# Patient Record
Sex: FEMALE | Race: WHITE | NOT HISPANIC OR LATINO | Employment: OTHER | URBAN - METROPOLITAN AREA
[De-identification: names, ages, dates, MRNs, and addresses within clinical notes are randomized per-mention and may not be internally consistent; named-entity substitution may affect disease eponyms.]

---

## 2017-02-01 ENCOUNTER — OFFICE VISIT (OUTPATIENT)
Dept: INTERNAL MEDICINE | Facility: CLINIC | Age: 67
End: 2017-02-01

## 2017-02-01 VITALS
HEIGHT: 58 IN | BODY MASS INDEX: 25.19 KG/M2 | SYSTOLIC BLOOD PRESSURE: 118 MMHG | DIASTOLIC BLOOD PRESSURE: 76 MMHG | WEIGHT: 120 LBS | TEMPERATURE: 98.3 F | HEART RATE: 72 BPM | RESPIRATION RATE: 16 BRPM

## 2017-02-01 DIAGNOSIS — I10 ESSENTIAL HYPERTENSION: Primary | ICD-10-CM

## 2017-02-01 DIAGNOSIS — K76.89 LIVER CYST: ICD-10-CM

## 2017-02-01 DIAGNOSIS — E06.3 HASHIMOTO'S THYROIDITIS: ICD-10-CM

## 2017-02-01 DIAGNOSIS — N28.1 KIDNEY CYSTS: ICD-10-CM

## 2017-02-01 DIAGNOSIS — Z00.00 PHYSICAL EXAM: ICD-10-CM

## 2017-02-01 LAB
ALBUMIN SERPL-MCNC: 4.3 G/DL (ref 3.2–4.8)
ALBUMIN/GLOB SERPL: 1.6 G/DL (ref 1.5–2.5)
ALP SERPL-CCNC: 73 U/L (ref 25–100)
ALT SERPL W P-5'-P-CCNC: 19 U/L (ref 7–40)
ANION GAP SERPL CALCULATED.3IONS-SCNC: 7 MMOL/L (ref 3–11)
ARTICHOKE IGE QN: 132 MG/DL (ref 0–130)
AST SERPL-CCNC: 23 U/L (ref 0–33)
BILIRUB SERPL-MCNC: 1 MG/DL (ref 0.3–1.2)
BUN BLD-MCNC: 29 MG/DL (ref 9–23)
BUN/CREAT SERPL: 26.4 (ref 7–25)
CALCIUM SPEC-SCNC: 10.3 MG/DL (ref 8.7–10.4)
CHLORIDE SERPL-SCNC: 105 MMOL/L (ref 99–109)
CHOLEST SERPL-MCNC: 233 MG/DL (ref 0–200)
CO2 SERPL-SCNC: 29 MMOL/L (ref 20–31)
CREAT BLD-MCNC: 1.1 MG/DL (ref 0.6–1.3)
GFR SERPL CREATININE-BSD FRML MDRD: 49 ML/MIN/1.73
GLOBULIN UR ELPH-MCNC: 2.7 GM/DL
GLUCOSE BLD-MCNC: 93 MG/DL (ref 70–100)
HCV AB SER DONR QL: NORMAL
HDLC SERPL-MCNC: 63 MG/DL (ref 40–60)
POTASSIUM BLD-SCNC: 3.9 MMOL/L (ref 3.5–5.5)
PROT SERPL-MCNC: 7 G/DL (ref 5.7–8.2)
SODIUM BLD-SCNC: 141 MMOL/L (ref 132–146)
TRIGL SERPL-MCNC: 96 MG/DL (ref 0–150)

## 2017-02-01 PROCEDURE — 99214 OFFICE O/P EST MOD 30 MIN: CPT | Performed by: INTERNAL MEDICINE

## 2017-02-01 PROCEDURE — 80053 COMPREHEN METABOLIC PANEL: CPT | Performed by: INTERNAL MEDICINE

## 2017-02-01 PROCEDURE — 80061 LIPID PANEL: CPT | Performed by: INTERNAL MEDICINE

## 2017-02-01 PROCEDURE — 86803 HEPATITIS C AB TEST: CPT | Performed by: INTERNAL MEDICINE

## 2017-02-01 PROCEDURE — 36415 COLL VENOUS BLD VENIPUNCTURE: CPT | Performed by: INTERNAL MEDICINE

## 2017-02-01 RX ORDER — LEVOTHYROXINE SODIUM 75 MCG
75 TABLET ORAL DAILY
Refills: 0 | COMMUNITY
Start: 2017-01-30 | End: 2021-05-20 | Stop reason: SDUPTHER

## 2017-02-01 NOTE — PROGRESS NOTES
Chief Complaint   Patient presents with   • EXTENDED FOLLOW UP       History of Present Illness      The patient presents for an established patient follow-up visit and a health maintenance visit. The patient has had a colonoscopy. The last colonoscopy was in 2016. Most recent colonoscopy findings: polyps. She has had a prior mammogram. Her last mammogram was in 2016 and it revealed no abnormalities. The patient states that she has gotten the pneumococcal vaccinations from [facility] and from another office. The patient states she has not had the zoster vaccination.    The patient presents for a follow-up related to hypertension. The patient reports that she has had palpitationsbut she denies having headaches, chest pain, dyspnea, edema, syncope or blurred vision. She states that she is taking her medication as prescribed. She reports that she is experiencing lightheadedness due to the medication. She checks her blood pressures at home. The home readings are low. She reports 3 episodes of hypotension, as low as 70/40.    As relates to hypothyroidism, the patient reports that she is fatigued. She reports no hair loss, heat intolerance, cold intolerance, diarrhea, constipation or sweats. She is taking her medication as prescribed her medication as prescribed. The patient has a thyroid nodule that is being monitored by Dr. Harris.    The patient states that she was diagnosed with liver and kidney cysts during an outside hospitalization. She reports this was nearly one year ago. The patient does have a family history of polycystic kidney disease.    Medications      Current Outpatient Prescriptions:   •  acetaminophen (TYLENOL) 500 MG tablet, Take 500 mg by mouth As Needed for mild pain (1-3)., Disp: , Rfl:   •  albuterol (PROVENTIL HFA;VENTOLIN HFA) 108 (90 BASE) MCG/ACT inhaler, Inhale 2 puffs every 4 (four) hours as needed for wheezing., Disp: 1 inhaler, Rfl: 5  •  aspirin 81 MG EC tablet, Take 81 mg by mouth daily.,  "Disp: , Rfl:   •  cycloSPORINE (RESTASIS) 0.05 % ophthalmic emulsion, Administer 2 drops to both eyes 2 (two) times a day., Disp: , Rfl:   •  diclofenac (VOLTAREN) 1 % gel gel, Apply 4 g topically 4 (four) times a day as needed., Disp: , Rfl:   •  fexofenadine (ALLEGRA) 180 MG tablet, Take 180 mg by mouth daily as needed., Disp: , Rfl:   •  fluticasone-salmeterol (ADVAIR) 100-50 MCG/DOSE DISKUS, Inhale 1 puff 2 (two) times a day., Disp: 60 each, Rfl: 5  •  losartan-hydrochlorothiazide (HYZAAR) 100-25 MG per tablet, Take 1 tablet by mouth Daily., Disp: 90 tablet, Rfl: 3  •  metoprolol succinate XL (TOPROL XL) 200 MG 24 hr tablet, Take 1 tablet by mouth Daily. Patient wants brand name, Disp: 90 tablet, Rfl: 3  •  SYNTHROID 75 MCG tablet, Take 1 tablet by mouth Daily., Disp: , Rfl: 0  •  Triamcinolone Acetonide (NASACORT) 55 MCG/ACT nasal inhaler, 2 sprays into each nostril daily., Disp: , Rfl:      Allergies    Allergies   Allergen Reactions   • Ace Inhibitors Cough   • Plaquenil [Hydroxychloroquine Sulfate] Other (See Comments)     EYE PROBLEMS   • Sudafed [Pseudoephedrine Hcl] Other (See Comments)     INCREASED PRESSURE IN EYES    • Tenormin [Atenolol] Cough       Problem List    Patient Active Problem List   Diagnosis   • Asthma   • Sinus tachycardia   • Hypertension   • Peripheral vascular disease   • GERD (gastroesophageal reflux disease)   • Diverticulosis   • Colon polyps   • Constipation   • Hashimoto's thyroiditis   • Mixed connective tissue disease   • Lupus   • Uterine fibroid   • Diverticulitis of small intestine   • Palpitations   • SVT (supraventricular tachycardia)       Physical Examination    Visit Vitals   • /76 (BP Location: Right arm, Patient Position: Sitting, Cuff Size: Adult)   • Pulse 72   • Temp 98.3 °F (36.8 °C) (Temporal Artery )   • Resp 16   • Ht 58.25\" (148 cm)   • Wt 120 lb (54.4 kg)   • LMP  (LMP Unknown)  Comment: 2 YEARS AGO BY DR PLATT   • Breastfeeding No   • BMI 24.87 kg/m2 "       HEENT: Head- Normocephalic Atraumatic. Facies- Within normal limits. Pinnas- Normal texture and shape bilaterally. Canals- Normal bilaterally. TMs- Normal bilaterally. Nares- Patent bilaterally. Nasal Septum- is normal. Lids- Normal bilaterally. Conjunctiva- Clear bilaterally. Sclera- Anicteric bilaterally. Oropharynx- Moist with no lesions. Tonsils- No enlargement, erythema or exudate.    Neck: Thyroid- non enlarged, symmetric and has no nodules. No bruits are detected. ROM- Normal Range of Motion with no rigidity.    Lymph Nodes: Cervical- no enlarged lymph nodes noted. Clavicular- Deferred. Axillary- Deferred. Inguinal- Deferred.    Lungs: Auscultation- Clear to auscultation bilaterally. There are no retractions, clubbing or cyanosis. The Expiratory to Inspiratory ratio is equal.    Cardiovascular: There are no carotid bruits. Heart- Normal Rate with Regular rhythm and no murmurs. There are no gallops. There are no rubs. In the lower extremities there is no edema. The upper extremities do not have edema.    Abdomen: Noted to have scarringbut is not noted to have rigidity, a mass, a hernia, an enlarged liver, an enlarged spleen, an enlarged right kidney, an enlarged left kidney, tenderness or pulsatile mass. The scarring is located in the right lower quadrant and pelvic midline.    Breast: Normal contours bilaterally with no masses, discharge, skin changes or lumps. There are no scars noted.    The patient has no worrisome or suspicious skin lesions noted.    Impression and Assessment    Normal Physical Examination.    Renal cyst     Liver cyst     Hypertension.     Hypothyroidism.     Plan    Liver cyst Plan: Further plans will be made after the tests are reviewed.    Hypertension Plan: The medications were changed as noted.    Hypothyroidism Plan: Dr. Harris obtained TSH a few days ago and adjusted her medications accordingly.    Renal cyst Plan: Further plans will be made after test results are  available.    Counseling was provided regarding: Adequate Exercise and Healthy Diet.    The following was ordered for screening and health maintenance: Hepatitis C Antibody and Lipid Profile.       Immunizations Ordered and Administered: None.    Kavitha was seen today for extended follow up.    Diagnoses and all orders for this visit:    Essential hypertension  -     Comprehensive Metabolic Panel    Hashimoto's thyroiditis    Physical exam  -     Hepatitis C Antibody  -     Lipid Panel    Liver cyst  -     CT Abdomen With Contrast; Future    Kidney cysts  -     CT Abdomen With Contrast; Future        Return to Office    The patient was instructed to return for follow-up in 6 months. Next Visit: Follow-up.    The patient was instructed to return sooner if the condition changes, worsens, or doesn't resolve.

## 2017-02-03 ENCOUNTER — TELEPHONE (OUTPATIENT)
Dept: INTERNAL MEDICINE | Facility: CLINIC | Age: 67
End: 2017-02-03

## 2017-02-03 RX ORDER — LOSARTAN POTASSIUM AND HYDROCHLOROTHIAZIDE 12.5; 1 MG/1; MG/1
1 TABLET ORAL DAILY
Qty: 30 TABLET | Refills: 5 | Status: SHIPPED | OUTPATIENT
Start: 2017-02-03 | End: 2017-07-31 | Stop reason: SDUPTHER

## 2017-02-03 NOTE — TELEPHONE ENCOUNTER
Ok she stated that it was suppose to be decreased from 100/25 to the 100/12.5 she thought that he was going to send this in yesterday

## 2017-02-03 NOTE — TELEPHONE ENCOUNTER
----- Message from Ragini Burt sent at 2/3/2017  2:53 PM EST -----  Contact: self  Was under the assumption that you were going to call in losartan-hydrochlorothiazide (HYZAAR) 100-12.5 MG per tablet?    Call back number [phone]

## 2017-02-06 ENCOUNTER — HOSPITAL ENCOUNTER (OUTPATIENT)
Dept: CT IMAGING | Facility: HOSPITAL | Age: 67
Discharge: HOME | End: 2017-02-06
Attending: INTERNAL MEDICINE | Admitting: INTERNAL MEDICINE

## 2017-02-06 DIAGNOSIS — N28.1 KIDNEY CYSTS: ICD-10-CM

## 2017-02-06 DIAGNOSIS — K76.89 LIVER CYST: ICD-10-CM

## 2017-02-06 PROCEDURE — 74160 CT ABDOMEN W/CONTRAST: CPT

## 2017-02-06 PROCEDURE — 0 IOPAMIDOL 61 % SOLUTION: Performed by: INTERNAL MEDICINE

## 2017-02-06 RX ADMIN — IOPAMIDOL 75 ML: 612 INJECTION, SOLUTION INTRAVENOUS at 14:55

## 2017-03-03 ENCOUNTER — OFFICE VISIT (OUTPATIENT)
Dept: CARDIOLOGY | Facility: CLINIC | Age: 67
End: 2017-03-03

## 2017-03-03 VITALS
SYSTOLIC BLOOD PRESSURE: 130 MMHG | HEIGHT: 59 IN | BODY MASS INDEX: 25 KG/M2 | WEIGHT: 124 LBS | DIASTOLIC BLOOD PRESSURE: 88 MMHG | HEART RATE: 78 BPM

## 2017-03-03 DIAGNOSIS — I10 ESSENTIAL HYPERTENSION: ICD-10-CM

## 2017-03-03 DIAGNOSIS — R00.2 PALPITATIONS: Primary | ICD-10-CM

## 2017-03-03 PROCEDURE — 99213 OFFICE O/P EST LOW 20 MIN: CPT | Performed by: INTERNAL MEDICINE

## 2017-03-03 RX ORDER — CELECOXIB 100 MG/1
100 CAPSULE ORAL 2 TIMES DAILY PRN
COMMUNITY
End: 2017-11-07 | Stop reason: SDUPTHER

## 2017-03-03 NOTE — PROGRESS NOTES
Encounter Date:2017      Patient ID: Kavitha Barnhart is a 66 y.o. female.    Chief Complaint: Follow-up of palpitations and hypertension    Problem List:  1. Palpitations/possible atrial tachycardia:  a. Patient was admitted for diverticulitis complicated by bowel perforation on 2016, and while in the hospital began having palpitations and shortness of breath that showed possible atrial tachycardia versus atrial fibrillation.   b. Patient reports a long-standing history of palpitations and tachycardia.   c. Echocardiogram at [facility], 01/10/2016, showed LVH with ejection fraction of 60% to 65%. No significant valve disease.   d. A 24-hour Holter, 2016, showed sinus rhythm with average heart rate of 85. Rare PVCs and PACs with occasional short runs of SVT/PAD. No symptoms were reported.   2. Hypertension.   3. Diverticulitis complicated by perforated sigmoid colon:  a. Currently awaiting surgery at the beginning of 2016. He was hospitalized at [facility], 2016 for approximately 2 weeks and treated with antibiotics.   4. Lupus and mixed connective tissue disease.   5. Raynaud’s disease.  6. Sjögren's disease.  7. Hashimoto disease.  8. Surgical history:  a. .  b. Knee surgery.    History of Present Illness     Mrs. Price is a 66 yr old white female with the above noted medical history who presents today for 3 month follow-up of her palpitations and hypertension.  She continues to have occasional episodes of palpitations that are not bothersome.  Her blood pressure has been fairly well- controlled at home.  Dr. Reddnig decreased the HCTZ dose in her Hyzaar due to episodes of dizziness. She denies any symptoms of chest pain, shortness of breath, syncope, PND or orthopnea.  She has mild edema. She recently had an abnormal TSH and her medications were adjusted.    Allergies   Allergen Reactions   • Ace Inhibitors Cough   • Plaquenil  [Hydroxychloroquine Sulfate] Other (See Comments)     EYE PROBLEMS   • Sudafed [Pseudoephedrine Hcl] Other (See Comments)     INCREASED PRESSURE IN EYES    • Tenormin [Atenolol] Cough         Current Outpatient Prescriptions:   •  acetaminophen (TYLENOL) 500 MG tablet, Take 500 mg by mouth As Needed for mild pain (1-3)., Disp: , Rfl:   •  albuterol (PROVENTIL HFA;VENTOLIN HFA) 108 (90 BASE) MCG/ACT inhaler, Inhale 2 puffs every 4 (four) hours as needed for wheezing., Disp: 1 inhaler, Rfl: 5  •  aspirin 81 MG EC tablet, Take 81 mg by mouth daily., Disp: , Rfl:   •  celecoxib (CeleBREX) 100 MG capsule, Take 100 mg by mouth Daily As Needed for mild pain (1-3)., Disp: , Rfl:   •  cycloSPORINE (RESTASIS) 0.05 % ophthalmic emulsion, Administer 1 drop to both eyes 2 (Two) Times a Day., Disp: , Rfl:   •  diclofenac (VOLTAREN) 1 % gel gel, Apply 4 g topically 4 (four) times a day as needed., Disp: , Rfl:   •  fexofenadine (ALLEGRA) 180 MG tablet, Take 180 mg by mouth daily as needed., Disp: , Rfl:   •  fluticasone-salmeterol (ADVAIR) 100-50 MCG/DOSE DISKUS, Inhale 1 puff 2 (two) times a day., Disp: 60 each, Rfl: 5  •  losartan-hydrochlorothiazide (HYZAAR) 100-12.5 MG per tablet, Take 1 tablet by mouth Daily., Disp: 30 tablet, Rfl: 5  •  metoprolol succinate XL (TOPROL XL) 200 MG 24 hr tablet, Take 1 tablet by mouth Daily. Patient wants brand name, Disp: 90 tablet, Rfl: 3  •  SYNTHROID 75 MCG tablet, Take 75 mcg by mouth Daily., Disp: , Rfl: 0  •  Triamcinolone Acetonide (NASACORT) 55 MCG/ACT nasal inhaler, 2 sprays into each nostril daily., Disp: , Rfl:     The following portions of the patient's history were reviewed and updated as appropriate: allergies, current medications, past family history, past medical history, past social history, past surgical history and problem list.    Review of Systems   Constitution: Negative.   HENT: Negative.    Eyes: Negative.    Cardiovascular: Positive for leg swelling and palpitations.  "Negative for chest pain, dyspnea on exertion, irregular heartbeat, near-syncope, orthopnea, paroxysmal nocturnal dyspnea and syncope.   Respiratory: Negative.    Endocrine: Negative.    Hematologic/Lymphatic: Negative.    Skin: Negative.    Musculoskeletal: Positive for arthritis, joint pain and joint swelling.   Gastrointestinal: Negative.    Genitourinary: Negative.    Neurological: Positive for dizziness.   Psychiatric/Behavioral: Negative.        Objective:     Blood pressure 130/88, pulse 78, height 59\" (149.9 cm), weight 124 lb (56.2 kg),       Physical Exam   Constitutional: She is oriented to person, place, and time. She appears well-developed and well-nourished.   HENT:   Head: Normocephalic and atraumatic.   Neck: Normal range of motion. No thyromegaly present.   Cardiovascular: Normal rate, regular rhythm, normal heart sounds and intact distal pulses.  Exam reveals no gallop and no friction rub.    No murmur heard.  Pulmonary/Chest: Effort normal and breath sounds normal. No respiratory distress. She has no wheezes. She has no rales.   Abdominal: Soft. Bowel sounds are normal.   Musculoskeletal: Normal range of motion. She exhibits no edema.   Neurological: She is alert and oriented to person, place, and time.   Skin: Skin is warm and dry.   Psychiatric: She has a normal mood and affect. Her behavior is normal. Judgment and thought content normal.   Nursing note and vitals reviewed.      Lab Review:     Procedures        Assessment:        Diagnosis Plan   1. Palpitations     2. Essential hypertension         Plan:      Continue current medications.   FU in 12 MO, sooner as needed.  Thank you for allowing us to participate in the care of your patient.     Scribed for Naveen Sarmiento MD by Cielo Ghotra RN. 3/3/2017  3:44 PM     I, Naveen Sarmiento MD, personally performed the services described in this documentation as scribed by the above named individual in my presence, and it is both accurate and complete.  " 3/3/2017  3:57 PM

## 2017-04-07 ENCOUNTER — TRANSCRIBE ORDERS (OUTPATIENT)
Dept: MAMMOGRAPHY | Facility: HOSPITAL | Age: 67
End: 2017-04-07

## 2017-04-07 DIAGNOSIS — Z12.31 VISIT FOR SCREENING MAMMOGRAM: Primary | ICD-10-CM

## 2017-04-21 ENCOUNTER — HOSPITAL ENCOUNTER (OUTPATIENT)
Dept: MAMMOGRAPHY | Facility: HOSPITAL | Age: 67
Discharge: HOME | End: 2017-04-21
Attending: OBSTETRICS & GYNECOLOGY | Admitting: OBSTETRICS & GYNECOLOGY

## 2017-04-21 DIAGNOSIS — Z12.31 VISIT FOR SCREENING MAMMOGRAM: ICD-10-CM

## 2017-04-21 PROCEDURE — 77063 BREAST TOMOSYNTHESIS BI: CPT | Performed by: RADIOLOGY

## 2017-04-21 PROCEDURE — G0202 SCR MAMMO BI INCL CAD: HCPCS | Performed by: RADIOLOGY

## 2017-04-21 PROCEDURE — G0202 SCR MAMMO BI INCL CAD: HCPCS

## 2017-04-21 PROCEDURE — 77063 BREAST TOMOSYNTHESIS BI: CPT

## 2017-05-03 ENCOUNTER — OFFICE VISIT (OUTPATIENT)
Dept: INTERNAL MEDICINE | Facility: CLINIC | Age: 67
End: 2017-05-03

## 2017-05-03 VITALS
TEMPERATURE: 99.5 F | HEART RATE: 72 BPM | DIASTOLIC BLOOD PRESSURE: 82 MMHG | WEIGHT: 126 LBS | SYSTOLIC BLOOD PRESSURE: 144 MMHG | BODY MASS INDEX: 25.45 KG/M2 | RESPIRATION RATE: 14 BRPM

## 2017-05-03 DIAGNOSIS — J45.20 MILD INTERMITTENT ASTHMA WITHOUT COMPLICATION: ICD-10-CM

## 2017-05-03 DIAGNOSIS — I10 ESSENTIAL HYPERTENSION: Primary | ICD-10-CM

## 2017-05-03 DIAGNOSIS — K21.9 GASTROESOPHAGEAL REFLUX DISEASE WITHOUT ESOPHAGITIS: ICD-10-CM

## 2017-05-03 LAB
ALBUMIN SERPL-MCNC: 4.2 G/DL (ref 3.2–4.8)
ALBUMIN/GLOB SERPL: 1.6 G/DL (ref 1.5–2.5)
ALP SERPL-CCNC: 66 U/L (ref 25–100)
ALT SERPL W P-5'-P-CCNC: 28 U/L (ref 7–40)
ANION GAP SERPL CALCULATED.3IONS-SCNC: 5 MMOL/L (ref 3–11)
AST SERPL-CCNC: 27 U/L (ref 0–33)
BILIRUB SERPL-MCNC: 0.7 MG/DL (ref 0.3–1.2)
BUN BLD-MCNC: 29 MG/DL (ref 9–23)
BUN/CREAT SERPL: 29 (ref 7–25)
CALCIUM SPEC-SCNC: 10 MG/DL (ref 8.7–10.4)
CHLORIDE SERPL-SCNC: 107 MMOL/L (ref 99–109)
CO2 SERPL-SCNC: 30 MMOL/L (ref 20–31)
CREAT BLD-MCNC: 1 MG/DL (ref 0.6–1.3)
GFR SERPL CREATININE-BSD FRML MDRD: 55 ML/MIN/1.73
GLOBULIN UR ELPH-MCNC: 2.6 GM/DL
GLUCOSE BLD-MCNC: 95 MG/DL (ref 70–100)
POTASSIUM BLD-SCNC: 4.4 MMOL/L (ref 3.5–5.5)
PROT SERPL-MCNC: 6.8 G/DL (ref 5.7–8.2)
SODIUM BLD-SCNC: 142 MMOL/L (ref 132–146)

## 2017-05-03 PROCEDURE — 36415 COLL VENOUS BLD VENIPUNCTURE: CPT | Performed by: INTERNAL MEDICINE

## 2017-05-03 PROCEDURE — 99214 OFFICE O/P EST MOD 30 MIN: CPT | Performed by: INTERNAL MEDICINE

## 2017-05-03 PROCEDURE — 80053 COMPREHEN METABOLIC PANEL: CPT | Performed by: INTERNAL MEDICINE

## 2017-05-03 RX ORDER — LOTEPREDNOL ETABONATE 5 MG/G
1 GEL OPHTHALMIC DAILY PRN
COMMUNITY
Start: 2017-04-18 | End: 2017-11-07

## 2017-07-31 RX ORDER — LOSARTAN POTASSIUM AND HYDROCHLOROTHIAZIDE 12.5; 1 MG/1; MG/1
TABLET ORAL
Qty: 30 TABLET | Refills: 5 | Status: SHIPPED | OUTPATIENT
Start: 2017-07-31 | End: 2018-02-13 | Stop reason: SDUPTHER

## 2017-11-07 ENCOUNTER — OFFICE VISIT (OUTPATIENT)
Dept: INTERNAL MEDICINE | Facility: CLINIC | Age: 67
End: 2017-11-07

## 2017-11-07 VITALS
TEMPERATURE: 97.1 F | HEART RATE: 64 BPM | WEIGHT: 131 LBS | BODY MASS INDEX: 26.46 KG/M2 | DIASTOLIC BLOOD PRESSURE: 82 MMHG | RESPIRATION RATE: 16 BRPM | SYSTOLIC BLOOD PRESSURE: 146 MMHG

## 2017-11-07 DIAGNOSIS — I10 ESSENTIAL HYPERTENSION: Primary | ICD-10-CM

## 2017-11-07 DIAGNOSIS — K21.9 GASTROESOPHAGEAL REFLUX DISEASE WITHOUT ESOPHAGITIS: ICD-10-CM

## 2017-11-07 DIAGNOSIS — J45.20 MILD INTERMITTENT ASTHMA WITHOUT COMPLICATION: ICD-10-CM

## 2017-11-07 LAB
ALBUMIN SERPL-MCNC: 4.1 G/DL (ref 3.2–4.8)
ALBUMIN/GLOB SERPL: 1.8 G/DL (ref 1.5–2.5)
ALP SERPL-CCNC: 74 U/L (ref 25–100)
ALT SERPL W P-5'-P-CCNC: 26 U/L (ref 7–40)
ANION GAP SERPL CALCULATED.3IONS-SCNC: 7 MMOL/L (ref 3–11)
AST SERPL-CCNC: 24 U/L (ref 0–33)
BILIRUB SERPL-MCNC: 0.7 MG/DL (ref 0.3–1.2)
BUN BLD-MCNC: 24 MG/DL (ref 9–23)
BUN/CREAT SERPL: 20 (ref 7–25)
CALCIUM SPEC-SCNC: 9.6 MG/DL (ref 8.7–10.4)
CHLORIDE SERPL-SCNC: 107 MMOL/L (ref 99–109)
CO2 SERPL-SCNC: 28 MMOL/L (ref 20–31)
CREAT BLD-MCNC: 1.2 MG/DL (ref 0.6–1.3)
GFR SERPL CREATININE-BSD FRML MDRD: 45 ML/MIN/1.73
GLOBULIN UR ELPH-MCNC: 2.3 GM/DL
GLUCOSE BLD-MCNC: 103 MG/DL (ref 70–100)
POTASSIUM BLD-SCNC: 4 MMOL/L (ref 3.5–5.5)
PROT SERPL-MCNC: 6.4 G/DL (ref 5.7–8.2)
SODIUM BLD-SCNC: 142 MMOL/L (ref 132–146)

## 2017-11-07 PROCEDURE — 36415 COLL VENOUS BLD VENIPUNCTURE: CPT | Performed by: INTERNAL MEDICINE

## 2017-11-07 PROCEDURE — 99214 OFFICE O/P EST MOD 30 MIN: CPT | Performed by: INTERNAL MEDICINE

## 2017-11-07 PROCEDURE — 80053 COMPREHEN METABOLIC PANEL: CPT | Performed by: INTERNAL MEDICINE

## 2017-11-07 RX ORDER — CELECOXIB 100 MG/1
100 CAPSULE ORAL 2 TIMES DAILY PRN
Qty: 60 CAPSULE | Refills: 5 | Status: SHIPPED | OUTPATIENT
Start: 2017-11-07 | End: 2019-02-26 | Stop reason: SDUPTHER

## 2017-11-07 RX ORDER — ALBUTEROL SULFATE 90 UG/1
2 AEROSOL, METERED RESPIRATORY (INHALATION) EVERY 4 HOURS PRN
Qty: 1 INHALER | Refills: 5 | Status: SHIPPED | OUTPATIENT
Start: 2017-11-07 | End: 2019-02-26 | Stop reason: SDUPTHER

## 2017-11-07 NOTE — PROGRESS NOTES
Chief Complaint   Patient presents with   • Follow-up     6 month follow up chronic medical problems       History of Present Illness    The patient presents for a follow-up related to hypertension. The patient reports that she has had no headaches, chest pain, dyspnea, edema, syncope, blurred vision or palpitations. She states that she is taking her medication as prescribed. She is not having medication side effects.    The patient is not on medication for her gastroesophageal reflux. She reports no abdominal pain, belching, diarrhea, dysphagia, early satiety, heartburn, hoarseness, nausea, odynophagia, rectal bleeding, vomiting or weight loss. The GERD has no known aggravating factors.    As relates to the patient's asthma, rescue inhaler usage is reported to be daily. The patient reports that she has no known triggers. Since the last office visit she has not sought emergency care. She currently reports no cough or sputum production. The patient is using an inhaled steroid. She does rinse her mouth after using her steroid inhaler. She doesn't report thrush symptoms or a sore mouth.    Review of Systems    CARDIOVASCULAR- Denies Claudication.    PULMONARY- Denies Hemoptysis or Pleuritic Chest Pain.    Medications      Current Outpatient Prescriptions:   •  acetaminophen (TYLENOL) 500 MG tablet, Take 500 mg by mouth As Needed for mild pain (1-3)., Disp: , Rfl:   •  albuterol (PROVENTIL HFA;VENTOLIN HFA) 108 (90 Base) MCG/ACT inhaler, Inhale 2 puffs Every 4 (Four) Hours As Needed for Wheezing., Disp: 1 inhaler, Rfl: 5  •  aspirin 81 MG EC tablet, Take 81 mg by mouth daily., Disp: , Rfl:   •  celecoxib (CeleBREX) 100 MG capsule, Take 1 capsule by mouth 2 (Two) Times a Day As Needed for Mild Pain ., Disp: 60 capsule, Rfl: 5  •  cycloSPORINE (RESTASIS) 0.05 % ophthalmic emulsion, Administer 1 drop to both eyes 2 (Two) Times a Day., Disp: , Rfl:   •  diclofenac (VOLTAREN) 1 % gel gel, Apply 4 g topically 4 (four) times a  day as needed., Disp: , Rfl:   •  fexofenadine (ALLEGRA) 180 MG tablet, Take 180 mg by mouth daily as needed., Disp: , Rfl:   •  fluticasone-salmeterol (ADVAIR) 100-50 MCG/DOSE DISKUS, Inhale 1 puff 2 (Two) Times a Day., Disp: 60 each, Rfl: 5  •  losartan-hydrochlorothiazide (HYZAAR) 100-12.5 MG per tablet, take 1 tablet by mouth daily, Disp: 30 tablet, Rfl: 5  •  metoprolol succinate XL (TOPROL XL) 200 MG 24 hr tablet, Take 1 tablet by mouth Daily. Patient wants brand name, Disp: 90 tablet, Rfl: 3  •  SYNTHROID 75 MCG tablet, Take 75 mcg by mouth Daily., Disp: , Rfl: 0  •  Triamcinolone Acetonide (NASACORT) 55 MCG/ACT nasal inhaler, 2 sprays into each nostril daily., Disp: , Rfl:      Allergies    Allergies   Allergen Reactions   • Ace Inhibitors Cough   • Plaquenil [Hydroxychloroquine Sulfate] Other (See Comments)     EYE PROBLEMS   • Sudafed [Pseudoephedrine Hcl] Other (See Comments)     INCREASED PRESSURE IN EYES    • Tenormin [Atenolol] Cough       Problem List    Patient Active Problem List   Diagnosis   • Asthma   • Hypertension   • Peripheral vascular disease   • GERD (gastroesophageal reflux disease)   • Diverticulosis   • Colon polyps   • Constipation   • Hashimoto's thyroiditis   • Mixed connective tissue disease   • Lupus   • Uterine fibroid       Medications, Allergies, Problems List and Past History were reviewed and updated.    Physical Examination    /82 (BP Location: Left arm, Patient Position: Sitting, Cuff Size: Adult)  Pulse 64  Temp 97.1 °F (36.2 °C) (Temporal Artery )   Resp 16  Wt 131 lb (59.4 kg)  LMP  (LMP Unknown) Comment: 2 YEARS AGO BY DR PLATT  Breastfeeding? No  BMI 26.46 kg/m2    HEENT: Head- Normocephalic Atraumatic. Facies- Within normal limits. Pinnas- Normal texture and shape bilaterally. Canals- Normal bilaterally. TMs- Normal bilaterally. Nares- Patent bilaterally. Nasal Septum- is normal. There is no tenderness to palpation over the frontal or maxillary sinuses.  Lids- Normal bilaterally. Conjunctiva- Clear bilaterally. Sclera- Anicteric bilaterally. Oropharynx- Moist with no lesions. Tonsils- No enlargement, erythema or exudate.    Neck: Thyroid- non enlarged, symmetric and has no nodules. No bruits are detected. ROM- Normal Range of Motion with no rigidity.    Lungs: Auscultation- Clear to auscultation bilaterally. There are no retractions, clubbing or cyanosis. The Expiratory to Inspiratory ratio is equal.    Cardiovascular: There are no carotid bruits. Heart- Normal Rate with Regular rhythm and no murmurs. There are no gallops. There are no rubs. In the lower extremities there is no edema. The upper extremities do not have edema.    Abdomen: Soft, benign, non-tender with no masses, hernias, organomegaly or scars.    Impression and Assessment    Essential Hypertension.    Gastroesophageal Reflux Disease.    Asthma.    Plan    Gastroesophageal Reflux Disease Plan: The condition is stable. No change is needed in the current plan.    Essential Hypertension Plan: The current plan was continued.    Asthma Plan: The current plan was continued.    Kavitha was seen today for follow-up.    Diagnoses and all orders for this visit:    Essential hypertension  -     Comprehensive Metabolic Panel    Mild intermittent asthma without complication  -     fluticasone-salmeterol (ADVAIR) 100-50 MCG/DOSE DISKUS; Inhale 1 puff 2 (Two) Times a Day.  -     Comprehensive Metabolic Panel    Gastroesophageal reflux disease without esophagitis  -     Comprehensive Metabolic Panel    Other orders  -     albuterol (PROVENTIL HFA;VENTOLIN HFA) 108 (90 Base) MCG/ACT inhaler; Inhale 2 puffs Every 4 (Four) Hours As Needed for Wheezing.  -     celecoxib (CeleBREX) 100 MG capsule; Take 1 capsule by mouth 2 (Two) Times a Day As Needed for Mild Pain .        Return to Office    The patient was instructed to return for follow-up in 4 months. Next Visit: Physical.    The patient was instructed to return sooner  if the condition changes, worsens, or doesn't resolve.

## 2017-12-08 RX ORDER — METOPROLOL SUCCINATE 200 MG
TABLET, EXTENDED RELEASE 24 HR ORAL
Qty: 90 TABLET | Refills: 3 | Status: SHIPPED | OUTPATIENT
Start: 2017-12-08 | End: 2018-12-31 | Stop reason: SDUPTHER

## 2018-02-13 RX ORDER — LOSARTAN POTASSIUM AND HYDROCHLOROTHIAZIDE 12.5; 1 MG/1; MG/1
TABLET ORAL
Qty: 30 TABLET | Refills: 5 | Status: SHIPPED | OUTPATIENT
Start: 2018-02-13 | End: 2018-08-23 | Stop reason: SDUPTHER

## 2018-02-19 ENCOUNTER — OFFICE VISIT (OUTPATIENT)
Dept: INTERNAL MEDICINE | Facility: CLINIC | Age: 68
End: 2018-02-19

## 2018-02-19 VITALS
DIASTOLIC BLOOD PRESSURE: 76 MMHG | RESPIRATION RATE: 16 BRPM | WEIGHT: 132 LBS | SYSTOLIC BLOOD PRESSURE: 124 MMHG | BODY MASS INDEX: 26.66 KG/M2 | TEMPERATURE: 98.6 F | HEART RATE: 68 BPM

## 2018-02-19 DIAGNOSIS — S61.211A LACERATION OF LEFT INDEX FINGER WITHOUT FOREIGN BODY, NAIL DAMAGE STATUS UNSPECIFIED, INITIAL ENCOUNTER: Primary | ICD-10-CM

## 2018-02-19 PROCEDURE — 99212 OFFICE O/P EST SF 10 MIN: CPT | Performed by: INTERNAL MEDICINE

## 2018-02-19 PROCEDURE — 90715 TDAP VACCINE 7 YRS/> IM: CPT | Performed by: INTERNAL MEDICINE

## 2018-02-19 PROCEDURE — 90471 IMMUNIZATION ADMIN: CPT | Performed by: INTERNAL MEDICINE

## 2018-02-19 RX ORDER — ESTRADIOL 10 UG/1
1 INSERT VAGINAL 2 TIMES WEEKLY
Refills: 12 | COMMUNITY
Start: 2018-02-06 | End: 2020-07-07

## 2018-02-19 NOTE — PROGRESS NOTES
Chief Complaint   Patient presents with   • CUT TIP INDEX FINGER LEFT HAND     DONE 2/16/18        History of Present Illness    The patient presents after sustaining a laceration.    She presents for an initial evaluation with a laceration on the left second finger. This has been present for four days. The condition is non-painful. The patient has not had a tetanus shot in the past five years. No prior treatment has been administered. There is no evidence of infection.    Medications      Current Outpatient Prescriptions:   •  acetaminophen (TYLENOL) 500 MG tablet, Take 500 mg by mouth As Needed for mild pain (1-3)., Disp: , Rfl:   •  albuterol (PROVENTIL HFA;VENTOLIN HFA) 108 (90 Base) MCG/ACT inhaler, Inhale 2 puffs Every 4 (Four) Hours As Needed for Wheezing., Disp: 1 inhaler, Rfl: 5  •  aspirin 81 MG EC tablet, Take 81 mg by mouth daily., Disp: , Rfl:   •  celecoxib (CeleBREX) 100 MG capsule, Take 1 capsule by mouth 2 (Two) Times a Day As Needed for Mild Pain ., Disp: 60 capsule, Rfl: 5  •  cycloSPORINE (RESTASIS) 0.05 % ophthalmic emulsion, Administer 1 drop to both eyes 2 (Two) Times a Day., Disp: , Rfl:   •  diclofenac (VOLTAREN) 1 % gel gel, Apply 4 g topically 4 (four) times a day as needed., Disp: , Rfl:   •  estradiol (VAGIFEM) 10 MCG tablet vaginal tablet, Insert 1 tablet into the vagina 2 (Two) Times a Week., Disp: , Rfl: 12  •  fexofenadine (ALLEGRA) 180 MG tablet, Take 180 mg by mouth daily as needed., Disp: , Rfl:   •  fluticasone-salmeterol (ADVAIR) 100-50 MCG/DOSE DISKUS, Inhale 1 puff 2 (Two) Times a Day., Disp: 60 each, Rfl: 5  •  losartan-hydrochlorothiazide (HYZAAR) 100-12.5 MG per tablet, take 1 tablet by mouth once daily, Disp: 30 tablet, Rfl: 5  •  SYNTHROID 75 MCG tablet, Take 75 mcg by mouth Daily., Disp: , Rfl: 0  •  TOPROL  MG 24 hr tablet, take 1 tablet by mouth once daily, Disp: 90 tablet, Rfl: 3  •  Triamcinolone Acetonide (NASACORT) 55 MCG/ACT nasal inhaler, 2 sprays into each  nostril daily., Disp: , Rfl:      Allergies    Allergies   Allergen Reactions   • Ace Inhibitors Cough   • Plaquenil [Hydroxychloroquine Sulfate] Other (See Comments)     EYE PROBLEMS   • Sudafed [Pseudoephedrine Hcl] Other (See Comments)     INCREASED PRESSURE IN EYES    • Tenormin [Atenolol] Cough       Problem List    Patient Active Problem List   Diagnosis   • Asthma   • Hypertension   • Peripheral vascular disease   • GERD (gastroesophageal reflux disease)   • Diverticulosis   • Colon polyps   • Constipation   • Hashimoto's thyroiditis   • Mixed connective tissue disease   • Lupus   • Uterine fibroid       Medications, Allergies, Problems List and Past History were reviewed and updated.    Physical Examination    /76 (BP Location: Right arm, Patient Position: Sitting, Cuff Size: Adult)  Pulse 68  Temp 98.6 °F (37 °C) (Temporal Artery )   Resp 16  Wt 59.9 kg (132 lb)  LMP  (LMP Unknown) Comment: 2 YEARS AGO BY DR PALTT  Breastfeeding? No  BMI 26.66 kg/m2    The patient has a laceration on the left second finger. The laceration is pale, black and pink. The affected skin is lacerated in a curved fashion    Impression and Assessment    Laceration.    Encounter for Immunization Administration.    Plan    Laceration Plan: Skin care was discussed with the patient.    Counseled regarding immunizations and applicable VIS given.    Immunizations Ordered and Administered: Tdap.    Kavitha was seen today for cut tip index finger left hand.    Diagnoses and all orders for this visit:    Laceration of left index finger without foreign body, nail damage status unspecified, initial encounter  -     Tdap Vaccine Greater Than or Equal To 8yo IM        Return to Office    The patient was instructed to return for follow-up at the next scheduled visit.    The patient was instructed to return sooner if the condition changes, worsens, or doesn't resolve.

## 2018-02-28 ENCOUNTER — OFFICE VISIT (OUTPATIENT)
Dept: INTERNAL MEDICINE | Facility: CLINIC | Age: 68
End: 2018-02-28

## 2018-02-28 VITALS
HEART RATE: 72 BPM | TEMPERATURE: 96.9 F | WEIGHT: 133 LBS | HEIGHT: 55 IN | DIASTOLIC BLOOD PRESSURE: 78 MMHG | BODY MASS INDEX: 30.78 KG/M2 | SYSTOLIC BLOOD PRESSURE: 120 MMHG

## 2018-02-28 DIAGNOSIS — N39.0 URINARY TRACT INFECTION WITH HEMATURIA, SITE UNSPECIFIED: ICD-10-CM

## 2018-02-28 DIAGNOSIS — R31.9 URINARY TRACT INFECTION WITH HEMATURIA, SITE UNSPECIFIED: ICD-10-CM

## 2018-02-28 DIAGNOSIS — R31.9 HEMATURIA, UNSPECIFIED TYPE: Primary | ICD-10-CM

## 2018-02-28 DIAGNOSIS — Z23 NEED FOR PNEUMOCOCCAL VACCINE: ICD-10-CM

## 2018-02-28 DIAGNOSIS — K21.9 GASTROESOPHAGEAL REFLUX DISEASE WITHOUT ESOPHAGITIS: ICD-10-CM

## 2018-02-28 DIAGNOSIS — E03.9 HYPOTHYROIDISM, UNSPECIFIED TYPE: ICD-10-CM

## 2018-02-28 DIAGNOSIS — I10 ESSENTIAL HYPERTENSION: ICD-10-CM

## 2018-02-28 LAB
ALBUMIN SERPL-MCNC: 4.2 G/DL (ref 3.2–4.8)
ALBUMIN/GLOB SERPL: 1.9 G/DL (ref 1.5–2.5)
ALP SERPL-CCNC: 73 U/L (ref 25–100)
ALT SERPL W P-5'-P-CCNC: 21 U/L (ref 7–40)
ANION GAP SERPL CALCULATED.3IONS-SCNC: 8 MMOL/L (ref 3–11)
AST SERPL-CCNC: 19 U/L (ref 0–33)
BACTERIA UR QL AUTO: ABNORMAL /HPF
BILIRUB BLD-MCNC: NEGATIVE MG/DL
BILIRUB SERPL-MCNC: 0.6 MG/DL (ref 0.3–1.2)
BUN BLD-MCNC: 28 MG/DL (ref 9–23)
BUN/CREAT SERPL: 23.3 (ref 7–25)
CALCIUM SPEC-SCNC: 9.1 MG/DL (ref 8.7–10.4)
CHLORIDE SERPL-SCNC: 106 MMOL/L (ref 99–109)
CLARITY, POC: ABNORMAL
CO2 SERPL-SCNC: 26 MMOL/L (ref 20–31)
COLOR UR: YELLOW
CREAT BLD-MCNC: 1.2 MG/DL (ref 0.6–1.3)
EXPIRATION DATE: ABNORMAL
GFR SERPL CREATININE-BSD FRML MDRD: 45 ML/MIN/1.73
GLOBULIN UR ELPH-MCNC: 2.2 GM/DL
GLUCOSE BLD-MCNC: 86 MG/DL (ref 70–100)
GLUCOSE UR STRIP-MCNC: NEGATIVE MG/DL
HYALINE CASTS UR QL AUTO: ABNORMAL /LPF
KETONES UR QL: NEGATIVE
LEUKOCYTE EST, POC: ABNORMAL
Lab: ABNORMAL
NITRITE UR-MCNC: NEGATIVE MG/ML
PH UR: 5 [PH] (ref 5–8)
POTASSIUM BLD-SCNC: 3.8 MMOL/L (ref 3.5–5.5)
PROT SERPL-MCNC: 6.4 G/DL (ref 5.7–8.2)
PROT UR STRIP-MCNC: ABNORMAL MG/DL
RBC # UR STRIP: ABNORMAL /UL
RBC # UR: ABNORMAL /HPF
REF LAB TEST METHOD: ABNORMAL
SODIUM BLD-SCNC: 140 MMOL/L (ref 132–146)
SP GR UR: 1.01 (ref 1–1.03)
SQUAMOUS #/AREA URNS HPF: ABNORMAL /HPF
TSH SERPL DL<=0.05 MIU/L-ACNC: 0.77 MIU/ML (ref 0.35–5.35)
UROBILINOGEN UR QL: NORMAL
WBC UR QL AUTO: ABNORMAL /HPF

## 2018-02-28 PROCEDURE — 81003 URINALYSIS AUTO W/O SCOPE: CPT | Performed by: INTERNAL MEDICINE

## 2018-02-28 PROCEDURE — 80053 COMPREHEN METABOLIC PANEL: CPT | Performed by: INTERNAL MEDICINE

## 2018-02-28 PROCEDURE — 84443 ASSAY THYROID STIM HORMONE: CPT | Performed by: INTERNAL MEDICINE

## 2018-02-28 PROCEDURE — 36415 COLL VENOUS BLD VENIPUNCTURE: CPT | Performed by: INTERNAL MEDICINE

## 2018-02-28 PROCEDURE — 87077 CULTURE AEROBIC IDENTIFY: CPT | Performed by: INTERNAL MEDICINE

## 2018-02-28 PROCEDURE — 87186 SC STD MICRODIL/AGAR DIL: CPT | Performed by: INTERNAL MEDICINE

## 2018-02-28 PROCEDURE — 99214 OFFICE O/P EST MOD 30 MIN: CPT | Performed by: INTERNAL MEDICINE

## 2018-02-28 PROCEDURE — 90670 PCV13 VACCINE IM: CPT | Performed by: INTERNAL MEDICINE

## 2018-02-28 PROCEDURE — G0009 ADMIN PNEUMOCOCCAL VACCINE: HCPCS | Performed by: INTERNAL MEDICINE

## 2018-02-28 PROCEDURE — 87086 URINE CULTURE/COLONY COUNT: CPT | Performed by: INTERNAL MEDICINE

## 2018-02-28 PROCEDURE — 81015 MICROSCOPIC EXAM OF URINE: CPT | Performed by: INTERNAL MEDICINE

## 2018-02-28 RX ORDER — SULFAMETHOXAZOLE AND TRIMETHOPRIM 800; 160 MG/1; MG/1
1 TABLET ORAL 2 TIMES DAILY
Qty: 20 TABLET | Refills: 0 | Status: SHIPPED | OUTPATIENT
Start: 2018-02-28 | End: 2018-08-30

## 2018-03-02 LAB
BACTERIA SPEC AEROBE CULT: ABNORMAL
BACTERIA SPEC AEROBE CULT: ABNORMAL

## 2018-03-09 ENCOUNTER — OFFICE VISIT (OUTPATIENT)
Dept: CARDIOLOGY | Facility: CLINIC | Age: 68
End: 2018-03-09

## 2018-03-09 VITALS
DIASTOLIC BLOOD PRESSURE: 70 MMHG | OXYGEN SATURATION: 96 % | HEART RATE: 61 BPM | SYSTOLIC BLOOD PRESSURE: 110 MMHG | WEIGHT: 131.4 LBS | HEIGHT: 59 IN | BODY MASS INDEX: 26.49 KG/M2

## 2018-03-09 DIAGNOSIS — I10 ESSENTIAL HYPERTENSION: ICD-10-CM

## 2018-03-09 DIAGNOSIS — R00.2 PALPITATIONS: Primary | ICD-10-CM

## 2018-03-09 PROCEDURE — 99213 OFFICE O/P EST LOW 20 MIN: CPT | Performed by: INTERNAL MEDICINE

## 2018-03-09 RX ORDER — RIFAXIMIN 550 MG/1
TABLET ORAL DAILY
Refills: 0 | COMMUNITY
Start: 2018-03-05 | End: 2018-08-30

## 2018-03-09 RX ORDER — LIFITEGRAST 50 MG/ML
SOLUTION/ DROPS OPHTHALMIC
Refills: 6 | COMMUNITY
Start: 2018-03-07 | End: 2019-02-26

## 2018-03-09 NOTE — PROGRESS NOTES
Encounter Date:2018      Patient ID: Kavitha Barnhart is a 67 y.o. female.    Chief Complaint: Peripheral Vascular Disease and Hypertension      PROBLEM LIST:  1. Palpitations/possible atrial tachycardia:  a. Patient was admitted for diverticulitis complicated by bowel perforation on 2016, and while in the hospital began having palpitations and shortness of breath that showed possible atrial tachycardia versus atrial fibrillation.   b. Patient reports a long-standing history of palpitations and tachycardia.   c. Echocardiogram at [facility], 01/10/2016, showed LVH with ejection fraction of 60% to 65%. No significant valve disease.   d. A 24-hour Holter, 2016, showed sinus rhythm with average heart rate of 85. Rare PVCs and PACs with occasional short runs of SVT/PAD. No symptoms were reported.   2. Hypertension.   3. Diverticulitis complicated by perforated sigmoid colon:  a. Currently awaiting surgery at the beginning of 2016. He was hospitalized at [facility], 2016 for approximately 2 weeks and treated with antibiotics.   4. Lupus and mixed connective tissue disease.   5. Raynaud’s disease.  6. Sjögren's disease.  7. Hashimoto disease.  8. Surgical history:  a. .  b. Knee surgery.    History of Present Illness  Patient presents today for follow-up with a history of palpitations and cardiac risk factors. Since last visit has been doing well form a cardiac standpoint but does experience occasional palpitations with excessive exercise or alcohol intake. Does not monitor her caffeine intake, but states she has cut back a bit. Denies chest pain, shortness of breath, leg swelling, and syncope. Remains busy and active with an exercise class but is limited by palpitations if she pushes herself too hard.    Allergies   Allergen Reactions   • Ace Inhibitors Cough   • Plaquenil [Hydroxychloroquine Sulfate] Other (See Comments)     EYE PROBLEMS   •  Sudafed [Pseudoephedrine Hcl] Other (See Comments)     INCREASED PRESSURE IN EYES    • Tenormin [Atenolol] Cough         Current Outpatient Prescriptions:   •  albuterol (PROVENTIL HFA;VENTOLIN HFA) 108 (90 Base) MCG/ACT inhaler, Inhale 2 puffs Every 4 (Four) Hours As Needed for Wheezing., Disp: 1 inhaler, Rfl: 5  •  aspirin 81 MG EC tablet, Take 81 mg by mouth daily., Disp: , Rfl:   •  celecoxib (CeleBREX) 100 MG capsule, Take 1 capsule by mouth 2 (Two) Times a Day As Needed for Mild Pain ., Disp: 60 capsule, Rfl: 5  •  diclofenac (VOLTAREN) 1 % gel gel, Apply 4 g topically 4 (four) times a day as needed., Disp: , Rfl:   •  estradiol (VAGIFEM) 10 MCG tablet vaginal tablet, Insert 1 tablet into the vagina 2 (Two) Times a Week., Disp: , Rfl: 12  •  fexofenadine (ALLEGRA) 180 MG tablet, Take 180 mg by mouth daily as needed., Disp: , Rfl:   •  fluticasone-salmeterol (ADVAIR) 100-50 MCG/DOSE DISKUS, Inhale 1 puff 2 (Two) Times a Day., Disp: 60 each, Rfl: 5  •  losartan-hydrochlorothiazide (HYZAAR) 100-12.5 MG per tablet, take 1 tablet by mouth once daily, Disp: 30 tablet, Rfl: 5  •  sulfamethoxazole-trimethoprim (BACTRIM DS) 800-160 MG per tablet, Take 1 tablet by mouth 2 (Two) Times a Day., Disp: 20 tablet, Rfl: 0  •  SYNTHROID 75 MCG tablet, Take 75 mcg by mouth Daily., Disp: , Rfl: 0  •  TOPROL  MG 24 hr tablet, take 1 tablet by mouth once daily, Disp: 90 tablet, Rfl: 3  •  Triamcinolone Acetonide (NASACORT) 55 MCG/ACT nasal inhaler, 2 sprays into each nostril daily., Disp: , Rfl:   •  XIFAXAN 550 MG tablet, Daily., Disp: , Rfl: 0  •  XIIDRA 5 % solution, INT 1 GTT IN OU BID, Disp: , Rfl: 6    The following portions of the patient's history were reviewed and updated as appropriate: allergies, current medications, past family history, past medical history, past social history, past surgical history and problem list.    ROS  Review of Systems   Constitution: Negative for chills, fever, weight gain and weight  "loss.   Cardiovascular: Negative for chest pain, claudication, dyspnea on exertion, leg swelling, orthopnea, palpitations, paroxysmal nocturnal dyspnea and syncope.        No dizziness   Gastrointestinal: Negative for abdominal pain, constipation, diarrhea, nausea and vomiting.   Genitourinary:        No urinary symptoms   Neurological:        No symptoms of stroke.   All other systems reviewed and are negative.    Objective:     Blood pressure 110/70, pulse 61, height 149.9 cm (59\"), weight 59.6 kg (131 lb 6.4 oz), SpO2 96 %, not currently breastfeeding.      Physical Exam  Constitutional: She appears well-developed and well-nourished.   HENT:   HEENT exam unremarkable.   Neck: Neck supple. No JVD present.   No carotid bruits.   Cardiovascular: Normal rate, regular rhythm and normal heart sounds.    No murmur heard.  2 plus symmetric pulses.   Pulmonary/Chest: Breath sounds normal. Does not exhibit tenderness.   Abdominal:   Abdomen benign.   Musculoskeletal: Does not exhibit edema.   Neurological:   Neurological exam unremarkable.   Vitals reviewed.    Lab Review:   Procedures       Assessment:      Diagnosis Plan   1. Palpitations  Mostly well controlled with occasional exacerbation related to physical and emotional stress most probably related to awareness of physiologic tachycardia or PVCs.     2. Essential hypertension  Well controlled.  Continue current medication regimen.       Plan:   Maintain good hydration.  Decrease alcohol and caffeine intake.  Overall cardiac status is stable.  Continue current medications.   Follow-up with PCP for further care, follow-up with us as needed.  Thank you for allowing us to participate in the care of your patient.     Scribed for Naveen Sarmiento MD by Gio Whitaker. 3/9/2018  3:10 PM    INaveen MD, personally performed the services described in this documentation as scribed by the above named individual in my presence, and it is both accurate and complete.  3/9/2018  " 3:39 PM        Please note that portions of this note may have been completed with a voice recognition program. Efforts were made to edit the dictations, but occasionally words are mistranscribed.

## 2018-04-12 ENCOUNTER — TRANSCRIBE ORDERS (OUTPATIENT)
Dept: ADMINISTRATIVE | Facility: HOSPITAL | Age: 68
End: 2018-04-12

## 2018-04-12 DIAGNOSIS — Z12.31 VISIT FOR SCREENING MAMMOGRAM: Primary | ICD-10-CM

## 2018-04-25 ENCOUNTER — HOSPITAL ENCOUNTER (OUTPATIENT)
Dept: MAMMOGRAPHY | Facility: HOSPITAL | Age: 68
Discharge: HOME | End: 2018-04-25
Attending: INTERNAL MEDICINE | Admitting: INTERNAL MEDICINE

## 2018-04-25 DIAGNOSIS — Z12.31 VISIT FOR SCREENING MAMMOGRAM: ICD-10-CM

## 2018-04-25 PROCEDURE — 77067 SCR MAMMO BI INCL CAD: CPT

## 2018-04-25 PROCEDURE — 77063 BREAST TOMOSYNTHESIS BI: CPT | Performed by: RADIOLOGY

## 2018-04-25 PROCEDURE — 77063 BREAST TOMOSYNTHESIS BI: CPT

## 2018-04-25 PROCEDURE — 77067 SCR MAMMO BI INCL CAD: CPT | Performed by: RADIOLOGY

## 2018-07-06 ENCOUNTER — APPOINTMENT (OUTPATIENT)
Dept: MAMMOGRAPHY | Facility: HOSPITAL | Age: 68
End: 2018-07-06
Attending: INTERNAL MEDICINE

## 2018-07-12 ENCOUNTER — TELEPHONE (OUTPATIENT)
Dept: INTERNAL MEDICINE | Facility: CLINIC | Age: 68
End: 2018-07-12

## 2018-07-12 DIAGNOSIS — M79.671 FOOT PAIN, BILATERAL: Primary | ICD-10-CM

## 2018-07-12 DIAGNOSIS — M79.672 FOOT PAIN, BILATERAL: Primary | ICD-10-CM

## 2018-07-12 NOTE — TELEPHONE ENCOUNTER
----- Message from Malgorzata Rodriguez sent at 7/12/2018 11:29 AM EDT -----  Per voicemail from Miguel Wood, a physical therapist at [facility], he is requesting an order for physical therapy for patients bilateral foot pain.  Please advise.    Phone # for office [phone]  Fax # [phone]

## 2018-08-23 RX ORDER — LOSARTAN POTASSIUM AND HYDROCHLOROTHIAZIDE 12.5; 1 MG/1; MG/1
TABLET ORAL
Qty: 30 TABLET | Refills: 5 | Status: SHIPPED | OUTPATIENT
Start: 2018-08-23 | End: 2019-02-28 | Stop reason: SDUPTHER

## 2018-08-30 ENCOUNTER — OFFICE VISIT (OUTPATIENT)
Dept: INTERNAL MEDICINE | Facility: CLINIC | Age: 68
End: 2018-08-30

## 2018-08-30 VITALS
WEIGHT: 132 LBS | BODY MASS INDEX: 26.66 KG/M2 | HEART RATE: 64 BPM | DIASTOLIC BLOOD PRESSURE: 60 MMHG | TEMPERATURE: 98.6 F | RESPIRATION RATE: 16 BRPM | SYSTOLIC BLOOD PRESSURE: 112 MMHG

## 2018-08-30 DIAGNOSIS — I10 ESSENTIAL HYPERTENSION: Primary | ICD-10-CM

## 2018-08-30 DIAGNOSIS — K21.9 GASTROESOPHAGEAL REFLUX DISEASE WITHOUT ESOPHAGITIS: ICD-10-CM

## 2018-08-30 PROCEDURE — 36415 COLL VENOUS BLD VENIPUNCTURE: CPT | Performed by: INTERNAL MEDICINE

## 2018-08-30 PROCEDURE — 99213 OFFICE O/P EST LOW 20 MIN: CPT | Performed by: INTERNAL MEDICINE

## 2018-08-30 PROCEDURE — 80048 BASIC METABOLIC PNL TOTAL CA: CPT | Performed by: INTERNAL MEDICINE

## 2018-10-25 DIAGNOSIS — J45.20 MILD INTERMITTENT ASTHMA WITHOUT COMPLICATION: ICD-10-CM

## 2018-11-08 ENCOUNTER — OFFICE VISIT (OUTPATIENT)
Dept: INTERNAL MEDICINE | Facility: CLINIC | Age: 68
End: 2018-11-08

## 2018-11-08 VITALS
TEMPERATURE: 99.7 F | SYSTOLIC BLOOD PRESSURE: 148 MMHG | RESPIRATION RATE: 18 BRPM | BODY MASS INDEX: 27.27 KG/M2 | HEART RATE: 76 BPM | DIASTOLIC BLOOD PRESSURE: 86 MMHG | WEIGHT: 135 LBS

## 2018-11-08 DIAGNOSIS — J01.90 ACUTE NON-RECURRENT SINUSITIS, UNSPECIFIED LOCATION: Primary | ICD-10-CM

## 2018-11-08 PROCEDURE — 99214 OFFICE O/P EST MOD 30 MIN: CPT | Performed by: INTERNAL MEDICINE

## 2018-11-08 RX ORDER — METHYLPREDNISOLONE 4 MG/1
TABLET ORAL
Qty: 21 TABLET | Refills: 0 | Status: SHIPPED | OUTPATIENT
Start: 2018-11-08 | End: 2019-01-11

## 2018-11-08 RX ORDER — AZITHROMYCIN 250 MG/1
TABLET, FILM COATED ORAL
Qty: 6 TABLET | Refills: 0 | Status: SHIPPED | OUTPATIENT
Start: 2018-11-08 | End: 2018-12-08

## 2018-11-08 NOTE — PROGRESS NOTES
Chief Complaint   Patient presents with   • Sinusitis       History of Present Illness    The patient reports a 1 week history of upper respiratory symptoms. The patient has symptoms of a wet cough, facial pain, a headache, nasal congestion, rhinorrhea and sore throat but the patient does not have a dry cough, wheezing, fever, tooth pain, eye drainage, ear pain, ear drainage, nausea, vomiting, diarrhea, rash, decreased appetite or abdominal pain. The cough is non productive. The nasal discharge is yellow and thick. The patient has not tried anything for treatment of this illness.     Review of Systems    GENERAL/CONSTITUTIONAL- Denies Unexplained Weight Loss, Chills, Sweats, Fatigue, Weakness or Malaise.    HEENT- Denies Eye Pain, Decreased Vision, Sinus Pain, Decreased Hearing or Tinnitus.    GASTROINTESTINAL- Denies Blood per Rectum, Constipation or Heartburn.    PULMONARY- Reports: Cough. Denies: Dyspnea, Sputum Production or Hemoptysis.    Medications      Current Outpatient Prescriptions:   •  ADVAIR DISKUS 100-50 MCG/DOSE DISKUS, inhale 1 dose by mouth twice a day, Disp: 60 each, Rfl: 5  •  albuterol (PROVENTIL HFA;VENTOLIN HFA) 108 (90 Base) MCG/ACT inhaler, Inhale 2 puffs Every 4 (Four) Hours As Needed for Wheezing., Disp: 1 inhaler, Rfl: 5  •  aspirin 81 MG EC tablet, Take 81 mg by mouth daily., Disp: , Rfl:   •  celecoxib (CeleBREX) 100 MG capsule, Take 1 capsule by mouth 2 (Two) Times a Day As Needed for Mild Pain ., Disp: 60 capsule, Rfl: 5  •  diclofenac (VOLTAREN) 1 % gel gel, Apply 4 g topically 4 (four) times a day as needed., Disp: , Rfl:   •  estradiol (VAGIFEM) 10 MCG tablet vaginal tablet, Insert 1 tablet into the vagina 2 (Two) Times a Week., Disp: , Rfl: 12  •  fexofenadine (ALLEGRA) 180 MG tablet, Take 180 mg by mouth daily as needed., Disp: , Rfl:   •  losartan-hydrochlorothiazide (HYZAAR) 100-12.5 MG per tablet, take 1 tablet by mouth once daily, Disp: 30 tablet, Rfl: 5  •  SYNTHROID 75 MCG  tablet, Take 75 mcg by mouth Daily., Disp: , Rfl: 0  •  TOPROL  MG 24 hr tablet, take 1 tablet by mouth once daily, Disp: 90 tablet, Rfl: 3  •  Triamcinolone Acetonide (NASACORT) 55 MCG/ACT nasal inhaler, 2 sprays into each nostril daily., Disp: , Rfl:   •  XIIDRA 5 % solution, INT 1 GTT IN OU BID, Disp: , Rfl: 6  •  azithromycin (ZITHROMAX Z-CRISTINO) 250 MG tablet, Take 2 tablets the first day, then 1 tablet daily for 4 days., Disp: 6 tablet, Rfl: 0  •  MethylPREDNISolone (MEDROL, CRISTINO,) 4 MG tablet, Take as directed on package instructions., Disp: 21 tablet, Rfl: 0     Allergies    Allergies   Allergen Reactions   • Ace Inhibitors Cough   • Plaquenil [Hydroxychloroquine Sulfate] Other (See Comments)     EYE PROBLEMS   • Sudafed [Pseudoephedrine Hcl] Other (See Comments)     INCREASED PRESSURE IN EYES    • Tenormin [Atenolol] Cough and Angioedema       Problem List    Patient Active Problem List   Diagnosis   • Asthma   • Hypertension   • Peripheral vascular disease (CMS/HCC)   • GERD (gastroesophageal reflux disease)   • Diverticulosis   • Colon polyps   • Constipation   • Hashimoto's thyroiditis   • Mixed connective tissue disease (CMS/HCC)   • Lupus   • Uterine fibroid       Medications, Allergies, Problems List and Past History were reviewed and updated.    Physical Examination    /86 (BP Location: Left arm, Patient Position: Sitting, Cuff Size: Adult)   Pulse 76   Temp 99.7 °F (37.6 °C) (Temporal Artery )   Resp 18   Wt 61.2 kg (135 lb)   LMP  (LMP Unknown) Comment: 2 YEARS AGO BY DR PLATT  Breastfeeding? No   BMI 27.27 kg/m²     HEENT: Head- Normocephalic Atraumatic. Facies- Within normal limits. Pinnas- Normal texture and shape bilaterally. Canals- Normal bilaterally. TMs- Normal bilaterally. Nares- Patent bilaterally. Nasal Septum- is normal. There is no tenderness to palpation over the frontal or maxillary sinuses. Lids- Normal bilaterally. Conjunctiva- Clear bilaterally. Sclera- Anicteric  bilaterally. Oropharynx- Moist with no lesions. Tonsils- No enlargement, erythema or exudate.    Neck: Thyroid- non enlarged, symmetric and has no nodules. No bruits are detected. ROM- Normal Range of Motion with no rigidity.    Lymph Nodes: Cervical- no enlarged lymph nodes noted. Clavicular- Deferred. Axillary- Deferred. Inguinal- Deferred.    Lungs: Auscultation- Clear to auscultation bilaterally. There are no retractions, clubbing or cyanosis. The Expiratory to Inspiratory ratio is equal.    Cardiovascular: There are no carotid bruits. Heart- Normal Rate with Regular rhythm and no murmurs. There are no gallops. There are no rubs. In the lower extremities there is no edema. The upper extremities do not have edema.    Impression and Assessment    Acute Sinusitis.    Plan    Acute Sinusitis Plan: A cool mist humidifier was encouraged. She was instructed to use an over the counter cough suppressant. She was encouraged to drink plenty of fluids. Adequate rest was advised. Medication will be added as noted below.    Kavitha was seen today for sinusitis.    Diagnoses and all orders for this visit:    Acute non-recurrent sinusitis, unspecified location  -     azithromycin (ZITHROMAX Z-CRISTINO) 250 MG tablet; Take 2 tablets the first day, then 1 tablet daily for 4 days.  -     MethylPREDNISolone (MEDROL, CRISTINO,) 4 MG tablet; Take as directed on package instructions.        Return to Office    The patient was instructed to return for follow-up as needed.    The patient was instructed to return sooner if the condition changes, worsens, or doesn't resolve.

## 2018-12-31 ENCOUNTER — OFFICE VISIT (OUTPATIENT)
Dept: INTERNAL MEDICINE | Facility: CLINIC | Age: 68
End: 2018-12-31

## 2018-12-31 VITALS
HEART RATE: 64 BPM | RESPIRATION RATE: 18 BRPM | BODY MASS INDEX: 27.27 KG/M2 | WEIGHT: 135 LBS | DIASTOLIC BLOOD PRESSURE: 82 MMHG | TEMPERATURE: 97.4 F | SYSTOLIC BLOOD PRESSURE: 122 MMHG

## 2018-12-31 DIAGNOSIS — R31.9 HEMATURIA, UNSPECIFIED TYPE: Primary | ICD-10-CM

## 2018-12-31 DIAGNOSIS — R82.998 LEUKOCYTES IN URINE: ICD-10-CM

## 2018-12-31 DIAGNOSIS — N30.01 ACUTE CYSTITIS WITH HEMATURIA: ICD-10-CM

## 2018-12-31 LAB
BILIRUB BLD-MCNC: NEGATIVE MG/DL
CLARITY, POC: ABNORMAL
COLOR UR: YELLOW
EXPIRATION DATE: ABNORMAL
GLUCOSE UR STRIP-MCNC: NEGATIVE MG/DL
KETONES UR QL: NEGATIVE
LEUKOCYTE EST, POC: ABNORMAL
Lab: ABNORMAL
NITRITE UR-MCNC: NEGATIVE MG/ML
PH UR: 6 [PH] (ref 5–8)
PROT UR STRIP-MCNC: NEGATIVE MG/DL
RBC # UR STRIP: ABNORMAL /UL
SP GR UR: 1 (ref 1–1.03)
UROBILINOGEN UR QL: NORMAL

## 2018-12-31 PROCEDURE — 87086 URINE CULTURE/COLONY COUNT: CPT | Performed by: INTERNAL MEDICINE

## 2018-12-31 PROCEDURE — 81003 URINALYSIS AUTO W/O SCOPE: CPT | Performed by: INTERNAL MEDICINE

## 2018-12-31 PROCEDURE — 87077 CULTURE AEROBIC IDENTIFY: CPT | Performed by: INTERNAL MEDICINE

## 2018-12-31 PROCEDURE — 99213 OFFICE O/P EST LOW 20 MIN: CPT | Performed by: INTERNAL MEDICINE

## 2018-12-31 PROCEDURE — 87186 SC STD MICRODIL/AGAR DIL: CPT | Performed by: INTERNAL MEDICINE

## 2018-12-31 RX ORDER — METOPROLOL SUCCINATE 200 MG/1
TABLET, EXTENDED RELEASE ORAL
Qty: 90 TABLET | Refills: 1 | Status: SHIPPED | OUTPATIENT
Start: 2018-12-31 | End: 2019-07-03 | Stop reason: SDUPTHER

## 2018-12-31 RX ORDER — SULFAMETHOXAZOLE AND TRIMETHOPRIM 800; 160 MG/1; MG/1
1 TABLET ORAL 2 TIMES DAILY
Qty: 10 TABLET | Refills: 0 | Status: SHIPPED | OUTPATIENT
Start: 2018-12-31 | End: 2019-01-02

## 2018-12-31 NOTE — PROGRESS NOTES
"Subjective   Kavitha Barnhart is a 68 y.o. female.     History of Present Illness     Urinary symptoms  Duration  1 days  Sx : Patient says that he has been having significant urgency, dysuria, frequency, mild chills, no nausea, vomiting, no diarrhea, no other systemic symptoms.    2 mild bulge along abdomen-patient has noticed that there is a small soft tissue \"bulge\" in the lower superpubic region.  It is at this region that she had a colostomy bag placed along with revision secondary to bowel surgery.    Review of Systems   All other systems reviewed and are negative.      Objective   Physical Exam   Constitutional: She is oriented to person, place, and time. She appears well-developed and well-nourished.   HENT:   Head: Normocephalic.   Right Ear: External ear normal.   Left Ear: External ear normal.   Nose: Nose normal.   Mouth/Throat: Oropharynx is clear and moist.   Eyes: Conjunctivae and EOM are normal. Pupils are equal, round, and reactive to light.   Neck: Normal range of motion. Neck supple.   Cardiovascular: Normal rate, regular rhythm and normal heart sounds.   Pulmonary/Chest: Effort normal and breath sounds normal.   Abdominal: Soft. Bowel sounds are normal.   Musculoskeletal: Normal range of motion.   Neurological: She is alert and oriented to person, place, and time.   Skin: Skin is warm.   Nursing note and vitals reviewed.        Assessment/Plan   Kavitha was seen today for blood in urine.    Diagnoses and all orders for this visit:    Hematuria, unspecified type  -     POC Urinalysis Dipstick, Automated  -     Urine Culture - Urine, Urine, Clean Catch  -     sulfamethoxazole-trimethoprim (BACTRIM DS) 800-160 MG per tablet; Take 1 tablet by mouth 2 (Two) Times a Day.    Leukocytes in urine  -     Urine Culture - Urine, Urine, Clean Catch    Acute cystitis with hematuria               "

## 2019-01-02 ENCOUNTER — TELEPHONE (OUTPATIENT)
Dept: INTERNAL MEDICINE | Facility: CLINIC | Age: 69
End: 2019-01-02

## 2019-01-02 DIAGNOSIS — N39.0 URINARY TRACT INFECTION WITHOUT HEMATURIA, SITE UNSPECIFIED: Primary | ICD-10-CM

## 2019-01-02 LAB — BACTERIA SPEC AEROBE CULT: ABNORMAL

## 2019-01-02 RX ORDER — NITROFURANTOIN 25; 75 MG/1; MG/1
100 CAPSULE ORAL 2 TIMES DAILY
Qty: 14 CAPSULE | Refills: 0 | Status: SHIPPED | OUTPATIENT
Start: 2019-01-02 | End: 2019-02-26

## 2019-01-02 NOTE — TELEPHONE ENCOUNTER
I have spoke with patient and notified her of this, she verbalized understanding and states that she will stop taking Bactrim and will pick Macrobid up. She would like us to do a repeat urinalysis after the antibiotics to make sure she is better. Please approve the pended future order.

## 2019-01-02 NOTE — TELEPHONE ENCOUNTER
Please tell patient that her urine culture grew out Escherichia coli (which is a common bacteria that causes urinary tract infections) but her Escherichia coli is resistant to the Bactrim that was previously prescribed.    Please have her discontinue current usage of the Bactrim.    I have already called in the Macrobid which will replace the Bactrim and patient is to take this antibiotic until completion.  Follow-up in clinic if symptoms do not improve.

## 2019-01-03 NOTE — TELEPHONE ENCOUNTER
Please tell patient that Dr. Redding is back in clinic and I will have her direct that towards him for further management

## 2019-01-03 NOTE — TELEPHONE ENCOUNTER
I have entered the order.  She can stop in and leave a sample at her convenience after she completes the antibiotics.  Farzad Redding MD  1:44 PM  01/03/19

## 2019-01-03 NOTE — TELEPHONE ENCOUNTER
S/W PT, INFORMED THAT DR HERNANDEZ HAS PLACED AN ORDER FOR REPEAT U/A AFTER SHE COMPLETES HER ABX.  EXPL SHE NEEDS TO COME IN 2 DAYS AFTER COMPLETING ABX TO LEAVE SPECIMEN.  VERB UNDERSTANDING AND AGREEMENT.

## 2019-01-11 ENCOUNTER — TELEPHONE (OUTPATIENT)
Dept: INTERNAL MEDICINE | Facility: CLINIC | Age: 69
End: 2019-01-11

## 2019-01-11 ENCOUNTER — OFFICE VISIT (OUTPATIENT)
Dept: INTERNAL MEDICINE | Facility: CLINIC | Age: 69
End: 2019-01-11

## 2019-01-11 ENCOUNTER — LAB (OUTPATIENT)
Dept: INTERNAL MEDICINE | Facility: CLINIC | Age: 69
End: 2019-01-11

## 2019-01-11 VITALS
OXYGEN SATURATION: 99 % | HEART RATE: 96 BPM | WEIGHT: 135 LBS | TEMPERATURE: 98.3 F | RESPIRATION RATE: 18 BRPM | DIASTOLIC BLOOD PRESSURE: 80 MMHG | SYSTOLIC BLOOD PRESSURE: 112 MMHG | BODY MASS INDEX: 27.21 KG/M2 | HEIGHT: 59 IN

## 2019-01-11 DIAGNOSIS — T78.40XA ALLERGIC REACTION TO DRUG, INITIAL ENCOUNTER: Primary | ICD-10-CM

## 2019-01-11 DIAGNOSIS — N39.0 URINARY TRACT INFECTION WITHOUT HEMATURIA, SITE UNSPECIFIED: ICD-10-CM

## 2019-01-11 DIAGNOSIS — R53.83 OTHER FATIGUE: ICD-10-CM

## 2019-01-11 LAB
BILIRUB BLD-MCNC: NEGATIVE MG/DL
CLARITY, POC: CLEAR
COLOR UR: YELLOW
EXPIRATION DATE: NORMAL
GLUCOSE UR STRIP-MCNC: NEGATIVE MG/DL
KETONES UR QL: NEGATIVE
LEUKOCYTE EST, POC: NEGATIVE
Lab: NORMAL
NITRITE UR-MCNC: NEGATIVE MG/ML
PH UR: 5 [PH] (ref 5–8)
PROT UR STRIP-MCNC: NEGATIVE MG/DL
RBC # UR STRIP: NEGATIVE /UL
SP GR UR: 1.02 (ref 1–1.03)
UROBILINOGEN UR QL: NORMAL

## 2019-01-11 PROCEDURE — 99213 OFFICE O/P EST LOW 20 MIN: CPT | Performed by: PHYSICIAN ASSISTANT

## 2019-01-11 PROCEDURE — 81003 URINALYSIS AUTO W/O SCOPE: CPT | Performed by: INTERNAL MEDICINE

## 2019-01-11 PROCEDURE — 87077 CULTURE AEROBIC IDENTIFY: CPT | Performed by: INTERNAL MEDICINE

## 2019-01-11 PROCEDURE — 87086 URINE CULTURE/COLONY COUNT: CPT | Performed by: INTERNAL MEDICINE

## 2019-01-11 PROCEDURE — 87186 SC STD MICRODIL/AGAR DIL: CPT | Performed by: INTERNAL MEDICINE

## 2019-01-11 NOTE — TELEPHONE ENCOUNTER
Please call pt and let her know she needs to be seen today either in office, or at ER. If she is having chest tightness and throat swelling, she needs to be seen ASAP. Please encourage not to delay being seen. If she is only feeling fatigued at this time and no chest tightness/ throat swelling, then she still needs to be seen but at her convenience today or at [facility] this weekend.

## 2019-01-11 NOTE — TELEPHONE ENCOUNTER
Spoke to pt, advised of clinical message, good verbal understanding. Pt has been scheduled for a F/U today @ 3:45pm.

## 2019-01-11 NOTE — PROGRESS NOTES
Chief Complaint   Patient presents with   • Allergic Reaction     abx x2 days, feeling a little better       Subjective       History of Present Illness     Kavitha Barnhart is a 68 y.o. female. She presents for evaluation of possible reaction to recent Abx. Pt was tx with Macrobid after dx UTI with ESBL E Coli. Pt began Abx on 1/2/2019. She has never had Macrobid in the past. Pt states she began to feel very fatigued after first dose, and this persisted for several hours. She notes that this occurred with every dose of Macrobid, about an hour after taking medication. After 4 days of Abx use, she states she developed allergy symptoms with dry cough, congestion, chest tightness, some swelling around eyes. She also had some itching at arms but no associated rash. Pt does have cough variant asthma, and she is using rescue inhaler more often than normal, although this does give relief of cough and chest tightness.   Pt finished last does of Macrobid on Tuesday and does report significant improvement of fatigue, swelling at eyes, and itching. Still continues to have some cough, congestion, chest tightness but improved.   All UTI symptoms resolved.     Of note, pt does have a new puppy in the house x weeks, and some concern for allergies/ worsening of asthma.       The following portions of the patient's history were reviewed and updated as appropriate: allergies, current medications, past family history, past medical history, past social history, past surgical history and problem list.    Allergies   Allergen Reactions   • Macrobid [Nitrofurantoin Monohyd Macro] Swelling and Other (See Comments)     Swelling around eyes, fatigue   • Ace Inhibitors Cough   • Plaquenil [Hydroxychloroquine Sulfate] Other (See Comments)     EYE PROBLEMS   • Sudafed [Pseudoephedrine Hcl] Other (See Comments)     INCREASED PRESSURE IN EYES    • Tenormin [Atenolol] Cough and Angioedema     Social History     Tobacco Use   • Smoking status: Never  Smoker   • Smokeless tobacco: Never Used   Substance Use Topics   • Alcohol use: Yes     Alcohol/week: 1.8 oz     Types: 3 Glasses of wine per week     Comment: weekly         Current Outpatient Medications:   •  ADVAIR DISKUS 100-50 MCG/DOSE DISKUS, inhale 1 dose by mouth twice a day, Disp: 60 each, Rfl: 5  •  albuterol (PROVENTIL HFA;VENTOLIN HFA) 108 (90 Base) MCG/ACT inhaler, Inhale 2 puffs Every 4 (Four) Hours As Needed for Wheezing., Disp: 1 inhaler, Rfl: 5  •  aspirin 81 MG EC tablet, Take 81 mg by mouth daily., Disp: , Rfl:   •  celecoxib (CeleBREX) 100 MG capsule, Take 1 capsule by mouth 2 (Two) Times a Day As Needed for Mild Pain ., Disp: 60 capsule, Rfl: 5  •  diclofenac (VOLTAREN) 1 % gel gel, Apply 4 g topically 4 (four) times a day as needed., Disp: , Rfl:   •  estradiol (VAGIFEM) 10 MCG tablet vaginal tablet, Insert 1 tablet into the vagina 2 (Two) Times a Week., Disp: , Rfl: 12  •  fexofenadine (ALLEGRA) 180 MG tablet, Take 180 mg by mouth daily as needed., Disp: , Rfl:   •  losartan-hydrochlorothiazide (HYZAAR) 100-12.5 MG per tablet, take 1 tablet by mouth once daily, Disp: 30 tablet, Rfl: 5  •  metoprolol succinate XL (TOPROL-XL) 200 MG 24 hr tablet, take 1 tablet by mouth once daily, Disp: 90 tablet, Rfl: 1  •  SYNTHROID 75 MCG tablet, Take 75 mcg by mouth Daily., Disp: , Rfl: 0  •  Triamcinolone Acetonide (NASACORT) 55 MCG/ACT nasal inhaler, 2 sprays into each nostril daily., Disp: , Rfl:   •  XIIDRA 5 % solution, INT 1 GTT IN OU BID, Disp: , Rfl: 6  •  nitrofurantoin, macrocrystal-monohydrate, (MACROBID) 100 MG capsule, Take 1 capsule by mouth 2 (Two) Times a Day., Disp: 14 capsule, Rfl: 0    Review of Systems   Constitutional: Positive for fatigue. Negative for chills and fever.   HENT: Positive for congestion and facial swelling (around eyes). Negative for ear pain, sore throat and trouble swallowing.    Eyes: Negative for pain.   Respiratory: Positive for cough and chest tightness. Negative  for shortness of breath and wheezing.    Cardiovascular: Negative for chest pain and palpitations.   Gastrointestinal: Negative for abdominal pain, diarrhea, nausea and vomiting.   Genitourinary: Negative for dysuria.   Skin: Negative for dry skin and rash.   Neurological: Negative for dizziness and headache.       Objective   Vitals:    01/11/19 1550   BP: 112/80   Pulse: 96   Resp: 18   Temp: 98.3 °F (36.8 °C)   SpO2: 99%     Physical Exam   Constitutional: She appears well-developed and well-nourished.   HENT:   Head: Normocephalic and atraumatic.   Right Ear: Tympanic membrane, external ear and ear canal normal.   Left Ear: Tympanic membrane, external ear and ear canal normal.   Nose: Rhinorrhea present. No sinus tenderness or congestion. Right sinus exhibits no maxillary sinus tenderness and no frontal sinus tenderness. Left sinus exhibits no maxillary sinus tenderness and no frontal sinus tenderness.   Mouth/Throat: Oropharynx is clear and moist and mucous membranes are normal.   Eyes: Conjunctivae and EOM are normal. Pupils are equal, round, and reactive to light.   +mild puffiness/ poss swelling below eyes bilaterally.    Neck: Normal range of motion. Neck supple.   Cardiovascular: Normal rate and regular rhythm.   Pulmonary/Chest: Effort normal and breath sounds normal. No respiratory distress. She has no wheezes. She has no rales.   Abdominal: Soft. There is no hepatosplenomegaly. There is no tenderness.   Lymphadenopathy:     She has no cervical adenopathy.   Skin: No rash noted.   Psychiatric: She has a normal mood and affect. Her behavior is normal.             Assessment/Plan   Kavitha was seen today for allergic reaction.    Diagnoses and all orders for this visit:    Allergic reaction to drug, initial encounter    Other fatigue      Discussed reaction to Macrobid vs external allergen (new puppy, seasonal allergies, etc.) with mild asthma flare. Unlikely to be Macrobid; however, I advised pt to avoid  Macrobid use when other alternatives available.  Pt has finished all Macrobid-- no further meds required at this time.            Return if symptoms worsen or fail to improve.

## 2019-01-11 NOTE — TELEPHONE ENCOUNTER
----- Message from Shayy Alvarado sent at 1/11/2019 11:52 AM EST -----  PATIENT STATES WHEN SHE TOOK THE nitrofurantoin, macrocrystal-monohydrate, (MACROBID) 100 MG capsule SHE EXPERIENCED CHEST TIGHTNESS, TIREDNESS, FATIGUE AND SWELLING OF HER THROAT. PATIENT STATES THE SYMPTOMS BECAME WORSE THE MORE DOSES THAT SHE TOOK. PATIENT STATES SHE NEVER WENT TO THE ER. PATIENT STATES SHE STARTED THE MEDICATION 1/2/19 AND HAS FINISHED THE FULL COURSE OF THE MEDICATION. PATIENT STATES SHE STILL DOESN'T FEEL QUIET RIGHT.  SHE CAN BE REACHED [phone].

## 2019-01-14 ENCOUNTER — TELEPHONE (OUTPATIENT)
Dept: INTERNAL MEDICINE | Facility: CLINIC | Age: 69
End: 2019-01-14

## 2019-01-14 LAB — BACTERIA SPEC AEROBE CULT: ABNORMAL

## 2019-01-14 RX ORDER — AMOXICILLIN 875 MG/1
875 TABLET, COATED ORAL 2 TIMES DAILY
Qty: 20 TABLET | Refills: 0 | Status: SHIPPED | OUTPATIENT
Start: 2019-01-14 | End: 2019-01-24

## 2019-01-14 NOTE — TELEPHONE ENCOUNTER
----- Message from Lourdes Duarte sent at 1/14/2019  5:27 PM EST -----  Contact: SELF  ASTER CESAR CALLING FOR HER UA RESULTS. SHE CAN BE REACHED [phone].

## 2019-01-14 NOTE — TELEPHONE ENCOUNTER
S/W PT, REVIEWED LAB RESULTS.  SEE RESULT NOTE.  INFORMED OF NEED FOR ABX WITH DIRECTIONS GIVEN.  VERB GOOD UNDERSTANDING AND APPREC.     RX SENT VIA [facility].

## 2019-02-26 ENCOUNTER — OFFICE VISIT (OUTPATIENT)
Dept: INTERNAL MEDICINE | Facility: CLINIC | Age: 69
End: 2019-02-26

## 2019-02-26 ENCOUNTER — HOSPITAL ENCOUNTER (OUTPATIENT)
Dept: GENERAL RADIOLOGY | Facility: HOSPITAL | Age: 69
Discharge: HOME | End: 2019-02-26
Admitting: PHYSICIAN ASSISTANT

## 2019-02-26 VITALS
SYSTOLIC BLOOD PRESSURE: 120 MMHG | DIASTOLIC BLOOD PRESSURE: 74 MMHG | RESPIRATION RATE: 17 BRPM | WEIGHT: 132 LBS | HEIGHT: 59 IN | HEART RATE: 75 BPM | TEMPERATURE: 97.1 F | OXYGEN SATURATION: 98 % | BODY MASS INDEX: 26.61 KG/M2

## 2019-02-26 DIAGNOSIS — M35.1 MIXED CONNECTIVE TISSUE DISEASE (HCC): ICD-10-CM

## 2019-02-26 DIAGNOSIS — M25.551 BILATERAL HIP PAIN: ICD-10-CM

## 2019-02-26 DIAGNOSIS — R21 RASH AND NONSPECIFIC SKIN ERUPTION: ICD-10-CM

## 2019-02-26 DIAGNOSIS — M25.552 BILATERAL HIP PAIN: ICD-10-CM

## 2019-02-26 DIAGNOSIS — N39.3 STRESS INCONTINENCE OF URINE: Primary | ICD-10-CM

## 2019-02-26 DIAGNOSIS — Z79.51 LONG TERM CURRENT USE OF INHALED STEROID: ICD-10-CM

## 2019-02-26 PROCEDURE — 99214 OFFICE O/P EST MOD 30 MIN: CPT | Performed by: PHYSICIAN ASSISTANT

## 2019-02-26 PROCEDURE — 73521 X-RAY EXAM HIPS BI 2 VIEWS: CPT

## 2019-02-26 RX ORDER — OXYBUTYNIN CHLORIDE 5 MG/1
5 TABLET, EXTENDED RELEASE ORAL DAILY
Qty: 30 TABLET | Refills: 3 | Status: SHIPPED | OUTPATIENT
Start: 2019-02-26 | End: 2019-05-24

## 2019-02-26 RX ORDER — ALBUTEROL SULFATE 90 UG/1
2 AEROSOL, METERED RESPIRATORY (INHALATION) EVERY 4 HOURS PRN
Qty: 1 INHALER | Refills: 5 | Status: SHIPPED | OUTPATIENT
Start: 2019-02-26 | End: 2020-08-27 | Stop reason: SDUPTHER

## 2019-02-26 RX ORDER — CELECOXIB 100 MG/1
100 CAPSULE ORAL 2 TIMES DAILY PRN
Qty: 60 CAPSULE | Refills: 5 | Status: SHIPPED | OUTPATIENT
Start: 2019-02-26 | End: 2020-08-27 | Stop reason: SDUPTHER

## 2019-02-26 NOTE — PROGRESS NOTES
Chief Complaint   Patient presents with   • Hip Pain     bursitits bilateral, worsening within the past x3 weeks, refill celebrex, diclofenac gel, ventolin    • Rash     bilateral hips, not painful or itchy, x1 month, spreading       Subjective       History of Present Illness     Kavitha Barnhart is a 68 y.o. female. She presents with 3 week history worsening acute on chronic hip pain, new rash, and new incontinence issue. Regarding hips, pt states she has chronic hip pain with bursitis + osteoarthritis. However, this issue has worsened in the last three weeks. She has been taking Celebrex, but only PRN about 1/ day. Recently has taken BID a few days, which does give some relief. She also uses diclofenac gel as well which gives some relief. She has taken a week off of normal exercise routine and still no improvement of pain. Her pain is equal bilaterally in hips. She does have known herniated disk in lumbar spine, per pt. She has not had hip XR in 15+ years. She does not have radiating pain, but localized to hips. No numbness or tingling in toes or feet.     Pt also has rash at thighs which has been presents for about 3-4 weeks. It was initially itchy x1 week, but no longer itchy and not painful. It has slightly grown in size. She tried cortisone cream the first week which improved itching but did not resolve rash. She has not tried other tx. She has had a new puppy since January, about 6 weeks, but no other changes. Patient denies any changes in soaps/ shampoo/ lotions/ personal care products/ detergent, new sheets or new unwashed clothing, new medications or OTC meds, or significant changes in diet.     Pt reports stress incontinence x years, worsened in the last 6 months. She has incontinence with sneezing and coughing. This issue has been occurring almost daily, and does cause difficulty in patient's social life and personal life. She is sometimes worried to leave the house d/t incontinence. She denies urinary  frequency, urgency, dark urine, blood in urine, leaking of urine, pelvis pressure or other concerns. Stress incontinence only. She has not tried any meds for this issue in the past.     The following portions of the patient's history were reviewed and updated as appropriate: allergies, current medications, past family history, past medical history, past social history, past surgical history and problem list.    Allergies   Allergen Reactions   • Macrobid [Nitrofurantoin Monohyd Macro] Swelling and Other (See Comments)     Swelling around eyes, fatigue   • Ace Inhibitors Cough   • Plaquenil [Hydroxychloroquine Sulfate] Other (See Comments)     EYE PROBLEMS   • Sudafed [Pseudoephedrine Hcl] Other (See Comments)     INCREASED PRESSURE IN EYES    • Tenormin [Atenolol] Cough and Angioedema     Social History     Tobacco Use   • Smoking status: Never Smoker   • Smokeless tobacco: Never Used   Substance Use Topics   • Alcohol use: Yes     Alcohol/week: 1.8 oz     Types: 3 Glasses of wine per week     Comment: weekly             Current Outpatient Medications:   •  ADVAIR DISKUS 100-50 MCG/DOSE DISKUS, inhale 1 dose by mouth twice a day, Disp: 60 each, Rfl: 5  •  albuterol sulfate  (90 Base) MCG/ACT inhaler, Inhale 2 puffs Every 4 (Four) Hours As Needed for Wheezing., Disp: 1 inhaler, Rfl: 5  •  aspirin 81 MG EC tablet, Take 81 mg by mouth daily., Disp: , Rfl:   •  celecoxib (CeleBREX) 100 MG capsule, Take 1 capsule by mouth 2 (Two) Times a Day As Needed for Mild Pain ., Disp: 60 capsule, Rfl: 5  •  diclofenac (VOLTAREN) 1 % gel gel, Apply 4 g topically to the appropriate area as directed 4 (Four) Times a Day., Disp: 100 g, Rfl: 3  •  estradiol (VAGIFEM) 10 MCG tablet vaginal tablet, Insert 1 tablet into the vagina 2 (Two) Times a Week., Disp: , Rfl: 12  •  losartan-hydrochlorothiazide (HYZAAR) 100-12.5 MG per tablet, take 1 tablet by mouth once daily, Disp: 30 tablet, Rfl: 5  •  metoprolol succinate XL (TOPROL-XL)  200 MG 24 hr tablet, take 1 tablet by mouth once daily, Disp: 90 tablet, Rfl: 1  •  SYNTHROID 75 MCG tablet, Take 75 mcg by mouth Daily., Disp: , Rfl: 0  •  Triamcinolone Acetonide (NASACORT) 55 MCG/ACT nasal inhaler, 2 sprays into each nostril daily., Disp: , Rfl:   •  fexofenadine (ALLEGRA) 180 MG tablet, Take 180 mg by mouth daily as needed., Disp: , Rfl:   •  oxybutynin XL (DITROPAN XL) 5 MG 24 hr tablet, Take 1 tablet by mouth Daily., Disp: 30 tablet, Rfl: 3    Review of Systems   Constitutional: Negative for chills, fatigue and fever.   HENT: Negative for sore throat.    Respiratory: Negative for cough and shortness of breath.    Cardiovascular: Negative for chest pain.   Gastrointestinal: Negative for abdominal pain, diarrhea, nausea and vomiting.   Genitourinary: Positive for urinary incontinence. Negative for dysuria, frequency, hematuria and pelvic pain.   Musculoskeletal: Positive for arthralgias and back pain. Negative for gait problem.   Skin: Positive for rash.   Neurological: Negative for dizziness, weakness and headache.       Objective   Vitals:    02/26/19 1013   BP: 120/74   Pulse: 75   Resp: 17   Temp: 97.1 °F (36.2 °C)   SpO2: 98%     Physical Exam   Constitutional: She appears well-developed and well-nourished.   HENT:   Head: Normocephalic and atraumatic.   Right Ear: Tympanic membrane, external ear and ear canal normal.   Left Ear: Tympanic membrane, external ear and ear canal normal.   Mouth/Throat: Oropharynx is clear and moist and mucous membranes are normal.   Eyes: Conjunctivae are normal.   Neck: Normal range of motion.   Cardiovascular: Normal rate, regular rhythm and intact distal pulses.   No murmur heard.  Pulmonary/Chest: Effort normal and breath sounds normal. She has no wheezes. She has no rales.   Abdominal: Soft. There is no hepatosplenomegaly. There is no tenderness. There is no CVA tenderness.   Musculoskeletal:        Right hip: She exhibits tenderness. She exhibits normal  range of motion, normal strength and no deformity.        Left hip: She exhibits tenderness. She exhibits normal range of motion, normal strength and no deformity.        Lumbar back: She exhibits no tenderness, no bony tenderness and no deformity.   Right hip: mild TTP at sacroiliac joint, and at lateral aspect of hip. Normal passive flexion, extension, internal rotation, external rotation with no pain.   Left hip: mild TTP at sacroiliac joint, and at lateral aspect of hip. Normal passive flexion, extension, internal rotation, external rotation with no pain.    Lymphadenopathy:     She has no cervical adenopathy.   Skin: Rash noted. Rash is macular.   +pink macular rash at anterior thighs bilaterally, minimally at R posterior thigh/ buttock. No TTP. No drainage or bleeding.    Psychiatric: She has a normal mood and affect. Her behavior is normal.         Assessment/Plan   Kavitha was seen today for hip pain and rash.    Diagnoses and all orders for this visit:    Stress incontinence of urine  -     oxybutynin XL (DITROPAN XL) 5 MG 24 hr tablet; Take 1 tablet by mouth Daily.    Bilateral hip pain  -     diclofenac (VOLTAREN) 1 % gel gel; Apply 4 g topically to the appropriate area as directed 4 (Four) Times a Day.  -     celecoxib (CeleBREX) 100 MG capsule; Take 1 capsule by mouth 2 (Two) Times a Day As Needed for Mild Pain .  -     XR Hips Bilateral With or Without Pelvis 2 View; Future  -     DEXA Bone Density Axial; Future    Mixed connective tissue disease (CMS/HCC)  -     XR Hips Bilateral With or Without Pelvis 2 View; Future  -     DEXA Bone Density Axial; Future    Rash and nonspecific skin eruption    Long term current use of inhaled steroid  -     DEXA Bone Density Axial; Future    Other orders  -     albuterol sulfate  (90 Base) MCG/ACT inhaler; Inhale 2 puffs Every 4 (Four) Hours As Needed for Wheezing.      Advised pt she can safely take celebrex BID as prescribed as this may further relieve pain.  Also advised tylenol arthritis between doses if pain persists.   Advised benadryl cream for rash.   Will trial ditropan. Discussed kegel exercises.           Return for Next scheduled follow up.

## 2019-02-28 RX ORDER — LOSARTAN POTASSIUM AND HYDROCHLOROTHIAZIDE 12.5; 1 MG/1; MG/1
TABLET ORAL
Qty: 30 TABLET | Refills: 5 | Status: SHIPPED | OUTPATIENT
Start: 2019-02-28 | End: 2019-09-17

## 2019-03-12 ENCOUNTER — OFFICE VISIT (OUTPATIENT)
Dept: INTERNAL MEDICINE | Facility: CLINIC | Age: 69
End: 2019-03-12

## 2019-03-12 ENCOUNTER — TELEPHONE (OUTPATIENT)
Dept: INTERNAL MEDICINE | Facility: CLINIC | Age: 69
End: 2019-03-12

## 2019-03-12 VITALS
HEART RATE: 68 BPM | BODY MASS INDEX: 26.86 KG/M2 | WEIGHT: 133 LBS | RESPIRATION RATE: 18 BRPM | SYSTOLIC BLOOD PRESSURE: 124 MMHG | TEMPERATURE: 97.9 F | DIASTOLIC BLOOD PRESSURE: 70 MMHG

## 2019-03-12 DIAGNOSIS — K21.9 GASTROESOPHAGEAL REFLUX DISEASE WITHOUT ESOPHAGITIS: ICD-10-CM

## 2019-03-12 DIAGNOSIS — Z23 NEED FOR PNEUMOCOCCAL VACCINE: ICD-10-CM

## 2019-03-12 DIAGNOSIS — E03.9 HYPOTHYROIDISM, UNSPECIFIED TYPE: ICD-10-CM

## 2019-03-12 DIAGNOSIS — I10 ESSENTIAL HYPERTENSION: Primary | ICD-10-CM

## 2019-03-12 LAB
ALBUMIN SERPL-MCNC: 4.28 G/DL (ref 3.2–4.8)
ALBUMIN/GLOB SERPL: 2 G/DL (ref 1.5–2.5)
ALP SERPL-CCNC: 73 U/L (ref 25–100)
ALT SERPL W P-5'-P-CCNC: 28 U/L (ref 7–40)
ANION GAP SERPL CALCULATED.3IONS-SCNC: 12 MMOL/L (ref 3–11)
ARTICHOKE IGE QN: 136 MG/DL (ref 0–130)
AST SERPL-CCNC: 24 U/L (ref 0–33)
BILIRUB BLD-MCNC: NEGATIVE MG/DL
BILIRUB SERPL-MCNC: 0.9 MG/DL (ref 0.3–1.2)
BUN BLD-MCNC: 26 MG/DL (ref 9–23)
BUN/CREAT SERPL: 22 (ref 7–25)
CALCIUM SPEC-SCNC: 9.5 MG/DL (ref 8.7–10.4)
CHLORIDE SERPL-SCNC: 108 MMOL/L (ref 99–109)
CHOLEST SERPL-MCNC: 205 MG/DL (ref 0–200)
CLARITY, POC: CLEAR
CO2 SERPL-SCNC: 24 MMOL/L (ref 20–31)
COLOR UR: YELLOW
CREAT BLD-MCNC: 1.18 MG/DL (ref 0.6–1.3)
EXPIRATION DATE: ABNORMAL
GFR SERPL CREATININE-BSD FRML MDRD: 45 ML/MIN/1.73
GLOBULIN UR ELPH-MCNC: 2.1 GM/DL
GLUCOSE BLD-MCNC: 91 MG/DL (ref 70–100)
GLUCOSE UR STRIP-MCNC: NEGATIVE MG/DL
HDLC SERPL-MCNC: 59 MG/DL (ref 40–60)
KETONES UR QL: NEGATIVE
LEUKOCYTE EST, POC: NEGATIVE
Lab: ABNORMAL
NITRITE UR-MCNC: NEGATIVE MG/ML
PH UR: 6.5 [PH] (ref 5–8)
POTASSIUM BLD-SCNC: 4.2 MMOL/L (ref 3.5–5.5)
PROT SERPL-MCNC: 6.4 G/DL (ref 5.7–8.2)
PROT UR STRIP-MCNC: NEGATIVE MG/DL
RBC # UR STRIP: ABNORMAL /UL
SODIUM BLD-SCNC: 144 MMOL/L (ref 132–146)
SP GR UR: 1.02 (ref 1–1.03)
T4 FREE SERPL-MCNC: 1.45 NG/DL (ref 0.89–1.76)
TRIGL SERPL-MCNC: 110 MG/DL (ref 0–150)
TSH SERPL DL<=0.05 MIU/L-ACNC: 1.11 MIU/ML (ref 0.35–5.35)
UROBILINOGEN UR QL: NORMAL

## 2019-03-12 PROCEDURE — 81003 URINALYSIS AUTO W/O SCOPE: CPT | Performed by: INTERNAL MEDICINE

## 2019-03-12 PROCEDURE — 84439 ASSAY OF FREE THYROXINE: CPT | Performed by: INTERNAL MEDICINE

## 2019-03-12 PROCEDURE — 80061 LIPID PANEL: CPT | Performed by: INTERNAL MEDICINE

## 2019-03-12 PROCEDURE — 99214 OFFICE O/P EST MOD 30 MIN: CPT | Performed by: INTERNAL MEDICINE

## 2019-03-12 PROCEDURE — 90732 PPSV23 VACC 2 YRS+ SUBQ/IM: CPT | Performed by: INTERNAL MEDICINE

## 2019-03-12 PROCEDURE — 80053 COMPREHEN METABOLIC PANEL: CPT | Performed by: INTERNAL MEDICINE

## 2019-03-12 PROCEDURE — 84443 ASSAY THYROID STIM HORMONE: CPT | Performed by: INTERNAL MEDICINE

## 2019-03-12 PROCEDURE — G0009 ADMIN PNEUMOCOCCAL VACCINE: HCPCS | Performed by: INTERNAL MEDICINE

## 2019-03-12 PROCEDURE — 36415 COLL VENOUS BLD VENIPUNCTURE: CPT | Performed by: INTERNAL MEDICINE

## 2019-03-12 NOTE — TELEPHONE ENCOUNTER
----- Message from Georgie Saenz sent at 3/12/2019 12:26 PM EDT -----  PT DECINED MEDICARE WELLNESS

## 2019-03-12 NOTE — PROGRESS NOTES
Chief Complaint   Patient presents with   • EXTENDED FOLLOW UP       History of Present Illness    The patient presents for a follow-up related to hypertension. The patient reports that she has had no headaches, chest pain, dyspnea, edema, syncope, blurred vision or palpitations. She states that she is taking her medication as prescribed. She is not having medication side effects.    The patient presents for a follow-up related to GERD. The patient is not on medication for her gastroesophageal reflux. She reports no abdominal pain, belching, diarrhea, dysphagia, early satiety, heartburn, hoarseness, nausea, odynophagia, rectal bleeding, vomiting or weight loss. The GERD has no known aggravating factors.    The patient presents for a follow-up related to hypothyroidism. She reports a good energy level. She reports no hair loss, heat intolerance, cold intolerance, constipation or sweats. She is taking her medication as prescribed.    Review of Systems    CONSTITUTIONAL- Denies Fever, Chills, Sweats, Weakness or Malaise.    NECK- Denies Decreased Range of Motion, Stiffness, Thyroid Nodules, Enlarged Lymph Nodes or Goiter.    EYES- Denies Eye Pain, Eye Drainage, Eye Redness, Eye Discharge, Decreased Vision, Visual Disturbance, Diplopia, Visual Loss or Swollen Eyelid.    HENT- Denies Nasal Discharge, Sore Throat, Ear Pain, Ear Drainage, Sinus Pain, Nasal Congestion, Decreased Hearing or Tinnitus.    PULMONARY- Denies Wheezing, Sputum Production, Cough, Hemoptysis or Pleuritic Chest Pain.    CARDIOVASCULAR- Denies Claudication or Irregular Heart Beat.    GENITOURINARY- Denies Dysuria, Hematuria, Urinary Frequency, Urinary Leakage, Pelvic Pain, Vaginal Prolapse, Vaginal Discharge, Dyspareunia or Abnormal Menses.    ENDOCRINE- Denies Depression, Memory Loss, Polydypsia, Polyphagia, Polyuria, Sleep Disturbance or Weight Gain.    NEUROLOGIC- Denies Seizures, Confusion or Excessive Daytime Sleepiness.    MUSCULOSKELETAL-  Denies Joint Pain, Joint Stiffness, Decreased Range of Motion, Joint Swelling or Erythema of Joints.    INTEGUMENTARY- Denies Rash, Lumps, Sores, Itching, Dryness, Color Change, Changes in Hair, Brittle Nails, Discolored Nails, Thickened Nails, Growths or Alopecia.    Medications      Current Outpatient Medications:   •  ADVAIR DISKUS 100-50 MCG/DOSE DISKUS, inhale 1 dose by mouth twice a day, Disp: 60 each, Rfl: 5  •  albuterol sulfate  (90 Base) MCG/ACT inhaler, Inhale 2 puffs Every 4 (Four) Hours As Needed for Wheezing., Disp: 1 inhaler, Rfl: 5  •  aspirin 81 MG EC tablet, Take 81 mg by mouth daily., Disp: , Rfl:   •  celecoxib (CeleBREX) 100 MG capsule, Take 1 capsule by mouth 2 (Two) Times a Day As Needed for Mild Pain ., Disp: 60 capsule, Rfl: 5  •  diclofenac (VOLTAREN) 1 % gel gel, Apply 4 g topically to the appropriate area as directed 4 (Four) Times a Day. (Patient taking differently: Apply 4 g topically to the appropriate area as directed 4 (Four) Times a Day As Needed.), Disp: 100 g, Rfl: 3  •  estradiol (VAGIFEM) 10 MCG tablet vaginal tablet, Insert 1 tablet into the vagina 2 (Two) Times a Week., Disp: , Rfl: 12  •  fexofenadine (ALLEGRA) 180 MG tablet, Take 180 mg by mouth daily as needed., Disp: , Rfl:   •  losartan-hydrochlorothiazide (HYZAAR) 100-12.5 MG per tablet, take 1 tablet by mouth once daily, Disp: 30 tablet, Rfl: 5  •  metoprolol succinate XL (TOPROL-XL) 200 MG 24 hr tablet, take 1 tablet by mouth once daily, Disp: 90 tablet, Rfl: 1  •  oxybutynin XL (DITROPAN XL) 5 MG 24 hr tablet, Take 1 tablet by mouth Daily., Disp: 30 tablet, Rfl: 3  •  SYNTHROID 75 MCG tablet, Take 75 mcg by mouth Daily., Disp: , Rfl: 0  •  Triamcinolone Acetonide (NASACORT) 55 MCG/ACT nasal inhaler, 2 sprays into each nostril daily., Disp: , Rfl:      Allergies    Allergies   Allergen Reactions   • Macrobid [Nitrofurantoin Monohyd Macro] Swelling and Other (See Comments)     Swelling around eyes, fatigue   •  Ace Inhibitors Cough   • Plaquenil [Hydroxychloroquine Sulfate] Other (See Comments)     EYE PROBLEMS   • Sudafed [Pseudoephedrine Hcl] Other (See Comments)     INCREASED PRESSURE IN EYES    • Tenormin [Atenolol] Cough and Angioedema       Problem List    Patient Active Problem List   Diagnosis   • Asthma   • Hypertension   • Peripheral vascular disease (CMS/HCC)   • GERD (gastroesophageal reflux disease)   • Diverticulosis   • Colon polyps   • Constipation   • Hashimoto's thyroiditis   • Mixed connective tissue disease (CMS/HCC)   • Lupus   • Uterine fibroid   • Stress incontinence of urine       Medications, Allergies, Problems List and Past History were reviewed and updated.    Physical Examination    /70 (BP Location: Right arm, Patient Position: Sitting, Cuff Size: Adult)   Pulse 68   Temp 97.9 °F (36.6 °C) (Temporal)   Resp 18   Wt 60.3 kg (133 lb)   LMP  (LMP Unknown)   Breastfeeding? No   BMI 26.86 kg/m²     HEENT: Head- Normocephalic Atraumatic. Facies- Within normal limits. Pinnas- Normal texture and shape bilaterally. Canals- Normal bilaterally. TMs- Normal bilaterally. Nares- Patent bilaterally. Nasal Septum- is normal. There is no tenderness to palpation over the frontal or maxillary sinuses. Lids- Normal bilaterally. Conjunctiva- Clear bilaterally. Sclera- Anicteric bilaterally. Oropharynx- Moist with no lesions. Tonsils- No enlargement, erythema or exudate.    Neck: Thyroid- non enlarged, symmetric and has no nodules. No bruits are detected. ROM- Normal Range of Motion with no rigidity.    Lungs: Auscultation- Clear to auscultation bilaterally. There are no retractions, clubbing or cyanosis. The Expiratory to Inspiratory ratio is equal.    Lymph Nodes: Cervical- no enlarged lymph nodes noted. Clavicular- no enlarged supraclavicular lymph nodes noted. Axillary- no enlarged axillary lymph nodes noted. Inguinal- no enlarged inguinal lymph nodes noted.    Cardiovascular: There are no carotid  bruits. Heart- Normal Rate with Regular rhythm and no murmurs. There are no gallops. There are no rubs. In the lower extremities there is no edema. The upper extremities do not have edema.    Abdomen: Soft, benign, non-tender with no masses, hernias, organomegaly or scars.    Breast: Normal contours bilaterally with no masses, discharge, skin changes or lumps. There are no scars noted.    Dermatologic: The patient has no worrisome or suspicious skin lesions noted.    Impression and Assessment    Encounter for Immunization Administration.    Essential Hypertension.    Gastroesophageal Reflux Disease.    Hypothyroidism.    Plan    Gastroesophageal Reflux Disease Plan: The current plan was continued.    Essential Hypertension Plan: The current plan was continued.    Hypothyroidism Plan: The current plan was continued.    Counseled regarding immunizations and applicable VIS given.    Immunizations Ordered and Administered: Pneumovax 23.    Kavitha was seen today for extended follow up.    Diagnoses and all orders for this visit:    Essential hypertension  -     Comprehensive Metabolic Panel  -     Lipid Panel  -     POC Urinalysis Dipstick, Automated    Gastroesophageal reflux disease without esophagitis  -     Comprehensive Metabolic Panel    Hypothyroidism, unspecified type  -     TSH  -     T4, Free    Need for pneumococcal vaccine  -     Pneumococcal polysaccharide vaccine 23-valent >= 1yo subcutaneous/IM (PPSV23)        Return to Office    The patient was instructed to return for follow-up in 6 months.    The patient was instructed to return sooner if the condition changes, worsens, or does not resolve.

## 2019-03-20 ENCOUNTER — TRANSCRIBE ORDERS (OUTPATIENT)
Dept: ADMINISTRATIVE | Facility: HOSPITAL | Age: 69
End: 2019-03-20

## 2019-03-20 DIAGNOSIS — Z12.31 VISIT FOR SCREENING MAMMOGRAM: Primary | ICD-10-CM

## 2019-03-29 ENCOUNTER — HOSPITAL ENCOUNTER (OUTPATIENT)
Dept: BONE DENSITY | Facility: HOSPITAL | Age: 69
Discharge: HOME | End: 2019-03-29
Admitting: PHYSICIAN ASSISTANT

## 2019-03-29 DIAGNOSIS — M25.551 BILATERAL HIP PAIN: ICD-10-CM

## 2019-03-29 DIAGNOSIS — M25.552 BILATERAL HIP PAIN: ICD-10-CM

## 2019-03-29 DIAGNOSIS — M35.1 MIXED CONNECTIVE TISSUE DISEASE (HCC): ICD-10-CM

## 2019-03-29 DIAGNOSIS — Z79.51 LONG TERM CURRENT USE OF INHALED STEROID: ICD-10-CM

## 2019-03-29 PROCEDURE — 77080 DXA BONE DENSITY AXIAL: CPT

## 2019-05-24 ENCOUNTER — OFFICE VISIT (OUTPATIENT)
Dept: CARDIOLOGY | Facility: CLINIC | Age: 69
End: 2019-05-24

## 2019-05-24 VITALS
SYSTOLIC BLOOD PRESSURE: 142 MMHG | HEART RATE: 65 BPM | WEIGHT: 136 LBS | OXYGEN SATURATION: 97 % | HEIGHT: 58 IN | DIASTOLIC BLOOD PRESSURE: 86 MMHG | BODY MASS INDEX: 28.55 KG/M2

## 2019-05-24 DIAGNOSIS — R00.2 PALPITATIONS: ICD-10-CM

## 2019-05-24 DIAGNOSIS — I10 ESSENTIAL HYPERTENSION: Primary | ICD-10-CM

## 2019-05-24 PROCEDURE — 99214 OFFICE O/P EST MOD 30 MIN: CPT | Performed by: INTERNAL MEDICINE

## 2019-05-24 RX ORDER — AMLODIPINE BESYLATE 5 MG/1
5 TABLET ORAL DAILY
Qty: 90 TABLET | Refills: 3 | Status: SHIPPED | OUTPATIENT
Start: 2019-05-24 | End: 2020-07-07

## 2019-06-14 ENCOUNTER — HOSPITAL ENCOUNTER (OUTPATIENT)
Dept: MAMMOGRAPHY | Facility: HOSPITAL | Age: 69
Discharge: HOME | End: 2019-06-14
Admitting: INTERNAL MEDICINE

## 2019-06-14 DIAGNOSIS — Z12.31 VISIT FOR SCREENING MAMMOGRAM: ICD-10-CM

## 2019-06-14 PROCEDURE — 77063 BREAST TOMOSYNTHESIS BI: CPT | Performed by: RADIOLOGY

## 2019-06-14 PROCEDURE — 77067 SCR MAMMO BI INCL CAD: CPT

## 2019-06-14 PROCEDURE — 77067 SCR MAMMO BI INCL CAD: CPT | Performed by: RADIOLOGY

## 2019-06-14 PROCEDURE — 77063 BREAST TOMOSYNTHESIS BI: CPT

## 2019-06-28 ENCOUNTER — OFFICE VISIT (OUTPATIENT)
Dept: CARDIOLOGY | Facility: CLINIC | Age: 69
End: 2019-06-28

## 2019-06-28 VITALS
WEIGHT: 132.5 LBS | SYSTOLIC BLOOD PRESSURE: 130 MMHG | DIASTOLIC BLOOD PRESSURE: 72 MMHG | HEART RATE: 78 BPM | OXYGEN SATURATION: 98 % | HEIGHT: 59 IN | BODY MASS INDEX: 26.71 KG/M2

## 2019-06-28 DIAGNOSIS — I10 ESSENTIAL HYPERTENSION: ICD-10-CM

## 2019-06-28 DIAGNOSIS — R00.2 PALPITATIONS: Primary | ICD-10-CM

## 2019-06-28 PROCEDURE — 99213 OFFICE O/P EST LOW 20 MIN: CPT | Performed by: INTERNAL MEDICINE

## 2019-06-28 RX ORDER — OMEPRAZOLE 20 MG/1
20 CAPSULE, DELAYED RELEASE ORAL AS NEEDED
COMMUNITY
End: 2020-07-07

## 2019-06-28 NOTE — PROGRESS NOTES
Encounter Date:2019        Patient ID: Kavitha Barnhart is a 68 y.o. female.    PCP: Farzad Redding MD     Chief Complaint: Hypertension      PROBLEM LIST:  1. Palpitations/possible atrial tachycardia:  a. Patient was admitted for diverticulitis complicated by bowel perforation on 2016, and while in the hospital began having palpitations and shortness of breath that showed possible atrial tachycardia versus atrial fibrillation.   b. Patient reports a long-standing history of palpitations and tachycardia.   c. Echocardiogram, 01/10/2016, [facility]: LVH with EF 60-65%. No significant valve disease.   d. 24h Holter, 2016: Sinus rhythm with average heart rate of 85. Rare PVCs and PACs with occasional short runs of SVT/PAT. No symptoms were reported.   e. 30 day event monitor, 2019: No significant arrhythmias noted, symptoms reported twice correlated with sinus rhythm at normal rate.    2. Hypertension.   3. Diverticulitis complicated by perforated sigmoid colon:  a. Currently awaiting surgery at the beginning of 2016. He was hospitalized at [facility], 2016 for approximately 2 weeks and treated with antibiotics.   4. Lupus and mixed connective tissue disease.   5. Raynaud’s disease.  6. Sjögren's disease.  7. Hashimoto disease.  8. Surgical history:  a. .  b. Knee surgery.    History of Present Illness  Patient presents today for 4 week follow-up with a history of palpitations and cardiac risk factors. Since last visit, she has been doing well overall from a cardiovascular standpoint.  She experiences occasional brief awareness of her heart which is self-limiting.  No severe palpitations.  No dizziness lightheadedness or syncope.  Denies chest pain, shortness of breath, leg swelling, and syncope. Remains busy and active with no significant cardiac limitations.    Allergies   Allergen Reactions   • Macrobid [Nitrofurantoin Monohyd Macro] Swelling  and Other (See Comments)     Swelling around eyes, fatigue   • Ace Inhibitors Cough   • Plaquenil [Hydroxychloroquine Sulfate] Other (See Comments)     EYE PROBLEMS   • Sudafed [Pseudoephedrine Hcl] Other (See Comments)     INCREASED PRESSURE IN EYES    • Tenormin [Atenolol] Cough and Angioedema         Current Outpatient Medications:   •  ADVAIR DISKUS 100-50 MCG/DOSE DISKUS, inhale 1 dose by mouth twice a day, Disp: 60 each, Rfl: 5  •  albuterol sulfate  (90 Base) MCG/ACT inhaler, Inhale 2 puffs Every 4 (Four) Hours As Needed for Wheezing., Disp: 1 inhaler, Rfl: 5  •  amLODIPine (NORVASC) 5 MG tablet, Take 1 tablet by mouth Daily., Disp: 90 tablet, Rfl: 3  •  aspirin 81 MG EC tablet, Take 81 mg by mouth daily., Disp: , Rfl:   •  celecoxib (CeleBREX) 100 MG capsule, Take 1 capsule by mouth 2 (Two) Times a Day As Needed for Mild Pain ., Disp: 60 capsule, Rfl: 5  •  diclofenac (VOLTAREN) 1 % gel gel, Apply 4 g topically to the appropriate area as directed 4 (Four) Times a Day. (Patient taking differently: Apply 4 g topically to the appropriate area as directed 4 (Four) Times a Day As Needed.), Disp: 100 g, Rfl: 3  •  estradiol (VAGIFEM) 10 MCG tablet vaginal tablet, Insert 1 tablet into the vagina 2 (Two) Times a Week., Disp: , Rfl: 12  •  fexofenadine (ALLEGRA) 180 MG tablet, Take 180 mg by mouth Daily., Disp: , Rfl:   •  losartan-hydrochlorothiazide (HYZAAR) 100-12.5 MG per tablet, take 1 tablet by mouth once daily, Disp: 30 tablet, Rfl: 5  •  metoprolol succinate XL (TOPROL-XL) 200 MG 24 hr tablet, take 1 tablet by mouth once daily, Disp: 90 tablet, Rfl: 1  •  omeprazole (priLOSEC) 20 MG capsule, Take 20 mg by mouth As Needed., Disp: , Rfl:   •  SYNTHROID 75 MCG tablet, Take 75 mcg by mouth Daily., Disp: , Rfl: 0  •  Triamcinolone Acetonide (NASACORT) 55 MCG/ACT nasal inhaler, 2 sprays into each nostril daily., Disp: , Rfl:     The following portions of the patient's history were reviewed and updated as  "appropriate: allergies, current medications, past family history, past medical history, past social history, past surgical history and problem list.    ROS  Review of Systems   Constitution: Negative for chills, fatigue, fever, generalized weakness, weight gain and weight loss.   Cardiovascular: Negative for chest pain, claudication, dyspnea on exertion, leg swelling, orthopnea,  paroxysmal nocturnal dyspnea and syncope. Positive for palpitations.  Respiratory: Negative for cough, shortness of breath, and wheezing.  HENT: Negative for ear pain, nosebleeds, and tinnitus.  Gastrointestinal: Negative for abdominal pain, constipation, diarrhea, nausea and vomiting.   Genitourinary: No urinary symptoms   Musculoskeletal: Negative for muscle cramps.  Neurological: Negative for dizziness, headaches, loss of balance, numbness, and symptoms of stroke.  Psychiatric: Normal mental status.     All other systems reviewed and are negative.    Objective:     /72 (BP Location: Right arm, Patient Position: Sitting)   Pulse 78   Ht 149.9 cm (59\")   Wt 60.1 kg (132 lb 8 oz)   LMP  (LMP Unknown)   SpO2 98%   BMI 26.76 kg/m²        Physical Exam  Constitutional: Patient appears well-developed and well-nourished.   HENT: HEENT exam unremarkable.   Neck: Neck supple. No JVD present. No carotid bruits.   Cardiovascular: Normal rate, regular rhythm and normal heart sounds. No murmur heard.   2+ symmetric pulses.   Pulmonary/Chest: Breath sounds normal. Does not exhibit tenderness.   Abdominal: Abdomen benign.   Musculoskeletal: Does not exhibit edema.   Neurological: Neurological exam unremarkable.   Vitals reviewed.    Lab Review:   Lab Results   Component Value Date    GLUCOSE 91 03/12/2019    BUN 26 (H) 03/12/2019    CREATININE 1.18 03/12/2019    EGFRIFNONA 45 (L) 03/12/2019    BCR 22.0 03/12/2019    K 4.2 03/12/2019    CO2 24.0 03/12/2019    CALCIUM 9.5 03/12/2019    ALBUMIN 4.28 03/12/2019    AST 24 03/12/2019    ALT 28 " 03/12/2019     Lab Results   Component Value Date    CHOL 205 (H) 03/12/2019    TRIG 110 03/12/2019    HDL 59 03/12/2019     (H) 03/12/2019        Procedures       Assessment:      Diagnosis Plan   1. Palpitations  Symptoms on event monitor correlate with NSR. Continue current medications   2. Essential hypertension  Well-controlled, continue current medications.     Plan:   Stable cardiac status.  The findings of negative cardiac event monitor discussed with the patient and she was reassured.  Continue current medications.   FU in 12 MO, sooner as needed.  Thank you for allowing us to participate in the care of your patient.     Scribed for Naveen Sarmiento MD by Jerrica Schmitt. 6/28/2019  1:33 PM      I, Naveen Sarmiento MD, personally performed the services described in this documentation as scribed by the above named individual in my presence, and it is both accurate and complete.  6/28/2019  1:51 PM        Please note that portions of this note may have been completed with a voice recognition program. Efforts were made to edit the dictations, but occasionally words are mistranscribed.

## 2019-07-03 RX ORDER — METOPROLOL SUCCINATE 200 MG/1
TABLET, EXTENDED RELEASE ORAL
Qty: 90 TABLET | Refills: 3 | Status: SHIPPED | OUTPATIENT
Start: 2019-07-03 | End: 2020-06-23

## 2019-08-17 PROCEDURE — 87077 CULTURE AEROBIC IDENTIFY: CPT | Performed by: NURSE PRACTITIONER

## 2019-08-17 PROCEDURE — 87186 SC STD MICRODIL/AGAR DIL: CPT | Performed by: NURSE PRACTITIONER

## 2019-08-17 PROCEDURE — 87086 URINE CULTURE/COLONY COUNT: CPT | Performed by: NURSE PRACTITIONER

## 2019-08-22 ENCOUNTER — TELEPHONE (OUTPATIENT)
Dept: URGENT CARE | Facility: CLINIC | Age: 69
End: 2019-08-22

## 2019-08-22 DIAGNOSIS — N39.0 ACUTE UTI: Primary | ICD-10-CM

## 2019-08-22 RX ORDER — LEVOFLOXACIN 500 MG/1
500 TABLET, FILM COATED ORAL DAILY
Qty: 7 TABLET | Refills: 0 | Status: SHIPPED | OUTPATIENT
Start: 2019-08-22 | End: 2019-08-29

## 2019-08-22 NOTE — TELEPHONE ENCOUNTER
Urine culture is positive for greater than 100,000 E. coli ESBL.  It is resistant to the Bactrim which she is on but is sensitive to Levaquin.  A prescription for Levaquin is sent to her pharmacy. -Spencer Armando M.D.

## 2019-08-23 ENCOUNTER — TELEPHONE (OUTPATIENT)
Dept: INTERNAL MEDICINE | Facility: CLINIC | Age: 69
End: 2019-08-23

## 2019-08-23 NOTE — TELEPHONE ENCOUNTER
It looks like she was initially on Bactrim, but this was changed to Levaquin based on the UCx results. Bacteria should be susceptible to Levaquin and she is on the correct medication. Given this specific bacteria, we would recommend having repeat UA and UCx in 2-3 weeks. She has appt with Dr. Alfaro scheduled 9/16/2019 and this would be good timing to repeat UCx to make sure this has cleared. If symptoms still present on Monday, RTC sooner. Do not hesitate to be seen at [facility] or ED this weekend if symptoms acutely worsen.

## 2019-08-23 NOTE — TELEPHONE ENCOUNTER
Kavitha Barnhart [phone]  Spoke to pt, advised of clinical message. Pt is in agreement with plan. Good verbal understanding.

## 2019-08-23 NOTE — TELEPHONE ENCOUNTER
----- Message from Amy Barton sent at 8/23/2019 10:48 AM EDT -----  Patient called and stated she went to [facility] last Saturday for a UTI. Since then they have called and told her she has E-coli and have changed her medication 3 times this week. Patient wanted to make us aware of the situation. She also wants to know if she should be seen here? Patient stated she is feeling terrible and in some pain in her stomach. Patient can be reached at [phone].

## 2019-09-17 ENCOUNTER — OFFICE VISIT (OUTPATIENT)
Dept: INTERNAL MEDICINE | Facility: CLINIC | Age: 69
End: 2019-09-17

## 2019-09-17 VITALS
DIASTOLIC BLOOD PRESSURE: 68 MMHG | BODY MASS INDEX: 27.67 KG/M2 | TEMPERATURE: 98.9 F | WEIGHT: 137 LBS | RESPIRATION RATE: 16 BRPM | SYSTOLIC BLOOD PRESSURE: 126 MMHG | HEART RATE: 80 BPM

## 2019-09-17 DIAGNOSIS — E03.9 HYPOTHYROIDISM, UNSPECIFIED TYPE: ICD-10-CM

## 2019-09-17 DIAGNOSIS — R30.0 DYSURIA: ICD-10-CM

## 2019-09-17 DIAGNOSIS — N39.0 URINARY TRACT INFECTION WITHOUT HEMATURIA, SITE UNSPECIFIED: ICD-10-CM

## 2019-09-17 DIAGNOSIS — K76.89 LIVER CYST: ICD-10-CM

## 2019-09-17 DIAGNOSIS — K21.9 GASTROESOPHAGEAL REFLUX DISEASE WITHOUT ESOPHAGITIS: ICD-10-CM

## 2019-09-17 DIAGNOSIS — I10 ESSENTIAL HYPERTENSION: Primary | ICD-10-CM

## 2019-09-17 LAB
ALBUMIN SERPL-MCNC: 4.6 G/DL (ref 3.5–5.2)
ALBUMIN/GLOB SERPL: 1.8 G/DL
ALP SERPL-CCNC: 75 U/L (ref 39–117)
ALT SERPL W P-5'-P-CCNC: 16 U/L (ref 1–33)
ANION GAP SERPL CALCULATED.3IONS-SCNC: 11.9 MMOL/L (ref 5–15)
AST SERPL-CCNC: 14 U/L (ref 1–32)
BILIRUB BLD-MCNC: NEGATIVE MG/DL
BILIRUB SERPL-MCNC: 0.6 MG/DL (ref 0.2–1.2)
BUN BLD-MCNC: 25 MG/DL (ref 8–23)
BUN/CREAT SERPL: 24 (ref 7–25)
CALCIUM SPEC-SCNC: 9.8 MG/DL (ref 8.6–10.5)
CHLORIDE SERPL-SCNC: 103 MMOL/L (ref 98–107)
CLARITY, POC: CLEAR
CO2 SERPL-SCNC: 26.1 MMOL/L (ref 22–29)
COLOR UR: YELLOW
CREAT BLD-MCNC: 1.04 MG/DL (ref 0.57–1)
EXPIRATION DATE: NORMAL
GFR SERPL CREATININE-BSD FRML MDRD: 52 ML/MIN/1.73
GLOBULIN UR ELPH-MCNC: 2.5 GM/DL
GLUCOSE BLD-MCNC: 95 MG/DL (ref 65–99)
GLUCOSE UR STRIP-MCNC: NEGATIVE MG/DL
KETONES UR QL: NEGATIVE
LEUKOCYTE EST, POC: NEGATIVE
Lab: NORMAL
NITRITE UR-MCNC: NEGATIVE MG/ML
PH UR: 6.5 [PH] (ref 5–8)
POTASSIUM BLD-SCNC: 4.2 MMOL/L (ref 3.5–5.2)
PROT SERPL-MCNC: 7.1 G/DL (ref 6–8.5)
PROT UR STRIP-MCNC: NEGATIVE MG/DL
RBC # UR STRIP: NEGATIVE /UL
SODIUM BLD-SCNC: 141 MMOL/L (ref 136–145)
SP GR UR: 1 (ref 1–1.03)
T4 FREE SERPL-MCNC: 1.79 NG/DL (ref 0.93–1.7)
TSH SERPL DL<=0.05 MIU/L-ACNC: 1.39 UIU/ML (ref 0.27–4.2)
UROBILINOGEN UR QL: NORMAL

## 2019-09-17 PROCEDURE — 84439 ASSAY OF FREE THYROXINE: CPT | Performed by: INTERNAL MEDICINE

## 2019-09-17 PROCEDURE — 36415 COLL VENOUS BLD VENIPUNCTURE: CPT | Performed by: INTERNAL MEDICINE

## 2019-09-17 PROCEDURE — 99214 OFFICE O/P EST MOD 30 MIN: CPT | Performed by: INTERNAL MEDICINE

## 2019-09-17 PROCEDURE — 80053 COMPREHEN METABOLIC PANEL: CPT | Performed by: INTERNAL MEDICINE

## 2019-09-17 PROCEDURE — 84443 ASSAY THYROID STIM HORMONE: CPT | Performed by: INTERNAL MEDICINE

## 2019-09-17 PROCEDURE — 87086 URINE CULTURE/COLONY COUNT: CPT | Performed by: INTERNAL MEDICINE

## 2019-09-17 PROCEDURE — 81003 URINALYSIS AUTO W/O SCOPE: CPT | Performed by: INTERNAL MEDICINE

## 2019-09-17 RX ORDER — LOSARTAN POTASSIUM AND HYDROCHLOROTHIAZIDE 25; 100 MG/1; MG/1
1 TABLET ORAL DAILY
Qty: 30 TABLET | Refills: 5 | Status: SHIPPED | OUTPATIENT
Start: 2019-09-17 | End: 2020-03-26

## 2019-09-17 NOTE — PROGRESS NOTES
Chief Complaint   Patient presents with   • Follow-up     3 month follow up chronic medical problems and recurrent UTI       History of Present Illness    The patient presents for a follow-up related to hypertension. The patient reports that she has had no headaches, chest pain, dyspnea, edema, syncope, blurred vision or palpitations. She states that she is taking her medication as prescribed. She reports that she is experiencing edema due to the medication.    The patient presents for a follow-up related to hypothyroidism. She reports that she does not have as much energy as usual. She reports no hair loss, heat intolerance, cold intolerance, diarrhea, constipation or sweats. She is taking her medication as prescribed.    The patient presents for a follow-up related to GERD. The patient is on omeprazole for her gastroesophageal reflux. The medication is taken on a regular basis and gives complete relief of the symptoms. She reports no abdominal pain, belching, dysphagia, early satiety, heartburn, hoarseness, nausea, odynophagia, rectal bleeding, vomiting or weight loss. The GERD has no known aggravating factors.    The patient reports that she has symptoms consisting of frequency of urination and urinary urgency but there are no reports of painful urination, blood in the urine, fever, pelvic pain, flank pain, back pain, a decreased urine stream, chills, nausea or burning with urination. The symptoms have been present for one week. The urine stream is normal. She does not have increased nocturia. The patient has not tried anything to alleviate the symptoms.    Review of Systems    CONSTITUTIONAL- Denies Sweats, Weakness or Malaise.    PULMONARY- Denies Wheezing, Sputum Production, Cough, Hemoptysis or Pleuritic Chest Pain.    CARDIOVASCULAR- Denies Claudication or Irregular Heart Beat.    GENITOURINARY- Denies Urinary Leakage, Vaginal Prolapse or Vaginal Discharge.    Medications      Current Outpatient Medications:    •  ADVAIR DISKUS 100-50 MCG/DOSE DISKUS, inhale 1 dose by mouth twice a day, Disp: 60 each, Rfl: 5  •  albuterol sulfate  (90 Base) MCG/ACT inhaler, Inhale 2 puffs Every 4 (Four) Hours As Needed for Wheezing., Disp: 1 inhaler, Rfl: 5  •  amLODIPine (NORVASC) 5 MG tablet, Take 1 tablet by mouth Daily., Disp: 90 tablet, Rfl: 3  •  aspirin 81 MG EC tablet, Take 81 mg by mouth daily., Disp: , Rfl:   •  celecoxib (CeleBREX) 100 MG capsule, Take 1 capsule by mouth 2 (Two) Times a Day As Needed for Mild Pain ., Disp: 60 capsule, Rfl: 5  •  diclofenac (VOLTAREN) 1 % gel gel, Apply 4 g topically to the appropriate area as directed 4 (Four) Times a Day. (Patient taking differently: Apply 4 g topically to the appropriate area as directed 4 (Four) Times a Day As Needed.), Disp: 100 g, Rfl: 3  •  estradiol (VAGIFEM) 10 MCG tablet vaginal tablet, Insert 1 tablet into the vagina 2 (Two) Times a Week., Disp: , Rfl: 12  •  fexofenadine (ALLEGRA) 180 MG tablet, Take 180 mg by mouth Daily., Disp: , Rfl:   •  losartan-hydrochlorothiazide (HYZAAR) 100-12.5 MG per tablet, take 1 tablet by mouth once daily, Disp: 30 tablet, Rfl: 5  •  metoprolol succinate XL (TOPROL-XL) 200 MG 24 hr tablet, take 1 tablet by mouth once daily, Disp: 90 tablet, Rfl: 3  •  omeprazole (priLOSEC) 20 MG capsule, Take 20 mg by mouth As Needed., Disp: , Rfl:   •  SYNTHROID 75 MCG tablet, Take 75 mcg by mouth Daily., Disp: , Rfl: 0  •  Triamcinolone Acetonide (NASACORT) 55 MCG/ACT nasal inhaler, 2 sprays into each nostril daily., Disp: , Rfl:      Allergies    Allergies   Allergen Reactions   • Macrobid [Nitrofurantoin Monohyd Macro] Swelling and Other (See Comments)     Swelling around eyes, fatigue   • Ace Inhibitors Cough   • Plaquenil [Hydroxychloroquine Sulfate] Other (See Comments)     EYE PROBLEMS   • Sudafed [Pseudoephedrine Hcl] Other (See Comments)     INCREASED PRESSURE IN EYES    • Tenormin [Atenolol] Cough and Angioedema       Problem  List    Patient Active Problem List   Diagnosis   • Asthma   • Hypertension   • Peripheral vascular disease (CMS/HCC)   • GERD (gastroesophageal reflux disease)   • Diverticulosis   • Colon polyps   • Constipation   • Hashimoto's thyroiditis   • Mixed connective tissue disease (CMS/HCC)   • Lupus   • Uterine fibroid   • Stress incontinence of urine   • Palpitations       Medications, Allergies, Problems List and Past History were reviewed and updated.    Physical Examination    /68 (BP Location: Left arm, Patient Position: Sitting, Cuff Size: Adult)   Pulse 80   Temp 98.9 °F (37.2 °C) (Temporal)   Resp 16   Wt 62.1 kg (137 lb)   LMP  (LMP Unknown)   Breastfeeding? No   BMI 27.67 kg/m²       HEENT: Head- Normocephalic Atraumatic. Facies- Within normal limits. Pinnas- Normal texture and shape bilaterally. Canals- Normal bilaterally. TMs- Normal bilaterally. Nares- Patent bilaterally. Nasal Septum- is normal. There is no tenderness to palpation over the frontal or maxillary sinuses. Lids- Normal bilaterally. Conjunctiva- Clear bilaterally. Sclera- Anicteric bilaterally. Oropharynx- Moist with no lesions. Tonsils- No enlargement, erythema or exudate.    Neck: Thyroid- non enlarged, symmetric and has no nodules. No bruits are detected. ROM- Normal Range of Motion with no rigidity.    Lungs: Auscultation- Clear to auscultation bilaterally. There are no retractions, clubbing or cyanosis. The Expiratory to Inspiratory ratio is equal.    Cardiovascular: There are no carotid bruits. Heart- Normal Rate with Regular rhythm and no murmurs. There are no gallops. There are no rubs. In the lower extremities there is no edema. The upper extremities do not have edema.    Abdomen: Soft, benign, non-tender with no masses, hernias, organomegaly or scars.    Back: The patient has a normal spinal curvature with no evidence of scoliosis. There are no skin lesions noted on the back. Palpation reveals no tenderness and normal  muscle tone. The straight leg raising test is negative. The back has normal flexion, extension, rotation, and lateral bending.    Impression and Assessment    Essential Hypertension.    Hypothyroidism.    Gastroesophageal Reflux Disease.    Urinary Tract Infection.    Plan    Gastroesophageal Reflux Disease Plan: The current plan was continued.    Essential Hypertension Plan: The current plan was continued.    Hypothyroidism Plan: Further plans will be formulated after test results are reviewed.    Urinary Tract Infection Plan: Further plans will be made after test results are available.    Kavitha was seen today for follow-up.    Diagnoses and all orders for this visit:    Essential hypertension  -     Comprehensive Metabolic Panel  -     losartan-hydrochlorothiazide (HYZAAR) 100-25 MG per tablet; Take 1 tablet by mouth Daily.    Hypothyroidism, unspecified type  -     TSH  -     T4, Free    Gastroesophageal reflux disease without esophagitis    Urinary tract infection without hematuria, site unspecified  -     Urine Culture - Urine, Urine, Clean Catch    Liver cyst  -     CT abdomen w contrast; Future        Return to Office    The patient was instructed to return for follow-up in 6 months.    The patient was instructed to return sooner if the condition changes, worsens, or does not resolve.

## 2019-09-19 LAB — BACTERIA SPEC AEROBE CULT: ABNORMAL

## 2019-09-26 ENCOUNTER — HOSPITAL ENCOUNTER (OUTPATIENT)
Dept: CT IMAGING | Facility: HOSPITAL | Age: 69
Discharge: HOME | End: 2019-09-26
Admitting: INTERNAL MEDICINE

## 2019-09-26 DIAGNOSIS — K76.89 LIVER CYST: ICD-10-CM

## 2019-09-26 PROCEDURE — 74160 CT ABDOMEN W/CONTRAST: CPT

## 2019-09-26 PROCEDURE — A9270 NON-COVERED ITEM OR SERVICE: HCPCS | Performed by: INTERNAL MEDICINE

## 2019-09-26 PROCEDURE — 25010000002 IOPAMIDOL 61 % SOLUTION: Performed by: INTERNAL MEDICINE

## 2019-09-26 PROCEDURE — 63710000001 BARIUM 2 % SUSPENSION: Performed by: INTERNAL MEDICINE

## 2019-09-26 RX ADMIN — BARIUM SULFATE 450 ML: 21 SUSPENSION ORAL at 13:10

## 2019-09-26 RX ADMIN — IOPAMIDOL 85 ML: 612 INJECTION, SOLUTION INTRAVENOUS at 14:20

## 2019-10-16 DIAGNOSIS — J45.20 MILD INTERMITTENT ASTHMA WITHOUT COMPLICATION: ICD-10-CM

## 2020-02-25 ENCOUNTER — TRANSCRIBE ORDERS (OUTPATIENT)
Dept: ADMINISTRATIVE | Facility: HOSPITAL | Age: 70
End: 2020-02-25

## 2020-02-25 DIAGNOSIS — Z12.31 VISIT FOR SCREENING MAMMOGRAM: Primary | ICD-10-CM

## 2020-03-26 DIAGNOSIS — I10 ESSENTIAL HYPERTENSION: ICD-10-CM

## 2020-03-26 RX ORDER — LOSARTAN POTASSIUM AND HYDROCHLOROTHIAZIDE 25; 100 MG/1; MG/1
TABLET ORAL
Qty: 90 TABLET | Refills: 2 | Status: SHIPPED | OUTPATIENT
Start: 2020-03-26 | End: 2021-01-05

## 2020-06-15 ENCOUNTER — HOSPITAL ENCOUNTER (OUTPATIENT)
Dept: MAMMOGRAPHY | Facility: HOSPITAL | Age: 70
Discharge: HOME | End: 2020-06-15
Admitting: INTERNAL MEDICINE

## 2020-06-15 DIAGNOSIS — Z12.31 VISIT FOR SCREENING MAMMOGRAM: ICD-10-CM

## 2020-06-15 PROCEDURE — 77067 SCR MAMMO BI INCL CAD: CPT | Performed by: RADIOLOGY

## 2020-06-15 PROCEDURE — 77063 BREAST TOMOSYNTHESIS BI: CPT

## 2020-06-15 PROCEDURE — 77063 BREAST TOMOSYNTHESIS BI: CPT | Performed by: RADIOLOGY

## 2020-06-15 PROCEDURE — 77067 SCR MAMMO BI INCL CAD: CPT

## 2020-06-23 RX ORDER — METOPROLOL SUCCINATE 200 MG/1
TABLET, EXTENDED RELEASE ORAL
Qty: 90 TABLET | Refills: 0 | Status: SHIPPED | OUTPATIENT
Start: 2020-06-23 | End: 2020-10-12 | Stop reason: SDUPTHER

## 2020-07-07 ENCOUNTER — HOSPITAL ENCOUNTER (OUTPATIENT)
Dept: GENERAL RADIOLOGY | Facility: HOSPITAL | Age: 70
Discharge: HOME | End: 2020-07-07
Admitting: INTERNAL MEDICINE

## 2020-07-07 ENCOUNTER — OFFICE VISIT (OUTPATIENT)
Dept: INTERNAL MEDICINE | Facility: CLINIC | Age: 70
End: 2020-07-07

## 2020-07-07 VITALS
WEIGHT: 134 LBS | SYSTOLIC BLOOD PRESSURE: 132 MMHG | DIASTOLIC BLOOD PRESSURE: 68 MMHG | BODY MASS INDEX: 27.06 KG/M2 | TEMPERATURE: 97.5 F | RESPIRATION RATE: 16 BRPM | HEART RATE: 68 BPM

## 2020-07-07 DIAGNOSIS — Z87.01 HISTORY OF PNEUMONIA: ICD-10-CM

## 2020-07-07 DIAGNOSIS — E03.9 HYPOTHYROIDISM, UNSPECIFIED TYPE: ICD-10-CM

## 2020-07-07 DIAGNOSIS — M79.631 RIGHT FOREARM PAIN: ICD-10-CM

## 2020-07-07 DIAGNOSIS — K21.9 GASTROESOPHAGEAL REFLUX DISEASE WITHOUT ESOPHAGITIS: ICD-10-CM

## 2020-07-07 DIAGNOSIS — I10 ESSENTIAL HYPERTENSION: Primary | ICD-10-CM

## 2020-07-07 LAB
ALBUMIN SERPL-MCNC: 4.3 G/DL (ref 3.5–5.2)
ALBUMIN/GLOB SERPL: 1.5 G/DL
ALP SERPL-CCNC: 62 U/L (ref 39–117)
ALT SERPL W P-5'-P-CCNC: 17 U/L (ref 1–33)
ANION GAP SERPL CALCULATED.3IONS-SCNC: 9.5 MMOL/L (ref 5–15)
AST SERPL-CCNC: 17 U/L (ref 1–32)
BILIRUB SERPL-MCNC: 0.6 MG/DL (ref 0–1.2)
BUN SERPL-MCNC: 27 MG/DL (ref 8–23)
BUN/CREAT SERPL: 22.1 (ref 7–25)
CALCIUM SPEC-SCNC: 10 MG/DL (ref 8.6–10.5)
CHLORIDE SERPL-SCNC: 105 MMOL/L (ref 98–107)
CO2 SERPL-SCNC: 26.5 MMOL/L (ref 22–29)
CREAT SERPL-MCNC: 1.22 MG/DL (ref 0.57–1)
GFR SERPL CREATININE-BSD FRML MDRD: 43 ML/MIN/1.73
GLOBULIN UR ELPH-MCNC: 2.8 GM/DL
GLUCOSE SERPL-MCNC: 100 MG/DL (ref 65–99)
POTASSIUM SERPL-SCNC: 3.5 MMOL/L (ref 3.5–5.2)
PROT SERPL-MCNC: 7.1 G/DL (ref 6–8.5)
SODIUM SERPL-SCNC: 141 MMOL/L (ref 136–145)

## 2020-07-07 PROCEDURE — 99214 OFFICE O/P EST MOD 30 MIN: CPT | Performed by: INTERNAL MEDICINE

## 2020-07-07 PROCEDURE — 84443 ASSAY THYROID STIM HORMONE: CPT | Performed by: INTERNAL MEDICINE

## 2020-07-07 PROCEDURE — 84439 ASSAY OF FREE THYROXINE: CPT | Performed by: INTERNAL MEDICINE

## 2020-07-07 PROCEDURE — 73090 X-RAY EXAM OF FOREARM: CPT

## 2020-07-07 PROCEDURE — 36415 COLL VENOUS BLD VENIPUNCTURE: CPT | Performed by: INTERNAL MEDICINE

## 2020-07-07 PROCEDURE — 86769 SARS-COV-2 COVID-19 ANTIBODY: CPT | Performed by: INTERNAL MEDICINE

## 2020-07-07 PROCEDURE — 80053 COMPREHEN METABOLIC PANEL: CPT | Performed by: INTERNAL MEDICINE

## 2020-07-07 RX ORDER — ESTRADIOL 0.1 MG/G
0.5 CREAM VAGINAL 2 TIMES WEEKLY
COMMUNITY
End: 2022-09-09

## 2020-07-07 NOTE — PROGRESS NOTES
Chief Complaint   Patient presents with   • Follow-up     6 month follow up chronic medical problems        History of Present Illness    The patient presents for an initial evaluation of pain of the right forearm. The pain is described as aching. The symptoms have been present for 1 month. There has not been recent trauma to the affected area. The symptoms are coming and going.       There is no pain in the spine. There has been no trauma to the neck or back. The patient denies loss of bowel or bladder control. The patient does not report foreign travel. There are no insect bites reported. The patient denies new sexual exposures. There are no high risk exposures. There have not been recent viral infections.       The patient does not report a dry cough, a wet cough, wheezing, fever, facial pain, a headache, eye drainage, ear pain, ear drainage, nausea, vomiting, diarrhea, myalgias, sore throat, abdominal pain, nasal discharge, weight loss, decreased appetite, chills, rash, joint pain, a breast lump or GI bleeding.    The patient presents for a follow-up related to hypertension. The patient reports that she has had no chest pain, dyspnea, edema, syncope, blurred vision or palpitations. She states that she is taking her medication as prescribed. She is not having medication side effects.    The patient presents for a follow-up related to hypothyroidism. She reports a good energy level. She reports no hair loss, heat intolerance, cold intolerance, constipation or sweats. She is taking her medication as prescribed.    The patient presents for a follow-up related to GERD. The patient is not on medication for her gastroesophageal reflux. She reports no belching, dysphagia, early satiety, heartburn, hoarseness, odynophagia or rectal bleeding. The GERD has no known aggravating factors.    Review of Systems    CONSTITUTIONAL- Denies Sweats, Weakness or Malaise.    HENT- Denies Sinus Pain, Nasal Congestion, Decreased Hearing  or Tinnitus.    PULMONARY- Denies Sputum Production, Cough, Hemoptysis or Pleuritic Chest Pain.    CARDIOVASCULAR- Denies Claudication or Irregular Heart Beat.    Medications      Current Outpatient Medications:   •  albuterol sulfate  (90 Base) MCG/ACT inhaler, Inhale 2 puffs Every 4 (Four) Hours As Needed for Wheezing., Disp: 1 inhaler, Rfl: 5  •  aspirin 81 MG EC tablet, Take 81 mg by mouth 2 (Two) Times a Week., Disp: , Rfl:   •  celecoxib (CeleBREX) 100 MG capsule, Take 1 capsule by mouth 2 (Two) Times a Day As Needed for Mild Pain ., Disp: 60 capsule, Rfl: 5  •  diclofenac (VOLTAREN) 1 % gel gel, Apply 4 g topically to the appropriate area as directed 4 (Four) Times a Day. (Patient taking differently: Apply 4 g topically to the appropriate area as directed 4 (Four) Times a Day As Needed.), Disp: 100 g, Rfl: 3  •  estradiol (ESTRACE) 0.1 MG/GM vaginal cream, Insert 0.5 g into the vagina 2 (Two) Times a Week., Disp: , Rfl:   •  fexofenadine (ALLEGRA) 180 MG tablet, Take 180 mg by mouth Daily., Disp: , Rfl:   •  hydrocortisone 2.5 % cream, Apply  topically to the appropriate area as directed Daily., Disp: , Rfl:   •  losartan-hydrochlorothiazide (HYZAAR) 100-25 MG per tablet, TAKE 1 TABLET BY MOUTH ONCE DAILY, Disp: 90 tablet, Rfl: 2  •  metoprolol succinate XL (TOPROL-XL) 200 MG 24 hr tablet, TAKE 1 TABLET BY MOUTH ONCE DAILY, Disp: 90 tablet, Rfl: 0  •  SYNTHROID 75 MCG tablet, Take 75 mcg by mouth Daily., Disp: , Rfl: 0  •  Triamcinolone Acetonide (NASACORT) 55 MCG/ACT nasal inhaler, 2 sprays into each nostril daily., Disp: , Rfl:   •  WIXELA INHUB 100-50 MCG/DOSE DISKUS, inhale 1 dose by mouth twice a day, Disp: 60 each, Rfl: 5     Allergies    Allergies   Allergen Reactions   • Macrobid [Nitrofurantoin Monohyd Macro] Swelling and Other (See Comments)     Swelling around eyes, fatigue   • Ace Inhibitors Cough   • Plaquenil [Hydroxychloroquine Sulfate] Other (See Comments)     EYE PROBLEMS   • Sudafed  [Pseudoephedrine Hcl] Other (See Comments)     INCREASED PRESSURE IN EYES    • Tenormin [Atenolol] Cough and Angioedema       Problem List    Patient Active Problem List   Diagnosis   • Asthma   • Hypertension   • Peripheral vascular disease (CMS/HCC)   • GERD (gastroesophageal reflux disease)   • Diverticulosis   • Colon polyps   • Constipation   • Hashimoto's thyroiditis   • Mixed connective tissue disease (CMS/HCC)   • Lupus (CMS/HCC)   • Uterine fibroid   • Stress incontinence of urine   • Palpitations       Medications, Allergies, Problems List and Past History were reviewed and updated.    Physical Examination    /68 (BP Location: Right arm, Patient Position: Sitting, Cuff Size: Adult)   Pulse 68   Temp 97.5 °F (36.4 °C) (Temporal)   Resp 16   Wt 60.8 kg (134 lb)   LMP  (LMP Unknown)   Breastfeeding No   BMI 27.06 kg/m²     HEENT: Head- Normocephalic Atraumatic. Facies- Within normal limits. Pinnas- Normal texture and shape bilaterally. Canals- Normal bilaterally. TMs- Normal bilaterally. Nares- Patent bilaterally. Nasal Septum- is normal. There is no tenderness to palpation over the frontal or maxillary sinuses. Lids- Normal bilaterally. Conjunctiva- Clear bilaterally. Sclera- Anicteric bilaterally. Oropharynx- Moist with no lesions. Tonsils- No enlargement, erythema or exudate.    Neck: Thyroid- non enlarged, symmetric and has no nodules. No bruits are detected. ROM- Normal Range of Motion with no rigidity.    Lungs: Auscultation- Clear to auscultation bilaterally. There are no retractions, clubbing or cyanosis. The Expiratory to Inspiratory ratio is equal.    Lymph Nodes: Cervical- no enlarged lymph nodes noted. Clavicular- no enlarged supraclavicular lymph nodes noted. Axillary- no enlarged axillary lymph nodes noted. Inguinal- no enlarged inguinal lymph nodes noted.    Cardiovascular: There are no carotid bruits. Heart- Normal Rate with Regular rhythm and no murmurs. There are no gallops.  There are no rubs. In the lower extremities there is no edema. The upper extremities do not have edema.    Abdomen: Soft, benign, non-tender with no masses, hernias, organomegaly or scars.    Upper Extremity Neuro Exam: Muscle Strength is 5/5 in all major upper extremity muscle groups. Deep Tendon Reflexes are 2+ and equal in the biceps, triceps and brachioradialis distributions bilaterally. Sensation is normal in all distributions.    Arm Exam: The right arm has no anatomic abnormalities or tenderness. The left arm has no anatomic abnormalities or tenderness.    Impression and Assessment    Essential Hypertension.    Hypothyroidism.    Gastroesophageal Reflux Disease.    Right Forearm Pain.    Plan    Gastroesophageal Reflux Disease Plan: The current plan was continued.    Essential Hypertension Plan: The current plan was continued.    Hypothyroidism Plan: The current plan was continued.    Right Forearm Pain Plan: She was referred to physical therapy.    Kavitha was seen today for follow-up.    Diagnoses and all orders for this visit:    Essential hypertension  -     Comprehensive Metabolic Panel    Hypothyroidism, unspecified type  -     TSH  -     T4, Free    Gastroesophageal reflux disease without esophagitis    History of pneumonia  -     SARS-CoV-2 Antibodies (Roche); Future  -     SARS-CoV-2 Antibodies (Roche)    Right forearm pain  -     XR forearm 2 vw right; Future  -     Ambulatory Referral to Physical Therapy Evaluate and treat        Return to Office    The patient was instructed to return for follow-up in 4 months.    The patient was instructed to return sooner if the condition changes, worsens, or does not resolve.

## 2020-07-08 LAB
T4 FREE SERPL-MCNC: 1.64 NG/DL (ref 0.93–1.7)
TSH SERPL DL<=0.05 MIU/L-ACNC: 1.74 UIU/ML (ref 0.27–4.2)

## 2020-07-10 ENCOUNTER — OFFICE VISIT (OUTPATIENT)
Dept: CARDIOLOGY | Facility: CLINIC | Age: 70
End: 2020-07-10

## 2020-07-10 VITALS
WEIGHT: 135 LBS | TEMPERATURE: 97.8 F | SYSTOLIC BLOOD PRESSURE: 110 MMHG | BODY MASS INDEX: 27.21 KG/M2 | OXYGEN SATURATION: 98 % | HEART RATE: 66 BPM | HEIGHT: 59 IN | DIASTOLIC BLOOD PRESSURE: 72 MMHG

## 2020-07-10 DIAGNOSIS — I10 ESSENTIAL HYPERTENSION: ICD-10-CM

## 2020-07-10 DIAGNOSIS — R00.2 PALPITATIONS: Primary | ICD-10-CM

## 2020-07-10 LAB — SARS-COV-2 AB SERPL QL IA: NEGATIVE

## 2020-07-10 PROCEDURE — 93000 ELECTROCARDIOGRAM COMPLETE: CPT | Performed by: INTERNAL MEDICINE

## 2020-07-10 PROCEDURE — 99213 OFFICE O/P EST LOW 20 MIN: CPT | Performed by: INTERNAL MEDICINE

## 2020-07-10 NOTE — PROGRESS NOTES
[facility]    Encounter Date: 07/10/2020    Patient ID: Kavitha Barnhart is a 69 y.o. female.  : 1950     PCP: Farzad Redding MD       Chief Complaint: Peripheral Vascular Disease      PROBLEM LIST:  1. Palpitations/possible atrial tachycardia:  a. Patient was admitted for diverticulitis complicated by bowel perforation on 2016, and while in the hospital began having palpitations and shortness of breath that showed possible atrial tachycardia versus atrial fibrillation.   b. Patient reports a long-standing history of palpitations and tachycardia.   c. Echocardiogram, 01/10/2016, [facility]: LVH with EF 60-65%. No significant valve disease.   d. 24h Holter, 2016: Sinus rhythm with average heart rate of 85. Rare PVCs and PACs with occasional short runs of SVT/PAT. No symptoms were reported.   e. 30 day event monitor, 2019: No significant arrhythmias noted, symptoms reported twice correlated with sinus rhythm at normal rate.    2. Hypertension.   3. Diverticulitis complicated by perforated sigmoid colon:  a. Currently awaiting surgery at the beginning of 2016. He was hospitalized at [facility], 2016 for approximately 2 weeks and treated with antibiotics.   4. Lupus and mixed connective tissue disease.   5. Raynaud’s disease.  6. Sjögren's disease.  7. Hashimoto disease.  8. Surgical history:  a. .  b. Knee surgery.    History of Present Illness  Patient presents today for an annual follow-up with a history of palpitations and cardiac risk factors. Since last visit, she has done very well from cardiac standpoint.  She was exercising regularly doing Pilates, since the epidemic she has been doing activities and exercises at home.  She has rare awareness of her fast heartbeat with physical exertion.  No palpitations with normal activities.  No chest pain or shortness of breath.  Feels pretty good overall.  Compliant  with all medications.  She continues to have problems with her abdomen due to diverticulitis.    Allergies   Allergen Reactions   • Macrobid [Nitrofurantoin Monohyd Macro] Swelling and Other (See Comments)     Swelling around eyes, fatigue   • Ace Inhibitors Cough   • Plaquenil [Hydroxychloroquine Sulfate] Other (See Comments)     EYE PROBLEMS   • Sudafed [Pseudoephedrine Hcl] Other (See Comments)     INCREASED PRESSURE IN EYES    • Tenormin [Atenolol] Cough and Angioedema         Current Outpatient Medications:   •  albuterol sulfate  (90 Base) MCG/ACT inhaler, Inhale 2 puffs Every 4 (Four) Hours As Needed for Wheezing., Disp: 1 inhaler, Rfl: 5  •  aspirin 81 MG EC tablet, Take 81 mg by mouth 2 (Two) Times a Week., Disp: , Rfl:   •  celecoxib (CeleBREX) 100 MG capsule, Take 1 capsule by mouth 2 (Two) Times a Day As Needed for Mild Pain ., Disp: 60 capsule, Rfl: 5  •  diclofenac (VOLTAREN) 1 % gel gel, Apply 4 g topically to the appropriate area as directed 4 (Four) Times a Day. (Patient taking differently: Apply 4 g topically to the appropriate area as directed 4 (Four) Times a Day As Needed.), Disp: 100 g, Rfl: 3  •  estradiol (ESTRACE) 0.1 MG/GM vaginal cream, Insert 0.5 g into the vagina 2 (Two) Times a Week., Disp: , Rfl:   •  fexofenadine (ALLEGRA) 180 MG tablet, Take 180 mg by mouth As Needed., Disp: , Rfl:   •  hydrocortisone 2.5 % cream, Apply  topically to the appropriate area as directed Daily., Disp: , Rfl:   •  losartan-hydrochlorothiazide (HYZAAR) 100-25 MG per tablet, TAKE 1 TABLET BY MOUTH ONCE DAILY, Disp: 90 tablet, Rfl: 2  •  metoprolol succinate XL (TOPROL-XL) 200 MG 24 hr tablet, TAKE 1 TABLET BY MOUTH ONCE DAILY, Disp: 90 tablet, Rfl: 0  •  SYNTHROID 75 MCG tablet, Take 75 mcg by mouth Daily., Disp: , Rfl: 0  •  Triamcinolone Acetonide (NASACORT) 55 MCG/ACT nasal inhaler, 2 sprays into each nostril daily., Disp: , Rfl:   •  WIXELA INHUB 100-50 MCG/DOSE DISKUS, inhale 1 dose by mouth  "twice a day, Disp: 60 each, Rfl: 5    The following portions of the patient's history were reviewed and updated as appropriate: allergies, current medications, past family history, past medical history, past social history, past surgical history and problem list.    ROS  Review of Systems   Constitution: Negative for chills, fever, fatigue, generalized weakness.   Cardiovascular: Negative for chest pain, claudication, dyspnea on exertion, leg swelling, palpitations, orthopnea, and syncope.   Respiratory: Negative for cough, shortness of breath, and wheezing.  HENT: Negative for ear pain, nosebleeds, and tinnitus.  Gastrointestinal: Negative for abdominal pain, constipation, diarrhea, nausea and vomiting.   Genitourinary: No urinary symptoms.  Musculoskeletal: Negative for muscle cramps.  Neurological: Negative for dizziness, headaches, loss of balance, numbness, and symptoms of stroke.  Psychiatric: Normal mental status.     All other systems reviewed and are negative.        Objective:     /72 (BP Location: Left arm, Patient Position: Sitting)   Pulse 66   Temp 97.8 °F (36.6 °C)   Ht 149.9 cm (59\")   Wt 61.2 kg (135 lb)   LMP  (LMP Unknown)   SpO2 98%   BMI 27.27 kg/m²      Physical Exam  Constitutional: Patient appears well-developed and well-nourished.   HENT: HEENT exam unremarkable.   Neck: Neck supple. No JVD present. No carotid bruits.   Cardiovascular: Normal rate, regular rhythm and normal heart sounds. No murmur heard.   2+ symmetric pulses.   Pulmonary/Chest: Breath sounds normal. Does not exhibit tenderness.   Abdominal: Abdomen benign.   Musculoskeletal: Does not exhibit edema.   Neurological: Neurological exam unremarkable.   Vitals reviewed.    Lab Review:   Lab Results   Component Value Date    GLUCOSE 100 (H) 07/07/2020    BUN 27 (H) 07/07/2020    CREATININE 1.22 (H) 07/07/2020    EGFRIFNONA 43 (L) 07/07/2020    BCR 22.1 07/07/2020    K 3.5 07/07/2020    CO2 26.5 07/07/2020    CALCIUM " 10.0 07/07/2020    ALBUMIN 4.30 07/07/2020    AST 17 07/07/2020    ALT 17 07/07/2020     Lab Results   Component Value Date    CHOL 205 (H) 03/12/2019    TRIG 110 03/12/2019    HDL 59 03/12/2019     (H) 03/12/2019      Lab Results   Component Value Date    TSH 1.740 07/07/2020           ECG 12 Lead  Date/Time: 7/10/2020 1:11 PM  Performed by: Naveen Sarmiento MD  Authorized by: Naveen Sarmiento MD   Comparison: compared with previous ECG from 3/1/2016  Comparison to previous ECG: No significant changes compared to old ECG, ST changes are nonspecific.  Rhythm: sinus rhythm  Other findings: non-specific ST-T wave changes  Comments: Sinus rhythm, nonspecific ST segment changes.               Assessment:      Diagnosis Plan   1. Palpitations due to awareness of sinus tachycardia, stable/controlled. ECG 12 Lead   2. Essential hypertension well-controlled.   Continue current medications.     Plan:   Stable cardiac status.  Continue current medications.   FU in 12 MO, sooner as needed.  Thank you for allowing us to participate in the care of your patient.     Naveen Sarmiento MD, FACC, [facility]      Please note that portions of this note may have been completed with a voice recognition program. Efforts were made to edit the dictations, but occasionally words are mistranscribed.

## 2020-07-30 DIAGNOSIS — J45.20 MILD INTERMITTENT ASTHMA WITHOUT COMPLICATION: ICD-10-CM

## 2020-08-27 DIAGNOSIS — J45.20 MILD INTERMITTENT ASTHMA WITHOUT COMPLICATION: Primary | ICD-10-CM

## 2020-08-27 DIAGNOSIS — M25.551 BILATERAL HIP PAIN: ICD-10-CM

## 2020-08-27 DIAGNOSIS — M25.552 BILATERAL HIP PAIN: ICD-10-CM

## 2020-08-27 RX ORDER — CELECOXIB 100 MG/1
100 CAPSULE ORAL 2 TIMES DAILY PRN
Qty: 60 CAPSULE | Refills: 5 | Status: SHIPPED | OUTPATIENT
Start: 2020-08-27 | End: 2022-03-07

## 2020-08-27 RX ORDER — ALBUTEROL SULFATE 90 UG/1
2 AEROSOL, METERED RESPIRATORY (INHALATION) EVERY 4 HOURS PRN
Qty: 1 G | Refills: 5 | Status: SHIPPED | OUTPATIENT
Start: 2020-08-27 | End: 2022-10-19 | Stop reason: SDUPTHER

## 2020-10-12 RX ORDER — METOPROLOL SUCCINATE 200 MG/1
200 TABLET, EXTENDED RELEASE ORAL DAILY
Qty: 90 TABLET | Refills: 1 | Status: SHIPPED | OUTPATIENT
Start: 2020-10-12 | End: 2021-04-20

## 2020-10-12 NOTE — TELEPHONE ENCOUNTER
Caller: IRENE    Relationship: PHARMACY    Best call back number: [phone]    Medication needed:   Requested Prescriptions     Pending Prescriptions Disp Refills   • metoprolol succinate XL (TOPROL-XL) 200 MG 24 hr tablet 90 tablet 0     Sig: Take 1 tablet by mouth Daily.       What details did the patient provide when requesting the medication: PATIENT IS OUT OF MEDICATION    Does the patient have less than a 3 day supply:  [x] Yes  [] No    What is the patient's preferred pharmacy: [id number] - [place] [address] - [phone] [facility] [phone]

## 2020-11-11 ENCOUNTER — OFFICE VISIT (OUTPATIENT)
Dept: INTERNAL MEDICINE | Facility: CLINIC | Age: 70
End: 2020-11-11

## 2020-11-11 VITALS
BODY MASS INDEX: 27.87 KG/M2 | DIASTOLIC BLOOD PRESSURE: 86 MMHG | TEMPERATURE: 97.1 F | WEIGHT: 138 LBS | SYSTOLIC BLOOD PRESSURE: 138 MMHG | RESPIRATION RATE: 18 BRPM | HEART RATE: 72 BPM

## 2020-11-11 DIAGNOSIS — M13.0 POLYARTHRITIS: ICD-10-CM

## 2020-11-11 DIAGNOSIS — I10 ESSENTIAL HYPERTENSION: Primary | ICD-10-CM

## 2020-11-11 DIAGNOSIS — K21.9 GASTROESOPHAGEAL REFLUX DISEASE WITHOUT ESOPHAGITIS: ICD-10-CM

## 2020-11-11 DIAGNOSIS — M77.11 RIGHT LATERAL EPICONDYLITIS: ICD-10-CM

## 2020-11-11 DIAGNOSIS — E03.9 HYPOTHYROIDISM, UNSPECIFIED TYPE: ICD-10-CM

## 2020-11-11 LAB
ALBUMIN SERPL-MCNC: 4.4 G/DL (ref 3.5–5.2)
ALBUMIN/GLOB SERPL: 1.7 G/DL
ALP SERPL-CCNC: 69 U/L (ref 39–117)
ALT SERPL W P-5'-P-CCNC: 19 U/L (ref 1–33)
ANION GAP SERPL CALCULATED.3IONS-SCNC: 6.2 MMOL/L (ref 5–15)
AST SERPL-CCNC: 18 U/L (ref 1–32)
BASOPHILS # BLD AUTO: 0.07 10*3/MM3 (ref 0–0.2)
BASOPHILS NFR BLD AUTO: 1 % (ref 0–1.5)
BILIRUB SERPL-MCNC: 0.5 MG/DL (ref 0–1.2)
BUN SERPL-MCNC: 20 MG/DL (ref 8–23)
BUN/CREAT SERPL: 18.3 (ref 7–25)
CALCIUM SPEC-SCNC: 9.5 MG/DL (ref 8.6–10.5)
CHLORIDE SERPL-SCNC: 106 MMOL/L (ref 98–107)
CHOLEST SERPL-MCNC: 210 MG/DL (ref 0–200)
CHROMATIN AB SERPL-ACNC: <10 IU/ML (ref 0–14)
CO2 SERPL-SCNC: 27.8 MMOL/L (ref 22–29)
CREAT SERPL-MCNC: 1.09 MG/DL (ref 0.57–1)
CRP SERPL-MCNC: 0.16 MG/DL (ref 0–0.5)
DEPRECATED RDW RBC AUTO: 44.1 FL (ref 37–54)
EOSINOPHIL # BLD AUTO: 0.16 10*3/MM3 (ref 0–0.4)
EOSINOPHIL NFR BLD AUTO: 2.4 % (ref 0.3–6.2)
ERYTHROCYTE [DISTWIDTH] IN BLOOD BY AUTOMATED COUNT: 14.1 % (ref 12.3–15.4)
GFR SERPL CREATININE-BSD FRML MDRD: 49 ML/MIN/1.73
GLOBULIN UR ELPH-MCNC: 2.6 GM/DL
GLUCOSE SERPL-MCNC: 97 MG/DL (ref 65–99)
HCT VFR BLD AUTO: 43.6 % (ref 34–46.6)
HDLC SERPL-MCNC: 54 MG/DL (ref 40–60)
HGB BLD-MCNC: 14.5 G/DL (ref 12–15.9)
IMM GRANULOCYTES # BLD AUTO: 0.03 10*3/MM3 (ref 0–0.05)
IMM GRANULOCYTES NFR BLD AUTO: 0.4 % (ref 0–0.5)
LDLC SERPL CALC-MCNC: 133 MG/DL (ref 0–100)
LDLC/HDLC SERPL: 2.41 {RATIO}
LYMPHOCYTES # BLD AUTO: 2.38 10*3/MM3 (ref 0.7–3.1)
LYMPHOCYTES NFR BLD AUTO: 35.5 % (ref 19.6–45.3)
MCH RBC QN AUTO: 28.6 PG (ref 26.6–33)
MCHC RBC AUTO-ENTMCNC: 33.3 G/DL (ref 31.5–35.7)
MCV RBC AUTO: 86 FL (ref 79–97)
MONOCYTES # BLD AUTO: 0.78 10*3/MM3 (ref 0.1–0.9)
MONOCYTES NFR BLD AUTO: 11.6 % (ref 5–12)
NEUTROPHILS NFR BLD AUTO: 3.28 10*3/MM3 (ref 1.7–7)
NEUTROPHILS NFR BLD AUTO: 49.1 % (ref 42.7–76)
NRBC BLD AUTO-RTO: 0 /100 WBC (ref 0–0.2)
PLATELET # BLD AUTO: 185 10*3/MM3 (ref 140–450)
PMV BLD AUTO: 11.4 FL (ref 6–12)
POTASSIUM SERPL-SCNC: 3.9 MMOL/L (ref 3.5–5.2)
PROT SERPL-MCNC: 7 G/DL (ref 6–8.5)
RBC # BLD AUTO: 5.07 10*6/MM3 (ref 3.77–5.28)
SODIUM SERPL-SCNC: 140 MMOL/L (ref 136–145)
T4 FREE SERPL-MCNC: 1.62 NG/DL (ref 0.93–1.7)
TRIGL SERPL-MCNC: 128 MG/DL (ref 0–150)
TSH SERPL DL<=0.05 MIU/L-ACNC: 1.99 UIU/ML (ref 0.27–4.2)
VLDLC SERPL-MCNC: 23 MG/DL (ref 5–40)
WBC # BLD AUTO: 6.7 10*3/MM3 (ref 3.4–10.8)

## 2020-11-11 PROCEDURE — 84443 ASSAY THYROID STIM HORMONE: CPT | Performed by: INTERNAL MEDICINE

## 2020-11-11 PROCEDURE — 80061 LIPID PANEL: CPT | Performed by: INTERNAL MEDICINE

## 2020-11-11 PROCEDURE — 86140 C-REACTIVE PROTEIN: CPT | Performed by: INTERNAL MEDICINE

## 2020-11-11 PROCEDURE — 99214 OFFICE O/P EST MOD 30 MIN: CPT | Performed by: INTERNAL MEDICINE

## 2020-11-11 PROCEDURE — 85025 COMPLETE CBC W/AUTO DIFF WBC: CPT | Performed by: INTERNAL MEDICINE

## 2020-11-11 PROCEDURE — 86431 RHEUMATOID FACTOR QUANT: CPT | Performed by: INTERNAL MEDICINE

## 2020-11-11 PROCEDURE — 20605 DRAIN/INJ JOINT/BURSA W/O US: CPT | Performed by: INTERNAL MEDICINE

## 2020-11-11 PROCEDURE — 85652 RBC SED RATE AUTOMATED: CPT | Performed by: INTERNAL MEDICINE

## 2020-11-11 PROCEDURE — 80053 COMPREHEN METABOLIC PANEL: CPT | Performed by: INTERNAL MEDICINE

## 2020-11-11 PROCEDURE — 84439 ASSAY OF FREE THYROXINE: CPT | Performed by: INTERNAL MEDICINE

## 2020-11-11 RX ORDER — TRIAMCINOLONE ACETONIDE 40 MG/ML
40 INJECTION, SUSPENSION INTRA-ARTICULAR; INTRAMUSCULAR ONCE
Status: COMPLETED | OUTPATIENT
Start: 2020-11-11 | End: 2020-11-11

## 2020-11-11 RX ADMIN — TRIAMCINOLONE ACETONIDE 40 MG: 40 INJECTION, SUSPENSION INTRA-ARTICULAR; INTRAMUSCULAR at 13:00

## 2020-11-11 NOTE — PROGRESS NOTES
Chief Complaint   Patient presents with   • Follow-up     4 MONTH FOLLOW UP CHRONIC MEDICAL PROBLEMS       History of Present Illness    The patient presents with pain in her joints of a chronic duration. There is no history of trauma. The patient has swelling associated with the pain. There is stiffness. The stiffness is present most of the time. The patient denies a history of overuse or repetitive motion with the affected joints.    The patient denies dry eyes, shortness of breath, fevers, cough, dry mouth, hematuria, headaches or abdominal pain. There are no associated insect bites. There is no family history of rheumatoid arthritis, juvenile rheumatoid arthritis, systemic lupus erythematosis or gout. The patient denies a personal history of malignancy.    The joint pain is aggravated by motion, touching the affected area and manipulation of the affected area. The pain has no alleviating factors noted.    The patient presents for a follow-up related to hypertension. The patient reports that she has had no chest pain, edema, syncope, blurred vision or palpitations. She states that she is taking her medication as prescribed. She is not having medication side effects.    The patient presents for a follow-up related to hypothyroidism. She reports a good energy level. She reports no hair loss, heat intolerance, cold intolerance, diarrhea, constipation or sweats. She is taking her medication as prescribed.    The patient presents for a follow-up related to GERD. The patient is not on medication for her gastroesophageal reflux. She reports no belching, dysphagia, early satiety, heartburn, hoarseness, nausea, odynophagia, rectal bleeding, vomiting or weight loss. The GERD has no known aggravating factors.    The patient presents with pain in the right elbow of many months duration. There is no history of trauma. The patient has no joint swelling. There is stiffness. The joint stiffness is present most of the time. The  patient denies a history of overuse or repetitive motion with the affected joints.    Review of Systems    CONSTITUTIONAL- Denies Chills, Sweats, Weakness or Malaise.    HENT- Denies Nasal Discharge, Sore Throat, Ear Pain, Ear Drainage, Sinus Pain, Nasal Congestion, Decreased Hearing or Tinnitus.    PULMONARY- Denies Wheezing, Sputum Production, Hemoptysis or Pleuritic Chest Pain.    CARDIOVASCULAR- Denies Claudication or Irregular Heart Beat.    Medications      Current Outpatient Medications:   •  albuterol sulfate  (90 Base) MCG/ACT inhaler, Inhale 2 puffs Every 4 (Four) Hours As Needed for Wheezing., Disp: 1 g, Rfl: 5  •  celecoxib (CeleBREX) 100 MG capsule, Take 1 capsule by mouth 2 (Two) Times a Day As Needed for Mild Pain ., Disp: 60 capsule, Rfl: 5  •  diclofenac (VOLTAREN) 1 % gel gel, Apply 4 g topically to the appropriate area as directed 4 (Four) Times a Day. (Patient taking differently: Apply 4 g topically to the appropriate area as directed 4 (Four) Times a Day As Needed.), Disp: 100 g, Rfl: 3  •  estradiol (ESTRACE) 0.1 MG/GM vaginal cream, Insert 0.5 g into the vagina 2 (Two) Times a Week., Disp: , Rfl:   •  fexofenadine (ALLEGRA) 180 MG tablet, Take 180 mg by mouth As Needed., Disp: , Rfl:   •  hydrocortisone 2.5 % cream, Apply  topically to the appropriate area as directed Daily., Disp: , Rfl:   •  losartan-hydrochlorothiazide (HYZAAR) 100-25 MG per tablet, TAKE 1 TABLET BY MOUTH ONCE DAILY, Disp: 90 tablet, Rfl: 2  •  metoprolol succinate XL (TOPROL-XL) 200 MG 24 hr tablet, Take 1 tablet by mouth Daily., Disp: 90 tablet, Rfl: 1  •  SYNTHROID 75 MCG tablet, Take 75 mcg by mouth Daily., Disp: , Rfl: 0  •  Triamcinolone Acetonide (NASACORT) 55 MCG/ACT nasal inhaler, 2 sprays into each nostril daily., Disp: , Rfl:   •  WIXELA INHUB 100-50 MCG/DOSE DISKUS, USE 1 INHALATION BY MOUTH TWICE DAILY, Disp: 240 each, Rfl: 0  •  aspirin 81 MG EC tablet, Take 81 mg by mouth 2 (Two) Times a Week., Disp: ,  Rfl:     Current Facility-Administered Medications:   •  triamcinolone acetonide (KENALOG-40) injection 40 mg, 40 mg, Intra-articular, Once, Farzad Redding MD     Allergies    Allergies   Allergen Reactions   • Macrobid [Nitrofurantoin Monohyd Macro] Swelling and Other (See Comments)     Swelling around eyes, fatigue   • Ace Inhibitors Cough   • Plaquenil [Hydroxychloroquine Sulfate] Other (See Comments)     EYE PROBLEMS   • Sudafed [Pseudoephedrine Hcl] Other (See Comments)     INCREASED PRESSURE IN EYES    • Tenormin [Atenolol] Cough and Angioedema       Problem List    Patient Active Problem List   Diagnosis   • Asthma   • Hypertension   • Peripheral vascular disease (CMS/HCC)   • GERD (gastroesophageal reflux disease)   • Diverticulosis   • Colon polyps   • Constipation   • Hashimoto's thyroiditis   • Mixed connective tissue disease (CMS/HCC)   • Lupus (CMS/HCC)   • Uterine fibroid   • Stress incontinence of urine   • Palpitations       Medications, Allergies, Problems List and Past History were reviewed and updated.    Physical Examination    /86 (BP Location: Left arm, Patient Position: Sitting, Cuff Size: Adult)   Pulse 72   Temp 97.1 °F (36.2 °C) (Infrared)   Resp 18   Wt 62.6 kg (138 lb)   LMP  (LMP Unknown)   Breastfeeding No   BMI 27.87 kg/m²     HEENT: Head- Normocephalic Atraumatic. Facies- Within normal limits. Pinnas- Normal texture and shape bilaterally. Canals- Normal bilaterally. TMs- Normal bilaterally. Nares- Patent bilaterally. Nasal Septum- is normal. There is no tenderness to palpation over the frontal or maxillary sinuses. Lids- Normal bilaterally. Conjunctiva- Clear bilaterally. Sclera- Anicteric bilaterally. Oropharynx- Moist with no lesions. Tonsils- No enlargement, erythema or exudate.    Neck: Thyroid- non enlarged, symmetric and has no nodules. No bruits are detected.    Lungs: Auscultation- Clear to auscultation bilaterally. There are no retractions, clubbing or  cyanosis. The Expiratory to Inspiratory ratio is equal.    Cardiovascular: There are no carotid bruits. Heart- Normal Rate with Regular rhythm and no murmurs. There are no gallops. There are no rubs. In the lower extremities there is no edema. The upper extremities do not have edema.    Abdomen: Soft, benign, non-tender with no masses, hernias, organomegaly or scars.    Elbows: The right elbow has normal paige landmarks and no noted atrophy. There are no skin lesions noted. The right elbow has a normal range of motion. The right Olecranon bursae is normal with no tenderness or swelling.       There is tenderness overlying the right lateral epicondyle. There is no tenderness over the right medial epicondyle.    Impression and Assessment    Essential Hypertension.    Hypothyroidism.    Gastroesophageal Reflux Disease.    Lateral Epicondylitis.    Polyarthritis.    Plan    Gastroesophageal Reflux Disease Plan: The condition is stable. No change is needed in the current plan.    Essential Hypertension Plan: The current plan was continued.    Hypothyroidism Plan: The current plan was continued.    Lateral Epicondylitis Plan: She was instructed to apply ice to the area with caution to avoid cold thermal injury. The joint was injected with K40 + Lidocaine (see procedure note).    Polyarthritis Plan: She was referred to rheumatology. The patient was instructed to continue the current medications.    Procedure Notes    The right elbow was prepped for a lateral epicondyle injection after informed consent was obtained. The risks as explained to the patient included a chance of pain or soreness lasting for several days, an inflammatory flare reaction, the chance of skin atrophy or hypopigmentation, the chance of scarring, the chance of infection and the chance that the condition may persist. The area was prepped in a sterile fashion with betadine. A fenestrated drape was applied. The joint was entered with an 18 gauge 1 inch  needle. The joint was injected with 1 mL of triamcinolone 40mg/mL with lidocaine 1% in a 5 to 1 ratio of lidocaine to steroid. There were no complications. A dressing was applied and wound care was explained.    Diagnoses and all orders for this visit:    1. Essential hypertension (Primary)  -     CBC & Differential  -     Comprehensive Metabolic Panel  -     Lipid Panel  -     CBC Auto Differential    2. Hypothyroidism, unspecified type  -     TSH  -     T4, Free    3. Gastroesophageal reflux disease without esophagitis    4. Right lateral epicondylitis  -     triamcinolone acetonide (KENALOG-40) injection 40 mg    5. Polyarthritis  -     Ambulatory Referral to Rheumatology  -     CBC & Differential  -     Comprehensive Metabolic Panel  -     C-reactive Protein  -     Rheumatoid Factor  -     Sedimentation Rate  -     CBC Auto Differential        Return to Office    The patient was instructed to return for follow-up in 4 months.    The patient was instructed to return sooner if the condition changes, worsens, or does not resolve.

## 2020-11-12 LAB — ERYTHROCYTE [SEDIMENTATION RATE] IN BLOOD: 6 MM/HR (ref 0–30)

## 2021-01-02 DIAGNOSIS — I10 ESSENTIAL HYPERTENSION: ICD-10-CM

## 2021-01-05 DIAGNOSIS — J45.20 MILD INTERMITTENT ASTHMA WITHOUT COMPLICATION: ICD-10-CM

## 2021-01-05 RX ORDER — LOSARTAN POTASSIUM AND HYDROCHLOROTHIAZIDE 25; 100 MG/1; MG/1
TABLET ORAL
Qty: 90 TABLET | Refills: 2 | Status: SHIPPED | OUTPATIENT
Start: 2021-01-05 | End: 2021-11-01

## 2021-03-31 ENCOUNTER — TRANSCRIBE ORDERS (OUTPATIENT)
Dept: ADMINISTRATIVE | Facility: HOSPITAL | Age: 71
End: 2021-03-31

## 2021-03-31 DIAGNOSIS — Z12.31 VISIT FOR SCREENING MAMMOGRAM: Primary | ICD-10-CM

## 2021-04-21 RX ORDER — METOPROLOL SUCCINATE 200 MG/1
TABLET, EXTENDED RELEASE ORAL
Qty: 90 TABLET | Refills: 1 | Status: SHIPPED | OUTPATIENT
Start: 2021-04-21 | End: 2021-11-01

## 2021-04-26 ENCOUNTER — OFFICE VISIT (OUTPATIENT)
Dept: INTERNAL MEDICINE | Facility: CLINIC | Age: 71
End: 2021-04-26

## 2021-04-26 VITALS
HEART RATE: 72 BPM | RESPIRATION RATE: 16 BRPM | SYSTOLIC BLOOD PRESSURE: 116 MMHG | WEIGHT: 135 LBS | BODY MASS INDEX: 27.27 KG/M2 | DIASTOLIC BLOOD PRESSURE: 72 MMHG | TEMPERATURE: 97.8 F

## 2021-04-26 DIAGNOSIS — E03.9 HYPOTHYROIDISM, UNSPECIFIED TYPE: ICD-10-CM

## 2021-04-26 DIAGNOSIS — K21.9 GASTROESOPHAGEAL REFLUX DISEASE WITHOUT ESOPHAGITIS: ICD-10-CM

## 2021-04-26 DIAGNOSIS — I10 ESSENTIAL HYPERTENSION: Primary | ICD-10-CM

## 2021-04-26 LAB
ALBUMIN SERPL-MCNC: 4.1 G/DL (ref 3.5–5.2)
ALBUMIN/GLOB SERPL: 1.5 G/DL
ALP SERPL-CCNC: 66 U/L (ref 39–117)
ALT SERPL W P-5'-P-CCNC: 17 U/L (ref 1–33)
ANION GAP SERPL CALCULATED.3IONS-SCNC: 11.2 MMOL/L (ref 5–15)
AST SERPL-CCNC: 17 U/L (ref 1–32)
BILIRUB SERPL-MCNC: 0.6 MG/DL (ref 0–1.2)
BUN SERPL-MCNC: 29 MG/DL (ref 8–23)
BUN/CREAT SERPL: 26.4 (ref 7–25)
CALCIUM SPEC-SCNC: 9.7 MG/DL (ref 8.6–10.5)
CHLORIDE SERPL-SCNC: 104 MMOL/L (ref 98–107)
CO2 SERPL-SCNC: 24.8 MMOL/L (ref 22–29)
CREAT SERPL-MCNC: 1.1 MG/DL (ref 0.57–1)
GFR SERPL CREATININE-BSD FRML MDRD: 49 ML/MIN/1.73
GLOBULIN UR ELPH-MCNC: 2.7 GM/DL
GLUCOSE SERPL-MCNC: 91 MG/DL (ref 65–99)
POTASSIUM SERPL-SCNC: 3.9 MMOL/L (ref 3.5–5.2)
PROT SERPL-MCNC: 6.8 G/DL (ref 6–8.5)
SODIUM SERPL-SCNC: 140 MMOL/L (ref 136–145)
T4 FREE SERPL-MCNC: 1.58 NG/DL (ref 0.93–1.7)
TSH SERPL DL<=0.05 MIU/L-ACNC: 1.78 UIU/ML (ref 0.27–4.2)

## 2021-04-26 PROCEDURE — 99214 OFFICE O/P EST MOD 30 MIN: CPT | Performed by: INTERNAL MEDICINE

## 2021-04-26 PROCEDURE — 84439 ASSAY OF FREE THYROXINE: CPT | Performed by: INTERNAL MEDICINE

## 2021-04-26 PROCEDURE — 84443 ASSAY THYROID STIM HORMONE: CPT | Performed by: INTERNAL MEDICINE

## 2021-04-26 PROCEDURE — 80053 COMPREHEN METABOLIC PANEL: CPT | Performed by: INTERNAL MEDICINE

## 2021-04-26 PROCEDURE — 36415 COLL VENOUS BLD VENIPUNCTURE: CPT | Performed by: INTERNAL MEDICINE

## 2021-04-26 NOTE — PROGRESS NOTES
Chief Complaint   Patient presents with   • Follow-up     4 MONTH FOLLOW UP CHRONIC MEDICAL PROBLEMS       History of Present Illness      The patient presents for a follow-up related to hypertension. The patient reports that she has had no headaches, chest pain, dyspnea, edema, syncope, blurred vision or palpitations. She states that she is taking her medication as prescribed. She is not having medication side effects.    The patient presents for a follow-up related to hypothyroidism. She reports a good energy level. She reports no hair loss, heat intolerance, cold intolerance, diarrhea, constipation or sweats. She is taking her medication as prescribed.    The patient presents for a follow-up related to GERD. The patient is not on medication for her gastroesophageal reflux. She reports no abdominal pain, belching, dysphagia, early satiety, heartburn, hoarseness, nausea, odynophagia, rectal bleeding, vomiting or weight loss. The GERD has no known aggravating factors.    Review of Systems    CONSTITUTIONAL- Denies Fever, Chills, Sweats, Weakness or Malaise.    HENT- Denies Nasal Discharge, Sore Throat, Ear Pain, Ear Drainage, Sinus Pain, Nasal Congestion, Decreased Hearing or Tinnitus.    PULMONARY- Denies Wheezing, Sputum Production, Cough, Hemoptysis or Pleuritic Chest Pain.    CARDIOVASCULAR- Denies Claudication or Irregular Heart Beat.    Medications      Current Outpatient Medications:   •  albuterol sulfate  (90 Base) MCG/ACT inhaler, Inhale 2 puffs Every 4 (Four) Hours As Needed for Wheezing., Disp: 1 g, Rfl: 5  •  aspirin 81 MG EC tablet, Take 81 mg by mouth 2 (Two) Times a Week., Disp: , Rfl:   •  celecoxib (CeleBREX) 100 MG capsule, Take 1 capsule by mouth 2 (Two) Times a Day As Needed for Mild Pain ., Disp: 60 capsule, Rfl: 5  •  diclofenac (VOLTAREN) 1 % gel gel, Apply 4 g topically to the appropriate area as directed 4 (Four) Times a Day. (Patient taking differently: Apply 4 g topically to the  appropriate area as directed 4 (Four) Times a Day As Needed.), Disp: 100 g, Rfl: 3  •  estradiol (ESTRACE) 0.1 MG/GM vaginal cream, Insert 0.5 g into the vagina 2 (Two) Times a Week., Disp: , Rfl:   •  fexofenadine (ALLEGRA) 180 MG tablet, Take 180 mg by mouth As Needed., Disp: , Rfl:   •  fluticasone-salmeterol (ADVAIR) 100-50 MCG/DOSE DISKUS, INHALE ONE PUFF BY MOUTH TWICE DAILY , Disp: 180 each, Rfl: 1  •  hydrocortisone 2.5 % cream, Apply  topically to the appropriate area as directed Daily., Disp: , Rfl:   •  losartan-hydrochlorothiazide (HYZAAR) 100-25 MG per tablet, TAKE ONE TABLET BY MOUTH ONE TIME DAILY , Disp: 90 tablet, Rfl: 2  •  metoprolol succinate XL (TOPROL-XL) 200 MG 24 hr tablet, TAKE ONE TABLET BY MOUTH ONE TIME DAILY , Disp: 90 tablet, Rfl: 1  •  SYNTHROID 75 MCG tablet, Take 75 mcg by mouth Daily., Disp: , Rfl: 0  •  Triamcinolone Acetonide (NASACORT) 55 MCG/ACT nasal inhaler, 2 sprays into each nostril daily., Disp: , Rfl:      Allergies    Allergies   Allergen Reactions   • Macrobid [Nitrofurantoin Monohyd Macro] Swelling and Other (See Comments)     Swelling around eyes, fatigue   • Ace Inhibitors Cough   • Plaquenil [Hydroxychloroquine Sulfate] Other (See Comments)     EYE PROBLEMS   • Sudafed [Pseudoephedrine Hcl] Other (See Comments)     INCREASED PRESSURE IN EYES    • Tenormin [Atenolol] Cough and Angioedema       Problem List    Patient Active Problem List   Diagnosis   • Asthma   • Hypertension   • Peripheral vascular disease (CMS/HCC)   • GERD (gastroesophageal reflux disease)   • Diverticulosis   • Colon polyps   • Constipation   • Hashimoto's thyroiditis   • Mixed connective tissue disease (CMS/HCC)   • Lupus (CMS/HCC)   • Uterine fibroid   • Stress incontinence of urine   • Palpitations       Medications, Allergies, Problems List and Past History were reviewed and updated.    Physical Examination    /72 (BP Location: Right arm, Patient Position: Sitting, Cuff Size: Adult)    Pulse 72   Temp 97.8 °F (36.6 °C) (Infrared)   Resp 16   Wt 61.2 kg (135 lb)   LMP  (LMP Unknown)   Breastfeeding No   BMI 27.27 kg/m²     HEENT: Facies- Within normal limits. Lids- Normal bilaterally. Conjunctiva- Clear bilaterally. Sclera- Anicteric bilaterally.    Neck: Thyroid- non enlarged, symmetric and has no nodules. No bruits are detected.    Lungs: Auscultation- Clear to auscultation bilaterally. There are no retractions, clubbing or cyanosis. The Expiratory to Inspiratory ratio is equal.    Cardiovascular: There are no carotid bruits. Heart- Normal Rate with Regular rhythm and no murmurs. There are no gallops. There are no rubs. In the lower extremities there is no edema. The upper extremities do not have edema.    Abdomen: Soft, benign, non-tender with no masses, hernias, organomegaly or scars.    Impression and Assessment    Essential Hypertension.    Hypothyroidism.    Gastroesophageal Reflux Disease.    Plan    Gastroesophageal Reflux Disease Plan: The condition is stable. No change is needed in the current plan.    Essential Hypertension Plan: The patient was instructed to continue the current medications.    Hypothyroidism Plan: The patient was instructed to continue the current medications.    Diagnoses and all orders for this visit:    1. Essential hypertension (Primary)  -     Comprehensive Metabolic Panel    2. Hypothyroidism, unspecified type  -     TSH  -     T4, Free    3. Gastroesophageal reflux disease without esophagitis        Return to Office    The patient was instructed to return for follow-up in 6 months. The patient was instructed to return sooner if the condition changes, worsens, or does not resolve.

## 2021-05-20 ENCOUNTER — OFFICE VISIT (OUTPATIENT)
Dept: ENDOCRINOLOGY | Facility: CLINIC | Age: 71
End: 2021-05-20

## 2021-05-20 VITALS
SYSTOLIC BLOOD PRESSURE: 118 MMHG | BODY MASS INDEX: 26.73 KG/M2 | WEIGHT: 132.6 LBS | DIASTOLIC BLOOD PRESSURE: 62 MMHG | HEIGHT: 59 IN

## 2021-05-20 DIAGNOSIS — E06.3 HASHIMOTO'S THYROIDITIS: Primary | ICD-10-CM

## 2021-05-20 DIAGNOSIS — E04.1 SOLITARY THYROID NODULE: ICD-10-CM

## 2021-05-20 PROCEDURE — 99213 OFFICE O/P EST LOW 20 MIN: CPT | Performed by: INTERNAL MEDICINE

## 2021-05-20 RX ORDER — LEVOTHYROXINE SODIUM 75 MCG
75 TABLET ORAL DAILY
Qty: 90 TABLET | Refills: 3 | Status: SHIPPED | OUTPATIENT
Start: 2021-05-20 | End: 2022-05-17 | Stop reason: SDUPTHER

## 2021-05-20 RX ORDER — AZITHROMYCIN 250 MG/1
TABLET, FILM COATED ORAL
COMMUNITY
Start: 2021-05-17 | End: 2021-07-16

## 2021-05-20 NOTE — PROGRESS NOTES
"     Office Note      Date: 2021  Patient Name: Kavitha Barnhart  MRN: [id number]  : 1950    Chief Complaint   Patient presents with   • Follow-up   • Thyroid Problem   • Hashimoto's Thyroiditis       History of Present Illness:   Kavitha Barnhart is a 70 y.o. female who presents for Follow-up, Thyroid Problem, and Hashimoto's Thyroiditis    She remains on synthroid 75mcg qd. She is taking this correctly. She isn't taking any interfering meds concurrently. She hasn't noted any change in the size of her neck. She notes occ sensation of food sticking which causes some coughing. She denies any sxs of hypo- or hyperthyroidism at this time.    Subjective      Review of Systems:   Review of Systems   Constitutional: Negative.    Cardiovascular: Negative.    Gastrointestinal: Negative.    Endocrine: Negative.        The following portions of the patient's history were reviewed and updated as appropriate: allergies, current medications, past family history, past medical history, past social history, past surgical history and problem list.    Objective     Visit Vitals  /62   Ht 149.9 cm (59\")   Wt 60.1 kg (132 lb 9.6 oz)   LMP  (LMP Unknown)   BMI 26.78 kg/m²       Physical Exam:  Physical Exam  Constitutional:       Appearance: Normal appearance.   Neck:      Thyroid: No thyroid mass, thyromegaly or thyroid tenderness.   Lymphadenopathy:      Cervical: No cervical adenopathy.   Neurological:      Mental Status: She is alert.         Labs:    TSH  No results found for: TSHBASE     Free T4  Free T4   Date Value Ref Range Status   2021 1.58 0.93 - 1.70 ng/dL Final       T3  No results found for: H6QGIRU      TPO  No results found for: THYROIDAB    TG AB  No results found for: THGAB    TG  No results found for: THYROGLB    CBC w/DIFF  Lab Results   Component Value Date    WBC 6.70 2020    RBC 5.07 2020    HGB 14.5 2020    HCT 43.6 2020    MCV 86.0 2020    MCH 28.6 2020 "    MCHC 33.3 11/11/2020    RDW 14.1 11/11/2020    RDWSD 44.1 11/11/2020    MPV 11.4 11/11/2020     11/11/2020    NEUTRORELPCT 49.1 11/11/2020    LYMPHORELPCT 35.5 11/11/2020    MONORELPCT 11.6 11/11/2020    EOSRELPCT 2.4 11/11/2020    BASORELPCT 1.0 11/11/2020    AUTOIGPER 0.4 11/11/2020    NEUTROABS 3.28 11/11/2020    LYMPHSABS 2.38 11/11/2020    MONOSABS 0.78 11/11/2020    EOSABS 0.16 11/11/2020    BASOSABS 0.07 11/11/2020    AUTOIGNUM 0.03 11/11/2020    NRBC 0.0 11/11/2020           Assessment / Plan      Assessment & Plan:  Diagnoses and all orders for this visit:    1. Hashimoto's thyroiditis (Primary)  Assessment & Plan:  Continue Synthroid.  Recent TSH okay.      2. Solitary thyroid nodule  Assessment & Plan:  She has h/o subcentimeter thyroid nodule.  Plan for another u/s in about 18 months.      Other orders  -     Synthroid 75 MCG tablet; Take 1 tablet by mouth Daily.  Dispense: 90 tablet; Refill: 3      Return in about 6 months (around 11/20/2021) for Recheck with TSH.    Surya Wells MD   05/20/2021

## 2021-06-18 ENCOUNTER — HOSPITAL ENCOUNTER (OUTPATIENT)
Dept: MAMMOGRAPHY | Facility: HOSPITAL | Age: 71
Discharge: HOME | End: 2021-06-18
Admitting: OBSTETRICS & GYNECOLOGY

## 2021-06-18 DIAGNOSIS — Z12.31 VISIT FOR SCREENING MAMMOGRAM: ICD-10-CM

## 2021-06-18 PROCEDURE — 77067 SCR MAMMO BI INCL CAD: CPT | Performed by: RADIOLOGY

## 2021-06-18 PROCEDURE — 77063 BREAST TOMOSYNTHESIS BI: CPT

## 2021-06-18 PROCEDURE — 77067 SCR MAMMO BI INCL CAD: CPT

## 2021-06-18 PROCEDURE — 77063 BREAST TOMOSYNTHESIS BI: CPT | Performed by: RADIOLOGY

## 2021-07-16 ENCOUNTER — OFFICE VISIT (OUTPATIENT)
Dept: CARDIOLOGY | Facility: CLINIC | Age: 71
End: 2021-07-16

## 2021-07-16 ENCOUNTER — DOCUMENTATION (OUTPATIENT)
Dept: MAMMOGRAPHY | Facility: HOSPITAL | Age: 71
End: 2021-07-16

## 2021-07-16 ENCOUNTER — HOSPITAL ENCOUNTER (OUTPATIENT)
Dept: ULTRASOUND IMAGING | Facility: HOSPITAL | Age: 71
Discharge: HOME | End: 2021-07-16

## 2021-07-16 ENCOUNTER — HOSPITAL ENCOUNTER (OUTPATIENT)
Dept: MAMMOGRAPHY | Facility: HOSPITAL | Age: 71
Discharge: HOME | End: 2021-07-16

## 2021-07-16 VITALS
SYSTOLIC BLOOD PRESSURE: 116 MMHG | WEIGHT: 132 LBS | BODY MASS INDEX: 26.61 KG/M2 | HEIGHT: 59 IN | HEART RATE: 70 BPM | OXYGEN SATURATION: 98 % | DIASTOLIC BLOOD PRESSURE: 62 MMHG

## 2021-07-16 DIAGNOSIS — R92.8 ABNORMAL MAMMOGRAM: ICD-10-CM

## 2021-07-16 DIAGNOSIS — R00.2 PALPITATIONS: Primary | ICD-10-CM

## 2021-07-16 DIAGNOSIS — I10 ESSENTIAL HYPERTENSION: ICD-10-CM

## 2021-07-16 PROCEDURE — G0279 TOMOSYNTHESIS, MAMMO: HCPCS

## 2021-07-16 PROCEDURE — 99214 OFFICE O/P EST MOD 30 MIN: CPT | Performed by: INTERNAL MEDICINE

## 2021-07-16 PROCEDURE — 77065 DX MAMMO INCL CAD UNI: CPT | Performed by: RADIOLOGY

## 2021-07-16 PROCEDURE — 93000 ELECTROCARDIOGRAM COMPLETE: CPT | Performed by: INTERNAL MEDICINE

## 2021-07-16 PROCEDURE — 77065 DX MAMMO INCL CAD UNI: CPT

## 2021-07-16 PROCEDURE — 76642 ULTRASOUND BREAST LIMITED: CPT | Performed by: RADIOLOGY

## 2021-07-16 PROCEDURE — 76642 ULTRASOUND BREAST LIMITED: CPT

## 2021-07-16 PROCEDURE — G0279 TOMOSYNTHESIS, MAMMO: HCPCS | Performed by: RADIOLOGY

## 2021-07-16 RX ORDER — HYDROCODONE BITARTRATE AND ACETAMINOPHEN 5; 325 MG/1; MG/1
1 TABLET ORAL AS NEEDED
COMMUNITY
Start: 2021-05-13 | End: 2021-10-19

## 2021-07-16 NOTE — PROGRESS NOTES
[facility]    Encounter Date: 2021    Patient ID: Kavitha Barnhart is a 70 y.o. female.  : 1950     PCP: Farzad Redding MD       Chief Complaint: Palpitations      PROBLEM LIST:  1. Palpitations/possible atrial tachycardia:  a. Patient was admitted for diverticulitis complicated by bowel perforation on 2016, and while in the hospital began having palpitations and shortness of breath that showed possible atrial tachycardia versus atrial fibrillation.   b. Patient reports a long-standing history of palpitations and tachycardia.   c. Echocardiogram, 01/10/2016, [facility]: LVH with EF 60-65%. No significant valve disease.   d. 24h Holter, 2016: Sinus rhythm with average heart rate of 85. Rare PVCs and PACs with occasional short runs of SVT/PAT. No symptoms were reported.   e. 30 day event monitor, 2019: No significant arrhythmias noted, symptoms reported twice correlated with sinus rhythm at normal rate.    2. Hypertension.   3. Diverticulitis complicated by perforated sigmoid colon:  a. Currently awaiting surgery at the beginning of 2016. He was hospitalized at [facility], 2016 for approximately 2 weeks and treated with antibiotics.   4. Lupus and mixed connective tissue disease.   5. Raynaud’s disease.  6. Sjögren's disease.  7. Hashimoto disease.  8. Surgical history:  a. .  b. Knee surgery.    History of Present Illness  Patient presents today for a follow-up with a history of palpitations, possible atrial tachycardia, and cardiac risk factors. Since last visit, she's experienced occasional episodes of palpitations but has been doing well overall. Patient denies chest pain, shortness of breath, edema, dizziness, and syncope. She stays physically active and reports walking up stairs daily. She confirms having acid reflux from time to time.      Allergies   Allergen Reactions   • Macrobid [Nitrofurantoin  Monohyd Macro] Swelling and Other (See Comments)     Swelling around eyes, fatigue   • Ace Inhibitors Cough   • Plaquenil [Hydroxychloroquine Sulfate] Other (See Comments)     EYE PROBLEMS   • Sudafed [Pseudoephedrine Hcl] Other (See Comments)     INCREASED PRESSURE IN EYES    • Tenormin [Atenolol] Cough and Angioedema         Current Outpatient Medications:   •  albuterol sulfate  (90 Base) MCG/ACT inhaler, Inhale 2 puffs Every 4 (Four) Hours As Needed for Wheezing., Disp: 1 g, Rfl: 5  •  celecoxib (CeleBREX) 100 MG capsule, Take 1 capsule by mouth 2 (Two) Times a Day As Needed for Mild Pain ., Disp: 60 capsule, Rfl: 5  •  diclofenac (VOLTAREN) 1 % gel gel, Apply 4 g topically to the appropriate area as directed 4 (Four) Times a Day. (Patient taking differently: Apply 4 g topically to the appropriate area as directed 4 (Four) Times a Day As Needed.), Disp: 100 g, Rfl: 3  •  fexofenadine (ALLEGRA) 180 MG tablet, Take 180 mg by mouth As Needed., Disp: , Rfl:   •  fluticasone-salmeterol (ADVAIR) 100-50 MCG/DOSE DISKUS, INHALE ONE PUFF BY MOUTH TWICE DAILY , Disp: 180 each, Rfl: 1  •  HYDROcodone-acetaminophen (NORCO) 5-325 MG per tablet, Take 1 tablet by mouth As Needed. Take 1 tablet by mouth every 4-6 hours as needed for pain, Disp: , Rfl:   •  losartan-hydrochlorothiazide (HYZAAR) 100-25 MG per tablet, TAKE ONE TABLET BY MOUTH ONE TIME DAILY , Disp: 90 tablet, Rfl: 2  •  metoprolol succinate XL (TOPROL-XL) 200 MG 24 hr tablet, TAKE ONE TABLET BY MOUTH ONE TIME DAILY , Disp: 90 tablet, Rfl: 1  •  Synthroid 75 MCG tablet, Take 1 tablet by mouth Daily., Disp: 90 tablet, Rfl: 3  •  Triamcinolone Acetonide (NASACORT) 55 MCG/ACT nasal inhaler, 2 sprays into each nostril daily., Disp: , Rfl:   •  aspirin 81 MG EC tablet, Take 81 mg by mouth 2 (Two) Times a Week., Disp: , Rfl:   •  estradiol (ESTRACE) 0.1 MG/GM vaginal cream, Insert 0.5 g into the vagina 2 (Two) Times a Week., Disp: , Rfl:   •  triazolam (HALCION)  "0.25 MG tablet, Take 1 tablet by mouth 1 (One) Time As Needed., Disp: , Rfl:     The following portions of the patient's history were reviewed and updated as appropriate: allergies, current medications, past family history, past medical history, past social history, past surgical history and problem list.    ROS  Review of Systems   Constitution: Negative for chills, fever, fatigue, generalized weakness.   Cardiovascular: Negative for chest pain, dyspnea on exertion, leg swelling, orthopnea, and syncope. Positive for palpitations  Respiratory: Negative for cough, shortness of breath, and wheezing.  HENT: Negative for ear pain, nosebleeds, and tinnitus.  Gastrointestinal: Negative for abdominal pain, constipation, diarrhea, nausea and vomiting.   Genitourinary: No urinary symptoms.  Musculoskeletal: Negative for muscle cramps.  Neurological: Negative for dizziness, headaches, loss of balance, numbness, and symptoms of stroke.  Psychiatric: Normal mental status.     All other systems reviewed and are negative.        Objective:     /62 (BP Location: Left arm, Patient Position: Sitting, Cuff Size: Adult)   Pulse 70   Ht 149.9 cm (59\")   Wt 59.9 kg (132 lb)   LMP  (LMP Unknown)   SpO2 98%   BMI 26.66 kg/m²      Physical Exam  Constitutional: Patient appears well-developed and well-nourished.   HENT: HEENT exam unremarkable.   Neck: Neck supple. No JVD present. No carotid bruits.   Cardiovascular: Normal rate, regular rhythm and normal heart sounds. No murmur heard.   2+ symmetric pulses.   Pulmonary/Chest: Breath sounds normal. Does not exhibit tenderness.   Abdominal: Abdomen benign.   Musculoskeletal: Does not exhibit edema.   Neurological: Neurological exam unremarkable.   Vitals reviewed.    Data Review:   Lab Results   Component Value Date    GLUCOSE 91 04/26/2021    BUN 29 (H) 04/26/2021    CREATININE 1.10 (H) 04/26/2021    EGFRIFNONA 49 (L) 04/26/2021    BCR 26.4 (H) 04/26/2021    K 3.9 04/26/2021    " CO2 24.8 04/26/2021    CALCIUM 9.7 04/26/2021    ALBUMIN 4.10 04/26/2021    AST 17 04/26/2021    ALT 17 04/26/2021     Lab Results   Component Value Date    CHOL 210 (H) 11/11/2020    TRIG 128 11/11/2020    HDL 54 11/11/2020     (H) 11/11/2020      Lab Results   Component Value Date    WBC 6.70 11/11/2020    RBC 5.07 11/11/2020    HGB 14.5 11/11/2020    HCT 43.6 11/11/2020    MCV 86.0 11/11/2020     11/11/2020     Lab Results   Component Value Date    TSH 1.780 04/26/2021            ECG 12 Lead    Date/Time: 7/16/2021 2:45 PM  Performed by: Naveen Sarmiento MD  Authorized by: Naveen Sarmiento MD   Comparison: compared with previous ECG   Similar to previous ECG  Rhythm: sinus rhythm  BPM: 64    Clinical impression: normal ECG  Comments: No acute changes.  Artifact due to electrode contact and possibly misplacement of electrodes noted however this is a satisfactory ECG considering the rhythm is normal and no significant ST segment changes are noted.               Assessment:      Diagnosis Plan   1. Palpitations due to awareness of benign supraventricular arrhythmias. Stable with unremarkable EKG; continue metoprolol 200 mg for rate control and aspirin 81 mg for antiplatelet therapy.   2. Essential hypertension  Well controlled; continue metoprolol 200 mg and Hyzaar 100-25 mg     Plan:   Stable cardiac status.  Rare brief palpitations which are self-limiting.  Continue current medications.   Continue diet and exercise, maintenance of good hydration and avoidance of caffeine.  FU in 12 MO, sooner as needed.  Thank you for allowing us to participate in the care of your patient.     Scribed for Naveen Sarmiento MD by Jessica Forman. 7/16/2021 14:46 EDT      I, Naveen Sarmiento MD, personally performed the services described in this documentation as scribed by the above named individual in my presence, and it is both accurate and complete.  7/16/2021  16:17 EDT        Please note that portions of this note may have been  completed with a voice recognition program. Efforts were made to edit the dictations, but occasionally words are mistranscribed.

## 2021-07-16 NOTE — PROGRESS NOTES
Antithrombolic/blood thinner clearance form successfully faxed to Farzad Redding MD at pt request.

## 2021-07-21 ENCOUNTER — TELEPHONE (OUTPATIENT)
Dept: INTERNAL MEDICINE | Facility: CLINIC | Age: 71
End: 2021-07-21

## 2021-07-22 ENCOUNTER — DOCUMENTATION (OUTPATIENT)
Dept: MAMMOGRAPHY | Facility: HOSPITAL | Age: 71
End: 2021-07-22

## 2021-07-22 NOTE — PROGRESS NOTES
Note in Epic from [facility] needs clarification. Epic basket message sent to Farzad Redding MD asking the pt's clearance form be faxed back to us, after which the pt will be scheduled for recommended bx.

## 2021-07-23 ENCOUNTER — TELEPHONE (OUTPATIENT)
Dept: INTERNAL MEDICINE | Facility: CLINIC | Age: 71
End: 2021-07-23

## 2021-07-23 ENCOUNTER — TELEPHONE (OUTPATIENT)
Dept: MAMMOGRAPHY | Facility: HOSPITAL | Age: 71
End: 2021-07-23

## 2021-07-23 NOTE — TELEPHONE ENCOUNTER
Returned Keeley Linton' (From Dr SHAWN Redding's office) call and told her Breast Imaging clearance form needed to be completed by Dr Redding and faxed back before pt could be scheduled for the biopsy. April was notified an Epic basket message was sent to Dr Redding asking for clearance to be returned. April stated Dr Redding was out of the office today and she would make him aware on Monday.

## 2021-07-26 ENCOUNTER — DOCUMENTATION (OUTPATIENT)
Dept: MAMMOGRAPHY | Facility: HOSPITAL | Age: 71
End: 2021-07-26

## 2021-07-26 ENCOUNTER — TELEPHONE (OUTPATIENT)
Dept: MAMMOGRAPHY | Facility: HOSPITAL | Age: 71
End: 2021-07-26

## 2021-07-26 ENCOUNTER — TRANSCRIBE ORDERS (OUTPATIENT)
Dept: MAMMOGRAPHY | Facility: HOSPITAL | Age: 71
End: 2021-07-26

## 2021-07-26 DIAGNOSIS — J45.20 MILD INTERMITTENT ASTHMA WITHOUT COMPLICATION: ICD-10-CM

## 2021-07-26 DIAGNOSIS — R92.8 ABNORMAL MAMMOGRAM: Primary | ICD-10-CM

## 2021-07-26 NOTE — TELEPHONE ENCOUNTER
Returned pt call; questions answered about biopsy scheduled on 8/13/21, support given. Encouraged to call back with further questions or concerns. Patient verbalized understanding.

## 2021-07-26 NOTE — PROGRESS NOTES
Antithrombolic/blood thinner clearance form successfully re-faxed to Dr Farzad Redding/Angela at Dr Farzad Redding's request.

## 2021-08-13 ENCOUNTER — HOSPITAL ENCOUNTER (OUTPATIENT)
Dept: MAMMOGRAPHY | Facility: HOSPITAL | Age: 71
Discharge: HOME | End: 2021-08-13

## 2021-08-13 DIAGNOSIS — R92.8 ABNORMAL MAMMOGRAM: ICD-10-CM

## 2021-08-13 PROCEDURE — A4648 IMPLANTABLE TISSUE MARKER: HCPCS

## 2021-08-13 PROCEDURE — 77065 DX MAMMO INCL CAD UNI: CPT | Performed by: RADIOLOGY

## 2021-08-13 PROCEDURE — 19081 BX BREAST 1ST LESION STRTCTC: CPT | Performed by: RADIOLOGY

## 2021-08-13 PROCEDURE — 88305 TISSUE EXAM BY PATHOLOGIST: CPT | Performed by: INTERNAL MEDICINE

## 2021-08-13 PROCEDURE — 25010000003 LIDOCAINE 1 % SOLUTION: Performed by: RADIOLOGY

## 2021-08-13 RX ORDER — LIDOCAINE HYDROCHLORIDE 10 MG/ML
10 INJECTION, SOLUTION INFILTRATION; PERINEURAL ONCE
Status: COMPLETED | OUTPATIENT
Start: 2021-08-13 | End: 2021-08-13

## 2021-08-13 RX ADMIN — LIDOCAINE HYDROCHLORIDE 10 ML: 10 INJECTION, SOLUTION INFILTRATION; PERINEURAL at 11:18

## 2021-08-16 ENCOUNTER — TELEPHONE (OUTPATIENT)
Dept: MAMMOGRAPHY | Facility: HOSPITAL | Age: 71
End: 2021-08-16

## 2021-08-16 LAB
CYTO UR: NORMAL
LAB AP CASE REPORT: NORMAL
LAB AP CLINICAL INFORMATION: NORMAL
LAB AP DIAGNOSIS COMMENT: NORMAL
PATH REPORT.FINAL DX SPEC: NORMAL
PATH REPORT.GROSS SPEC: NORMAL

## 2021-08-16 NOTE — TELEPHONE ENCOUNTER
Pt notified of pathology results and recommendations. Verbalizes understanding. Denies discomfort. Denies signs and symptoms of infection.     Patient desires Dr DORIS Randolph for surgical consult. Pt notified of surgical consult appointment with Dr. López on 9/27/21 @ [id number]. Pt given office contact & location information. Told to bring photo ID, list of prescription & OTC medications, insurance information, must wear a mask. Encouraged pt to call back or contact surgeon's office with further questions. Pt verbalized understanding.

## 2021-09-27 ENCOUNTER — TRANSCRIBE ORDERS (OUTPATIENT)
Dept: MAMMOGRAPHY | Facility: HOSPITAL | Age: 71
End: 2021-09-27

## 2021-09-27 DIAGNOSIS — R92.8 ABNORMAL MAMMOGRAM: Primary | ICD-10-CM

## 2021-10-13 ENCOUNTER — PRE-ADMISSION TESTING (OUTPATIENT)
Dept: PREADMISSION TESTING | Facility: HOSPITAL | Age: 71
End: 2021-10-13

## 2021-10-13 LAB
ANION GAP SERPL CALCULATED.3IONS-SCNC: 12 MMOL/L (ref 5–15)
BUN SERPL-MCNC: 25 MG/DL (ref 8–23)
BUN/CREAT SERPL: 22.3 (ref 7–25)
CALCIUM SPEC-SCNC: 9.7 MG/DL (ref 8.6–10.5)
CHLORIDE SERPL-SCNC: 105 MMOL/L (ref 98–107)
CO2 SERPL-SCNC: 25 MMOL/L (ref 22–29)
CREAT SERPL-MCNC: 1.12 MG/DL (ref 0.57–1)
DEPRECATED RDW RBC AUTO: 43.7 FL (ref 37–54)
ERYTHROCYTE [DISTWIDTH] IN BLOOD BY AUTOMATED COUNT: 14.1 % (ref 12.3–15.4)
GFR SERPL CREATININE-BSD FRML MDRD: 48 ML/MIN/1.73
GLUCOSE SERPL-MCNC: 104 MG/DL (ref 65–99)
HCT VFR BLD AUTO: 42.2 % (ref 34–46.6)
HGB BLD-MCNC: 14.1 G/DL (ref 12–15.9)
MCH RBC QN AUTO: 28.5 PG (ref 26.6–33)
MCHC RBC AUTO-ENTMCNC: 33.4 G/DL (ref 31.5–35.7)
MCV RBC AUTO: 85.4 FL (ref 79–97)
PLATELET # BLD AUTO: 188 10*3/MM3 (ref 140–450)
PMV BLD AUTO: 11 FL (ref 6–12)
POTASSIUM SERPL-SCNC: 3.9 MMOL/L (ref 3.5–5.2)
RBC # BLD AUTO: 4.94 10*6/MM3 (ref 3.77–5.28)
SARS-COV-2 RNA PNL SPEC NAA+PROBE: NOT DETECTED
SODIUM SERPL-SCNC: 142 MMOL/L (ref 136–145)
WBC # BLD AUTO: 6.49 10*3/MM3 (ref 3.4–10.8)

## 2021-10-13 PROCEDURE — U0004 COV-19 TEST NON-CDC HGH THRU: HCPCS

## 2021-10-13 PROCEDURE — C9803 HOPD COVID-19 SPEC COLLECT: HCPCS

## 2021-10-13 PROCEDURE — 85027 COMPLETE CBC AUTOMATED: CPT

## 2021-10-13 PROCEDURE — U0005 INFEC AGEN DETEC AMPLI PROBE: HCPCS

## 2021-10-13 PROCEDURE — 80048 BASIC METABOLIC PNL TOTAL CA: CPT

## 2021-10-13 PROCEDURE — 36415 COLL VENOUS BLD VENIPUNCTURE: CPT

## 2021-10-15 ENCOUNTER — HOSPITAL ENCOUNTER (OUTPATIENT)
Dept: MAMMOGRAPHY | Facility: HOSPITAL | Age: 71
Discharge: HOME | End: 2021-10-15

## 2021-10-15 ENCOUNTER — LAB REQUISITION (OUTPATIENT)
Dept: LAB | Facility: HOSPITAL | Age: 71
End: 2021-10-15

## 2021-10-15 DIAGNOSIS — R92.8 ABNORMAL MAMMOGRAM: ICD-10-CM

## 2021-10-15 DIAGNOSIS — R92.8 OTHER ABNORMAL AND INCONCLUSIVE FINDINGS ON DIAGNOSTIC IMAGING OF BREAST: ICD-10-CM

## 2021-10-15 PROCEDURE — 76098 X-RAY EXAM SURGICAL SPECIMEN: CPT

## 2021-10-15 PROCEDURE — 19283 PERQ DEV BREAST 1ST STRTCTC: CPT | Performed by: RADIOLOGY

## 2021-10-15 PROCEDURE — 76098 X-RAY EXAM SURGICAL SPECIMEN: CPT | Performed by: RADIOLOGY

## 2021-10-15 PROCEDURE — 88307 TISSUE EXAM BY PATHOLOGIST: CPT | Performed by: SURGERY

## 2021-10-15 PROCEDURE — 25010000003 LIDOCAINE 1 % SOLUTION: Performed by: RADIOLOGY

## 2021-10-15 PROCEDURE — 77065 DX MAMMO INCL CAD UNI: CPT | Performed by: RADIOLOGY

## 2021-10-15 PROCEDURE — C1819 TISSUE LOCALIZATION-EXCISION: HCPCS

## 2021-10-15 RX ORDER — LIDOCAINE HYDROCHLORIDE 10 MG/ML
5 INJECTION, SOLUTION INFILTRATION; PERINEURAL ONCE
Status: COMPLETED | OUTPATIENT
Start: 2021-10-15 | End: 2021-10-15

## 2021-10-15 RX ADMIN — LIDOCAINE HYDROCHLORIDE 2 ML: 10 INJECTION, SOLUTION INFILTRATION; PERINEURAL at 10:03

## 2021-10-18 LAB
CYTO UR: NORMAL
LAB AP CASE REPORT: NORMAL
LAB AP CLINICAL INFORMATION: NORMAL
PATH REPORT.FINAL DX SPEC: NORMAL
PATH REPORT.GROSS SPEC: NORMAL

## 2021-10-19 ENCOUNTER — LAB (OUTPATIENT)
Dept: LAB | Facility: HOSPITAL | Age: 71
End: 2021-10-19

## 2021-10-19 ENCOUNTER — OFFICE VISIT (OUTPATIENT)
Dept: INTERNAL MEDICINE | Facility: CLINIC | Age: 71
End: 2021-10-19

## 2021-10-19 VITALS
DIASTOLIC BLOOD PRESSURE: 68 MMHG | HEART RATE: 74 BPM | SYSTOLIC BLOOD PRESSURE: 120 MMHG | TEMPERATURE: 97.9 F | WEIGHT: 136 LBS | RESPIRATION RATE: 19 BRPM | BODY MASS INDEX: 27.47 KG/M2

## 2021-10-19 DIAGNOSIS — I10 ESSENTIAL HYPERTENSION: Primary | ICD-10-CM

## 2021-10-19 DIAGNOSIS — Z12.11 SCREENING FOR COLON CANCER: ICD-10-CM

## 2021-10-19 DIAGNOSIS — K21.9 GASTROESOPHAGEAL REFLUX DISEASE WITHOUT ESOPHAGITIS: ICD-10-CM

## 2021-10-19 DIAGNOSIS — E03.9 HYPOTHYROIDISM, UNSPECIFIED TYPE: ICD-10-CM

## 2021-10-19 LAB
ALBUMIN SERPL-MCNC: 4.1 G/DL (ref 3.5–5.2)
ALBUMIN/GLOB SERPL: 1.6 G/DL
ALP SERPL-CCNC: 76 U/L (ref 39–117)
ALT SERPL W P-5'-P-CCNC: 17 U/L (ref 1–33)
ANION GAP SERPL CALCULATED.3IONS-SCNC: 9.5 MMOL/L (ref 5–15)
AST SERPL-CCNC: 17 U/L (ref 1–32)
BILIRUB SERPL-MCNC: 0.4 MG/DL (ref 0–1.2)
BUN SERPL-MCNC: 21 MG/DL (ref 8–23)
BUN/CREAT SERPL: 22.6 (ref 7–25)
CALCIUM SPEC-SCNC: 9.3 MG/DL (ref 8.6–10.5)
CHLORIDE SERPL-SCNC: 105 MMOL/L (ref 98–107)
CO2 SERPL-SCNC: 26.5 MMOL/L (ref 22–29)
CREAT SERPL-MCNC: 0.93 MG/DL (ref 0.57–1)
GFR SERPL CREATININE-BSD FRML MDRD: 59 ML/MIN/1.73
GLOBULIN UR ELPH-MCNC: 2.6 GM/DL
GLUCOSE SERPL-MCNC: 92 MG/DL (ref 65–99)
POTASSIUM SERPL-SCNC: 4.1 MMOL/L (ref 3.5–5.2)
PROT SERPL-MCNC: 6.7 G/DL (ref 6–8.5)
SODIUM SERPL-SCNC: 141 MMOL/L (ref 136–145)
T4 FREE SERPL-MCNC: 1.86 NG/DL (ref 0.93–1.7)
TSH SERPL DL<=0.05 MIU/L-ACNC: 0.25 UIU/ML (ref 0.27–4.2)

## 2021-10-19 PROCEDURE — 80053 COMPREHEN METABOLIC PANEL: CPT | Performed by: INTERNAL MEDICINE

## 2021-10-19 PROCEDURE — 84439 ASSAY OF FREE THYROXINE: CPT | Performed by: INTERNAL MEDICINE

## 2021-10-19 PROCEDURE — 99214 OFFICE O/P EST MOD 30 MIN: CPT | Performed by: INTERNAL MEDICINE

## 2021-10-19 PROCEDURE — 36415 COLL VENOUS BLD VENIPUNCTURE: CPT | Performed by: INTERNAL MEDICINE

## 2021-10-19 PROCEDURE — 84443 ASSAY THYROID STIM HORMONE: CPT | Performed by: INTERNAL MEDICINE

## 2021-10-19 NOTE — PROGRESS NOTES
Chief Complaint   Patient presents with   • Essential hypertension       History of Present Illness      The patient presents for a follow-up related to hypertension. The patient reports that she has had no headaches, chest pain, dyspnea, edema, syncope, blurred vision or palpitations. She states that she is taking her medication as prescribed. She is not having medication side effects.    The patient presents for a follow-up related to hypothyroidism. She reports a good energy level. She reports no hair loss, heat intolerance, cold intolerance, diarrhea, constipation or sweats. She is taking her medication as prescribed.    The patient presents for a follow-up related to GERD. The patient is not on medication for her gastroesophageal reflux. She reports no abdominal pain, belching, dysphagia, early satiety, heartburn, hoarseness, nausea, odynophagia, rectal bleeding, vomiting or weight loss. The GERD has no known aggravating factors.    Review of Systems    CONSTITUTIONAL- Denies Fever, Chills, Sweats, Weakness or Malaise.    HENT- Denies Nasal Discharge, Sore Throat, Ear Pain, Ear Drainage, Sinus Pain, Nasal Congestion, Decreased Hearing or Tinnitus.    PULMONARY- Denies Wheezing, Sputum Production, Cough, Hemoptysis or Pleuritic Chest Pain.    CARDIOVASCULAR- Denies Claudication or Irregular Heart Beat.    Medications      Current Outpatient Medications:   •  albuterol sulfate  (90 Base) MCG/ACT inhaler, Inhale 2 puffs Every 4 (Four) Hours As Needed for Wheezing., Disp: 1 g, Rfl: 5  •  aspirin 81 MG EC tablet, Take 81 mg by mouth 2 (Two) Times a Week., Disp: , Rfl:   •  celecoxib (CeleBREX) 100 MG capsule, Take 1 capsule by mouth 2 (Two) Times a Day As Needed for Mild Pain ., Disp: 60 capsule, Rfl: 5  •  diclofenac (VOLTAREN) 1 % gel gel, Apply 4 g topically to the appropriate area as directed 4 (Four) Times a Day. (Patient taking differently: Apply 4 g topically to the appropriate area as directed 4  (Four) Times a Day As Needed.), Disp: 100 g, Rfl: 3  •  estradiol (ESTRACE) 0.1 MG/GM vaginal cream, Insert 0.5 g into the vagina 2 (Two) Times a Week., Disp: , Rfl:   •  fexofenadine (ALLEGRA) 180 MG tablet, Take 180 mg by mouth As Needed., Disp: , Rfl:   •  fluticasone-salmeterol (ADVAIR) 100-50 MCG/DOSE DISKUS, INHALE 1 PUFF BY MOUTH TWICE DAILY, Disp: 180 each, Rfl: 0  •  losartan-hydrochlorothiazide (HYZAAR) 100-25 MG per tablet, TAKE ONE TABLET BY MOUTH ONE TIME DAILY , Disp: 90 tablet, Rfl: 2  •  metoprolol succinate XL (TOPROL-XL) 200 MG 24 hr tablet, TAKE ONE TABLET BY MOUTH ONE TIME DAILY , Disp: 90 tablet, Rfl: 1  •  Synthroid 75 MCG tablet, Take 1 tablet by mouth Daily., Disp: 90 tablet, Rfl: 3  •  Triamcinolone Acetonide (NASACORT) 55 MCG/ACT nasal inhaler, 2 sprays into each nostril daily., Disp: , Rfl:      Allergies    Allergies   Allergen Reactions   • Macrobid [Nitrofurantoin Monohyd Macro] Swelling and Other (See Comments)     Swelling around eyes, fatigue   • Ace Inhibitors Cough   • Plaquenil [Hydroxychloroquine Sulfate] Other (See Comments)     EYE PROBLEMS   • Sudafed [Pseudoephedrine Hcl] Other (See Comments)     INCREASED PRESSURE IN EYES    • Tenormin [Atenolol] Cough and Angioedema       Problem List    Patient Active Problem List   Diagnosis   • Asthma   • Hypertension   • Peripheral vascular disease (HCC)   • GERD (gastroesophageal reflux disease)   • Diverticulosis   • Colon polyps   • Constipation   • Hashimoto's thyroiditis   • Mixed connective tissue disease (HCC)   • Lupus (HCC)   • Uterine fibroid   • Stress incontinence of urine   • Palpitations   • Solitary thyroid nodule       Medications, Allergies, Problems List and Past History were reviewed and updated.    Physical Examination    /68   Pulse 74   Temp 97.9 °F (36.6 °C)   Resp 19   Wt 61.7 kg (136 lb)   LMP  (LMP Unknown)   BMI 27.47 kg/m²     HEENT: Facies- Within normal limits. Lids- Normal bilaterally.  Conjunctiva- Clear bilaterally. Sclera- Anicteric bilaterally.    Neck: Thyroid- non enlarged, symmetric and has no nodules. No bruits are detected. ROM- Normal Range of Motion with no rigidity.    Lungs: Auscultation- Clear to auscultation bilaterally. There are no retractions, clubbing or cyanosis. The Expiratory to Inspiratory ratio is equal.    Cardiovascular: There are no carotid bruits. Heart- Normal Rate with Regular rhythm and no murmurs. There are no gallops. There are no rubs. In the lower extremities there is no edema. The upper extremities do not have edema.    Abdomen: Soft, benign, non-tender with no masses, hernias, organomegaly or scars.    Impression and Assessment    Encounter for Screening for Malignant Neoplasm of the Colon.    Essential Hypertension.    Hypothyroidism.    Gastroesophageal Reflux Disease.    Plan    Gastroesophageal Reflux Disease Plan: The condition is stable. No change is needed in the current plan.    Essential Hypertension Plan: The patient was instructed to continue the current medications.    Hypothyroidism Plan: The patient was instructed to continue the current medications.    The following was ordered for screening and health maintenance: Colonoscopy.    Diagnoses and all orders for this visit:    1. Essential hypertension (Primary)  -     Comprehensive Metabolic Panel; Future  -     Comprehensive Metabolic Panel    2. Hypothyroidism, unspecified type  -     TSH; Future  -     T4, Free; Future  -     TSH  -     T4, Free    3. Gastroesophageal reflux disease without esophagitis    4. Screening for colon cancer  -     Ambulatory Referral For Screening Colonoscopy        Return to Office    The patient was instructed to return for follow-up in 6 months. Next Visit: Extended Follow-up. The patient was instructed to return sooner if the condition changes, worsens, or does not resolve.

## 2021-10-29 ENCOUNTER — TRANSCRIBE ORDERS (OUTPATIENT)
Dept: MAMMOGRAPHY | Facility: HOSPITAL | Age: 71
End: 2021-10-29

## 2021-10-29 DIAGNOSIS — R92.8 ABNORMAL MAMMOGRAM: Primary | ICD-10-CM

## 2021-10-31 DIAGNOSIS — I10 ESSENTIAL HYPERTENSION: ICD-10-CM

## 2021-11-01 RX ORDER — METOPROLOL SUCCINATE 200 MG/1
TABLET, EXTENDED RELEASE ORAL
Qty: 90 TABLET | Refills: 1 | Status: SHIPPED | OUTPATIENT
Start: 2021-11-01 | End: 2022-06-06 | Stop reason: SDUPTHER

## 2021-11-01 RX ORDER — LOSARTAN POTASSIUM AND HYDROCHLOROTHIAZIDE 25; 100 MG/1; MG/1
TABLET ORAL
Qty: 90 TABLET | Refills: 0 | Status: SHIPPED | OUTPATIENT
Start: 2021-11-01 | End: 2022-01-26

## 2021-11-17 ENCOUNTER — OFFICE VISIT (OUTPATIENT)
Dept: ENDOCRINOLOGY | Facility: CLINIC | Age: 71
End: 2021-11-17

## 2021-11-17 VITALS
SYSTOLIC BLOOD PRESSURE: 118 MMHG | HEIGHT: 59 IN | HEART RATE: 68 BPM | OXYGEN SATURATION: 99 % | BODY MASS INDEX: 26.81 KG/M2 | WEIGHT: 133 LBS | DIASTOLIC BLOOD PRESSURE: 78 MMHG

## 2021-11-17 DIAGNOSIS — E06.3 HASHIMOTO'S THYROIDITIS: Primary | ICD-10-CM

## 2021-11-17 DIAGNOSIS — E04.1 SOLITARY THYROID NODULE: ICD-10-CM

## 2021-11-17 PROCEDURE — 99213 OFFICE O/P EST LOW 20 MIN: CPT | Performed by: INTERNAL MEDICINE

## 2021-11-17 NOTE — PROGRESS NOTES
"     Office Note      Date: 2021  Patient Name: Kavitha Barnhart  MRN: [id number]  : 1950    Chief Complaint   Patient presents with   • Hashimoto's Thyroiditis       History of Present Illness:   Kavitha Barnhart is a 71 y.o. female who presents for Hashimoto's Thyroiditis    She remains on synthroid 75mcg qd. She is taking this correctly. She isn't taking any interfering meds concurrently. She hasn't noted any change in the size of her neck. She notes occ sensation of food sticking when swallowing. She denies any sxs of hypo- or hyperthyroidism at this time.    She had labs done last month.  The TSH was mildly low at 0.25 (LLN 0.27).      Subjective      Review of Systems:   Review of Systems   Constitutional: Negative.    Cardiovascular: Negative.    Gastrointestinal: Negative.    Endocrine: Negative.        The following portions of the patient's history were reviewed and updated as appropriate: allergies, current medications, past family history, past medical history, past social history, past surgical history and problem list.    Objective     Visit Vitals  /78 (BP Location: Right arm, Patient Position: Sitting, Cuff Size: Adult)   Pulse 68   Ht 149.9 cm (59\")   Wt 60.3 kg (133 lb)   LMP  (LMP Unknown)   SpO2 99%   BMI 26.86 kg/m²       Physical Exam:  Physical Exam  Constitutional:       Appearance: Normal appearance.   Neck:      Thyroid: No thyroid mass, thyromegaly or thyroid tenderness.   Lymphadenopathy:      Cervical: No cervical adenopathy.   Neurological:      Mental Status: She is alert.         Labs:    TSH  No results found for: TSHBASE     Free T4  Free T4   Date Value Ref Range Status   10/19/2021 1.86 (H) 0.93 - 1.70 ng/dL Final       T3  No results found for: Z0FHGDE      TPO  No results found for: THYROIDAB    TG AB  No results found for: THGAB    TG  No results found for: THYROGLB    CBC w/DIFF  Lab Results   Component Value Date    WBC 6.49 10/13/2021    RBC 4.94 10/13/2021    " HGB 14.1 10/13/2021    HCT 42.2 10/13/2021    MCV 85.4 10/13/2021    MCH 28.5 10/13/2021    MCHC 33.4 10/13/2021    RDW 14.1 10/13/2021    RDWSD 43.7 10/13/2021    MPV 11.0 10/13/2021     10/13/2021    NEUTRORELPCT 49.1 11/11/2020    LYMPHORELPCT 35.5 11/11/2020    MONORELPCT 11.6 11/11/2020    EOSRELPCT 2.4 11/11/2020    BASORELPCT 1.0 11/11/2020    AUTOIGPER 0.4 11/11/2020    NEUTROABS 3.28 11/11/2020    LYMPHSABS 2.38 11/11/2020    MONOSABS 0.78 11/11/2020    EOSABS 0.16 11/11/2020    BASOSABS 0.07 11/11/2020    AUTOIGNUM 0.03 11/11/2020    NRBC 0.0 11/11/2020           Assessment / Plan      Assessment & Plan:  Diagnoses and all orders for this visit:    1. Hashimoto's thyroiditis (Primary)  Assessment & Plan:  Recent TSH was mildly low.  She is asymptomatic though.  Continue current medication for now.  I would rather have the TSH a bit low than higher due to h/o thyroid nodule.      2. Solitary thyroid nodule  Assessment & Plan:  Plan for another u/s in about a year.        Return in about 6 months (around 5/17/2022) for Recheck with TSH, free T4.    Surya Wells MD   11/17/2021

## 2021-11-17 NOTE — ASSESSMENT & PLAN NOTE
Recent TSH was mildly low.  She is asymptomatic though.  Continue current medication for now.  I would rather have the TSH a bit low than higher due to h/o thyroid nodule.

## 2022-01-26 DIAGNOSIS — I10 ESSENTIAL HYPERTENSION: ICD-10-CM

## 2022-01-26 RX ORDER — LOSARTAN POTASSIUM AND HYDROCHLOROTHIAZIDE 25; 100 MG/1; MG/1
TABLET ORAL
Qty: 90 TABLET | Refills: 0 | Status: SHIPPED | OUTPATIENT
Start: 2022-01-26 | End: 2022-04-28

## 2022-01-26 NOTE — TELEPHONE ENCOUNTER
Rx Refill Note  Requested Prescriptions     Pending Prescriptions Disp Refills   • losartan-hydrochlorothiazide (HYZAAR) 100-25 MG per tablet [Pharmacy Med Name: Losartan Potassium-HCTZ Oral Tablet 100-25 MG] 90 tablet 0     Sig: TAKE ONE TABLET BY MOUTH ONE TIME DAILY      Last office visit with prescribing clinician: 10/19/21      Next office visit with prescribing clinician: 4/19/2022           Starr Jerez MA  01/26/22, 16:13 EST

## 2022-01-31 ENCOUNTER — TELEPHONE (OUTPATIENT)
Dept: INTERNAL MEDICINE | Facility: CLINIC | Age: 72
End: 2022-01-31

## 2022-01-31 DIAGNOSIS — Z11.1 SCREENING-PULMONARY TB: Primary | ICD-10-CM

## 2022-02-02 ENCOUNTER — HOSPITAL ENCOUNTER (OUTPATIENT)
Dept: CARDIOLOGY | Facility: HOSPITAL | Age: 72
Discharge: HOME | End: 2022-02-02

## 2022-02-02 ENCOUNTER — TRANSCRIBE ORDERS (OUTPATIENT)
Dept: CARDIOLOGY | Facility: HOSPITAL | Age: 72
End: 2022-02-02

## 2022-02-02 ENCOUNTER — HOSPITAL ENCOUNTER (OUTPATIENT)
Dept: GENERAL RADIOLOGY | Facility: HOSPITAL | Age: 72
Discharge: HOME | End: 2022-02-02

## 2022-02-02 ENCOUNTER — TRANSCRIBE ORDERS (OUTPATIENT)
Dept: GENERAL RADIOLOGY | Facility: HOSPITAL | Age: 72
End: 2022-02-02

## 2022-02-02 DIAGNOSIS — R15.9 FREQUENT FECAL INCONTINENCE: Primary | ICD-10-CM

## 2022-02-02 DIAGNOSIS — J45.20 ASTHMA IN ADULT, MILD INTERMITTENT, UNCOMPLICATED: Primary | ICD-10-CM

## 2022-02-02 LAB
QT INTERVAL: 308 MS
QTC INTERVAL: 322 MS

## 2022-02-02 PROCEDURE — 93005 ELECTROCARDIOGRAM TRACING: CPT

## 2022-02-02 PROCEDURE — 71046 X-RAY EXAM CHEST 2 VIEWS: CPT

## 2022-02-02 PROCEDURE — 93010 ELECTROCARDIOGRAM REPORT: CPT | Performed by: INTERNAL MEDICINE

## 2022-02-07 ENCOUNTER — TELEPHONE (OUTPATIENT)
Dept: INTERNAL MEDICINE | Facility: CLINIC | Age: 72
End: 2022-02-07

## 2022-02-07 NOTE — TELEPHONE ENCOUNTER
Los begum from pt that last week she had an abnormal EKG at the hospital for an outpatient procedure george in March and she usually sees a Dr in cardio group but would like to make a change and wants to know if Dr Redding has a recommendation for her for a different cardiologist.  She has called [facility] cardio to ask about a female dr but she is booked out for months    Call back # is [phone]

## 2022-02-07 NOTE — TELEPHONE ENCOUNTER
I have put in the order.  She can stop in to have this done.  Farzad Redding MD  07:58 EST  02/07/22

## 2022-02-07 NOTE — TELEPHONE ENCOUNTER
Reached pt at [phone]  She has an appt to come in to talk to Dr Redding on Wed and prefers to talk to him about it    Closing encounter

## 2022-02-07 NOTE — TELEPHONE ENCOUNTER
S/W pt, informed that Dr Alfaro has placed order for lab test she requested and that she can stop in and have drawn.  States she has an appt scheduled for Wed and will just have done then.  Expl that will be fine. Verb apprec.

## 2022-02-09 ENCOUNTER — LAB (OUTPATIENT)
Dept: LAB | Facility: HOSPITAL | Age: 72
End: 2022-02-09

## 2022-02-09 ENCOUNTER — OFFICE VISIT (OUTPATIENT)
Dept: INTERNAL MEDICINE | Facility: CLINIC | Age: 72
End: 2022-02-09

## 2022-02-09 VITALS
TEMPERATURE: 97.7 F | OXYGEN SATURATION: 98 % | HEIGHT: 59 IN | SYSTOLIC BLOOD PRESSURE: 130 MMHG | RESPIRATION RATE: 16 BRPM | WEIGHT: 130 LBS | HEART RATE: 60 BPM | DIASTOLIC BLOOD PRESSURE: 82 MMHG | BODY MASS INDEX: 26.21 KG/M2

## 2022-02-09 DIAGNOSIS — R10.11 RUQ ABDOMINAL PAIN: ICD-10-CM

## 2022-02-09 DIAGNOSIS — R07.9 CHEST PAIN, UNSPECIFIED TYPE: Primary | ICD-10-CM

## 2022-02-09 DIAGNOSIS — Z11.1 SCREENING-PULMONARY TB: ICD-10-CM

## 2022-02-09 PROBLEM — N39.3 STRESS INCONTINENCE OF URINE: Status: RESOLVED | Noted: 2019-02-26 | Resolved: 2022-02-09

## 2022-02-09 PROCEDURE — 93000 ELECTROCARDIOGRAM COMPLETE: CPT | Performed by: INTERNAL MEDICINE

## 2022-02-09 PROCEDURE — 99215 OFFICE O/P EST HI 40 MIN: CPT | Performed by: INTERNAL MEDICINE

## 2022-02-09 PROCEDURE — 86480 TB TEST CELL IMMUN MEASURE: CPT | Performed by: INTERNAL MEDICINE

## 2022-02-09 PROCEDURE — 36415 COLL VENOUS BLD VENIPUNCTURE: CPT | Performed by: INTERNAL MEDICINE

## 2022-02-09 NOTE — PROGRESS NOTES
Chief Complaint   Patient presents with   • Hypertension     Discuss EKG       History of Present Illness    The patient presents for an acute visit for intermittent chest pain. There is a two month history of chest pain. The episodes tend to last five minutes. The patient has noted no alleviating factors. The pain onset was sudden. The pain is not made worse with activity and it is not relieved with rest. The pain is not made worse with deep breaths. It is characterized as sharp. The pain does not radiate. The patient states the pain is in the right chest. The pain is not associated with belching, clamminess, diaphoresis, dizziness, fatigue, fever, indigestion, lightheadedness, nausea, palpitations, shortness of breath, syncope or vomiting. A past history of CAD is not reported. She had obtained her ECGs online and is concerned that they are abnormal. She is frustrated that Dr. Sarmiento told her that they were normal.    The patient presents for an acute visit for right upper quadrant abdominal pain. There is a two month history of abdominal pain. The pain is intermittent. The pain is in the right upper quadrant and it radiates to the right shoulder and right upper chest. The pain is characterized as sharp and stabbing. The patient has not had a fever. The patient reports no aggravating factors.  The patient has noted no alleviating factors. The patient also denies hematuria, dysuria, diarrhea, constipation, a rash, rectal bleeding, urinary hesitancy or urinary frequency.    Review of Systems    CONSTITUTIONAL- Denies Unexplained Weight Loss, Chills, Weakness or Malaise.    PULMONARY- Denies Wheezing, Sputum Production, Cough, Hemoptysis or Pleuritic Chest Pain.    CARDIOVASCULAR- Reports: Chest Pain. Denies: Claudication or Edema.    Medications      Current Outpatient Medications:   •  albuterol sulfate  (90 Base) MCG/ACT inhaler, Inhale 2 puffs Every 4 (Four) Hours As Needed for Wheezing., Disp: 1 g, Rfl:  5  •  aspirin 81 MG EC tablet, Take 81 mg by mouth 2 (Two) Times a Week., Disp: , Rfl:   •  celecoxib (CeleBREX) 100 MG capsule, Take 1 capsule by mouth 2 (Two) Times a Day As Needed for Mild Pain ., Disp: 60 capsule, Rfl: 5  •  diclofenac (VOLTAREN) 1 % gel gel, Apply 4 g topically to the appropriate area as directed 4 (Four) Times a Day. (Patient taking differently: Apply 4 g topically to the appropriate area as directed 4 (Four) Times a Day As Needed.), Disp: 100 g, Rfl: 3  •  estradiol (ESTRACE) 0.1 MG/GM vaginal cream, Insert 0.5 g into the vagina 2 (Two) Times a Week., Disp: , Rfl:   •  fexofenadine (ALLEGRA) 180 MG tablet, Take 180 mg by mouth As Needed., Disp: , Rfl:   •  fluticasone-salmeterol (ADVAIR) 100-50 MCG/DOSE DISKUS, INHALE 1 PUFF BY MOUTH TWICE DAILY, Disp: 180 each, Rfl: 0  •  losartan-hydrochlorothiazide (HYZAAR) 100-25 MG per tablet, TAKE ONE TABLET BY MOUTH ONE TIME DAILY, Disp: 90 tablet, Rfl: 0  •  metoprolol succinate XL (TOPROL-XL) 200 MG 24 hr tablet, TAKE ONE TABLET BY MOUTH ONE TIME DAILY, Disp: 90 tablet, Rfl: 1  •  Synthroid 75 MCG tablet, Take 1 tablet by mouth Daily., Disp: 90 tablet, Rfl: 3  •  Triamcinolone Acetonide (NASACORT) 55 MCG/ACT nasal inhaler, 2 sprays into each nostril daily., Disp: , Rfl:      Allergies    Allergies   Allergen Reactions   • Macrobid [Nitrofurantoin Monohyd Macro] Swelling and Other (See Comments)     Swelling around eyes, fatigue   • Ace Inhibitors Cough   • Plaquenil [Hydroxychloroquine Sulfate] Other (See Comments)     EYE PROBLEMS   • Sudafed [Pseudoephedrine Hcl] Other (See Comments)     INCREASED PRESSURE IN EYES    • Tenormin [Atenolol] Cough and Angioedema       Problem List    Patient Active Problem List   Diagnosis   • Asthma   • Hypertension   • Peripheral vascular disease (HCC)   • GERD (gastroesophageal reflux disease)   • Diverticulosis   • Colon polyps   • Constipation   • Hashimoto's thyroiditis   • Mixed connective tissue disease (HCC)  "  • Lupus (HCC)   • Uterine fibroid   • Palpitations   • Solitary thyroid nodule       Medications, Allergies, Problems List and Past History were reviewed and updated.    Physical Examination    /82 (BP Location: Left arm, Patient Position: Sitting, Cuff Size: Adult)   Pulse 60   Temp 97.7 °F (36.5 °C) (Infrared)   Resp 16   Ht 149.9 cm (59\")   Wt 59 kg (130 lb)   LMP  (LMP Unknown)   SpO2 98%   BMI 26.26 kg/m²     HEENT: Facies- Within normal limits. Lids- Normal bilaterally. Conjunctiva- Clear bilaterally. Sclera- Anicteric bilaterally.    Lungs: Auscultation- Clear to auscultation bilaterally. There are no retractions, clubbing or cyanosis. The Expiratory to Inspiratory ratio is equal.    Cardiovascular: There are no carotid bruits. Heart- Normal Rate with Regular rhythm and no murmurs. There are no gallops. There are no rubs. In the lower extremities there is no edema. The upper extremities do not have edema.    Abdomen: Soft, benign, non-tender with no masses, hernias, organomegaly or scars.      ECG 12 Lead    Date/Time: 2/9/2022 2:28 PM  Performed by: Farzad Redding MD  Authorized by: Farzad Redding MD   Comparison: compared with previous ECG from 2/2/2022  Similar to previous ECG  Rhythm: sinus bradycardia  Rate: normal  Conduction: conduction normal  ST Segments: ST segments normal  T Waves: T waves normal  QRS axis: normal  Other findings: non-specific ST-T wave changes    Clinical impression: normal ECG              Impression and Assessment    Chest Pain.    Right Upper Quadrant Abdominal Pain    Plan    Right Upper Quadrant Abdominal Pain Plan: Further plans will be made after the tests are reviewed.    Chest Pain Plan: I had a long discussion with the patient. I reviewed all of her ECGs from today going back through 2016 and reassured her that the ECGs do not reveal any worrisome findings. She wants to learn more about ECG interpretation and I recommended [facility] textbook. At " this point will obtain a stress test to reassure her, given her chest pain. I suspect that the CP is more related to biliary disease.  A total of 45 minutes was spent with the patient obtaining her history, performing an examination, reviewing all prior ECGs in detail, reviewing a new ECG, documentation and discussing plans for additional treatment and work-up.      Diagnoses and all orders for this visit:    1. Chest pain, unspecified type (Primary)  -     ECG 12 Lead  -     Stress Test With Myocardial Perfusion One Day; Future    2. RUQ abdominal pain  -     US Abdomen Limited; Future    3. Screening-pulmonary TB  -     QuantiFERON TB Gold          Return to Office    The patient was instructed to return for follow-up at the next scheduled visit. The patient was instructed to return sooner if the condition changes, worsens, or does not resolve.

## 2022-02-12 LAB
GAMMA INTERFERON BACKGROUND BLD IA-ACNC: 0.12 IU/ML
M TB IFN-G BLD-IMP: NEGATIVE
M TB IFN-G CD4+ BCKGRND COR BLD-ACNC: 0.09 IU/ML
M TB IFN-G CD4+CD8+ BCKGRND COR BLD-ACNC: 0.11 IU/ML
MITOGEN IGNF BLD-ACNC: >10 IU/ML
SERVICE CMNT-IMP: NORMAL

## 2022-02-18 ENCOUNTER — HOSPITAL ENCOUNTER (OUTPATIENT)
Dept: ULTRASOUND IMAGING | Facility: HOSPITAL | Age: 72
Discharge: HOME | End: 2022-02-18
Admitting: INTERNAL MEDICINE

## 2022-02-18 DIAGNOSIS — R10.11 RUQ ABDOMINAL PAIN: ICD-10-CM

## 2022-02-18 PROCEDURE — 76705 ECHO EXAM OF ABDOMEN: CPT

## 2022-02-22 DIAGNOSIS — R10.11 RUQ ABDOMINAL PAIN: Primary | ICD-10-CM

## 2022-02-22 DIAGNOSIS — K80.20 CALCULUS OF GALLBLADDER WITHOUT CHOLECYSTITIS WITHOUT OBSTRUCTION: ICD-10-CM

## 2022-03-05 DIAGNOSIS — M25.552 BILATERAL HIP PAIN: ICD-10-CM

## 2022-03-05 DIAGNOSIS — J45.20 MILD INTERMITTENT ASTHMA WITHOUT COMPLICATION: ICD-10-CM

## 2022-03-05 DIAGNOSIS — M25.551 BILATERAL HIP PAIN: ICD-10-CM

## 2022-03-07 RX ORDER — CELECOXIB 100 MG/1
CAPSULE ORAL
Qty: 60 CAPSULE | Refills: 2 | Status: SHIPPED | OUTPATIENT
Start: 2022-03-07 | End: 2022-10-19 | Stop reason: SDUPTHER

## 2022-03-21 ENCOUNTER — HOSPITAL ENCOUNTER (OUTPATIENT)
Dept: CARDIOLOGY | Facility: HOSPITAL | Age: 72
Discharge: HOME | End: 2022-03-21
Admitting: INTERNAL MEDICINE

## 2022-03-21 DIAGNOSIS — R07.9 CHEST PAIN, UNSPECIFIED TYPE: ICD-10-CM

## 2022-03-21 LAB
BH CV REST NUCLEAR ISOTOPE DOSE: 9.9 MCI
BH CV STRESS BP STAGE 1: NORMAL
BH CV STRESS BP STAGE 2: NORMAL
BH CV STRESS DURATION MIN STAGE 1: 3
BH CV STRESS DURATION MIN STAGE 2: 1
BH CV STRESS DURATION SEC STAGE 1: 0
BH CV STRESS DURATION SEC STAGE 2: 2
BH CV STRESS GRADE STAGE 1: 10
BH CV STRESS GRADE STAGE 2: 12
BH CV STRESS HR STAGE 1: 125
BH CV STRESS HR STAGE 2: 136
BH CV STRESS METS STAGE 1: 5
BH CV STRESS METS STAGE 2: 7.5
BH CV STRESS NUCLEAR ISOTOPE DOSE: 33 MCI
BH CV STRESS O2 STAGE 1: 100
BH CV STRESS O2 STAGE 2: 99
BH CV STRESS PROTOCOL 1: NORMAL
BH CV STRESS RECOVERY BP: NORMAL MMHG
BH CV STRESS RECOVERY HR: 89 BPM
BH CV STRESS RECOVERY O2: 100 %
BH CV STRESS SPEED STAGE 1: 1.7
BH CV STRESS SPEED STAGE 2: 2.5
BH CV STRESS STAGE 1: 1
BH CV STRESS STAGE 2: 2
LV EF NUC BP: 80 %
MAXIMAL PREDICTED HEART RATE: 147 BPM
PERCENT MAX PREDICTED HR: 95.92 %
STRESS BASELINE BP: NORMAL MMHG
STRESS BASELINE HR: 83 BPM
STRESS O2 SAT REST: 100 %
STRESS PERCENT HR: 113 %
STRESS POST ESTIMATED WORKLOAD: 5.8 METS
STRESS POST EXERCISE DUR MIN: 4 MIN
STRESS POST EXERCISE DUR SEC: 2 SEC
STRESS POST O2 SAT PEAK: 99 %
STRESS POST PEAK BP: NORMAL MMHG
STRESS POST PEAK HR: 141 BPM
STRESS TARGET HR: 125 BPM

## 2022-03-21 PROCEDURE — 93017 CV STRESS TEST TRACING ONLY: CPT

## 2022-03-21 PROCEDURE — 78452 HT MUSCLE IMAGE SPECT MULT: CPT

## 2022-03-21 PROCEDURE — 93018 CV STRESS TEST I&R ONLY: CPT | Performed by: INTERNAL MEDICINE

## 2022-03-21 PROCEDURE — 0 TECHNETIUM SESTAMIBI: Performed by: INTERNAL MEDICINE

## 2022-03-21 PROCEDURE — A9500 TC99M SESTAMIBI: HCPCS | Performed by: INTERNAL MEDICINE

## 2022-03-21 PROCEDURE — 78452 HT MUSCLE IMAGE SPECT MULT: CPT | Performed by: INTERNAL MEDICINE

## 2022-03-21 RX ADMIN — TECHNETIUM TC 99M SESTAMIBI 1 DOSE: 1 INJECTION INTRAVENOUS at 12:25

## 2022-03-21 RX ADMIN — TECHNETIUM TC 99M SESTAMIBI 1 DOSE: 1 INJECTION INTRAVENOUS at 10:50

## 2022-03-23 ENCOUNTER — PRE-ADMISSION TESTING (OUTPATIENT)
Dept: PREADMISSION TESTING | Facility: HOSPITAL | Age: 72
End: 2022-03-23

## 2022-03-23 VITALS — WEIGHT: 130.29 LBS | BODY MASS INDEX: 26.27 KG/M2 | HEIGHT: 59 IN

## 2022-03-23 LAB
ALBUMIN SERPL-MCNC: 4.8 G/DL (ref 3.5–5.2)
ALBUMIN/GLOB SERPL: 2.2 G/DL
ALP SERPL-CCNC: 84 U/L (ref 39–117)
ALT SERPL W P-5'-P-CCNC: 14 U/L (ref 1–33)
ANION GAP SERPL CALCULATED.3IONS-SCNC: 9 MMOL/L (ref 5–15)
AST SERPL-CCNC: 16 U/L (ref 1–32)
BILIRUB SERPL-MCNC: 1 MG/DL (ref 0–1.2)
BUN SERPL-MCNC: 26 MG/DL (ref 8–23)
BUN/CREAT SERPL: 22.2 (ref 7–25)
CALCIUM SPEC-SCNC: 10 MG/DL (ref 8.6–10.5)
CHLORIDE SERPL-SCNC: 104 MMOL/L (ref 98–107)
CO2 SERPL-SCNC: 28 MMOL/L (ref 22–29)
CREAT SERPL-MCNC: 1.17 MG/DL (ref 0.57–1)
DEPRECATED RDW RBC AUTO: 44.4 FL (ref 37–54)
EGFRCR SERPLBLD CKD-EPI 2021: 49.4 ML/MIN/1.73
ERYTHROCYTE [DISTWIDTH] IN BLOOD BY AUTOMATED COUNT: 14.3 % (ref 12.3–15.4)
GLOBULIN UR ELPH-MCNC: 2.2 GM/DL
GLUCOSE SERPL-MCNC: 93 MG/DL (ref 65–99)
HCT VFR BLD AUTO: 44.4 % (ref 34–46.6)
HGB BLD-MCNC: 14.7 G/DL (ref 12–15.9)
MCH RBC QN AUTO: 28.2 PG (ref 26.6–33)
MCHC RBC AUTO-ENTMCNC: 33.1 G/DL (ref 31.5–35.7)
MCV RBC AUTO: 85.1 FL (ref 79–97)
PLATELET # BLD AUTO: 198 10*3/MM3 (ref 140–450)
PMV BLD AUTO: 10.5 FL (ref 6–12)
POTASSIUM SERPL-SCNC: 4.2 MMOL/L (ref 3.5–5.2)
PROT SERPL-MCNC: 7 G/DL (ref 6–8.5)
RBC # BLD AUTO: 5.22 10*6/MM3 (ref 3.77–5.28)
SODIUM SERPL-SCNC: 141 MMOL/L (ref 136–145)
WBC NRBC COR # BLD: 7 10*3/MM3 (ref 3.4–10.8)

## 2022-03-23 PROCEDURE — 80053 COMPREHEN METABOLIC PANEL: CPT

## 2022-03-23 PROCEDURE — 85027 COMPLETE CBC AUTOMATED: CPT

## 2022-03-23 PROCEDURE — 36415 COLL VENOUS BLD VENIPUNCTURE: CPT

## 2022-03-23 NOTE — PAT
Patient to apply Chlorhexadine wipes  to surgical area (as instructed) the night before procedure and the AM of procedure. Wipes provided.    Per Anesthesia Request, patient instructed not to take their ACE/ARB medications on the AM of surgery.    Patient viewed general PAT education video as instructed in their preoperative information received from their surgeon.  Patient stated the general PAT education video was viewed in its entirety and survey completed.  Copies of [facility] general education handouts (Incentive Spirometry, Meds to Beds Program, Patient Belongings, Pre-op skin preparation instructions, Blood Glucose testing, Visitor policy, Surgery FAQ, Code H) distributed to patient if not printed. Education related to the PAT pass and skin preparation for surgery (if applicable) completed in [facility] as a reinforcement to PAT education video. Patient instructed to return PAT pass provided today as well as completed skin preparation sheet (if applicable) on the day of procedure.     Additionally if patient had not viewed video yet but intended to view it at home or in our waiting area, then referred them to the handout with QR code/link provided during PAT visit.  Instructed patient to complete survey after viewing the video in its entirety.  Encouraged patient/family to read [facility] general education handouts thoroughly and notify PAT staff with any questions or concerns. Patient verbalized understanding of all information and priority content.    ekg in epic from 2-2022  Stress test from 3- in [facility], patient stated her PCP ordered. Left message with dr patterson's nurse for cardiac clearance and also notified maged in dr uribe's office of the info. Note left on chart to hold until clearance received

## 2022-03-24 ENCOUNTER — OFFICE VISIT (OUTPATIENT)
Dept: PULMONOLOGY | Facility: CLINIC | Age: 72
End: 2022-03-24

## 2022-03-24 ENCOUNTER — TELEPHONE (OUTPATIENT)
Dept: CARDIOLOGY | Facility: CLINIC | Age: 72
End: 2022-03-24

## 2022-03-24 VITALS
DIASTOLIC BLOOD PRESSURE: 70 MMHG | BODY MASS INDEX: 26.61 KG/M2 | HEIGHT: 59 IN | SYSTOLIC BLOOD PRESSURE: 122 MMHG | TEMPERATURE: 98 F | WEIGHT: 132 LBS | HEART RATE: 86 BPM | OXYGEN SATURATION: 98 %

## 2022-03-24 DIAGNOSIS — K21.9 CHRONIC GERD: ICD-10-CM

## 2022-03-24 DIAGNOSIS — Z87.09 HISTORY OF ASTHMA: ICD-10-CM

## 2022-03-24 DIAGNOSIS — M35.1 MIXED CONNECTIVE TISSUE DISEASE: ICD-10-CM

## 2022-03-24 DIAGNOSIS — M32.9 LUPUS: Primary | ICD-10-CM

## 2022-03-24 PROCEDURE — 99204 OFFICE O/P NEW MOD 45 MIN: CPT | Performed by: INTERNAL MEDICINE

## 2022-03-24 NOTE — TELEPHONE ENCOUNTER
03/24/22      Gallbladder cardiac clearance    Re:  Kavitha Vang Obey, 1950        Kavitha Severinoo, 1950 is LOW RISK  for scheduled procedure/surgery from a cardiac/EP standpoint.  Any questions please call [phone].    [x]  Continue Aspirin               Sincerely,    Naveen Sarmiento MD

## 2022-03-24 NOTE — PROGRESS NOTES
"  PULMONARY  NOTE    Chief Complaint     History of asthma, history of lupus/mixed connective tissue disease, reflux, hoarseness, cough, non-smoker    History of Present Illness     71-year-old female referred for history of lung disease    She is a non-smoker  She indicates that she was diagnosed with \"cough variant asthma\" many years ago  She has been on generic Advair with albuterol as needed  She uses albuterol a couple times a month  She has had no acute exacerbation of asthma, however  She has dyspnea \"occasionally\"    She has a cough that is aggravated by cold or eating  Some days worse than others  Typically no sputum production    She has had hoarseness in the past  She has been seen by ENT in the past and was felt to have reflux contribution to her hoarseness    She has a history of reflux  She has a head of her bed elevated but does not avoid eating or drinking before going to bed at night    She has previously been followed by pulmonary physicians at the [facility] and also in [place]  She is had surveillance PFTs for possible autoimmune related lung disease  Last pulmonary function tests were 6 or 7 years ago  She cannot recollect having had a CT scan of the chest in the past    She has a history of lupus/mixed connective tissue disease which has been longstanding for many years    Patient Active Problem List   Diagnosis   • Asthma   • Hypertension   • Peripheral vascular disease (HCC)   • Diverticulosis   • Colon polyps   • Constipation   • Hashimoto's thyroiditis   • Mixed connective tissue disease (HCC)   • Lupus (HCC)   • Uterine fibroid   • Palpitations   • Solitary thyroid nodule   • History of asthma   • GERD     Allergies   Allergen Reactions   • Tenormin [Atenolol] Cough and Angioedema   • Macrobid [Nitrofurantoin Monohyd Macro] Swelling and Other (See Comments)     Swelling around eyes, fatigue   • Plaquenil [Hydroxychloroquine Sulfate] Other (See Comments)     Vision changes over " time    • Ace Inhibitors Cough   • Sudafed [Pseudoephedrine Hcl] Other (See Comments)     INCREASED PRESSURE IN EYES        Current Outpatient Medications:   •  albuterol sulfate  (90 Base) MCG/ACT inhaler, Inhale 2 puffs Every 4 (Four) Hours As Needed for Wheezing., Disp: 1 g, Rfl: 5  •  aspirin 81 MG EC tablet, Take 81 mg by mouth 2 (Two) Times a Week., Disp: , Rfl:   •  celecoxib (CeleBREX) 100 MG capsule, TAKE ONE CAPSULE BY MOUTH TWICE DAILY AS NEEDED for mild pain (Patient taking differently: Take 100 mg by mouth 2 (Two) Times a Day As Needed for Moderate Pain .), Disp: 60 capsule, Rfl: 2  •  diclofenac (VOLTAREN) 1 % gel gel, Apply 4 g topically to the appropriate area as directed 4 (Four) Times a Day. (Patient taking differently: Apply 4 g topically to the appropriate area as directed 4 (Four) Times a Day As Needed (joint pain).), Disp: 100 g, Rfl: 3  •  estradiol (ESTRACE) 0.1 MG/GM vaginal cream, Insert 0.5 g into the vagina 2 (Two) Times a Week., Disp: , Rfl:   •  fexofenadine (ALLEGRA) 180 MG tablet, Take 180 mg by mouth Daily As Needed (allergies)., Disp: , Rfl:   •  fluticasone-salmeterol (ADVAIR) 100-50 MCG/DOSE DISKUS, inhale 1 puffs by mouth twice daily (Patient taking differently: Inhale 1 puff 2 (Two) Times a Day.), Disp: 180 each, Rfl: 2  •  losartan-hydrochlorothiazide (HYZAAR) 100-25 MG per tablet, TAKE ONE TABLET BY MOUTH ONE TIME DAILY (Patient taking differently: Take 1 tablet by mouth Daily.), Disp: 90 tablet, Rfl: 0  •  metoprolol succinate XL (TOPROL-XL) 200 MG 24 hr tablet, TAKE ONE TABLET BY MOUTH ONE TIME DAILY (Patient taking differently: Take 200 mg by mouth Daily.), Disp: 90 tablet, Rfl: 1  •  Synthroid 75 MCG tablet, Take 1 tablet by mouth Daily., Disp: 90 tablet, Rfl: 3  •  Triamcinolone Acetonide (NASACORT) 55 MCG/ACT nasal inhaler, 2 sprays into each nostril daily., Disp: , Rfl:   MEDICATION LIST AND ALLERGIES REVIEWED.    Family History   Problem Relation Age of Onset  "  • Lung cancer Mother    • Hyperlipidemia Mother    • Thyroid disease Mother    • Pancreatic cancer Mother    • Cancer Mother    • Diabetes Mother    • Hypertension Mother    • Heart disease Father    • Polycystic kidney disease Brother    • Hypertension Brother    • Hypertension Brother    • Cancer Brother    • Heart disease Brother    • Hyperlipidemia Brother    • Hypertension Brother    • Kidney disease Brother         Kidney Failure, Polycystic Kidney Disease   • Hyperlipidemia Brother    • Hypertension Brother    • Kidney disease Brother         Polycystic Kidney Disease   • Thyroid disease Son    • Breast cancer Neg Hx    • Ovarian cancer Neg Hx      Social History     Tobacco Use   • Smoking status: Never Smoker   • Smokeless tobacco: Never Used   Vaping Use   • Vaping Use: Never used   Substance Use Topics   • Alcohol use: Yes     Alcohol/week: 3.0 standard drinks     Types: 3 Glasses of wine per week   • Drug use: No     Social History     Social History Narrative    Non-smoker     FAMILY AND SOCIAL HISTORY REVIEWED.    Review of Systems  ALSO REFER TO SCANNED ROS SHEET FROM SAME DATE.    /70   Pulse 86   Temp 98 °F (36.7 °C)   Ht 149.9 cm (59\")   Wt 59.9 kg (132 lb)   LMP  (LMP Unknown)   SpO2 98% Comment: resting, room air  Breastfeeding No   BMI 26.66 kg/m²   Physical Exam  Vitals and nursing note reviewed.   Constitutional:       General: She is not in acute distress.     Appearance: She is well-developed. She is not diaphoretic.   HENT:      Head: Normocephalic and atraumatic.   Neck:      Thyroid: No thyromegaly.   Cardiovascular:      Rate and Rhythm: Normal rate and regular rhythm.      Heart sounds: Normal heart sounds. No murmur heard.  Pulmonary:      Effort: Pulmonary effort is normal.      Breath sounds: Normal breath sounds. No stridor.   Chest:   Breasts:      Right: No supraclavicular adenopathy.      Left: No supraclavicular adenopathy.       Lymphadenopathy:      Cervical: " No cervical adenopathy.      Upper Body:      Right upper body: No supraclavicular or epitrochlear adenopathy.      Left upper body: No supraclavicular or epitrochlear adenopathy.   Skin:     General: Skin is warm and dry.   Neurological:      Mental Status: She is alert and oriented to person, place, and time.   Psychiatric:         Behavior: Behavior normal.         Results     Chest x-ray from February 2022 reviewed on PACS  No suspicious intrathoracic findings  Cardiac silhouette is at the upper limits in size with CT ratio of 13 x 25    Immunization History   Administered Date(s) Administered   • COVID-19 (PFIZER) PURPLE CAP 01/27/2021, 02/17/2021, 09/18/2021   • FluMist 2-49yrs 10/01/2016   • Fluad Quad 65+ 10/01/2020, 10/09/2021   • Fluzone High Dose =>65 Years ([employer] ONLY) 01/01/2014, 10/10/2016, 10/20/2017, 11/06/2018, 10/31/2019, 10/11/2020, 11/22/2021   • Hepatitis A 11/06/2018, 09/03/2019   • Pneumococcal Conjugate 13-Valent (PCV13) 02/28/2018   • Pneumococcal Polysaccharide (PPSV23) 03/12/2019   • Tdap 02/19/2018     Problem List       ICD-10-CM ICD-9-CM   1. Lupus (HCC)  M32.9 710.0   2. Mixed connective tissue disease (HCC)  M35.1 710.8   3. History of asthma  Z87.09 V12.69   4. GERD  K21.9 530.81       Discussion     She is going to remain on generic Advair with albuterol as needed for her history of asthma  Her albuterol requirements are reasonable    We will get full pulmonary function tests and a high-resolution CT scan of the chest to evaluate for autoimmune related lung disease    She does not really have symptoms of cardiac disease but a transthoracic echocardiogram might be considered to evaluate underlying pulmonary hypertension    We discussed reflux precautions and I gave her a reflux information sheet    Moderate level of Medical Decision Making complexity based on 1 undiagnosed new problem, independent interpretation of tests, and/or prescription drug management     Hayden Crockett  MD [place]  Note electronically signed    CC: Farzad Redding MD

## 2022-03-25 DIAGNOSIS — R06.09 DYSPNEA ON EXERTION: ICD-10-CM

## 2022-03-25 DIAGNOSIS — M35.1 MIXED CONNECTIVE TISSUE DISEASE: Primary | ICD-10-CM

## 2022-03-27 ENCOUNTER — CLINICAL SUPPORT NO REQUIREMENTS (OUTPATIENT)
Dept: PREADMISSION TESTING | Facility: HOSPITAL | Age: 72
End: 2022-03-27

## 2022-03-27 LAB — SARS-COV-2 RNA PNL SPEC NAA+PROBE: NOT DETECTED

## 2022-03-27 PROCEDURE — U0004 COV-19 TEST NON-CDC HGH THRU: HCPCS

## 2022-03-27 PROCEDURE — C9803 HOPD COVID-19 SPEC COLLECT: HCPCS

## 2022-03-27 PROCEDURE — U0005 INFEC AGEN DETEC AMPLI PROBE: HCPCS

## 2022-03-28 ENCOUNTER — ANESTHESIA EVENT (OUTPATIENT)
Dept: PERIOP | Facility: HOSPITAL | Age: 72
End: 2022-03-28

## 2022-03-28 RX ORDER — FAMOTIDINE 10 MG/ML
20 INJECTION, SOLUTION INTRAVENOUS ONCE
Status: CANCELLED | OUTPATIENT
Start: 2022-03-28 | End: 2022-03-28

## 2022-03-28 RX ORDER — SODIUM CHLORIDE 0.9 % (FLUSH) 0.9 %
10 SYRINGE (ML) INJECTION EVERY 12 HOURS SCHEDULED
Status: CANCELLED | OUTPATIENT
Start: 2022-03-28

## 2022-03-28 RX ORDER — SODIUM CHLORIDE 0.9 % (FLUSH) 0.9 %
10 SYRINGE (ML) INJECTION AS NEEDED
Status: CANCELLED | OUTPATIENT
Start: 2022-03-28

## 2022-03-29 ENCOUNTER — ANESTHESIA (OUTPATIENT)
Dept: PERIOP | Facility: HOSPITAL | Age: 72
End: 2022-03-29

## 2022-03-29 ENCOUNTER — HOSPITAL ENCOUNTER (OUTPATIENT)
Facility: HOSPITAL | Age: 72
Discharge: HOME | End: 2022-03-30
Attending: SURGERY | Admitting: SURGERY

## 2022-03-29 DIAGNOSIS — K80.20 GALLBLADDER CALCULUS WITHOUT CHOLECYSTITIS AND NO OBSTRUCTION: Primary | ICD-10-CM

## 2022-03-29 PROCEDURE — 25010000002 FENTANYL CITRATE (PF) 50 MCG/ML SOLUTION: Performed by: NURSE ANESTHETIST, CERTIFIED REGISTERED

## 2022-03-29 PROCEDURE — A9270 NON-COVERED ITEM OR SERVICE: HCPCS | Performed by: SURGERY

## 2022-03-29 PROCEDURE — 25010000002 ONDANSETRON PER 1 MG: Performed by: NURSE ANESTHETIST, CERTIFIED REGISTERED

## 2022-03-29 PROCEDURE — 88304 TISSUE EXAM BY PATHOLOGIST: CPT | Performed by: SURGERY

## 2022-03-29 PROCEDURE — 25010000002 DEXAMETHASONE SODIUM PHOSPHATE 10 MG/ML SOLUTION: Performed by: NURSE ANESTHETIST, CERTIFIED REGISTERED

## 2022-03-29 PROCEDURE — 25010000002 HYDROMORPHONE PER 4 MG: Performed by: NURSE ANESTHETIST, CERTIFIED REGISTERED

## 2022-03-29 PROCEDURE — 25010000002 CEFAZOLIN IN DEXTROSE 2-4 GM/100ML-% SOLUTION: Performed by: SURGERY

## 2022-03-29 PROCEDURE — 63710000001 LEVOTHYROXINE 75 MCG TABLET: Performed by: SURGERY

## 2022-03-29 PROCEDURE — 25010000002 PROPOFOL 10 MG/ML EMULSION: Performed by: NURSE ANESTHETIST, CERTIFIED REGISTERED

## 2022-03-29 PROCEDURE — 63710000001 LOSARTAN 50 MG TABLET 1 EACH BLISTER: Performed by: SURGERY

## 2022-03-29 PROCEDURE — C1889 IMPLANT/INSERT DEVICE, NOC: HCPCS | Performed by: SURGERY

## 2022-03-29 PROCEDURE — 25010000002 EPINEPHRINE PER 0.1 MG: Performed by: SURGERY

## 2022-03-29 PROCEDURE — 63710000001 HYDROCHLOROTHIAZIDE 25 MG TABLET 1 EACH BLISTER: Performed by: SURGERY

## 2022-03-29 DEVICE — LIGACLIP 10-M/L, 10MM ENDOSCOPIC ROTATING MULTIPLE CLIP APPLIERS
Type: IMPLANTABLE DEVICE | Site: ABDOMEN | Status: FUNCTIONAL
Brand: LIGACLIP

## 2022-03-29 RX ORDER — SODIUM CHLORIDE, SODIUM LACTATE, POTASSIUM CHLORIDE, CALCIUM CHLORIDE 600; 310; 30; 20 MG/100ML; MG/100ML; MG/100ML; MG/100ML
9 INJECTION, SOLUTION INTRAVENOUS CONTINUOUS
Status: DISCONTINUED | OUTPATIENT
Start: 2022-03-29 | End: 2022-03-30 | Stop reason: HOSPADM

## 2022-03-29 RX ORDER — CEFAZOLIN SODIUM 2 G/100ML
2 INJECTION, SOLUTION INTRAVENOUS ONCE
Status: COMPLETED | OUTPATIENT
Start: 2022-03-29 | End: 2022-03-29

## 2022-03-29 RX ORDER — SODIUM CHLORIDE 9 MG/ML
INJECTION, SOLUTION INTRAVENOUS AS NEEDED
Status: DISCONTINUED | OUTPATIENT
Start: 2022-03-29 | End: 2022-03-29 | Stop reason: HOSPADM

## 2022-03-29 RX ORDER — FENTANYL CITRATE 50 UG/ML
50 INJECTION, SOLUTION INTRAMUSCULAR; INTRAVENOUS
Status: DISCONTINUED | OUTPATIENT
Start: 2022-03-29 | End: 2022-03-29 | Stop reason: HOSPADM

## 2022-03-29 RX ORDER — LEVOTHYROXINE SODIUM 0.07 MG/1
75 TABLET ORAL DAILY
Status: DISCONTINUED | OUTPATIENT
Start: 2022-03-29 | End: 2022-03-30 | Stop reason: HOSPADM

## 2022-03-29 RX ORDER — METOPROLOL SUCCINATE 100 MG/1
200 TABLET, EXTENDED RELEASE ORAL DAILY
Status: DISCONTINUED | OUTPATIENT
Start: 2022-03-30 | End: 2022-03-30 | Stop reason: HOSPADM

## 2022-03-29 RX ORDER — ENALAPRILAT 2.5 MG/2ML
1.25 INJECTION INTRAVENOUS EVERY 6 HOURS PRN
Status: DISCONTINUED | OUTPATIENT
Start: 2022-03-29 | End: 2022-03-30 | Stop reason: HOSPADM

## 2022-03-29 RX ORDER — HYDROMORPHONE HYDROCHLORIDE 1 MG/ML
0.5 INJECTION, SOLUTION INTRAMUSCULAR; INTRAVENOUS; SUBCUTANEOUS
Status: DISCONTINUED | OUTPATIENT
Start: 2022-03-29 | End: 2022-03-30 | Stop reason: HOSPADM

## 2022-03-29 RX ORDER — PROPOFOL 10 MG/ML
VIAL (ML) INTRAVENOUS AS NEEDED
Status: DISCONTINUED | OUTPATIENT
Start: 2022-03-29 | End: 2022-03-29 | Stop reason: SURG

## 2022-03-29 RX ORDER — BUPIVACAINE HCL/0.9 % NACL/PF 0.125 %
PLASTIC BAG, INJECTION (ML) EPIDURAL AS NEEDED
Status: DISCONTINUED | OUTPATIENT
Start: 2022-03-29 | End: 2022-03-29 | Stop reason: SURG

## 2022-03-29 RX ORDER — HYDROCODONE BITARTRATE AND ACETAMINOPHEN 5; 325 MG/1; MG/1
1 TABLET ORAL EVERY 4 HOURS PRN
Status: DISCONTINUED | OUTPATIENT
Start: 2022-03-29 | End: 2022-03-30 | Stop reason: HOSPADM

## 2022-03-29 RX ORDER — MIDAZOLAM HYDROCHLORIDE 1 MG/ML
0.5 INJECTION INTRAMUSCULAR; INTRAVENOUS
Status: DISCONTINUED | OUTPATIENT
Start: 2022-03-29 | End: 2022-03-29 | Stop reason: HOSPADM

## 2022-03-29 RX ORDER — ACETAMINOPHEN 325 MG/1
650 TABLET ORAL EVERY 4 HOURS PRN
Status: DISCONTINUED | OUTPATIENT
Start: 2022-03-29 | End: 2022-03-30 | Stop reason: HOSPADM

## 2022-03-29 RX ORDER — FENTANYL CITRATE 50 UG/ML
INJECTION, SOLUTION INTRAMUSCULAR; INTRAVENOUS AS NEEDED
Status: DISCONTINUED | OUTPATIENT
Start: 2022-03-29 | End: 2022-03-29 | Stop reason: SURG

## 2022-03-29 RX ORDER — HYDROCODONE BITARTRATE AND ACETAMINOPHEN 5; 325 MG/1; MG/1
1 TABLET ORAL EVERY 8 HOURS PRN
Qty: 6 TABLET | Refills: 0 | Status: SHIPPED | OUTPATIENT
Start: 2022-03-29 | End: 2022-04-01

## 2022-03-29 RX ORDER — ONDANSETRON 2 MG/ML
4 INJECTION INTRAMUSCULAR; INTRAVENOUS EVERY 6 HOURS PRN
Status: DISCONTINUED | OUTPATIENT
Start: 2022-03-29 | End: 2022-03-30 | Stop reason: HOSPADM

## 2022-03-29 RX ORDER — DEXAMETHASONE SODIUM PHOSPHATE 10 MG/ML
INJECTION, SOLUTION INTRAMUSCULAR; INTRAVENOUS AS NEEDED
Status: DISCONTINUED | OUTPATIENT
Start: 2022-03-29 | End: 2022-03-29 | Stop reason: SURG

## 2022-03-29 RX ORDER — ONDANSETRON 2 MG/ML
4 INJECTION INTRAMUSCULAR; INTRAVENOUS ONCE AS NEEDED
Status: COMPLETED | OUTPATIENT
Start: 2022-03-29 | End: 2022-03-29

## 2022-03-29 RX ORDER — ROCURONIUM BROMIDE 10 MG/ML
INJECTION, SOLUTION INTRAVENOUS AS NEEDED
Status: DISCONTINUED | OUTPATIENT
Start: 2022-03-29 | End: 2022-03-29 | Stop reason: SURG

## 2022-03-29 RX ORDER — LIDOCAINE HYDROCHLORIDE 10 MG/ML
INJECTION, SOLUTION EPIDURAL; INFILTRATION; INTRACAUDAL; PERINEURAL AS NEEDED
Status: DISCONTINUED | OUTPATIENT
Start: 2022-03-29 | End: 2022-03-29 | Stop reason: SURG

## 2022-03-29 RX ORDER — ONDANSETRON 2 MG/ML
INJECTION INTRAMUSCULAR; INTRAVENOUS
Status: DISPENSED
Start: 2022-03-29 | End: 2022-03-30

## 2022-03-29 RX ORDER — NALOXONE HCL 0.4 MG/ML
0.1 VIAL (ML) INJECTION
Status: DISCONTINUED | OUTPATIENT
Start: 2022-03-29 | End: 2022-03-30 | Stop reason: HOSPADM

## 2022-03-29 RX ORDER — SODIUM CHLORIDE 9 MG/ML
75 INJECTION, SOLUTION INTRAVENOUS CONTINUOUS
Status: DISCONTINUED | OUTPATIENT
Start: 2022-03-29 | End: 2022-03-30

## 2022-03-29 RX ORDER — LIDOCAINE HYDROCHLORIDE 10 MG/ML
0.5 INJECTION, SOLUTION EPIDURAL; INFILTRATION; INTRACAUDAL; PERINEURAL ONCE AS NEEDED
Status: COMPLETED | OUTPATIENT
Start: 2022-03-29 | End: 2022-03-29

## 2022-03-29 RX ORDER — ONDANSETRON 2 MG/ML
INJECTION INTRAMUSCULAR; INTRAVENOUS AS NEEDED
Status: DISCONTINUED | OUTPATIENT
Start: 2022-03-29 | End: 2022-03-29 | Stop reason: SURG

## 2022-03-29 RX ORDER — ONDANSETRON 4 MG/1
4 TABLET, FILM COATED ORAL EVERY 6 HOURS PRN
Status: DISCONTINUED | OUTPATIENT
Start: 2022-03-29 | End: 2022-03-30 | Stop reason: HOSPADM

## 2022-03-29 RX ORDER — IBUPROFEN 400 MG/1
400 TABLET ORAL
Status: DISCONTINUED | OUTPATIENT
Start: 2022-03-29 | End: 2022-03-30 | Stop reason: HOSPADM

## 2022-03-29 RX ORDER — ACETAMINOPHEN 650 MG/1
650 SUPPOSITORY RECTAL EVERY 4 HOURS PRN
Status: DISCONTINUED | OUTPATIENT
Start: 2022-03-29 | End: 2022-03-30 | Stop reason: HOSPADM

## 2022-03-29 RX ORDER — ALBUTEROL SULFATE 2.5 MG/3ML
2.5 SOLUTION RESPIRATORY (INHALATION) EVERY 6 HOURS PRN
Refills: 5 | Status: DISCONTINUED | OUTPATIENT
Start: 2022-03-29 | End: 2022-03-30 | Stop reason: HOSPADM

## 2022-03-29 RX ORDER — HYDROMORPHONE HYDROCHLORIDE 1 MG/ML
0.5 INJECTION, SOLUTION INTRAMUSCULAR; INTRAVENOUS; SUBCUTANEOUS
Status: DISCONTINUED | OUTPATIENT
Start: 2022-03-29 | End: 2022-03-29 | Stop reason: HOSPADM

## 2022-03-29 RX ORDER — FAMOTIDINE 20 MG/1
20 TABLET, FILM COATED ORAL ONCE
Status: COMPLETED | OUTPATIENT
Start: 2022-03-29 | End: 2022-03-29

## 2022-03-29 RX ADMIN — SODIUM CHLORIDE 75 ML/HR: 9 INJECTION, SOLUTION INTRAVENOUS at 18:19

## 2022-03-29 RX ADMIN — ONDANSETRON 4 MG: 2 INJECTION INTRAMUSCULAR; INTRAVENOUS at 12:01

## 2022-03-29 RX ADMIN — SUGAMMADEX 200 MG: 100 INJECTION, SOLUTION INTRAVENOUS at 12:49

## 2022-03-29 RX ADMIN — LIDOCAINE HYDROCHLORIDE 0.2 ML: 10 INJECTION, SOLUTION EPIDURAL; INFILTRATION; INTRACAUDAL; PERINEURAL at 10:30

## 2022-03-29 RX ADMIN — ONDANSETRON 4 MG: 2 INJECTION INTRAMUSCULAR; INTRAVENOUS at 13:31

## 2022-03-29 RX ADMIN — FENTANYL CITRATE 100 MCG: 50 INJECTION, SOLUTION INTRAMUSCULAR; INTRAVENOUS at 12:01

## 2022-03-29 RX ADMIN — HYDROMORPHONE HYDROCHLORIDE 0.5 MG: 1 INJECTION, SOLUTION INTRAMUSCULAR; INTRAVENOUS; SUBCUTANEOUS at 13:40

## 2022-03-29 RX ADMIN — LIDOCAINE HYDROCHLORIDE 50 MG: 10 INJECTION, SOLUTION EPIDURAL; INFILTRATION; INTRACAUDAL; PERINEURAL at 12:01

## 2022-03-29 RX ADMIN — PROPOFOL 20 MG: 10 INJECTION, EMULSION INTRAVENOUS at 12:52

## 2022-03-29 RX ADMIN — HYDROMORPHONE HYDROCHLORIDE 0.5 MG: 1 INJECTION, SOLUTION INTRAMUSCULAR; INTRAVENOUS; SUBCUTANEOUS at 13:52

## 2022-03-29 RX ADMIN — CEFAZOLIN SODIUM 2 G: 2 INJECTION, SOLUTION INTRAVENOUS at 12:01

## 2022-03-29 RX ADMIN — Medication 100 MCG: at 12:23

## 2022-03-29 RX ADMIN — ROCURONIUM BROMIDE 50 MG: 10 INJECTION, SOLUTION INTRAVENOUS at 12:01

## 2022-03-29 RX ADMIN — FAMOTIDINE 20 MG: 20 TABLET ORAL at 10:50

## 2022-03-29 RX ADMIN — Medication 100 MCG: at 12:14

## 2022-03-29 RX ADMIN — PROPOFOL 150 MG: 10 INJECTION, EMULSION INTRAVENOUS at 12:01

## 2022-03-29 RX ADMIN — Medication 100 MCG: at 12:06

## 2022-03-29 RX ADMIN — LOSARTAN POTASSIUM: 50 TABLET, FILM COATED ORAL at 18:18

## 2022-03-29 RX ADMIN — DEXAMETHASONE SODIUM PHOSPHATE 8 MG: 10 INJECTION, SOLUTION INTRAMUSCULAR; INTRAVENOUS at 12:01

## 2022-03-29 RX ADMIN — SODIUM CHLORIDE, POTASSIUM CHLORIDE, SODIUM LACTATE AND CALCIUM CHLORIDE 9 ML/HR: 600; 310; 30; 20 INJECTION, SOLUTION INTRAVENOUS at 10:30

## 2022-03-29 RX ADMIN — LEVOTHYROXINE SODIUM 75 MCG: 0.07 TABLET ORAL at 18:18

## 2022-03-29 NOTE — ANESTHESIA POSTPROCEDURE EVALUATION
Patient: Kavitha Barnhart    Procedure Summary     Date: 03/29/22 Room / Location:  [facility] /  [facility]    Anesthesia Start: 1159 Anesthesia Stop: 1325    Procedure: CHOLECYSTECTOMY LAPAROSCOPIC POSSIBLE OPEN (N/A Abdomen) Diagnosis: Cholelithiasis    Surgeons: Angie López MD Provider: Mick Gutierrez Jr., MD    Anesthesia Type: general ASA Status: 3          Anesthesia Type: general    Vitals  Vitals Value Taken Time   BP     Temp     Pulse     Resp     SpO2 95 % 03/29/22 1324           Post Anesthesia Care and Evaluation    Patient location during evaluation: PACU  Patient participation: complete - patient participated  Level of consciousness: awake and alert  Pain management: adequate  Airway patency: patent  Anesthetic complications: No anesthetic complications  PONV Status: none  Cardiovascular status: hemodynamically stable and acceptable  Respiratory status: nonlabored ventilation, acceptable and nasal cannula  Hydration status: acceptable

## 2022-03-29 NOTE — ANESTHESIA PROCEDURE NOTES
Airway  Urgency: elective    Date/Time: 3/29/2022 12:03 PM  Airway not difficult    General Information and Staff    Patient location during procedure: OR  CRNA: Jerardo Smith CRNA    Indications and Patient Condition  Indications for airway management: airway protection    Preoxygenated: yes  MILS not maintained throughout  Mask difficulty assessment: 1 - vent by mask    Final Airway Details  Final airway type: endotracheal airway      Successful airway: ETT  Cuffed: yes   Successful intubation technique: direct laryngoscopy  Endotracheal tube insertion site: oral  Blade: Lolita  Blade size: 3  ETT size (mm): 7.0  Cormack-Lehane Classification: grade I - full view of glottis  Placement verified by: chest auscultation and capnometry   Measured from: lips  ETT/EBT  to lips (cm): 20  Number of attempts at approach: 1  Assessment: lips, teeth, and gum same as pre-op and atraumatic intubation    Additional Comments  Negative epigastric sounds, Breath sound equal bilaterally with symmetric chest rise and fall

## 2022-03-29 NOTE — ANESTHESIA PREPROCEDURE EVALUATION
Anesthesia Evaluation     Patient summary reviewed and Nursing notes reviewed   history of anesthetic complications: PONV  NPO Solid Status: > 8 hours  NPO Liquid Status: > 2 hours           Airway   Mallampati: II  TM distance: >3 FB  Neck ROM: full  Dental - normal exam     Comment: implant without cap, upper back left    Pulmonary - normal exam   (+) asthma,  (-) not a smoker  Cardiovascular - normal exam    ECG reviewed    (+) hypertension, PVD,   (-) dysrhythmias, CHF    ROS comment: Recent stress test:  Interpretation Summary    · Average exercise capacity.  · Expected exercise duration 5:30. Actual exercise duratioin 4:02. ISAIAH (+20). Test stopped due to fatigue.  · Resting hypertension with appropriate hemodynamic response to exercise.  · Negative treadmill stress test for anginal chest pain. Stress ECG is nondiagnostic due to baseline abnormalities.  · Myocardial perfusion imaging indicates a normal myocardial perfusion study with no evidence of ischemia.  · Left ventricular ejection fraction is hyperdynamic (Calculated EF > 70%). .  · Impressions are consistent with a low risk study.        Neuro/Psych  (-) seizures, CVA  GI/Hepatic/Renal/Endo    (+)  GERD,  renal disease stones, thyroid problem   (-) liver disease    Musculoskeletal     Abdominal    Substance History      OB/GYN          Other   arthritis,                    Anesthesia Plan    ASA 3     general     intravenous induction     Anesthetic plan, all risks, benefits, and alternatives have been provided, discussed and informed consent has been obtained with: patient.    Plan discussed with CRNA.        CODE STATUS:

## 2022-03-30 VITALS
RESPIRATION RATE: 16 BRPM | TEMPERATURE: 98.1 F | BODY MASS INDEX: 26.61 KG/M2 | OXYGEN SATURATION: 93 % | HEART RATE: 80 BPM | DIASTOLIC BLOOD PRESSURE: 67 MMHG | WEIGHT: 132 LBS | SYSTOLIC BLOOD PRESSURE: 113 MMHG | HEIGHT: 59 IN

## 2022-03-30 LAB
ALBUMIN SERPL-MCNC: 3.9 G/DL (ref 3.5–5.2)
ALBUMIN/GLOB SERPL: 1.7 G/DL
ALP SERPL-CCNC: 68 U/L (ref 39–117)
ALT SERPL W P-5'-P-CCNC: 30 U/L (ref 1–33)
ANION GAP SERPL CALCULATED.3IONS-SCNC: 10 MMOL/L (ref 5–15)
AST SERPL-CCNC: 34 U/L (ref 1–32)
BASOPHILS # BLD AUTO: 0.03 10*3/MM3 (ref 0–0.2)
BASOPHILS NFR BLD AUTO: 0.2 % (ref 0–1.5)
BILIRUB SERPL-MCNC: 0.7 MG/DL (ref 0–1.2)
BUN SERPL-MCNC: 27 MG/DL (ref 8–23)
BUN/CREAT SERPL: 23.1 (ref 7–25)
CALCIUM SPEC-SCNC: 9.1 MG/DL (ref 8.6–10.5)
CHLORIDE SERPL-SCNC: 105 MMOL/L (ref 98–107)
CO2 SERPL-SCNC: 24 MMOL/L (ref 22–29)
CREAT SERPL-MCNC: 1.17 MG/DL (ref 0.57–1)
CYTO UR: NORMAL
DEPRECATED RDW RBC AUTO: 45.2 FL (ref 37–54)
EGFRCR SERPLBLD CKD-EPI 2021: 49.4 ML/MIN/1.73
EOSINOPHIL # BLD AUTO: 0.01 10*3/MM3 (ref 0–0.4)
EOSINOPHIL NFR BLD AUTO: 0.1 % (ref 0.3–6.2)
ERYTHROCYTE [DISTWIDTH] IN BLOOD BY AUTOMATED COUNT: 14.1 % (ref 12.3–15.4)
GLOBULIN UR ELPH-MCNC: 2.3 GM/DL
GLUCOSE SERPL-MCNC: 118 MG/DL (ref 65–99)
HCT VFR BLD AUTO: 41.3 % (ref 34–46.6)
HGB BLD-MCNC: 13.4 G/DL (ref 12–15.9)
IMM GRANULOCYTES # BLD AUTO: 0.06 10*3/MM3 (ref 0–0.05)
IMM GRANULOCYTES NFR BLD AUTO: 0.5 % (ref 0–0.5)
LAB AP CASE REPORT: NORMAL
LAB AP CLINICAL INFORMATION: NORMAL
LYMPHOCYTES # BLD AUTO: 1.72 10*3/MM3 (ref 0.7–3.1)
LYMPHOCYTES NFR BLD AUTO: 13.6 % (ref 19.6–45.3)
MCH RBC QN AUTO: 28.6 PG (ref 26.6–33)
MCHC RBC AUTO-ENTMCNC: 32.4 G/DL (ref 31.5–35.7)
MCV RBC AUTO: 88.1 FL (ref 79–97)
MONOCYTES # BLD AUTO: 0.96 10*3/MM3 (ref 0.1–0.9)
MONOCYTES NFR BLD AUTO: 7.6 % (ref 5–12)
NEUTROPHILS NFR BLD AUTO: 78 % (ref 42.7–76)
NEUTROPHILS NFR BLD AUTO: 9.87 10*3/MM3 (ref 1.7–7)
NRBC BLD AUTO-RTO: 0 /100 WBC (ref 0–0.2)
PATH REPORT.FINAL DX SPEC: NORMAL
PATH REPORT.GROSS SPEC: NORMAL
PLATELET # BLD AUTO: 164 10*3/MM3 (ref 140–450)
PMV BLD AUTO: 10.8 FL (ref 6–12)
POTASSIUM SERPL-SCNC: 3.5 MMOL/L (ref 3.5–5.2)
PROT SERPL-MCNC: 6.2 G/DL (ref 6–8.5)
RBC # BLD AUTO: 4.69 10*6/MM3 (ref 3.77–5.28)
SODIUM SERPL-SCNC: 139 MMOL/L (ref 136–145)
WBC NRBC COR # BLD: 12.65 10*3/MM3 (ref 3.4–10.8)

## 2022-03-30 PROCEDURE — 80053 COMPREHEN METABOLIC PANEL: CPT | Performed by: SURGERY

## 2022-03-30 PROCEDURE — A9270 NON-COVERED ITEM OR SERVICE: HCPCS | Performed by: SURGERY

## 2022-03-30 PROCEDURE — 63710000001 ACETAMINOPHEN 325 MG TABLET: Performed by: SURGERY

## 2022-03-30 PROCEDURE — 63710000001 HYDROCHLOROTHIAZIDE 25 MG TABLET 1 EACH BLISTER: Performed by: SURGERY

## 2022-03-30 PROCEDURE — 63710000001 IBUPROFEN 400 MG TABLET: Performed by: SURGERY

## 2022-03-30 PROCEDURE — 85025 COMPLETE CBC W/AUTO DIFF WBC: CPT | Performed by: SURGERY

## 2022-03-30 PROCEDURE — 63710000001 LOSARTAN 50 MG TABLET 1 EACH BLISTER: Performed by: SURGERY

## 2022-03-30 PROCEDURE — 63710000001 LEVOTHYROXINE 75 MCG TABLET: Performed by: SURGERY

## 2022-03-30 PROCEDURE — 63710000001 METOPROLOL SUCCINATE XL 100 MG TABLET SUSTAINED-RELEASE 24 HOUR: Performed by: SURGERY

## 2022-03-30 RX ADMIN — LOSARTAN POTASSIUM: 50 TABLET, FILM COATED ORAL at 09:30

## 2022-03-30 RX ADMIN — LEVOTHYROXINE SODIUM 75 MCG: 0.07 TABLET ORAL at 09:30

## 2022-03-30 RX ADMIN — SODIUM CHLORIDE 75 ML/HR: 9 INJECTION, SOLUTION INTRAVENOUS at 06:38

## 2022-03-30 RX ADMIN — ACETAMINOPHEN 650 MG: 325 TABLET ORAL at 06:36

## 2022-03-30 RX ADMIN — METOPROLOL SUCCINATE 200 MG: 100 TABLET, EXTENDED RELEASE ORAL at 09:30

## 2022-03-30 RX ADMIN — ACETAMINOPHEN 650 MG: 325 TABLET ORAL at 00:03

## 2022-03-30 RX ADMIN — IBUPROFEN 400 MG: 400 TABLET, FILM COATED ORAL at 12:57

## 2022-03-30 RX ADMIN — IBUPROFEN 400 MG: 400 TABLET, FILM COATED ORAL at 09:30

## 2022-04-09 ENCOUNTER — APPOINTMENT (OUTPATIENT)
Dept: CT IMAGING | Facility: HOSPITAL | Age: 72
End: 2022-04-09

## 2022-04-09 ENCOUNTER — HOSPITAL ENCOUNTER (EMERGENCY)
Facility: HOSPITAL | Age: 72
Discharge: HOME | End: 2022-04-09
Attending: EMERGENCY MEDICINE | Admitting: EMERGENCY MEDICINE

## 2022-04-09 ENCOUNTER — APPOINTMENT (OUTPATIENT)
Dept: GENERAL RADIOLOGY | Facility: HOSPITAL | Age: 72
End: 2022-04-09

## 2022-04-09 VITALS
RESPIRATION RATE: 18 BRPM | HEART RATE: 60 BPM | HEIGHT: 59 IN | DIASTOLIC BLOOD PRESSURE: 85 MMHG | BODY MASS INDEX: 24.8 KG/M2 | TEMPERATURE: 97.9 F | SYSTOLIC BLOOD PRESSURE: 138 MMHG | OXYGEN SATURATION: 96 % | WEIGHT: 123 LBS

## 2022-04-09 DIAGNOSIS — R10.10 UPPER ABDOMINAL PAIN: Primary | ICD-10-CM

## 2022-04-09 LAB
ALBUMIN SERPL-MCNC: 4.3 G/DL (ref 3.5–5.2)
ALBUMIN/GLOB SERPL: 1.4 G/DL
ALP SERPL-CCNC: 82 U/L (ref 39–117)
ALT SERPL W P-5'-P-CCNC: 31 U/L (ref 1–33)
ANION GAP SERPL CALCULATED.3IONS-SCNC: 13 MMOL/L (ref 5–15)
AST SERPL-CCNC: 57 U/L (ref 1–32)
BASOPHILS # BLD AUTO: 0.07 10*3/MM3 (ref 0–0.2)
BASOPHILS NFR BLD AUTO: 0.7 % (ref 0–1.5)
BILIRUB SERPL-MCNC: 1.1 MG/DL (ref 0–1.2)
BUN SERPL-MCNC: 36 MG/DL (ref 8–23)
BUN/CREAT SERPL: 27.1 (ref 7–25)
CALCIUM SPEC-SCNC: 9.9 MG/DL (ref 8.6–10.5)
CHLORIDE SERPL-SCNC: 99 MMOL/L (ref 98–107)
CO2 SERPL-SCNC: 24 MMOL/L (ref 22–29)
CREAT SERPL-MCNC: 1.33 MG/DL (ref 0.57–1)
D-LACTATE SERPL-SCNC: 1.8 MMOL/L (ref 0.5–2)
DEPRECATED RDW RBC AUTO: 42.6 FL (ref 37–54)
EGFRCR SERPLBLD CKD-EPI 2021: 42.3 ML/MIN/1.73
EOSINOPHIL # BLD AUTO: 0.37 10*3/MM3 (ref 0–0.4)
EOSINOPHIL NFR BLD AUTO: 3.5 % (ref 0.3–6.2)
ERYTHROCYTE [DISTWIDTH] IN BLOOD BY AUTOMATED COUNT: 14 % (ref 12.3–15.4)
GLOBULIN UR ELPH-MCNC: 3 GM/DL
GLUCOSE SERPL-MCNC: 122 MG/DL (ref 65–99)
HCT VFR BLD AUTO: 45.1 % (ref 34–46.6)
HGB BLD-MCNC: 15.1 G/DL (ref 12–15.9)
HOLD SPECIMEN: NORMAL
HOLD SPECIMEN: NORMAL
IMM GRANULOCYTES # BLD AUTO: 0.03 10*3/MM3 (ref 0–0.05)
IMM GRANULOCYTES NFR BLD AUTO: 0.3 % (ref 0–0.5)
LIPASE SERPL-CCNC: 95 U/L (ref 13–60)
LYMPHOCYTES # BLD AUTO: 3.06 10*3/MM3 (ref 0.7–3.1)
LYMPHOCYTES NFR BLD AUTO: 29.3 % (ref 19.6–45.3)
MCH RBC QN AUTO: 28.1 PG (ref 26.6–33)
MCHC RBC AUTO-ENTMCNC: 33.5 G/DL (ref 31.5–35.7)
MCV RBC AUTO: 84 FL (ref 79–97)
MONOCYTES # BLD AUTO: 0.95 10*3/MM3 (ref 0.1–0.9)
MONOCYTES NFR BLD AUTO: 9.1 % (ref 5–12)
NEUTROPHILS NFR BLD AUTO: 5.95 10*3/MM3 (ref 1.7–7)
NEUTROPHILS NFR BLD AUTO: 57.1 % (ref 42.7–76)
NRBC BLD AUTO-RTO: 0 /100 WBC (ref 0–0.2)
NT-PROBNP SERPL-MCNC: 203.7 PG/ML (ref 0–900)
PLATELET # BLD AUTO: 216 10*3/MM3 (ref 140–450)
PMV BLD AUTO: 10.9 FL (ref 6–12)
POTASSIUM SERPL-SCNC: 3.9 MMOL/L (ref 3.5–5.2)
PROT SERPL-MCNC: 7.3 G/DL (ref 6–8.5)
QT INTERVAL: 418 MS
QT INTERVAL: 450 MS
QTC INTERVAL: 434 MS
QTC INTERVAL: 445 MS
RBC # BLD AUTO: 5.37 10*6/MM3 (ref 3.77–5.28)
SODIUM SERPL-SCNC: 136 MMOL/L (ref 136–145)
TROPONIN T SERPL-MCNC: <0.01 NG/ML (ref 0–0.03)
TROPONIN T SERPL-MCNC: <0.01 NG/ML (ref 0–0.03)
WBC NRBC COR # BLD: 10.43 10*3/MM3 (ref 3.4–10.8)
WHOLE BLOOD HOLD SPECIMEN: NORMAL
WHOLE BLOOD HOLD SPECIMEN: NORMAL

## 2022-04-09 PROCEDURE — 71275 CT ANGIOGRAPHY CHEST: CPT

## 2022-04-09 PROCEDURE — 74176 CT ABD & PELVIS W/O CONTRAST: CPT

## 2022-04-09 PROCEDURE — 25010000002 ONDANSETRON PER 1 MG: Performed by: EMERGENCY MEDICINE

## 2022-04-09 PROCEDURE — 36415 COLL VENOUS BLD VENIPUNCTURE: CPT

## 2022-04-09 PROCEDURE — 96375 TX/PRO/DX INJ NEW DRUG ADDON: CPT

## 2022-04-09 PROCEDURE — 0 IOPAMIDOL PER 1 ML: Performed by: EMERGENCY MEDICINE

## 2022-04-09 PROCEDURE — 83690 ASSAY OF LIPASE: CPT

## 2022-04-09 PROCEDURE — 93005 ELECTROCARDIOGRAM TRACING: CPT | Performed by: EMERGENCY MEDICINE

## 2022-04-09 PROCEDURE — 83605 ASSAY OF LACTIC ACID: CPT | Performed by: EMERGENCY MEDICINE

## 2022-04-09 PROCEDURE — 80053 COMPREHEN METABOLIC PANEL: CPT

## 2022-04-09 PROCEDURE — 71045 X-RAY EXAM CHEST 1 VIEW: CPT

## 2022-04-09 PROCEDURE — 84484 ASSAY OF TROPONIN QUANT: CPT

## 2022-04-09 PROCEDURE — 83880 ASSAY OF NATRIURETIC PEPTIDE: CPT

## 2022-04-09 PROCEDURE — 25010000002 HYDROMORPHONE PER 4 MG: Performed by: EMERGENCY MEDICINE

## 2022-04-09 PROCEDURE — 99284 EMERGENCY DEPT VISIT MOD MDM: CPT

## 2022-04-09 PROCEDURE — 93005 ELECTROCARDIOGRAM TRACING: CPT

## 2022-04-09 PROCEDURE — 85025 COMPLETE CBC W/AUTO DIFF WBC: CPT

## 2022-04-09 PROCEDURE — 96374 THER/PROPH/DIAG INJ IV PUSH: CPT

## 2022-04-09 PROCEDURE — 84484 ASSAY OF TROPONIN QUANT: CPT | Performed by: EMERGENCY MEDICINE

## 2022-04-09 RX ORDER — ONDANSETRON 2 MG/ML
4 INJECTION INTRAMUSCULAR; INTRAVENOUS ONCE
Status: COMPLETED | OUTPATIENT
Start: 2022-04-09 | End: 2022-04-09

## 2022-04-09 RX ORDER — ASPIRIN 81 MG/1
324 TABLET, CHEWABLE ORAL ONCE
Status: DISCONTINUED | OUTPATIENT
Start: 2022-04-09 | End: 2022-04-10 | Stop reason: HOSPADM

## 2022-04-09 RX ORDER — HYDROMORPHONE HYDROCHLORIDE 1 MG/ML
0.25 INJECTION, SOLUTION INTRAMUSCULAR; INTRAVENOUS; SUBCUTANEOUS ONCE
Status: COMPLETED | OUTPATIENT
Start: 2022-04-09 | End: 2022-04-09

## 2022-04-09 RX ORDER — SODIUM CHLORIDE 0.9 % (FLUSH) 0.9 %
10 SYRINGE (ML) INJECTION AS NEEDED
Status: DISCONTINUED | OUTPATIENT
Start: 2022-04-09 | End: 2022-04-10 | Stop reason: HOSPADM

## 2022-04-09 RX ADMIN — ONDANSETRON 4 MG: 2 INJECTION INTRAMUSCULAR; INTRAVENOUS at 18:16

## 2022-04-09 RX ADMIN — IOPAMIDOL 56 ML: 755 INJECTION, SOLUTION INTRAVENOUS at 19:08

## 2022-04-09 RX ADMIN — HYDROMORPHONE HYDROCHLORIDE 0.25 MG: 1 INJECTION, SOLUTION INTRAMUSCULAR; INTRAVENOUS; SUBCUTANEOUS at 18:16

## 2022-04-09 RX ADMIN — SODIUM CHLORIDE 500 ML: 9 INJECTION, SOLUTION INTRAVENOUS at 18:26

## 2022-04-09 NOTE — ED PROVIDER NOTES
EMERGENCY DEPARTMENT ENCOUNTER    Pt Name: Kavitha Barnhart  MRN: [id number]  Pt :   1950  Room Number:    Date of encounter:  2022  PCP: Farzad Redding MD  ED Provider: Aly Stanford MD    Historian: Patient      HPI:  Chief Complaint: Abdominal pain        Context: Kavitha Barnhart is a 71 y.o. female who presents to the ED c/o epigastric and right upper quadrant abdominal pain.  Yesterday evening the patient reports onset of right flank pain.  It seemed to wax and wane but then went away.  Today she had not eaten breakfast and had only consumed 2 cups of coffee when she began having pain in her right upper quadrant and epigastric region of her abdomen.  The pain waxes and wanes in severity but at times becomes severe.  Her last bowel movement was 2 days ago.  She has had no fevers, chills, dysuria, hematuria.  Her discomfort feels somewhat like her GERD pain but notes that this is much worse and that her GERD pain usually responds to drinking milk and this did not.  The patient underwent cholecystectomy with Dr. Ohara on 3/29/2022.  She feels she has been healing well since then.  She reports some intermittent constipation and has not had a bowel movement in the last 48 hours.  Patient reports a remote history of colon perforation which was attributed to diverticulitis.  The perforation occurred in 2015.  She notices a lessening of her discomfort when she burps.      PAST MEDICAL HISTORY  Past Medical History:   Diagnosis Date   • Arthritis    • Asthma    • Cervical disc disease    • Disease of thyroid gland    • Diverticulitis of intestine with perforation    • E-coli UTI 2019    treated with antibioiutcs and urine rechecked and clean per pt report   • GERD (gastroesophageal reflux disease)    • Hashimoto's disease    • Headache    • Heart murmur     fast heart beat; sometimes irregular   • History of transfusion     several times, with surgeries post op and after c section,  never a reaction   • Hypertension    • Hyperthyroidism     Thyroid nodule, hashimoto's thyroiditis   • Hypothyroidism    • Irritable bowel syndrome    • Kidney stone    • Low back pain     sometimes   • Lupus (HCC)    • Neuromuscular disorder (HCC)     Nerve damage lower back   • Palpitations     POSSIBLE ATRIAL TACHYCARDIA   • PONV (postoperative nausea and vomiting)    • Raynaud's disease    • Sjogren's disease (HCC)    • SVT (supraventricular tachycardia) (HCC)    • Tremor     sometimes hands shake   • Visual impairment          PAST SURGICAL HISTORY  Past Surgical History:   Procedure Laterality Date   • ADENOIDECTOMY      Removed as a child   • APPENDECTOMY  2016   • BONE GRAFT  2021   • BREAST SURGERY  10/2021   •  SECTION     • CHOLECYSTECTOMY N/A 3/29/2022    Procedure: CHOLECYSTECTOMY LAPAROSCOPIC;  Surgeon: Angie López MD;  Location: [facility] OR;  Service: General;  Laterality: N/A;   • COLON RESECTION  2016    sigmoid and partial rectum    • COLON SURGERY     • COLONOSCOPY     • EXPLORATORY LAPAROTOMY      fibroid   • ILEOSTOMY CLOSURE N/A 2016    Procedure: ILEOSTOMY  EXCISION AND TAKEDOWN;  Surgeon: Brooks Jacob MD;  Location: [facility] OR;  Service:    • KNEE ARTHROSCOPY      right knee scope   • MYOMECTOMY     • SMALL INTESTINE SURGERY     • TONSILECTOMY, ADENOIDECTOMY, BILATERAL MYRINGOTOMY AND TUBES     • TONSILLECTOMY     • TOOTH EXTRACTION      2021   • TOOTH EXTRACTION           FAMILY HISTORY  Family History   Problem Relation Age of Onset   • Lung cancer Mother    • Hyperlipidemia Mother    • Thyroid disease Mother    • Pancreatic cancer Mother    • Cancer Mother    • Diabetes Mother    • Hypertension Mother    • Heart disease Father    • Polycystic kidney disease Brother    • Hypertension Brother    • Hypertension Brother    • Cancer Brother    • Heart disease Brother    • Hyperlipidemia Brother    • Hypertension Brother    • Kidney disease Brother          Kidney Failure, Polycystic Kidney Disease   • Hyperlipidemia Brother    • Hypertension Brother    • Kidney disease Brother         Polycystic Kidney Disease   • Thyroid disease Son    • Breast cancer Neg Hx    • Ovarian cancer Neg Hx          SOCIAL HISTORY  Social History     Socioeconomic History   • Marital status:    Tobacco Use   • Smoking status: Never Smoker   • Smokeless tobacco: Never Used   Vaping Use   • Vaping Use: Never used   Substance and Sexual Activity   • Alcohol use: Yes     Alcohol/week: 3.0 standard drinks     Types: 3 Glasses of wine per week   • Drug use: No   • Sexual activity: Not Currently         ALLERGIES  Tenormin [atenolol], Macrobid [nitrofurantoin monohyd macro], Plaquenil [hydroxychloroquine sulfate], Ace inhibitors, and Sudafed [pseudoephedrine hcl]        REVIEW OF SYSTEMS  Review of Systems       All systems reviewed and negative except for those discussed in HPI.       PHYSICAL EXAM    I have reviewed the triage vital signs and nursing notes.    ED Triage Vitals [04/09/22 1543]   Temp Heart Rate Resp BP SpO2   97.9 °F (36.6 °C) 78 22 (!) 176/144 99 %      Temp src Heart Rate Source Patient Position BP Location FiO2 (%)   -- Monitor Sitting Left arm --       Physical Exam  GENERAL:   Appears pleasant, uncomfortable initially but then relaxed as her abdominal spasm/discomfort temporarily resolved.  HENT: Nares patent.  EYES: No scleral icterus.  CV: Regular rhythm, regular rate.  No murmurs gallops rubs  RESPIRATORY: Normal effort.  No audible wheezes, rales or rhonchi.  Clear to auscultation  ABDOMEN: Soft, nontender to deep palpation throughout the entire abdomen and pelvis.  MUSCULOSKELETAL: No deformities.  Tenderness in the right flank region of the chest wall.  NEURO: Alert, moves all extremities, follows commands.  SKIN: Warm, dry, no rash visualized.        LAB RESULTS  Recent Results (from the past 24 hour(s))   ECG 12 Lead    Collection Time: 04/09/22  3:56 PM    Result Value Ref Range    QT Interval 418 ms    QTC Interval 434 ms   Troponin    Collection Time: 04/09/22  3:59 PM    Specimen: Blood   Result Value Ref Range    Troponin T <0.010 0.000 - 0.030 ng/mL   Comprehensive Metabolic Panel    Collection Time: 04/09/22  3:59 PM    Specimen: Blood   Result Value Ref Range    Glucose 122 (H) 65 - 99 mg/dL    BUN 36 (H) 8 - 23 mg/dL    Creatinine 1.33 (H) 0.57 - 1.00 mg/dL    Sodium 136 136 - 145 mmol/L    Potassium 3.9 3.5 - 5.2 mmol/L    Chloride 99 98 - 107 mmol/L    CO2 24.0 22.0 - 29.0 mmol/L    Calcium 9.9 8.6 - 10.5 mg/dL    Total Protein 7.3 6.0 - 8.5 g/dL    Albumin 4.30 3.50 - 5.20 g/dL    ALT (SGPT) 31 1 - 33 U/L    AST (SGOT) 57 (H) 1 - 32 U/L    Alkaline Phosphatase 82 39 - 117 U/L    Total Bilirubin 1.1 0.0 - 1.2 mg/dL    Globulin 3.0 gm/dL    A/G Ratio 1.4 g/dL    BUN/Creatinine Ratio 27.1 (H) 7.0 - 25.0    Anion Gap 13.0 5.0 - 15.0 mmol/L    eGFR 42.3 (L) >60.0 mL/min/1.73   Lipase    Collection Time: 04/09/22  3:59 PM    Specimen: Blood   Result Value Ref Range    Lipase 95 (H) 13 - 60 U/L   BNP    Collection Time: 04/09/22  3:59 PM    Specimen: Blood   Result Value Ref Range    proBNP 203.7 0.0 - 900.0 pg/mL   Green Top (Gel)    Collection Time: 04/09/22  3:59 PM   Result Value Ref Range    Extra Tube Hold for add-ons.    Lavender Top    Collection Time: 04/09/22  3:59 PM   Result Value Ref Range    Extra Tube hold for add-on    Gray Top    Collection Time: 04/09/22  3:59 PM   Result Value Ref Range    Extra Tube Hold for add-ons.    Light Blue Top    Collection Time: 04/09/22  3:59 PM   Result Value Ref Range    Extra Tube hold for add-on    CBC Auto Differential    Collection Time: 04/09/22  3:59 PM    Specimen: Blood   Result Value Ref Range    WBC 10.43 3.40 - 10.80 10*3/mm3    RBC 5.37 (H) 3.77 - 5.28 10*6/mm3    Hemoglobin 15.1 12.0 - 15.9 g/dL    Hematocrit 45.1 34.0 - 46.6 %    MCV 84.0 79.0 - 97.0 fL    MCH 28.1 26.6 - 33.0 pg    MCHC 33.5 31.5 -  35.7 g/dL    RDW 14.0 12.3 - 15.4 %    RDW-SD 42.6 37.0 - 54.0 fl    MPV 10.9 6.0 - 12.0 fL    Platelets 216 140 - 450 10*3/mm3    Neutrophil % 57.1 42.7 - 76.0 %    Lymphocyte % 29.3 19.6 - 45.3 %    Monocyte % 9.1 5.0 - 12.0 %    Eosinophil % 3.5 0.3 - 6.2 %    Basophil % 0.7 0.0 - 1.5 %    Immature Grans % 0.3 0.0 - 0.5 %    Neutrophils, Absolute 5.95 1.70 - 7.00 10*3/mm3    Lymphocytes, Absolute 3.06 0.70 - 3.10 10*3/mm3    Monocytes, Absolute 0.95 (H) 0.10 - 0.90 10*3/mm3    Eosinophils, Absolute 0.37 0.00 - 0.40 10*3/mm3    Basophils, Absolute 0.07 0.00 - 0.20 10*3/mm3    Immature Grans, Absolute 0.03 0.00 - 0.05 10*3/mm3    nRBC 0.0 0.0 - 0.2 /100 WBC   Lactic Acid, Plasma    Collection Time: 04/09/22  3:59 PM    Specimen: Blood   Result Value Ref Range    Lactate 1.8 0.5 - 2.0 mmol/L   Troponin    Collection Time: 04/09/22  6:28 PM    Specimen: Blood   Result Value Ref Range    Troponin T <0.010 0.000 - 0.030 ng/mL   ECG 12 Lead    Collection Time: 04/09/22  6:31 PM   Result Value Ref Range    QT Interval 450 ms    QTC Interval 445 ms       If labs were ordered, I independently reviewed the results.        RADIOLOGY  CT Abdomen Pelvis Without Contrast    Result Date: 4/9/2022  EXAMINATION: CT ABDOMEN AND PELVIS WITHOUT IV CONTRAST   DATE OF EXAMINATION: 4/9/2022. COMPARISON: None available. INDICATION: Upper abdominal pain status post cholecystectomy. PROCEDURE:   Axial CT of the abdomen and pelvis was performed with sagittal and coronal reformatted images without contrast enhancement. CT dose lowering techniques were used, to include: automated exposure control, adjustment for patient size, and/or use of iterative reconstruction. The exam is limited because some types of pathology may not be adequately demonstrated due to lack of contrast enhancement. FINDINGS: LOWER CHEST :  The visualized lung bases are clear.  There are no pleural or pericardial effusions. ABDOMEN: Liver and Biliary system:  Scattered  cysts are seen throughout the liver.. Adrenal glands:  Normal. Kidneys and ureters:  There is some residual contrast seen within the renal collecting systems from a recent CTA of the thorax. A simple cyst is seen within the upper pole of the left kidney measuring 2.1 cm in diameter. Spleen:  Normal. Pancreas:  Normal. Gallbladder:  Surgically absent. Lymph nodes, Peritoneum and mesentery:  There is no mesenteric or retroperitoneal lymphadenopathy. Gastrointestinal tract:  There are no dilated loops of bowel or free intraperitoneal air.  . The appendix appears absent. There are a few scattered colonic diverticuli without evidence of diverticulitis. Aorta/IVC:   There is mild vascular calcification throughout the abdominal aorta without evidence of aneurysmal dilation.  IVC normal. Abdominal wall:  Normal. PELVIS: Fluid: There is no free fluid in the pelvis. Lymph Nodes:  There is no pelvic or inguinal lymphadenopathy.. Urinary bladder:  Contrast is seen within the urinary bladder.. BONES:  There are no osseous destructive lesions.. ADDITIONAL  SIGNIFICANT FINDINGS:  None.     No acute process seen within the abdomen or pelvis. Slot 94. Electronically signed by:  Cam Gruber D.O.  4/9/2022 7:47 PM Mountain Time    XR Chest 1 View    Result Date: 4/9/2022  DATE OF EXAM: 4/9/2022 4:22 PM  PROCEDURE: XR CHEST 1 VW-  INDICATIONS: Chest Pain triage protocol  COMPARISON: 2/2/2022  TECHNIQUE: Single radiographic AP view of the chest was obtained.  FINDINGS: No focal airspace opacity. No pleural effusion or pneumothorax. Normal heart and mediastinal contours.      No evidence of acute disease in the chest.  This report was finalized on 4/9/2022 4:52 PM by Malcolm Cho.      CT Angiogram Chest    Result Date: 4/9/2022  DATE OF EXAM: 4/9/2022 7:00 PM  PROCEDURE: CT ANGIOGRAM CHEST-  INDICATIONS: upper abd pain, recent ccy  COMPARISON: No comparisons available.  TECHNIQUE: Contiguous axial imaging was obtained from the  thoracic inlet through the upper abdomen following the intravenous administration of 56 mL of Isovue 370. Reconstructed coronal and sagittal images were also obtained. Automated exposure control and iterative reconstruction methods were used.  The radiation dose reduction device was turned on for each scan per the ALARA (As Low as Reasonably Achievable) protocol.  FINDINGS: No pathologic axillary adenopathy or other worrisome body wall soft tissue finding in the chest. Multiple low-attenuation findings are noted through the partially imaged liver favored to represent cysts. There is no pleural or pericardial effusion. No distinct pathologic mediastinal or hilar lymphadenopathy. Mildly atherosclerotic nonaneurysmal thoracic aorta. Evaluation of the osseous structures demonstrates no evidence of fracture or aggressive osseous lesion. The pulmonary arteries are well-opacified and without evidence of filling defect concerning for acute pulmonary embolus. Evaluation of the lung fields demonstrates some mild dependent atelectasis. There is otherwise no evidence of acute infectious or inflammatory process. There are no suspicious pulmonary nodules.       No pulmonary embolus or other acute finding in the chest.  This report was finalized on 4/9/2022 7:17 PM by Malcolm Cho.        I ordered and reviewed the above noted radiographic studies.      I viewed images of CTA chest as well as CT abdomen which showed no evidence of pulmonary emboli and no evidence of bowel obstruction per my independent interpretation.    See radiologist's dictation for official interpretation.        PROCEDURES    Procedures    ECG 12 Lead   Final Result   Test Reason : chest pain   Blood Pressure :   */*   mmHG   Vent. Rate :  59 BPM     Atrial Rate :  59 BPM      P-R Int : 162 ms          QRS Dur :  80 ms       QT Int : 450 ms       P-R-T Axes :  59  59 117 degrees      QTc Int : 445 ms      Sinus bradycardia   T wave abnormality, consider  lateral ischemia   Abnormal ECG   When compared with ECG of 09-APR-2022 15:56, (Unconfirmed)   No significant change was found   Confirmed by MYRNA LIU MD ([id number]) on 4/9/2022 8:48:59 PM      Referred By: EDMD           Confirmed By: MYRNA LIU MD      ECG 12 Lead   Final Result   Test Reason : chest pain   Blood Pressure :   */*   mmHG   Vent. Rate :  65 BPM     Atrial Rate :  65 BPM      P-R Int : 164 ms          QRS Dur :  74 ms       QT Int : 418 ms       P-R-T Axes :  55  59  77 degrees      QTc Int : 434 ms      Normal sinus rhythm   Septal infarct , age undetermined   Abnormal ECG   Confirmed by MYRNA LIU MD ([id number]) on 4/9/2022 8:48:48 PM      Referred By:            Confirmed By: MYRNA LIU MD          MEDICATIONS GIVEN IN ER    Medications   sodium chloride 0.9 % flush 10 mL (has no administration in time range)   aspirin chewable tablet 324 mg (324 mg Oral Not Given 4/9/22 1706)   HYDROmorphone (DILAUDID) injection 0.25 mg (0.25 mg Intravenous Given 4/9/22 1816)   ondansetron (ZOFRAN) injection 4 mg (4 mg Intravenous Given 4/9/22 1816)   sodium chloride 0.9 % bolus 500 mL (0 mL Intravenous Stopped 4/1950)   iopamidol (ISOVUE-370) 76 % injection 100 mL (56 mL Intravenous Given 4/9/22 1908)         PROGRESS, DATA ANALYSIS, CONSULTS, AND MEDICAL DECISION MAKING    All labs have been independently reviewed by me.  All radiology studies have been reviewed by me and the radiologist dictating the report.   EKG's have been independently viewed and interpreted by me.      Differential diagnoses: Upper abdominal pain which could reflect bowel obstruction, pulmonary embolus, ureteral colic, musculoskeletal strain, constipation, etc.      ED Course as of 04/09/22 2154   Sat Apr 09, 2022   1721 Initial blood pressure was likely incorrect with a diastolic of 144.  Current blood pressure is 141/75. [MS]   2117 The patient has passed gas and is feeling significantly improved.  She has had no more flareups of  her severe discomfort since her IV fluids and medications administered to include Dilaudid 0.25 mg and Zofran 4 mg. [MS]      ED Course User Index  [MS] Aly Stanford MD             AS OF 21:54 EDT VITALS:    BP - 144/75  HR - 64  TEMP - 97.9 °F (36.6 °C)  O2 SATS - 97%                  DIAGNOSIS  Final diagnoses:   Upper abdominal pain         DISPOSITION  DISCHARGE    Patient discharged in stable condition.    Reviewed implications of results, diagnosis, meds, responsibility to follow up, warning signs and symptoms of possible worsening, potential complications and reasons to return to ER.    Patient/Family voiced understanding of above instructions.    Discussed plan for discharge, as there is no emergent indication for admission.  Pt/family is agreeable and understands need for follow up and possible repeat testing.  Pt/family is aware that discharge does not mean that nothing is wrong but that it indicates no emergency is currently present that requires admission and they must continue care with follow-up as given below or with a physician of their choice.     FOLLOW-UP  Brooks Jacob MD  [address] [place] [id number] [phone]      NEXT AVAILABLE APPOINTMENT - RECHECK OF CONDITION    Farzad Redding MD  [address] [facility] [address]  [place] [id number] [phone]      FOR ROUTINE FOLLOW-UP    [facility] Emergency Department  [address] [place] [address] [phone]    IF YOU HAVE ANY CONCERN OF WORSENING CONDITION         Medication List      Changed    celecoxib 100 MG capsule  Commonly known as: CeleBREX  TAKE ONE CAPSULE BY MOUTH TWICE DAILY AS NEEDED for mild pain  What changed: reasons to take this     diclofenac 1 % gel gel  Commonly known as: VOLTAREN  Apply 4 g topically to the appropriate area as directed 4 (Four) Times a Day.  What changed:   · when to take this  · reasons to take this     fluticasone-salmeterol 100-50 MCG/DOSE  DISKUS  Commonly known as: ADVAIR  inhale 1 puffs by mouth twice daily  What changed: See the new instructions.                     Aly Stanford MD  04/09/22 8302

## 2022-04-10 NOTE — DISCHARGE INSTRUCTIONS
I recommend avoiding all foods that are gas producing to include beans.    I recommended over-the-counter food supplements such as Beano to help reduce gas production.    I recommend utilizing a small amount, one third capful, of MiraLAX twice a day to see if this will help loosen your stools and soften their exit hopefully reducing your discomfort.    Do not hesitate to return to emergency department if you feel your symptoms are worsening.    Please review the medications you are supposed to be taking according to prior physician recommendations. I have not changed your home medications during this visit. If your discharge instructions indicate that I have changed your home medications, this is not the case, and you should disregard. If you have any questions about the medication you should be taking at home, please call your physician.

## 2022-04-12 ENCOUNTER — OFFICE VISIT (OUTPATIENT)
Dept: INTERNAL MEDICINE | Facility: CLINIC | Age: 72
End: 2022-04-12

## 2022-04-12 ENCOUNTER — HOSPITAL ENCOUNTER (EMERGENCY)
Facility: HOSPITAL | Age: 72
Discharge: HOME | End: 2022-04-12
Attending: EMERGENCY MEDICINE | Admitting: EMERGENCY MEDICINE

## 2022-04-12 ENCOUNTER — APPOINTMENT (OUTPATIENT)
Dept: GENERAL RADIOLOGY | Facility: HOSPITAL | Age: 72
End: 2022-04-12

## 2022-04-12 VITALS
RESPIRATION RATE: 17 BRPM | OXYGEN SATURATION: 97 % | HEIGHT: 59 IN | DIASTOLIC BLOOD PRESSURE: 78 MMHG | WEIGHT: 124 LBS | TEMPERATURE: 97.6 F | BODY MASS INDEX: 25 KG/M2 | SYSTOLIC BLOOD PRESSURE: 150 MMHG | HEART RATE: 73 BPM

## 2022-04-12 VITALS
OXYGEN SATURATION: 98 % | RESPIRATION RATE: 20 BRPM | DIASTOLIC BLOOD PRESSURE: 89 MMHG | HEART RATE: 74 BPM | SYSTOLIC BLOOD PRESSURE: 165 MMHG | TEMPERATURE: 97.7 F

## 2022-04-12 DIAGNOSIS — R07.9 CHEST PAIN, UNSPECIFIED TYPE: Primary | ICD-10-CM

## 2022-04-12 DIAGNOSIS — R10.13 EPIGASTRIC ABDOMINAL PAIN: Primary | ICD-10-CM

## 2022-04-12 DIAGNOSIS — R07.9 CHEST PAIN, UNSPECIFIED TYPE: ICD-10-CM

## 2022-04-12 LAB
ALBUMIN SERPL-MCNC: 4 G/DL (ref 3.5–5.2)
ALBUMIN/GLOB SERPL: 1.4 G/DL
ALP SERPL-CCNC: 114 U/L (ref 39–117)
ALT SERPL W P-5'-P-CCNC: 104 U/L (ref 1–33)
ANION GAP SERPL CALCULATED.3IONS-SCNC: 13 MMOL/L (ref 5–15)
AST SERPL-CCNC: 166 U/L (ref 1–32)
BASOPHILS # BLD AUTO: 0.06 10*3/MM3 (ref 0–0.2)
BASOPHILS NFR BLD AUTO: 0.5 % (ref 0–1.5)
BILIRUB SERPL-MCNC: 1.4 MG/DL (ref 0–1.2)
BUN SERPL-MCNC: 24 MG/DL (ref 8–23)
BUN/CREAT SERPL: 23.8 (ref 7–25)
CALCIUM SPEC-SCNC: 9.8 MG/DL (ref 8.6–10.5)
CHLORIDE SERPL-SCNC: 103 MMOL/L (ref 98–107)
CO2 SERPL-SCNC: 25 MMOL/L (ref 22–29)
CREAT SERPL-MCNC: 1.01 MG/DL (ref 0.57–1)
DEPRECATED RDW RBC AUTO: 43.3 FL (ref 37–54)
EGFRCR SERPLBLD CKD-EPI 2021: 58.9 ML/MIN/1.73
EOSINOPHIL # BLD AUTO: 0.3 10*3/MM3 (ref 0–0.4)
EOSINOPHIL NFR BLD AUTO: 2.4 % (ref 0.3–6.2)
ERYTHROCYTE [DISTWIDTH] IN BLOOD BY AUTOMATED COUNT: 14 % (ref 12.3–15.4)
GLOBULIN UR ELPH-MCNC: 2.9 GM/DL
GLUCOSE SERPL-MCNC: 116 MG/DL (ref 65–99)
HCT VFR BLD AUTO: 43.3 % (ref 34–46.6)
HGB BLD-MCNC: 14.5 G/DL (ref 12–15.9)
HOLD SPECIMEN: NORMAL
IMM GRANULOCYTES # BLD AUTO: 0.05 10*3/MM3 (ref 0–0.05)
IMM GRANULOCYTES NFR BLD AUTO: 0.4 % (ref 0–0.5)
LIPASE SERPL-CCNC: 49 U/L (ref 13–60)
LYMPHOCYTES # BLD AUTO: 1.65 10*3/MM3 (ref 0.7–3.1)
LYMPHOCYTES NFR BLD AUTO: 13.2 % (ref 19.6–45.3)
MCH RBC QN AUTO: 28.3 PG (ref 26.6–33)
MCHC RBC AUTO-ENTMCNC: 33.5 G/DL (ref 31.5–35.7)
MCV RBC AUTO: 84.4 FL (ref 79–97)
MONOCYTES # BLD AUTO: 0.86 10*3/MM3 (ref 0.1–0.9)
MONOCYTES NFR BLD AUTO: 6.9 % (ref 5–12)
NEUTROPHILS NFR BLD AUTO: 76.6 % (ref 42.7–76)
NEUTROPHILS NFR BLD AUTO: 9.58 10*3/MM3 (ref 1.7–7)
NRBC BLD AUTO-RTO: 0 /100 WBC (ref 0–0.2)
NT-PROBNP SERPL-MCNC: 284.7 PG/ML (ref 0–900)
PLATELET # BLD AUTO: 188 10*3/MM3 (ref 140–450)
PMV BLD AUTO: 11.1 FL (ref 6–12)
POTASSIUM SERPL-SCNC: 3.4 MMOL/L (ref 3.5–5.2)
PROT SERPL-MCNC: 6.9 G/DL (ref 6–8.5)
QT INTERVAL: 428 MS
QTC INTERVAL: 462 MS
RBC # BLD AUTO: 5.13 10*6/MM3 (ref 3.77–5.28)
SODIUM SERPL-SCNC: 141 MMOL/L (ref 136–145)
TROPONIN T SERPL-MCNC: <0.01 NG/ML (ref 0–0.03)
TROPONIN T SERPL-MCNC: <0.01 NG/ML (ref 0–0.03)
WBC NRBC COR # BLD: 12.5 10*3/MM3 (ref 3.4–10.8)
WHOLE BLOOD HOLD SPECIMEN: NORMAL
WHOLE BLOOD HOLD SPECIMEN: NORMAL

## 2022-04-12 PROCEDURE — 85025 COMPLETE CBC W/AUTO DIFF WBC: CPT

## 2022-04-12 PROCEDURE — 99214 OFFICE O/P EST MOD 30 MIN: CPT | Performed by: INTERNAL MEDICINE

## 2022-04-12 PROCEDURE — 93005 ELECTROCARDIOGRAM TRACING: CPT

## 2022-04-12 PROCEDURE — 80053 COMPREHEN METABOLIC PANEL: CPT

## 2022-04-12 PROCEDURE — 36415 COLL VENOUS BLD VENIPUNCTURE: CPT

## 2022-04-12 PROCEDURE — 83880 ASSAY OF NATRIURETIC PEPTIDE: CPT

## 2022-04-12 PROCEDURE — 93000 ELECTROCARDIOGRAM COMPLETE: CPT | Performed by: INTERNAL MEDICINE

## 2022-04-12 PROCEDURE — 83690 ASSAY OF LIPASE: CPT

## 2022-04-12 PROCEDURE — 93005 ELECTROCARDIOGRAM TRACING: CPT | Performed by: EMERGENCY MEDICINE

## 2022-04-12 PROCEDURE — 71045 X-RAY EXAM CHEST 1 VIEW: CPT

## 2022-04-12 PROCEDURE — 84484 ASSAY OF TROPONIN QUANT: CPT

## 2022-04-12 PROCEDURE — 99284 EMERGENCY DEPT VISIT MOD MDM: CPT

## 2022-04-12 RX ORDER — SODIUM CHLORIDE 0.9 % (FLUSH) 0.9 %
10 SYRINGE (ML) INJECTION AS NEEDED
Status: DISCONTINUED | OUTPATIENT
Start: 2022-04-12 | End: 2022-04-12 | Stop reason: HOSPADM

## 2022-04-12 RX ORDER — ALUMINA, MAGNESIA, AND SIMETHICONE 2400; 2400; 240 MG/30ML; MG/30ML; MG/30ML
15 SUSPENSION ORAL ONCE
Status: COMPLETED | OUTPATIENT
Start: 2022-04-12 | End: 2022-04-12

## 2022-04-12 RX ORDER — ASPIRIN 81 MG/1
324 TABLET, CHEWABLE ORAL ONCE
Status: COMPLETED | OUTPATIENT
Start: 2022-04-12 | End: 2022-04-12

## 2022-04-12 RX ORDER — DICYCLOMINE HCL 20 MG
20 TABLET ORAL EVERY 8 HOURS PRN
Qty: 20 TABLET | Refills: 0 | Status: SHIPPED | OUTPATIENT
Start: 2022-04-12 | End: 2022-05-17

## 2022-04-12 RX ORDER — DICYCLOMINE HYDROCHLORIDE 10 MG/1
20 CAPSULE ORAL ONCE
Status: COMPLETED | OUTPATIENT
Start: 2022-04-12 | End: 2022-04-12

## 2022-04-12 RX ORDER — ROSUVASTATIN CALCIUM 10 MG/1
10 TABLET, COATED ORAL NIGHTLY
Status: DISCONTINUED | OUTPATIENT
Start: 2022-04-12 | End: 2022-04-12

## 2022-04-12 RX ORDER — LIDOCAINE HYDROCHLORIDE 20 MG/ML
15 SOLUTION OROPHARYNGEAL ONCE
Status: COMPLETED | OUTPATIENT
Start: 2022-04-12 | End: 2022-04-12

## 2022-04-12 RX ORDER — PANTOPRAZOLE SODIUM 40 MG/1
40 TABLET, DELAYED RELEASE ORAL
Status: DISCONTINUED | OUTPATIENT
Start: 2022-04-13 | End: 2022-04-12 | Stop reason: HOSPADM

## 2022-04-12 RX ADMIN — DICYCLOMINE HYDROCHLORIDE 20 MG: 10 CAPSULE ORAL at 12:40

## 2022-04-12 RX ADMIN — ALUMINUM HYDROXIDE, MAGNESIUM HYDROXIDE, AND DIMETHICONE 15 ML: 400; 400; 40 SUSPENSION ORAL at 12:40

## 2022-04-12 RX ADMIN — LIDOCAINE HYDROCHLORIDE 15 ML: 20 SOLUTION ORAL; TOPICAL at 12:40

## 2022-04-12 RX ADMIN — ASPIRIN 81 MG 324 MG: 81 TABLET ORAL at 12:40

## 2022-04-12 NOTE — PROGRESS NOTES
Chief Complaint   Patient presents with   • ER follow up [facility] 4/9  Severe epigastric pain       History of Present Illness    The patient presents for an acute visit for constant chest pain. There is a four day history of chest pain. The patient has noted that the pain is relieved by burping. The pain onset was sudden. The pain is made worse with activity and it is not relieved with rest. The pain is not made worse with deep breaths. It is characterized as crushing, pressure and a squeezing sensation. The pain radiates to her epigastric area, left shoulder and back but does not radiate to the patient's right shoulder, right arm, left arm, neck, throat, lower jaw and teeth, right wrist or left wrist. The patient states the pain is in the left chest. The pain is associated with belching, clamminess, diaphoresis, indigestion, nausea and shortness of breath but is not associated with dizziness, fatigue, fever, lightheadedness, palpitations, syncope or vomiting. A past history of CAD is not reported. She was seen in the ER for similar symptoms 4 days ago. She has had intermittent symptoms since. She arrived at our office and the pain started in the parking lot and has worsened progressively. Of note, she had a cholecystectomy about 2 weeks ago.    Review of Systems    CONSTITUTIONAL- Denies Unexplained Weight Loss, Chills or Weakness.    HENT- Denies Nasal Discharge, Sore Throat, Ear Pain, Ear Drainage, Headache, Sinus Pain, Nasal Congestion, Decreased Hearing or Tinnitus.    GASTROINTESTINAL- Reports: Abdominal Pain. Denies: Diarrhea, Blood per Rectum, Constipation or Heartburn.    PULMONARY- Denies Wheezing, Sputum Production, Cough or Hemoptysis.    CARDIOVASCULAR- Reports: Chest Pain. Denies: Edema or Irregular Heart Beat.    Medications      Current Outpatient Medications:   •  albuterol sulfate  (90 Base) MCG/ACT inhaler, Inhale 2 puffs Every 4 (Four) Hours As Needed for Wheezing., Disp: 1 g, Rfl: 5  •   aspirin 81 MG EC tablet, Take 81 mg by mouth 2 (Two) Times a Week., Disp: , Rfl:   •  celecoxib (CeleBREX) 100 MG capsule, TAKE ONE CAPSULE BY MOUTH TWICE DAILY AS NEEDED for mild pain (Patient taking differently: Take 100 mg by mouth 2 (Two) Times a Day As Needed for Moderate Pain .), Disp: 60 capsule, Rfl: 2  •  diclofenac (VOLTAREN) 1 % gel gel, Apply 4 g topically to the appropriate area as directed 4 (Four) Times a Day. (Patient taking differently: Apply 4 g topically to the appropriate area as directed 4 (Four) Times a Day As Needed (joint pain).), Disp: 100 g, Rfl: 3  •  estradiol (ESTRACE) 0.1 MG/GM vaginal cream, Insert 0.5 g into the vagina 2 (Two) Times a Week., Disp: , Rfl:   •  fexofenadine (ALLEGRA) 180 MG tablet, Take 180 mg by mouth Daily As Needed (allergies)., Disp: , Rfl:   •  fluticasone-salmeterol (ADVAIR) 100-50 MCG/DOSE DISKUS, inhale 1 puffs by mouth twice daily (Patient taking differently: Inhale 1 puff 2 (Two) Times a Day.), Disp: 180 each, Rfl: 2  •  losartan-hydrochlorothiazide (HYZAAR) 100-25 MG per tablet, TAKE ONE TABLET BY MOUTH ONE TIME DAILY (Patient taking differently: Take 1 tablet by mouth Daily.), Disp: 90 tablet, Rfl: 0  •  metoprolol succinate XL (TOPROL-XL) 200 MG 24 hr tablet, TAKE ONE TABLET BY MOUTH ONE TIME DAILY (Patient taking differently: Take 200 mg by mouth Daily.), Disp: 90 tablet, Rfl: 1  •  Synthroid 75 MCG tablet, Take 1 tablet by mouth Daily., Disp: 90 tablet, Rfl: 3  •  Triamcinolone Acetonide (NASACORT) 55 MCG/ACT nasal inhaler, 2 sprays into each nostril daily., Disp: , Rfl:      Allergies    Allergies   Allergen Reactions   • Tenormin [Atenolol] Cough and Angioedema   • Macrobid [Nitrofurantoin Monohyd Macro] Swelling and Other (See Comments)     Swelling around eyes, fatigue   • Plaquenil [Hydroxychloroquine Sulfate] Other (See Comments)     Vision changes over time    • Ace Inhibitors Cough   • Sudafed [Pseudoephedrine Hcl] Other (See Comments)      INCREASED PRESSURE IN EYES        Problem List    Patient Active Problem List   Diagnosis   • Asthma   • Hypertension   • Peripheral vascular disease (HCC)   • Diverticulosis   • Colon polyps   • Constipation   • Hashimoto's thyroiditis   • Mixed connective tissue disease (HCC)   • Lupus (HCC)   • Uterine fibroid   • Palpitations   • Solitary thyroid nodule   • History of asthma   • GERD   • Gallbladder calculus without cholecystitis and no obstruction       Medications, Allergies, Problems List and Past History were reviewed and updated.    Physical Examination    /89 (BP Location: Left arm, Patient Position: Sitting, Cuff Size: Adult)   Pulse 74   Temp 97.7 °F (36.5 °C) (Infrared)   Resp 20   LMP  (LMP Unknown)   SpO2 98%   Breastfeeding No     HEENT: Head- Normocephalic Atraumatic. Facies- Within normal limits. Pinnas- Normal texture and shape bilaterally. Canals- Normal bilaterally. TMs- Normal bilaterally. Nares- Patent bilaterally. Nasal Septum- is normal. There is no tenderness to palpation over the frontal or maxillary sinuses. Lids- Normal bilaterally. Conjunctiva- Clear bilaterally. Sclera- Anicteric bilaterally. Oropharynx- Moist with no lesions. Tonsils- No enlargement, erythema or exudate.    Lungs: Auscultation- Clear to auscultation bilaterally. There are no retractions, clubbing or cyanosis. The Expiratory to Inspiratory ratio is equal.    Cardiovascular: There are no carotid bruits. Heart- Normal Rate with Regular rhythm and no murmurs. There are no gallops. There are no rubs. In the lower extremities there is no edema. The upper extremities do not have edema.    Abdomen: Soft, benign, non-tender with no masses, hernias, organomegaly or scars.      ECG 12 Lead    Date/Time: 4/12/2022 11:50 AM  Performed by: Farzad Redding MD  Authorized by: Farzad Redding MD   Comparison: compared with previous ECG from 4/9/2022  Similar to previous ECG  Rhythm: sinus rhythm  Rate:  normal  Conduction: conduction normal  T flattening: I, aVL, V5 and V6  QRS axis: normal  Other findings: T wave abnormality    Clinical impression: abnormal EKG            Impression and Assessment    Chest Pain.    Plan    Chest Pain Plan: She was given an aspirin to chew and swallow. She had pain relief from 10/10 to 3/10 with one 0.4 mg sl NTG. She was transported to the ER via squad. I have spoken to Dr. Dowd in the ER.    Diagnoses and all orders for this visit:    1. Chest pain, unspecified type (Primary)  -     ECG 12 Lead    A total of 35 minutes was spent in direct patient care including obtaining a history from the patient, chart review, performing an examination, data interpretation, discussion with the ER physician and EMS personnel and documentation.    Return to Office    The patient was instructed to return for follow-up after being discharged from the hospital. The patient was instructed to return sooner if the condition changes, worsens, or does not resolve.

## 2022-04-14 NOTE — PROGRESS NOTES
"[facility]  Heart and Valve Center    Chief Complaint  Chest Pain and Establish Care    Subjective    History of Present Illness {CC  Problem List  Visit  Diagnosis   Encounters  Notes  Medications  Labs  Result Review Imaging  Media :23}     Kavitha Barnhart is a 71 y.o. female with palpitations/tachycardia/PAT, hypertension, lupus, Raynaud's, sjogren's disease, Hashimoto's disease who presents today for evaluation of chest pain at the request of Dr. Dowd.Patient presented to the ED on 4/12 with complaints of epigastric pain that radiates into her chest.  Patient had been to the ED on 4/9 with similar but less severe epigastric/chest pain.  She reports issues with her bowel movements and p.o. intake since her cholecystectomy earlier this year.  Patient had a nuclear stress test 3/21 which was normal.  She was evaluated by cardiology in the ED who felt symptoms were more related to GI.  Troponins were normal and EKGs without ischemic changes. She reports that she has had no further severe attacks of chest pain. She has had some mild intermittent pain. She reports that it will radiated to her upper back. Pains are random. Not associated with exertion. She notes some nausea. She saw her gallbladder surgeon yesterday and GI today.  She reports some reproducible tenderness to her chest wall and back.  Symptoms are worse when she wears a tight bra.  She reports some exertional dyspnea in her daily activities since her surgery.       Cardiac risks: HTN, age    Objective     Vital Signs:   Vitals:    04/15/22 1613 04/15/22 1615 04/15/22 1616   BP: 139/73 146/76 138/79   BP Location: Right arm Left arm Left arm   Patient Position: Sitting Standing Sitting   Cuff Size: Adult Adult Adult   Pulse: 61 65 62   Resp:   15   Temp:   97.5 °F (36.4 °C)   TempSrc:   Temporal   SpO2: 99% 99% 98%   Weight:   56.7 kg (125 lb)   Height:   149.9 cm (59\")     Body mass index is 25.25 kg/m².  Physical Exam  Vitals " reviewed.   Constitutional:       Appearance: Normal appearance.   HENT:      Head: Normocephalic.   Neck:      Vascular: No carotid bruit.   Cardiovascular:      Rate and Rhythm: Normal rate and regular rhythm.      Pulses: Normal pulses.      Heart sounds: Normal heart sounds, S1 normal and S2 normal. No murmur heard.  Pulmonary:      Effort: Pulmonary effort is normal. No respiratory distress.      Breath sounds: Normal breath sounds.   Chest:      Chest wall: Tenderness present.   Abdominal:      General: Abdomen is flat.      Palpations: Abdomen is soft.   Musculoskeletal:      Cervical back: Neck supple.      Right lower leg: No edema.      Left lower leg: No edema.   Skin:     General: Skin is warm and dry.   Neurological:      General: No focal deficit present.      Mental Status: She is alert and oriented to person, place, and time. Mental status is at baseline.   Psychiatric:         Mood and Affect: Mood normal.         Behavior: Behavior normal.         Thought Content: Thought content normal.              Result Review  Data Reviewed:{ Labs  Result Review  Imaging  Med Tab  Media :23}   Comprehensive Metabolic Panel (04/12/2022 13:34)  CBC & Differential (04/12/2022 13:34)  Troponin (04/12/2022 13:34)  Troponin (04/09/2022 18:28)  BNP (04/09/2022 15:59)  Lactic Acid, Plasma (04/09/2022 15:59)  Lipase (04/09/2022 15:59)  Comprehensive Metabolic Panel (04/09/2022 15:59)  CBC & Differential (04/09/2022 15:59)  Troponin (04/09/2022 15:59)  Troponin (04/12/2022 16:25)  BNP (04/12/2022 13:34)  Lipase (04/12/2022 13:34)  Stress Test With Myocardial Perfusion One Day (03/21/2022 12:01)  Stress Test With Myocardial Perfusion One Day (03/21/2022 12:01)  ECG 12 Lead (04/12/2022 16:17)  ECG 12 Lead (04/12/2022 12:32)    Consultant notes Dr. Dowd, cardiology            Assessment and Plan {CC Problem List  Visit Diagnosis  ROS  Review (Popup)  Health Maintenance  Quality  BestPractice  Medications   Vahe  SnapShot Encounters  Media :23}   1. Chest pain, unspecified type  Atypical symptoms.  Normal nuclear stress test 3/21.  She has some reproducible chest wall tenderness.  She saw GI today and has upcoming MRI to rule out retained stone  - Adult Transthoracic Echo Complete W/ Cont if Necessary Per Protocol; Future    2. HUA (dyspnea on exertion)  Will repeat echo. High risk for structural heart disease with underlying autoimmune illnesses  - Adult Transthoracic Echo Complete W/ Cont if Necessary Per Protocol; Future    3. Primary hypertension  Appears to be reasonably controlled  Continue to monitor      Follow Up {Instructions Charge Capture  Follow-up Communications :23}   No follow-ups on file.    Patient was given instructions and counseling regarding her condition or for health maintenance advice. Please see specific information pulled into the AVS if appropriate.  Advised to call the Heart and Valve Center with any questions, concerns, or worsening symptoms.

## 2022-04-15 ENCOUNTER — OFFICE VISIT (OUTPATIENT)
Dept: CARDIOLOGY | Facility: HOSPITAL | Age: 72
End: 2022-04-15

## 2022-04-15 ENCOUNTER — LAB (OUTPATIENT)
Dept: LAB | Facility: HOSPITAL | Age: 72
End: 2022-04-15

## 2022-04-15 ENCOUNTER — OFFICE VISIT (OUTPATIENT)
Dept: GASTROENTEROLOGY | Facility: CLINIC | Age: 72
End: 2022-04-15

## 2022-04-15 VITALS
TEMPERATURE: 97.4 F | WEIGHT: 125 LBS | BODY MASS INDEX: 25.2 KG/M2 | HEART RATE: 71 BPM | OXYGEN SATURATION: 99 % | SYSTOLIC BLOOD PRESSURE: 130 MMHG | HEIGHT: 59 IN | DIASTOLIC BLOOD PRESSURE: 88 MMHG

## 2022-04-15 VITALS
OXYGEN SATURATION: 98 % | RESPIRATION RATE: 15 BRPM | HEIGHT: 59 IN | SYSTOLIC BLOOD PRESSURE: 138 MMHG | HEART RATE: 62 BPM | TEMPERATURE: 97.5 F | DIASTOLIC BLOOD PRESSURE: 79 MMHG | WEIGHT: 125 LBS | BODY MASS INDEX: 25.2 KG/M2

## 2022-04-15 DIAGNOSIS — Z90.49 S/P CHOLE: ICD-10-CM

## 2022-04-15 DIAGNOSIS — R74.8 ELEVATED LIVER ENZYMES: ICD-10-CM

## 2022-04-15 DIAGNOSIS — I10 PRIMARY HYPERTENSION: ICD-10-CM

## 2022-04-15 DIAGNOSIS — R06.09 DOE (DYSPNEA ON EXERTION): ICD-10-CM

## 2022-04-15 DIAGNOSIS — K83.1 BILIARY OBSTRUCTION: Primary | ICD-10-CM

## 2022-04-15 DIAGNOSIS — R94.5 ABNORMAL RESULTS OF LIVER FUNCTION STUDIES: ICD-10-CM

## 2022-04-15 DIAGNOSIS — R07.9 CHEST PAIN, UNSPECIFIED TYPE: Primary | ICD-10-CM

## 2022-04-15 DIAGNOSIS — F40.240 CLAUSTROPHOBIA: ICD-10-CM

## 2022-04-15 LAB
ALBUMIN SERPL-MCNC: 4.6 G/DL (ref 3.5–5.2)
ALBUMIN/GLOB SERPL: 1.7 G/DL
ALP SERPL-CCNC: 115 U/L (ref 39–117)
ALT SERPL W P-5'-P-CCNC: 69 U/L (ref 1–33)
ANION GAP SERPL CALCULATED.3IONS-SCNC: 13 MMOL/L (ref 5–15)
AST SERPL-CCNC: 23 U/L (ref 1–32)
BASOPHILS # BLD AUTO: 0.08 10*3/MM3 (ref 0–0.2)
BASOPHILS NFR BLD AUTO: 1.4 % (ref 0–1.5)
BILIRUB CONJ SERPL-MCNC: <0.2 MG/DL (ref 0–0.3)
BILIRUB SERPL-MCNC: 0.7 MG/DL (ref 0–1.2)
BUN SERPL-MCNC: 37 MG/DL (ref 8–23)
BUN/CREAT SERPL: 25.2 (ref 7–25)
CALCIUM SPEC-SCNC: 10 MG/DL (ref 8.6–10.5)
CHLORIDE SERPL-SCNC: 106 MMOL/L (ref 98–107)
CO2 SERPL-SCNC: 24 MMOL/L (ref 22–29)
CREAT SERPL-MCNC: 1.47 MG/DL (ref 0.57–1)
DEPRECATED RDW RBC AUTO: 43 FL (ref 37–54)
EGFRCR SERPLBLD CKD-EPI 2021: 37.5 ML/MIN/1.73
EOSINOPHIL # BLD AUTO: 0.39 10*3/MM3 (ref 0–0.4)
EOSINOPHIL NFR BLD AUTO: 6.7 % (ref 0.3–6.2)
ERYTHROCYTE [DISTWIDTH] IN BLOOD BY AUTOMATED COUNT: 13.9 % (ref 12.3–15.4)
GLOBULIN UR ELPH-MCNC: 2.7 GM/DL
GLUCOSE SERPL-MCNC: 91 MG/DL (ref 65–99)
HCT VFR BLD AUTO: 44.6 % (ref 34–46.6)
HGB BLD-MCNC: 14.8 G/DL (ref 12–15.9)
IMM GRANULOCYTES # BLD AUTO: 0.01 10*3/MM3 (ref 0–0.05)
IMM GRANULOCYTES NFR BLD AUTO: 0.2 % (ref 0–0.5)
INR PPP: 0.99 (ref 0.84–1.13)
LYMPHOCYTES # BLD AUTO: 1.84 10*3/MM3 (ref 0.7–3.1)
LYMPHOCYTES NFR BLD AUTO: 31.5 % (ref 19.6–45.3)
MCH RBC QN AUTO: 28.4 PG (ref 26.6–33)
MCHC RBC AUTO-ENTMCNC: 33.2 G/DL (ref 31.5–35.7)
MCV RBC AUTO: 85.4 FL (ref 79–97)
MONOCYTES # BLD AUTO: 0.61 10*3/MM3 (ref 0.1–0.9)
MONOCYTES NFR BLD AUTO: 10.4 % (ref 5–12)
NEUTROPHILS NFR BLD AUTO: 2.92 10*3/MM3 (ref 1.7–7)
NEUTROPHILS NFR BLD AUTO: 49.8 % (ref 42.7–76)
NRBC BLD AUTO-RTO: 0 /100 WBC (ref 0–0.2)
PLATELET # BLD AUTO: 181 10*3/MM3 (ref 140–450)
PMV BLD AUTO: 11.5 FL (ref 6–12)
POTASSIUM SERPL-SCNC: 3.5 MMOL/L (ref 3.5–5.2)
PROT SERPL-MCNC: 7.3 G/DL (ref 6–8.5)
PROTHROMBIN TIME: 13 SECONDS (ref 11.4–14.4)
QT INTERVAL: 416 MS
QTC INTERVAL: 429 MS
RBC # BLD AUTO: 5.22 10*6/MM3 (ref 3.77–5.28)
SODIUM SERPL-SCNC: 143 MMOL/L (ref 136–145)
WBC NRBC COR # BLD: 5.85 10*3/MM3 (ref 3.4–10.8)

## 2022-04-15 PROCEDURE — 99214 OFFICE O/P EST MOD 30 MIN: CPT | Performed by: NURSE PRACTITIONER

## 2022-04-15 PROCEDURE — 80053 COMPREHEN METABOLIC PANEL: CPT

## 2022-04-15 PROCEDURE — 85025 COMPLETE CBC W/AUTO DIFF WBC: CPT

## 2022-04-15 PROCEDURE — 85610 PROTHROMBIN TIME: CPT

## 2022-04-15 PROCEDURE — 99213 OFFICE O/P EST LOW 20 MIN: CPT | Performed by: NURSE PRACTITIONER

## 2022-04-15 PROCEDURE — 82248 BILIRUBIN DIRECT: CPT

## 2022-04-15 PROCEDURE — 36415 COLL VENOUS BLD VENIPUNCTURE: CPT

## 2022-04-15 RX ORDER — DIAZEPAM 2 MG/1
2 TABLET ORAL ONCE AS NEEDED
Qty: 1 TABLET | Refills: 0 | Status: SHIPPED | OUTPATIENT
Start: 2022-04-15 | End: 2022-04-19

## 2022-04-15 RX ORDER — FAMOTIDINE 20 MG/1
20 TABLET, FILM COATED ORAL 2 TIMES DAILY
COMMUNITY
End: 2022-05-27 | Stop reason: ALTCHOICE

## 2022-04-15 NOTE — PROGRESS NOTES
New Patient Consultation     Patient Name: Kavitha Barnhart  : 1950   MRN: [id number]     Chief Complaint:    Chief Complaint   Patient presents with   • Abdominal Pain       History of Present Illness: Kavitha Barnhart is a 71 y.o. female who is here today for a Gastroenterology Consultation for abdominal pain.    Has been seen in the ER 2 times in the past 1 month since having her cholecystectomy for epigastric pain.  Was diaphoretic in the office with PCP and sent to the ED- Epigastric abdominal pain that is improved significantly with nitroglycerin.  She is to see the chest pain clinic today.  Pain is epigastric and radiates to her back since having her dax.  Do not see where an IOC was performed.  She is having a poor appetite, nausea She has had GERD in the past that is very different than this pain.  CT abdomen no bile duct leak but was without contrast.  No testing to rule out retained stone.  She did have elevated liver function tests.  CT Abdomen Pelvis Without Contrast (2022 20:18)  US Abdomen Limited (2022 11:40)  Underwent cholecystectomy on   There is a history of bowel perforation from complicated diverticulitis in  with colon resection by Dr. Jacob, and appendectomy, and exploratory laparotomy. In the process of having a spinal stimulator for incontinence/ urgency since having bowel resection  SCANNED - COLONOSCOPY (2021)    Subjective      Review of Systems:   Review of Systems   Constitutional: Positive for appetite change. Negative for unexpected weight loss.   HENT: Negative for trouble swallowing.    Gastrointestinal: Positive for abdominal pain, constipation, diarrhea and nausea. Negative for abdominal distention, anal bleeding, blood in stool, rectal pain, vomiting, GERD and indigestion.       Past Medical History:   Past Medical History:   Diagnosis Date   • Arthritis    • Asthma    • Cervical disc disease    • Disease of thyroid gland    •  Diverticulitis of intestine with perforation    • E-coli UTI     treated with antibioiutcs and urine rechecked and clean per pt report   • GERD (gastroesophageal reflux disease)    • Hashimoto's disease    • Headache    • Heart murmur     fast heart beat; sometimes irregular   • History of transfusion     several times, with surgeries post op and after c section, never a reaction   • Hypertension    • Hyperthyroidism     Thyroid nodule, hashimoto's thyroiditis   • Hypothyroidism    • Irritable bowel syndrome    • Kidney stone    • Low back pain     sometimes   • Lupus (HCC)    • Neuromuscular disorder (HCC)     Nerve damage lower back   • Palpitations     POSSIBLE ATRIAL TACHYCARDIA   • PONV (postoperative nausea and vomiting)    • Raynaud's disease    • Sjogren's disease (HCC)    • SVT (supraventricular tachycardia) (HCC)    • Tremor     sometimes hands shake   • Visual impairment        Past Surgical History:   Past Surgical History:   Procedure Laterality Date   • ADENOIDECTOMY      Removed as a child   • APPENDECTOMY  2016   • BONE GRAFT  2021   • BREAST SURGERY  10/2021   •  SECTION     • CHOLECYSTECTOMY N/A 3/29/2022    Procedure: CHOLECYSTECTOMY LAPAROSCOPIC;  Surgeon: Angie López MD;  Location: [facility] OR;  Service: General;  Laterality: N/A;   • COLON RESECTION  2016    sigmoid and partial rectum    • COLON SURGERY     • COLONOSCOPY     • EXPLORATORY LAPAROTOMY      fibroid   • ILEOSTOMY CLOSURE N/A 2016    Procedure: ILEOSTOMY  EXCISION AND TAKEDOWN;  Surgeon: Brooks Jacob MD;  Location: [facility] OR;  Service:    • KNEE ARTHROSCOPY      right knee scope   • MYOMECTOMY     • SMALL INTESTINE SURGERY     • TONSILECTOMY, ADENOIDECTOMY, BILATERAL MYRINGOTOMY AND TUBES     • TONSILLECTOMY     • TOOTH EXTRACTION      2021   • TOOTH EXTRACTION         Family History:   Family History   Problem Relation Age of Onset   • Lung cancer Mother    • Hyperlipidemia Mother    •  Thyroid disease Mother    • Pancreatic cancer Mother    • Cancer Mother    • Diabetes Mother    • Hypertension Mother    • Heart disease Father    • Polycystic kidney disease Brother    • Hypertension Brother    • Hypertension Brother    • Cancer Brother    • Heart disease Brother    • Hyperlipidemia Brother    • Hypertension Brother    • Kidney disease Brother         Kidney Failure, Polycystic Kidney Disease   • Hyperlipidemia Brother    • Hypertension Brother    • Kidney disease Brother         Polycystic Kidney Disease   • Thyroid disease Son    • Breast cancer Neg Hx    • Ovarian cancer Neg Hx        Social History:   Social History     Socioeconomic History   • Marital status:    Tobacco Use   • Smoking status: Never Smoker   • Smokeless tobacco: Never Used   Vaping Use   • Vaping Use: Never used   Substance and Sexual Activity   • Alcohol use: Yes     Alcohol/week: 3.0 standard drinks     Types: 3 Glasses of wine per week     Comment: occ   • Drug use: No   • Sexual activity: Not Currently       Alcohol/Tobacco History:   Social History     Substance and Sexual Activity   Alcohol Use Yes   • Alcohol/week: 3.0 standard drinks   • Types: 3 Glasses of wine per week    Comment: occ     Social History     Tobacco Use   Smoking Status Never Smoker   Smokeless Tobacco Never Used       Medications:     Current Outpatient Medications:   •  albuterol sulfate  (90 Base) MCG/ACT inhaler, Inhale 2 puffs Every 4 (Four) Hours As Needed for Wheezing., Disp: 1 g, Rfl: 5  •  celecoxib (CeleBREX) 100 MG capsule, TAKE ONE CAPSULE BY MOUTH TWICE DAILY AS NEEDED for mild pain (Patient taking differently: Take 100 mg by mouth 2 (Two) Times a Day As Needed for Moderate Pain .), Disp: 60 capsule, Rfl: 2  •  diclofenac (VOLTAREN) 1 % gel gel, Apply 4 g topically to the appropriate area as directed 4 (Four) Times a Day. (Patient taking differently: Apply 4 g topically to the appropriate area as directed 4 (Four) Times a  "Day As Needed (joint pain).), Disp: 100 g, Rfl: 3  •  estradiol (ESTRACE) 0.1 MG/GM vaginal cream, Insert 0.5 g into the vagina 2 (Two) Times a Week., Disp: , Rfl:   •  famotidine (PEPCID) 20 MG tablet, Take 20 mg by mouth 2 (Two) Times a Day., Disp: , Rfl:   •  fexofenadine (ALLEGRA) 180 MG tablet, Take 180 mg by mouth Daily As Needed (allergies)., Disp: , Rfl:   •  fluticasone-salmeterol (ADVAIR) 100-50 MCG/DOSE DISKUS, inhale 1 puffs by mouth twice daily (Patient taking differently: Inhale 1 puff 2 (Two) Times a Day.), Disp: 180 each, Rfl: 2  •  losartan-hydrochlorothiazide (HYZAAR) 100-25 MG per tablet, TAKE ONE TABLET BY MOUTH ONE TIME DAILY (Patient taking differently: Take 1 tablet by mouth Daily.), Disp: 90 tablet, Rfl: 0  •  metoprolol succinate XL (TOPROL-XL) 200 MG 24 hr tablet, TAKE ONE TABLET BY MOUTH ONE TIME DAILY (Patient taking differently: Take 200 mg by mouth Daily.), Disp: 90 tablet, Rfl: 1  •  Synthroid 75 MCG tablet, Take 1 tablet by mouth Daily., Disp: 90 tablet, Rfl: 3  •  Triamcinolone Acetonide (NASACORT) 55 MCG/ACT nasal inhaler, 2 sprays into each nostril daily., Disp: , Rfl:   •  dicyclomine (BENTYL) 20 MG tablet, Take 1 tablet by mouth Every 8 (Eight) Hours As Needed (pain, cramps)., Disp: 20 tablet, Rfl: 0    Allergies:   Allergies   Allergen Reactions   • Tenormin [Atenolol] Cough and Angioedema   • Macrobid [Nitrofurantoin Monohyd Macro] Swelling and Other (See Comments)     Swelling around eyes, fatigue   • Plaquenil [Hydroxychloroquine Sulfate] Other (See Comments)     Vision changes over time    • Ace Inhibitors Cough   • Sudafed [Pseudoephedrine Hcl] Other (See Comments)     INCREASED PRESSURE IN EYES        Objective     Physical Exam:  Vital Signs:   Vitals:    04/15/22 0955   BP: 130/88   BP Location: Right arm   Patient Position: Sitting   Cuff Size: Adult   Pulse: 71   Temp: 97.4 °F (36.3 °C)   TempSrc: Temporal   SpO2: 99%   Weight: 56.7 kg (125 lb)   Height: 149.9 cm (59\") "     Body mass index is 25.25 kg/m².     Physical Exam  Vitals and nursing note reviewed.   Constitutional:       General: She is not in acute distress.     Appearance: She is well-developed. She is not diaphoretic.   Eyes:      General: No scleral icterus.     Conjunctiva/sclera: Conjunctivae normal.   Neck:      Thyroid: No thyromegaly.   Cardiovascular:      Rate and Rhythm: Normal rate and regular rhythm.   Pulmonary:      Effort: Pulmonary effort is normal.      Breath sounds: Normal breath sounds.   Abdominal:      General: A surgical scar is present. Bowel sounds are normal. There is no distension.      Palpations: Abdomen is soft.      Tenderness: There is abdominal tenderness in the right upper quadrant. There is no guarding or rebound. Negative signs include Helm's sign.      Hernia: No hernia is present.   Musculoskeletal:      Cervical back: Neck supple.      Right lower leg: No edema.      Left lower leg: No edema.   Skin:     General: Skin is warm and dry.      Capillary Refill: Capillary refill takes 2 to 3 seconds.      Coloration: Skin is not jaundiced or pale.      Findings: No bruising or petechiae.      Nails: There is no clubbing.   Neurological:      Mental Status: She is alert and oriented to person, place, and time.   Psychiatric:         Behavior: Behavior normal.         Thought Content: Thought content normal.         Judgment: Judgment normal.     Reviewed op note, reviewed labs and ED summary, reviewed CT abdomen    Assessment / Plan      Assessment/Plan:   Diagnoses and all orders for this visit:    1. Biliary obstruction (Primary)  -     MRI abdomen w wo contrast mrcp; Future  I am concerned there may be a retained stone in the bile duct.  Will order stat MRCP and labs.  If retained stone, will need to proceed with ERCP.  2. Elevated liver enzymes  -     CBC & Differential; Future  -     Comprehensive Metabolic Panel; Future  -     Bilirubin, Direct; Future  -     Protime-INR;  Future  -     MRI abdomen w wo contrast mrcp; Future    3. S/P dax  -     CBC & Differential; Future  -     Comprehensive Metabolic Panel; Future  -     Bilirubin, Direct; Future  -     Protime-INR; Future  -     MRI abdomen w wo contrast mrcp; Future    4. Abnormal results of liver function studies   -     Protime-INR; Future  -     MRI abdomen w wo contrast mrcp; Future         Follow Up: TBD  No follow-ups on file.    Plan of care reviewed with the patient at the conclusion of today's visit.  Education was provided regarding diagnosis, management, and any prescribed or recommended OTC medications.  Patient verbalized understanding of and agreement with management plan.       JACK Lares  [facility]

## 2022-04-18 ENCOUNTER — TELEPHONE (OUTPATIENT)
Dept: GASTROENTEROLOGY | Facility: CLINIC | Age: 72
End: 2022-04-18

## 2022-04-18 DIAGNOSIS — R94.5 ABNORMAL RESULTS OF LIVER FUNCTION STUDIES: ICD-10-CM

## 2022-04-18 DIAGNOSIS — K83.1 BILIARY OBSTRUCTION: Primary | ICD-10-CM

## 2022-04-18 DIAGNOSIS — Z90.49 S/P CHOLE: ICD-10-CM

## 2022-04-18 DIAGNOSIS — R74.8 ELEVATED LIVER ENZYMES: ICD-10-CM

## 2022-04-18 NOTE — TELEPHONE ENCOUNTER
The patient does not want to go through with the MRI.  I am going to change it to an ultrasound.  Can you help get this set up?

## 2022-04-19 ENCOUNTER — LAB (OUTPATIENT)
Dept: LAB | Facility: HOSPITAL | Age: 72
End: 2022-04-19

## 2022-04-19 ENCOUNTER — OFFICE VISIT (OUTPATIENT)
Dept: INTERNAL MEDICINE | Facility: CLINIC | Age: 72
End: 2022-04-19

## 2022-04-19 ENCOUNTER — HOSPITAL ENCOUNTER (OUTPATIENT)
Dept: ULTRASOUND IMAGING | Facility: HOSPITAL | Age: 72
Discharge: HOME | End: 2022-04-19

## 2022-04-19 VITALS
TEMPERATURE: 97.7 F | DIASTOLIC BLOOD PRESSURE: 76 MMHG | BODY MASS INDEX: 26.54 KG/M2 | SYSTOLIC BLOOD PRESSURE: 130 MMHG | RESPIRATION RATE: 16 BRPM | HEART RATE: 76 BPM | WEIGHT: 123 LBS | HEIGHT: 57 IN

## 2022-04-19 DIAGNOSIS — K83.1 BILIARY OBSTRUCTION: ICD-10-CM

## 2022-04-19 DIAGNOSIS — Z90.49 S/P CHOLE: ICD-10-CM

## 2022-04-19 DIAGNOSIS — I10 ESSENTIAL HYPERTENSION: ICD-10-CM

## 2022-04-19 DIAGNOSIS — I10 ESSENTIAL HYPERTENSION: Primary | ICD-10-CM

## 2022-04-19 DIAGNOSIS — E03.9 HYPOTHYROIDISM, UNSPECIFIED TYPE: ICD-10-CM

## 2022-04-19 DIAGNOSIS — R74.8 ELEVATED LIVER ENZYMES: ICD-10-CM

## 2022-04-19 DIAGNOSIS — K21.9 GASTROESOPHAGEAL REFLUX DISEASE WITHOUT ESOPHAGITIS: ICD-10-CM

## 2022-04-19 DIAGNOSIS — R94.5 ABNORMAL RESULTS OF LIVER FUNCTION STUDIES: ICD-10-CM

## 2022-04-19 LAB
ALBUMIN SERPL-MCNC: 4.5 G/DL (ref 3.5–5.2)
ALBUMIN/GLOB SERPL: 1.6 G/DL
ALP SERPL-CCNC: 95 U/L (ref 39–117)
ALT SERPL W P-5'-P-CCNC: 29 U/L (ref 1–33)
ANION GAP SERPL CALCULATED.3IONS-SCNC: 11.6 MMOL/L (ref 5–15)
AST SERPL-CCNC: 16 U/L (ref 1–32)
BASOPHILS # BLD AUTO: 0.05 10*3/MM3 (ref 0–0.2)
BASOPHILS NFR BLD AUTO: 0.9 % (ref 0–1.5)
BILIRUB BLD-MCNC: NEGATIVE MG/DL
BILIRUB SERPL-MCNC: 0.8 MG/DL (ref 0–1.2)
BUN SERPL-MCNC: 30 MG/DL (ref 8–23)
BUN/CREAT SERPL: 27 (ref 7–25)
CALCIUM SPEC-SCNC: 10 MG/DL (ref 8.6–10.5)
CHLORIDE SERPL-SCNC: 104 MMOL/L (ref 98–107)
CHOLEST SERPL-MCNC: 180 MG/DL (ref 0–200)
CLARITY, POC: CLEAR
CO2 SERPL-SCNC: 24.4 MMOL/L (ref 22–29)
COLOR UR: YELLOW
CREAT SERPL-MCNC: 1.11 MG/DL (ref 0.57–1)
DEPRECATED RDW RBC AUTO: 40.9 FL (ref 37–54)
EGFRCR SERPLBLD CKD-EPI 2021: 52.6 ML/MIN/1.73
EOSINOPHIL # BLD AUTO: 0.25 10*3/MM3 (ref 0–0.4)
EOSINOPHIL NFR BLD AUTO: 4.4 % (ref 0.3–6.2)
ERYTHROCYTE [DISTWIDTH] IN BLOOD BY AUTOMATED COUNT: 13.6 % (ref 12.3–15.4)
EXPIRATION DATE: ABNORMAL
GLOBULIN UR ELPH-MCNC: 2.8 GM/DL
GLUCOSE SERPL-MCNC: 85 MG/DL (ref 65–99)
GLUCOSE UR STRIP-MCNC: ABNORMAL MG/DL
HCT VFR BLD AUTO: 41.2 % (ref 34–46.6)
HDLC SERPL-MCNC: 42 MG/DL (ref 40–60)
HGB BLD-MCNC: 14.2 G/DL (ref 12–15.9)
IMM GRANULOCYTES # BLD AUTO: 0.01 10*3/MM3 (ref 0–0.05)
IMM GRANULOCYTES NFR BLD AUTO: 0.2 % (ref 0–0.5)
KETONES UR QL: ABNORMAL
LDLC SERPL CALC-MCNC: 114 MG/DL (ref 0–100)
LDLC/HDLC SERPL: 2.64 {RATIO}
LEUKOCYTE EST, POC: ABNORMAL
LYMPHOCYTES # BLD AUTO: 2.14 10*3/MM3 (ref 0.7–3.1)
LYMPHOCYTES NFR BLD AUTO: 37.6 % (ref 19.6–45.3)
Lab: ABNORMAL
MCH RBC QN AUTO: 28.6 PG (ref 26.6–33)
MCHC RBC AUTO-ENTMCNC: 34.5 G/DL (ref 31.5–35.7)
MCV RBC AUTO: 82.9 FL (ref 79–97)
MONOCYTES # BLD AUTO: 0.57 10*3/MM3 (ref 0.1–0.9)
MONOCYTES NFR BLD AUTO: 10 % (ref 5–12)
NEUTROPHILS NFR BLD AUTO: 2.67 10*3/MM3 (ref 1.7–7)
NEUTROPHILS NFR BLD AUTO: 46.9 % (ref 42.7–76)
NITRITE UR-MCNC: NEGATIVE MG/ML
NRBC BLD AUTO-RTO: 0 /100 WBC (ref 0–0.2)
PH UR: 6 [PH] (ref 5–8)
PLATELET # BLD AUTO: 182 10*3/MM3 (ref 140–450)
PMV BLD AUTO: 12.1 FL (ref 6–12)
POTASSIUM SERPL-SCNC: 3.4 MMOL/L (ref 3.5–5.2)
PROT SERPL-MCNC: 7.3 G/DL (ref 6–8.5)
PROT UR STRIP-MCNC: ABNORMAL MG/DL
RBC # BLD AUTO: 4.97 10*6/MM3 (ref 3.77–5.28)
RBC # UR STRIP: NEGATIVE /UL
SODIUM SERPL-SCNC: 140 MMOL/L (ref 136–145)
SP GR UR: 1.02 (ref 1–1.03)
T4 FREE SERPL-MCNC: 1.8 NG/DL (ref 0.93–1.7)
TRIGL SERPL-MCNC: 136 MG/DL (ref 0–150)
TSH SERPL DL<=0.05 MIU/L-ACNC: 0.63 UIU/ML (ref 0.27–4.2)
UROBILINOGEN UR QL: NORMAL
VLDLC SERPL-MCNC: 24 MG/DL (ref 5–40)
WBC NRBC COR # BLD: 5.69 10*3/MM3 (ref 3.4–10.8)

## 2022-04-19 PROCEDURE — 76705 ECHO EXAM OF ABDOMEN: CPT

## 2022-04-19 PROCEDURE — 81003 URINALYSIS AUTO W/O SCOPE: CPT | Performed by: INTERNAL MEDICINE

## 2022-04-19 PROCEDURE — 85025 COMPLETE CBC W/AUTO DIFF WBC: CPT

## 2022-04-19 PROCEDURE — 84443 ASSAY THYROID STIM HORMONE: CPT

## 2022-04-19 PROCEDURE — 80061 LIPID PANEL: CPT

## 2022-04-19 PROCEDURE — 84439 ASSAY OF FREE THYROXINE: CPT

## 2022-04-19 PROCEDURE — 80053 COMPREHEN METABOLIC PANEL: CPT

## 2022-04-19 PROCEDURE — 99214 OFFICE O/P EST MOD 30 MIN: CPT | Performed by: INTERNAL MEDICINE

## 2022-04-19 RX ORDER — OMEPRAZOLE 20 MG/1
20 CAPSULE, DELAYED RELEASE ORAL DAILY
Qty: 14 CAPSULE | Refills: 0 | Status: SHIPPED | OUTPATIENT
Start: 2022-04-19 | End: 2022-05-03

## 2022-04-19 NOTE — PROGRESS NOTES
Chief Complaint   Patient presents with   • Extended follow up       History of Present Illness      The patient presents for a follow-up related to hypertension. The patient reports that she has had no headaches, chest pain, dyspnea, edema, syncope, blurred vision or palpitations. She states that she is taking her medication as prescribed. She is not having medication side effects.    The patient presents for a follow-up related to hypothyroidism. She reports a good energy level. She reports no hair loss, heat intolerance, cold intolerance, diarrhea, constipation or sweats. She is taking her medication as prescribed.    The patient presents for a follow-up related to GERD. The patient is on famotidine for her gastroesophageal reflux. The medication is taken on a regular basis and gives complete relief of the symptoms. She reports no abdominal pain, belching, dysphagia, early satiety, heartburn, hoarseness, nausea, odynophagia, rectal bleeding, vomiting or weight loss. The GERD has no known aggravating factors.    Review of Systems    CONSTITUTIONAL- Denies Fever, Chills, Sweats, Weakness or Malaise.    HENT- Denies Nasal Discharge, Sore Throat, Ear Pain, Ear Drainage, Sinus Pain, Nasal Congestion, Decreased Hearing or Tinnitus.    PULMONARY- Denies Wheezing, Sputum Production, Cough, Hemoptysis or Pleuritic Chest Pain.    CARDIOVASCULAR- Denies Claudication or Irregular Heart Beat.    Medications      Current Outpatient Medications:   •  albuterol sulfate  (90 Base) MCG/ACT inhaler, Inhale 2 puffs Every 4 (Four) Hours As Needed for Wheezing., Disp: 1 g, Rfl: 5  •  celecoxib (CeleBREX) 100 MG capsule, TAKE ONE CAPSULE BY MOUTH TWICE DAILY AS NEEDED for mild pain (Patient taking differently: Take 100 mg by mouth 2 (Two) Times a Day As Needed for Moderate Pain .), Disp: 60 capsule, Rfl: 2  •  diclofenac (VOLTAREN) 1 % gel gel, Apply 4 g topically to the appropriate area as directed 4 (Four) Times a Day.  (Patient taking differently: Apply 4 g topically to the appropriate area as directed 4 (Four) Times a Day As Needed (joint pain).), Disp: 100 g, Rfl: 3  •  estradiol (ESTRACE) 0.1 MG/GM vaginal cream, Insert 0.5 g into the vagina 2 (Two) Times a Week., Disp: , Rfl:   •  famotidine (PEPCID) 20 MG tablet, Take 20 mg by mouth 2 (Two) Times a Day., Disp: , Rfl:   •  fexofenadine (ALLEGRA) 180 MG tablet, Take 180 mg by mouth Daily As Needed (allergies)., Disp: , Rfl:   •  fluticasone-salmeterol (ADVAIR) 100-50 MCG/DOSE DISKUS, inhale 1 puffs by mouth twice daily (Patient taking differently: Inhale 1 puff 2 (Two) Times a Day.), Disp: 180 each, Rfl: 2  •  losartan-hydrochlorothiazide (HYZAAR) 100-25 MG per tablet, TAKE ONE TABLET BY MOUTH ONE TIME DAILY (Patient taking differently: Take 1 tablet by mouth Daily.), Disp: 90 tablet, Rfl: 0  •  metoprolol succinate XL (TOPROL-XL) 200 MG 24 hr tablet, TAKE ONE TABLET BY MOUTH ONE TIME DAILY (Patient taking differently: Take 200 mg by mouth Daily.), Disp: 90 tablet, Rfl: 1  •  Synthroid 75 MCG tablet, Take 1 tablet by mouth Daily., Disp: 90 tablet, Rfl: 3  •  Triamcinolone Acetonide (NASACORT) 55 MCG/ACT nasal inhaler, 2 sprays into each nostril daily., Disp: , Rfl:   •  dicyclomine (BENTYL) 20 MG tablet, Take 1 tablet by mouth Every 8 (Eight) Hours As Needed (pain, cramps)., Disp: 20 tablet, Rfl: 0     Allergies    Allergies   Allergen Reactions   • Tenormin [Atenolol] Cough and Angioedema   • Macrobid [Nitrofurantoin Monohyd Macro] Swelling and Other (See Comments)     Swelling around eyes, fatigue   • Plaquenil [Hydroxychloroquine Sulfate] Other (See Comments)     Vision changes over time    • Ace Inhibitors Cough   • Sudafed [Pseudoephedrine Hcl] Other (See Comments)     INCREASED PRESSURE IN EYES        Problem List    Patient Active Problem List   Diagnosis   • Asthma   • Hypertension   • Peripheral vascular disease (HCC)   • Diverticulosis   • Colon polyps   •  "Constipation   • Hashimoto's thyroiditis   • Mixed connective tissue disease (HCC)   • Lupus (HCC)   • Uterine fibroid   • Palpitations   • Solitary thyroid nodule   • History of asthma   • GERD   • Gallbladder calculus without cholecystitis and no obstruction       Medications, Allergies, Problems List and Past History were reviewed and updated.    Physical Examination    /76 (BP Location: Left arm, Patient Position: Sitting, Cuff Size: Adult)   Pulse 76   Temp 97.7 °F (36.5 °C) (Infrared)   Resp 16   Ht 145.4 cm (57.25\")   Wt 55.8 kg (123 lb)   LMP  (LMP Unknown)   Breastfeeding No   BMI 26.38 kg/m²     HEENT: Head- Normocephalic Atraumatic. Facies- Within normal limits. Pinnas- Normal texture and shape bilaterally. Canals- Normal bilaterally. TMs- Normal bilaterally. Nares- Patent bilaterally. Nasal Septum- is normal. There is no tenderness to palpation over the frontal or maxillary sinuses. Lids- Normal bilaterally. Conjunctiva- Clear bilaterally. Sclera- Anicteric bilaterally. Oropharynx- Moist with no lesions. Tonsils- No enlargement, erythema or exudate.    Neck: Thyroid- non enlarged, symmetric and has no nodules. No bruits are detected.    Lungs: Auscultation- Clear to auscultation bilaterally. There are no retractions, clubbing or cyanosis. The Expiratory to Inspiratory ratio is equal.    Lymph Nodes: Cervical- no enlarged lymph nodes noted. Clavicular- no enlarged supraclavicular lymph nodes noted. Axillary- no enlarged axillary lymph nodes noted. Inguinal- no enlarged inguinal lymph nodes noted.    Cardiovascular: There are no carotid bruits. Heart- Normal Rate with Regular rhythm and no murmurs. There are no gallops. There are no rubs. In the lower extremities there is no edema. The upper extremities do not have edema.    Abdomen: Soft, benign, non-tender with no masses, hernias, organomegaly or scars.    Breast: Normal contours bilaterally with no masses, discharge, skin changes or lumps. " There are no scars noted.    Impression and Assessment    Essential Hypertension.    Hypothyroidism.    Gastroesophageal Reflux Disease.    Plan    Gastroesophageal Reflux Disease Plan: The patient was instructed to continue the current medications.    Essential Hypertension Plan: The patient was instructed to continue the current medications.    Hypothyroidism Plan: The patient was instructed to continue the current medications.    Diagnoses and all orders for this visit:    1. Essential hypertension (Primary)  -     CBC & Differential; Future  -     Comprehensive Metabolic Panel; Future  -     Lipid Panel; Future  -     POC Urinalysis Dipstick, Automated; Future    2. Hypothyroidism, unspecified type  -     TSH; Future  -     T4, Free; Future    3. Gastroesophageal reflux disease without esophagitis        Return to Office    The patient was instructed to return for follow-up in 6 months. The patient was instructed to return sooner if the condition changes, worsens, or does not resolve.

## 2022-04-21 ENCOUNTER — APPOINTMENT (OUTPATIENT)
Dept: MRI IMAGING | Facility: HOSPITAL | Age: 72
End: 2022-04-21

## 2022-04-22 ENCOUNTER — HOSPITAL ENCOUNTER (OUTPATIENT)
Dept: CARDIOLOGY | Facility: HOSPITAL | Age: 72
Discharge: HOME | End: 2022-04-22
Admitting: NURSE PRACTITIONER

## 2022-04-22 VITALS — BODY MASS INDEX: 26.54 KG/M2 | HEIGHT: 57 IN | WEIGHT: 123 LBS

## 2022-04-22 DIAGNOSIS — R07.9 CHEST PAIN, UNSPECIFIED TYPE: ICD-10-CM

## 2022-04-22 DIAGNOSIS — R06.09 DOE (DYSPNEA ON EXERTION): ICD-10-CM

## 2022-04-22 PROCEDURE — 93306 TTE W/DOPPLER COMPLETE: CPT | Performed by: INTERNAL MEDICINE

## 2022-04-22 PROCEDURE — 93306 TTE W/DOPPLER COMPLETE: CPT

## 2022-04-25 ENCOUNTER — APPOINTMENT (OUTPATIENT)
Dept: CT IMAGING | Facility: HOSPITAL | Age: 72
End: 2022-04-25

## 2022-04-25 LAB
BH CV ECHO MEAS - AO ROOT AREA (BSA CORRECTED): 2.1
BH CV ECHO MEAS - AO ROOT AREA: 7.5 CM^2
BH CV ECHO MEAS - AO ROOT DIAM: 3.1 CM
BH CV ECHO MEAS - ASC AORTA: 2.4 CM
BH CV ECHO MEAS - BSA(HAYCOCK): 1.5 M^2
BH CV ECHO MEAS - BSA: 1.5 M^2
BH CV ECHO MEAS - BZI_BMI: 26.6 KILOGRAMS/M^2
BH CV ECHO MEAS - BZI_METRIC_HEIGHT: 144.8 CM
BH CV ECHO MEAS - BZI_METRIC_WEIGHT: 55.8 KG
BH CV ECHO MEAS - EDV(CUBED): 35.3 ML
BH CV ECHO MEAS - EDV(MOD-SP2): 90 ML
BH CV ECHO MEAS - EDV(MOD-SP4): 95 ML
BH CV ECHO MEAS - EDV(TEICH): 43.5 ML
BH CV ECHO MEAS - EF(CUBED): 72.6 %
BH CV ECHO MEAS - EF(MOD-BP): 55 %
BH CV ECHO MEAS - EF(MOD-SP2): 58.9 %
BH CV ECHO MEAS - EF(MOD-SP4): 50.5 %
BH CV ECHO MEAS - EF(TEICH): 65.7 %
BH CV ECHO MEAS - ESV(CUBED): 9.7 ML
BH CV ECHO MEAS - ESV(MOD-SP2): 37 ML
BH CV ECHO MEAS - ESV(MOD-SP4): 47 ML
BH CV ECHO MEAS - ESV(TEICH): 14.9 ML
BH CV ECHO MEAS - FS: 35.1 %
BH CV ECHO MEAS - IVS/LVPW: 0.96
BH CV ECHO MEAS - IVSD: 1.3 CM
BH CV ECHO MEAS - LA DIMENSION: 3.6 CM
BH CV ECHO MEAS - LA/AO: 1.2
BH CV ECHO MEAS - LAD MAJOR: 4 CM
BH CV ECHO MEAS - LAT PEAK E' VEL: 6.5 CM/SEC
BH CV ECHO MEAS - LATERAL E/E' RATIO: 11.4
BH CV ECHO MEAS - LV DIASTOLIC VOL/BSA (35-75): 64.9 ML/M^2
BH CV ECHO MEAS - LV IVRT: 0.11 SEC
BH CV ECHO MEAS - LV MASS(C)D: 137.8 GRAMS
BH CV ECHO MEAS - LV MASS(C)DI: 94.2 GRAMS/M^2
BH CV ECHO MEAS - LV MAX PG: 3.8 MMHG
BH CV ECHO MEAS - LV MEAN PG: 2 MMHG
BH CV ECHO MEAS - LV SYSTOLIC VOL/BSA (12-30): 32.1 ML/M^2
BH CV ECHO MEAS - LV V1 MAX: 97 CM/SEC
BH CV ECHO MEAS - LV V1 MEAN: 59.7 CM/SEC
BH CV ECHO MEAS - LV V1 VTI: 23.9 CM
BH CV ECHO MEAS - LVIDD: 3.3 CM
BH CV ECHO MEAS - LVIDS: 2.1 CM
BH CV ECHO MEAS - LVLD AP2: 7.6 CM
BH CV ECHO MEAS - LVLD AP4: 7.4 CM
BH CV ECHO MEAS - LVLS AP2: 6.5 CM
BH CV ECHO MEAS - LVLS AP4: 6.5 CM
BH CV ECHO MEAS - LVOT AREA (M): 2 CM^2
BH CV ECHO MEAS - LVOT AREA: 2 CM^2
BH CV ECHO MEAS - LVOT DIAM: 1.6 CM
BH CV ECHO MEAS - LVPWD: 1.3 CM
BH CV ECHO MEAS - MED PEAK E' VEL: 6.7 CM/SEC
BH CV ECHO MEAS - MEDIAL E/E' RATIO: 11.1
BH CV ECHO MEAS - MV A MAX VEL: 93.3 CM/SEC
BH CV ECHO MEAS - MV DEC SLOPE: 533 CM/SEC^2
BH CV ECHO MEAS - MV DEC TIME: 0.16 SEC
BH CV ECHO MEAS - MV E MAX VEL: 74 CM/SEC
BH CV ECHO MEAS - MV E/A: 0.79
BH CV ECHO MEAS - MV MAX PG: 3.9 MMHG
BH CV ECHO MEAS - MV MEAN PG: 2 MMHG
BH CV ECHO MEAS - MV P1/2T MAX VEL: 93.7 CM/SEC
BH CV ECHO MEAS - MV P1/2T: 51.5 MSEC
BH CV ECHO MEAS - MV V2 MAX: 99.3 CM/SEC
BH CV ECHO MEAS - MV V2 MEAN: 63.5 CM/SEC
BH CV ECHO MEAS - MV V2 VTI: 22.9 CM
BH CV ECHO MEAS - MVA P1/2T LCG: 2.3 CM^2
BH CV ECHO MEAS - MVA(P1/2T): 4.3 CM^2
BH CV ECHO MEAS - MVA(VTI): 2.1 CM^2
BH CV ECHO MEAS - PA ACC SLOPE: 435 CM/SEC^2
BH CV ECHO MEAS - PA ACC TIME: 0.17 SEC
BH CV ECHO MEAS - PA PR(ACCEL): 4.5 MMHG
BH CV ECHO MEAS - PI END-D VEL: 92.3 CM/SEC
BH CV ECHO MEAS - RAP SYSTOLE: 3 MMHG
BH CV ECHO MEAS - RVSP: 17 MMHG
BH CV ECHO MEAS - SI(CUBED): 17.5 ML/M^2
BH CV ECHO MEAS - SI(LVOT): 32.9 ML/M^2
BH CV ECHO MEAS - SI(MOD-SP2): 36.2 ML/M^2
BH CV ECHO MEAS - SI(MOD-SP4): 32.8 ML/M^2
BH CV ECHO MEAS - SI(TEICH): 19.5 ML/M^2
BH CV ECHO MEAS - SV(CUBED): 25.6 ML
BH CV ECHO MEAS - SV(LVOT): 48.1 ML
BH CV ECHO MEAS - SV(MOD-SP2): 53 ML
BH CV ECHO MEAS - SV(MOD-SP4): 48 ML
BH CV ECHO MEAS - SV(TEICH): 28.6 ML
BH CV ECHO MEAS - TAPSE (>1.6): 2.1 CM
BH CV ECHO MEAS - TR MAX PG: 14 MMHG
BH CV ECHO MEAS - TR MAX VEL: 186 CM/SEC
BH CV ECHO MEASUREMENTS AVERAGE E/E' RATIO: 11.21
BH CV VAS BP LEFT ARM: NORMAL MMHG
BH CV XLRA - RV BASE: 3.8 CM
BH CV XLRA - RV LENGTH: 5.1 CM
BH CV XLRA - RV MID: 2.3 CM
BH CV XLRA - TDI S': 12.3 CM/SEC
LV EF 2D ECHO EST: 55 %
MAXIMAL PREDICTED HEART RATE: 147 BPM
STRESS TARGET HR: 125 BPM

## 2022-04-28 DIAGNOSIS — I10 ESSENTIAL HYPERTENSION: ICD-10-CM

## 2022-04-28 RX ORDER — LOSARTAN POTASSIUM AND HYDROCHLOROTHIAZIDE 25; 100 MG/1; MG/1
TABLET ORAL
Qty: 90 TABLET | Refills: 0 | Status: SHIPPED | OUTPATIENT
Start: 2022-04-28 | End: 2022-08-15 | Stop reason: SDUPTHER

## 2022-05-02 ENCOUNTER — OFFICE VISIT (OUTPATIENT)
Dept: GASTROENTEROLOGY | Facility: CLINIC | Age: 72
End: 2022-05-02

## 2022-05-02 VITALS
OXYGEN SATURATION: 97 % | BODY MASS INDEX: 27.18 KG/M2 | WEIGHT: 126 LBS | HEART RATE: 64 BPM | SYSTOLIC BLOOD PRESSURE: 118 MMHG | DIASTOLIC BLOOD PRESSURE: 80 MMHG | TEMPERATURE: 97.8 F | HEIGHT: 57 IN

## 2022-05-02 DIAGNOSIS — R10.10 PAIN OF UPPER ABDOMEN: Primary | ICD-10-CM

## 2022-05-02 PROCEDURE — 99213 OFFICE O/P EST LOW 20 MIN: CPT | Performed by: NURSE PRACTITIONER

## 2022-05-02 NOTE — PROGRESS NOTES
Follow Up      Patient Name: Kavitha Barnhart  : 1950   MRN: [id number]     Chief Complaint:    Chief Complaint   Patient presents with   • Follow-up     2 week follow up       History of Present Illness: Kavitha Barnhart is a 71 y.o. female who is here today for follow up on abdominal pain.    Kavitha was evaluated on April 15 with epigastric pain after cholecystectomy.  Her symptoms were consistent with biliary obstruction and I ordered an MRCP.  Due to claustrophobia, this was not performed but an ultrasound was performed.  She did have resolution of her pain spontaneously and ultrasound was negative for stone in the bile duct.  Kavitha has not had recurrence of her pain since this time.  She does wish to follow in this GI clinic for her chronic GI issues.  She wishes to avoid spinal stimulator for incontinence and urgency after bowel resection for now.  Was previously followed by Dr. Jacob.    CT Abdomen Pelvis Without Contrast (2022 20:18)  US Abdomen Limited (2022 11:40)  Underwent cholecystectomy on   There is a history of bowel perforation from complicated diverticulitis in  with colon resection by Dr. Jacob, and appendectomy, and exploratory laparotomy. In the process of having a spinal stimulator for incontinence/ urgency since having bowel resection  SCANNED - COLONOSCOPY (2021)  Subjective      Review of Systems:   Review of Systems   Constitutional: Negative for appetite change and unexpected weight loss.   HENT: Negative for trouble swallowing.    Gastrointestinal: Positive for diarrhea. Negative for abdominal distention, abdominal pain, anal bleeding, blood in stool, constipation, nausea, rectal pain, vomiting, GERD and indigestion.       Medications:     Current Outpatient Medications:   •  albuterol sulfate  (90 Base) MCG/ACT inhaler, Inhale 2 puffs Every 4 (Four) Hours As Needed for Wheezing., Disp: 1 g, Rfl: 5  •  celecoxib (CeleBREX) 100 MG capsule, TAKE  ONE CAPSULE BY MOUTH TWICE DAILY AS NEEDED for mild pain (Patient taking differently: Take 100 mg by mouth 2 (Two) Times a Day As Needed for Moderate Pain .), Disp: 60 capsule, Rfl: 2  •  diclofenac (VOLTAREN) 1 % gel gel, Apply 4 g topically to the appropriate area as directed 4 (Four) Times a Day. (Patient taking differently: Apply 4 g topically to the appropriate area as directed 4 (Four) Times a Day As Needed (joint pain).), Disp: 100 g, Rfl: 3  •  estradiol (ESTRACE) 0.1 MG/GM vaginal cream, Insert 0.5 g into the vagina 2 (Two) Times a Week., Disp: , Rfl:   •  famotidine (PEPCID) 20 MG tablet, Take 20 mg by mouth 2 (Two) Times a Day., Disp: , Rfl:   •  fexofenadine (ALLEGRA) 180 MG tablet, Take 180 mg by mouth Daily As Needed (allergies)., Disp: , Rfl:   •  fluticasone-salmeterol (ADVAIR) 100-50 MCG/DOSE DISKUS, inhale 1 puffs by mouth twice daily (Patient taking differently: Inhale 1 puff 2 (Two) Times a Day.), Disp: 180 each, Rfl: 2  •  losartan-hydrochlorothiazide (HYZAAR) 100-25 MG per tablet, TAKE ONE TABLET BY MOUTH ONE TIME DAILY, Disp: 90 tablet, Rfl: 0  •  metoprolol succinate XL (TOPROL-XL) 200 MG 24 hr tablet, TAKE ONE TABLET BY MOUTH ONE TIME DAILY (Patient taking differently: Take 200 mg by mouth Daily.), Disp: 90 tablet, Rfl: 1  •  Synthroid 75 MCG tablet, Take 1 tablet by mouth Daily., Disp: 90 tablet, Rfl: 3  •  Triamcinolone Acetonide (NASACORT) 55 MCG/ACT nasal inhaler, 2 sprays into each nostril daily., Disp: , Rfl:   •  dicyclomine (BENTYL) 20 MG tablet, Take 1 tablet by mouth Every 8 (Eight) Hours As Needed (pain, cramps)., Disp: 20 tablet, Rfl: 0  •  omeprazole (priLOSEC) 20 MG capsule, Take 1 capsule by mouth Daily for 14 days., Disp: 14 capsule, Rfl: 0    Allergies:   Allergies   Allergen Reactions   • Tenormin [Atenolol] Cough and Angioedema   • Macrobid [Nitrofurantoin Monohyd Macro] Swelling and Other (See Comments)     Swelling around eyes, fatigue   • Plaquenil [Hydroxychloroquine  Sulfate] Other (See Comments)     Vision changes over time    • Omeprazole Diarrhea   • Ace Inhibitors Cough   • Sudafed [Pseudoephedrine Hcl] Other (See Comments)     INCREASED PRESSURE IN EYES        Social History:   Social History     Socioeconomic History   • Marital status:    Tobacco Use   • Smoking status: Never Smoker   • Smokeless tobacco: Never Used   Vaping Use   • Vaping Use: Never used   Substance and Sexual Activity   • Alcohol use: Yes     Alcohol/week: 3.0 standard drinks     Types: 3 Glasses of wine per week     Comment: occ   • Drug use: No   • Sexual activity: Not Currently        Surgical History:   Past Surgical History:   Procedure Laterality Date   • ADENOIDECTOMY      Removed as a child   • APPENDECTOMY  2016   • BONE GRAFT  2021   • BREAST SURGERY  10/2021   •  SECTION     • CHOLECYSTECTOMY N/A 3/29/2022    Procedure: CHOLECYSTECTOMY LAPAROSCOPIC;  Surgeon: Angie López MD;  Location: [facility] OR;  Service: General;  Laterality: N/A;   • COLON RESECTION  2016    sigmoid and partial rectum    • COLON SURGERY     • COLONOSCOPY     • EXPLORATORY LAPAROTOMY      fibroid   • ILEOSTOMY CLOSURE N/A 2016    Procedure: ILEOSTOMY  EXCISION AND TAKEDOWN;  Surgeon: Brooks Jacob MD;  Location: [facility] OR;  Service:    • KNEE ARTHROSCOPY      right knee scope   • MYOMECTOMY     • SMALL INTESTINE SURGERY     • TONSILECTOMY, ADENOIDECTOMY, BILATERAL MYRINGOTOMY AND TUBES     • TONSILLECTOMY     • TOOTH EXTRACTION      2021   • TOOTH EXTRACTION          Medical History:   Past Medical History:   Diagnosis Date   • Arthritis    • Asthma    • Cervical disc disease    • Disease of thyroid gland    • Diverticulitis of intestine with perforation    • E-coli UTI 2019    treated with antibioiutcs and urine rechecked and clean per pt report   • GERD (gastroesophageal reflux disease)    • Hashimoto's disease    • Headache    • Heart murmur     fast heart beat; sometimes  "irregular   • History of transfusion     several times, with surgeries post op and after c section, never a reaction   • Hypertension    • Hyperthyroidism     Thyroid nodule, hashimoto's thyroiditis   • Hypothyroidism    • Irritable bowel syndrome    • Kidney stone    • Low back pain     sometimes   • Lupus (HCC)    • Neuromuscular disorder (HCC)     Nerve damage lower back   • Palpitations     POSSIBLE ATRIAL TACHYCARDIA   • PONV (postoperative nausea and vomiting)    • Raynaud's disease    • Sjogren's disease (HCC)    • SVT (supraventricular tachycardia) (HCC)    • Tremor     sometimes hands shake   • Visual impairment         Objective     Physical Exam:  Vital Signs:   Vitals:    05/02/22 1045   BP: 118/80   BP Location: Right arm   Patient Position: Sitting   Cuff Size: Adult   Pulse: 64   Temp: 97.8 °F (36.6 °C)   TempSrc: Temporal   SpO2: 97%   Weight: 57.2 kg (126 lb)   Height: 145.4 cm (57.24\")     Body mass index is 27.04 kg/m².     Physical Exam  Vitals and nursing note reviewed.   Constitutional:       General: She is not in acute distress.     Appearance: She is well-developed. She is not diaphoretic.   Eyes:      General: No scleral icterus.     Conjunctiva/sclera: Conjunctivae normal.   Neck:      Thyroid: No thyromegaly.   Cardiovascular:      Rate and Rhythm: Normal rate and regular rhythm.   Pulmonary:      Effort: Pulmonary effort is normal.      Breath sounds: Normal breath sounds.   Abdominal:      General: Bowel sounds are normal. There is no distension.      Palpations: Abdomen is soft.      Tenderness: There is no abdominal tenderness. There is no guarding or rebound.      Hernia: No hernia is present.   Musculoskeletal:      Cervical back: Neck supple.      Right lower leg: No edema.      Left lower leg: No edema.   Skin:     General: Skin is warm and dry.      Capillary Refill: Capillary refill takes 2 to 3 seconds.      Coloration: Skin is not jaundiced or pale.      Findings: No bruising " or petechiae.      Nails: There is no clubbing.   Neurological:      Mental Status: She is alert and oriented to person, place, and time.   Psychiatric:         Behavior: Behavior normal.         Thought Content: Thought content normal.         Judgment: Judgment normal.         Assessment / Plan      Assessment/Plan:   Diagnoses and all orders for this visit:    1. Pain of upper abdomen (Primary)    Pain in the upper abdomen has resolved.  She has stopped her PPI and remains on Pepcid for her chronic GERD.  We discussed the possibility that she passed a gallstone. She will notify the clinic with any recurrence of this issue.  She will follow-up in the clinic in 4 months on her chronic diarrhea and GERD.    Follow Up:   Return in about 4 months (around 9/2/2022), or if symptoms worsen or fail to improve.    Plan of care reviewed with the patient at the conclusion of today's visit.  Education was provided regarding diagnosis, management, and any prescribed or recommended OTC medications.  Patient verbalized understanding of and agreement with management plan.     Time Statement:   Discussed plan of care in detail with patient today. Patient verbally understands and agrees. I have spent 20minutes reviewing available diagnostics, obtaining history, examining the patient, developing a treatment plan, and educating the patient on disease process and plan of care.     JACK Lares  [facility]

## 2022-05-17 ENCOUNTER — OFFICE VISIT (OUTPATIENT)
Dept: ENDOCRINOLOGY | Facility: CLINIC | Age: 72
End: 2022-05-17

## 2022-05-17 VITALS
HEIGHT: 57 IN | SYSTOLIC BLOOD PRESSURE: 122 MMHG | HEART RATE: 70 BPM | DIASTOLIC BLOOD PRESSURE: 70 MMHG | WEIGHT: 127.6 LBS | OXYGEN SATURATION: 97 % | BODY MASS INDEX: 27.53 KG/M2

## 2022-05-17 DIAGNOSIS — E06.3 HASHIMOTO'S THYROIDITIS: ICD-10-CM

## 2022-05-17 DIAGNOSIS — E04.1 SOLITARY THYROID NODULE: Primary | ICD-10-CM

## 2022-05-17 PROCEDURE — 99213 OFFICE O/P EST LOW 20 MIN: CPT | Performed by: INTERNAL MEDICINE

## 2022-05-17 RX ORDER — LEVOTHYROXINE SODIUM 75 MCG
75 TABLET ORAL DAILY
Qty: 90 TABLET | Refills: 3 | Status: SHIPPED | OUTPATIENT
Start: 2022-05-17

## 2022-05-17 NOTE — PROGRESS NOTES
"     Office Note      Date: 2022  Patient Name: Kavitha Barnhart  MRN: [id number]  : 1950    Chief Complaint   Patient presents with   • Hashimoto's Thyroiditis       History of Present Illness:   Kavitha Barnhart is a 71 y.o. female who presents for Hashimoto's Thyroiditis    She remains on synthroid 75mcg qd. She is taking this correctly. She isn't taking any interfering meds concurrently. She hasn't noted any change in the size of her neck. She notes occ sensation of food sticking when swallowing. She denies any sxs of hypo- or hyperthyroidism at this time.    She had labs done last month.  TSH was 0.6.  Free T4 was mildly elevated.    Subjective      Review of Systems:   Review of Systems   Constitutional: Negative.    Cardiovascular: Negative.    Gastrointestinal: Negative.    Endocrine: Negative.        The following portions of the patient's history were reviewed and updated as appropriate: allergies, current medications, past family history, past medical history, past social history, past surgical history and problem list.    Objective     Visit Vitals  /70   Pulse 70   Ht 145.3 cm (57.2\")   Wt 57.9 kg (127 lb 9.6 oz)   LMP  (LMP Unknown)   SpO2 97%   BMI 27.42 kg/m²       Physical Exam:  Physical Exam  Constitutional:       Appearance: Normal appearance.   Neck:      Thyroid: No thyroid mass, thyromegaly or thyroid tenderness.   Lymphadenopathy:      Cervical: No cervical adenopathy.   Neurological:      Mental Status: She is alert.         Labs:    TSH  No results found for: TSHBASE     Free T4  Free T4   Date Value Ref Range Status   2022 1.80 (H) 0.93 - 1.70 ng/dL Final       T3  No results found for: C1IKDWP      TPO  No results found for: THYROIDAB    TG AB  No results found for: THGAB    TG  No results found for: THYROGLB    CBC w/DIFF  Lab Results   Component Value Date    WBC 5.69 2022    RBC 4.97 2022    HGB 14.2 2022    HCT 41.2 2022    MCV 82.9 " 04/19/2022    MCH 28.6 04/19/2022    MCHC 34.5 04/19/2022    RDW 13.6 04/19/2022    RDWSD 40.9 04/19/2022    MPV 12.1 (H) 04/19/2022     04/19/2022    NEUTRORELPCT 46.9 04/19/2022    LYMPHORELPCT 37.6 04/19/2022    MONORELPCT 10.0 04/19/2022    EOSRELPCT 4.4 04/19/2022    BASORELPCT 0.9 04/19/2022    AUTOIGPER 0.2 04/19/2022    NEUTROABS 2.67 04/19/2022    LYMPHSABS 2.14 04/19/2022    MONOSABS 0.57 04/19/2022    EOSABS 0.25 04/19/2022    BASOSABS 0.05 04/19/2022    AUTOIGNUM 0.01 04/19/2022    NRBC 0.0 04/19/2022           Assessment / Plan      Assessment & Plan:  Diagnoses and all orders for this visit:    1. Solitary thyroid nodule (Primary)  Assessment & Plan:  Plan for neck u/s in about 6 months.      2. Hashimoto's thyroiditis  Assessment & Plan:  Continue synthroid.  Recent TFTs were okay.      Other orders  -     Synthroid 75 MCG tablet; Take 1 tablet by mouth Daily.  Dispense: 90 tablet; Refill: 3      Return in about 6 months (around 11/17/2022) for Recheck with TSH, neck u/s.    Surya Wells MD   05/17/2022

## 2022-05-20 ENCOUNTER — HOSPITAL ENCOUNTER (OUTPATIENT)
Dept: MAMMOGRAPHY | Facility: HOSPITAL | Age: 72
Discharge: HOME | End: 2022-05-20
Admitting: SURGERY

## 2022-05-20 DIAGNOSIS — R92.8 ABNORMAL MAMMOGRAM: ICD-10-CM

## 2022-05-20 PROCEDURE — 77066 DX MAMMO INCL CAD BI: CPT | Performed by: RADIOLOGY

## 2022-05-20 PROCEDURE — G0279 TOMOSYNTHESIS, MAMMO: HCPCS | Performed by: RADIOLOGY

## 2022-05-20 PROCEDURE — G0279 TOMOSYNTHESIS, MAMMO: HCPCS

## 2022-05-20 PROCEDURE — 77066 DX MAMMO INCL CAD BI: CPT

## 2022-05-24 DIAGNOSIS — Z01.812 BLOOD TESTS PRIOR TO TREATMENT OR PROCEDURE: Primary | ICD-10-CM

## 2022-05-25 ENCOUNTER — CLINICAL SUPPORT NO REQUIREMENTS (OUTPATIENT)
Dept: PULMONOLOGY | Facility: CLINIC | Age: 72
End: 2022-05-25

## 2022-05-25 DIAGNOSIS — Z01.812 BLOOD TESTS PRIOR TO TREATMENT OR PROCEDURE: ICD-10-CM

## 2022-05-25 PROCEDURE — U0004 COV-19 TEST NON-CDC HGH THRU: HCPCS | Performed by: INTERNAL MEDICINE

## 2022-05-25 PROCEDURE — 99211 OFF/OP EST MAY X REQ PHY/QHP: CPT | Performed by: INTERNAL MEDICINE

## 2022-05-25 PROCEDURE — U0005 INFEC AGEN DETEC AMPLI PROBE: HCPCS | Performed by: INTERNAL MEDICINE

## 2022-05-26 LAB — SARS-COV-2 RNA NOSE QL NAA+PROBE: NOT DETECTED

## 2022-05-27 ENCOUNTER — OFFICE VISIT (OUTPATIENT)
Dept: PULMONOLOGY | Facility: CLINIC | Age: 72
End: 2022-05-27

## 2022-05-27 VITALS
BODY MASS INDEX: 27.22 KG/M2 | TEMPERATURE: 97.8 F | HEART RATE: 80 BPM | WEIGHT: 126.2 LBS | HEIGHT: 57 IN | OXYGEN SATURATION: 97 % | SYSTOLIC BLOOD PRESSURE: 122 MMHG | DIASTOLIC BLOOD PRESSURE: 80 MMHG

## 2022-05-27 DIAGNOSIS — Z87.09 HISTORY OF ASTHMA: ICD-10-CM

## 2022-05-27 DIAGNOSIS — K21.9 CHRONIC GERD: ICD-10-CM

## 2022-05-27 DIAGNOSIS — R06.09 DYSPNEA ON EXERTION: Primary | ICD-10-CM

## 2022-05-27 DIAGNOSIS — M32.9 LUPUS: ICD-10-CM

## 2022-05-27 PROCEDURE — 99214 OFFICE O/P EST MOD 30 MIN: CPT | Performed by: INTERNAL MEDICINE

## 2022-05-27 PROCEDURE — 94726 PLETHYSMOGRAPHY LUNG VOLUMES: CPT | Performed by: INTERNAL MEDICINE

## 2022-05-27 PROCEDURE — 94375 RESPIRATORY FLOW VOLUME LOOP: CPT | Performed by: INTERNAL MEDICINE

## 2022-05-27 PROCEDURE — 94729 DIFFUSING CAPACITY: CPT | Performed by: INTERNAL MEDICINE

## 2022-05-27 NOTE — PROGRESS NOTES
"  PULMONARY  NOTE    Chief Complaint     History of asthma, history of lupus/mixed connective tissue disease, reflux, hoarseness, cough, non-smoker    History of Present Illness     71-year-old female returns today for follow-up  I initially saw her 3/24/2022    She is a non-smoker    She has been diagnosed with \"cough variant asthma\" many years ago  She has been on Advair or generic Advair with albuterol  She tolerates the Advair well and occasionally uses the albuterol  She has noted worsening of her cough if she has gone off of the Advair    She also has a history of lupus/mixed connective tissue disease  She is followed by Dr. Willi Luke    Since I last saw her she underwent a cholecystectomy  She is still recovering but feels that she is doing better    She is had no acute exacerbation of respiratory symptoms    Further testing is as noted below    Patient Active Problem List   Diagnosis   • Hypertension   • Peripheral vascular disease (HCC)   • Diverticulosis   • Colon polyps   • Constipation   • Hashimoto's thyroiditis   • Mixed connective tissue disease (HCC)   • Lupus (HCC)   • Uterine fibroid   • Palpitations   • Solitary thyroid nodule   • History of asthma   • GERD   • Gallbladder calculus without cholecystitis and no obstruction     Allergies   Allergen Reactions   • Tenormin [Atenolol] Cough and Angioedema   • Macrobid [Nitrofurantoin Monohyd Macro] Swelling and Other (See Comments)     Swelling around eyes, fatigue   • Plaquenil [Hydroxychloroquine Sulfate] Other (See Comments)     Vision changes over time    • Omeprazole Diarrhea   • Ace Inhibitors Cough   • Sudafed [Pseudoephedrine Hcl] Other (See Comments)     INCREASED PRESSURE IN EYES        Current Outpatient Medications:   •  albuterol sulfate  (90 Base) MCG/ACT inhaler, Inhale 2 puffs Every 4 (Four) Hours As Needed for Wheezing., Disp: 1 g, Rfl: 5  •  celecoxib (CeleBREX) 100 MG capsule, TAKE ONE CAPSULE BY MOUTH TWICE DAILY AS NEEDED " for mild pain (Patient taking differently: Take 100 mg by mouth 2 (Two) Times a Day As Needed for Moderate Pain .), Disp: 60 capsule, Rfl: 2  •  diclofenac (VOLTAREN) 1 % gel gel, Apply 4 g topically to the appropriate area as directed 4 (Four) Times a Day. (Patient taking differently: Apply 4 g topically to the appropriate area as directed 4 (Four) Times a Day As Needed (joint pain).), Disp: 100 g, Rfl: 3  •  estradiol (ESTRACE) 0.1 MG/GM vaginal cream, Insert 0.5 g into the vagina 2 (Two) Times a Week., Disp: , Rfl:   •  fexofenadine (ALLEGRA) 180 MG tablet, Take 180 mg by mouth Daily As Needed (allergies)., Disp: , Rfl:   •  fluticasone-salmeterol (ADVAIR) 100-50 MCG/DOSE DISKUS, inhale 1 puffs by mouth twice daily (Patient taking differently: Inhale 1 puff 2 (Two) Times a Day.), Disp: 180 each, Rfl: 2  •  losartan-hydrochlorothiazide (HYZAAR) 100-25 MG per tablet, TAKE ONE TABLET BY MOUTH ONE TIME DAILY, Disp: 90 tablet, Rfl: 0  •  metoprolol succinate XL (TOPROL-XL) 200 MG 24 hr tablet, TAKE ONE TABLET BY MOUTH ONE TIME DAILY (Patient taking differently: Take 200 mg by mouth Daily.), Disp: 90 tablet, Rfl: 1  •  Synthroid 75 MCG tablet, Take 1 tablet by mouth Daily., Disp: 90 tablet, Rfl: 3  •  Triamcinolone Acetonide (NASACORT) 55 MCG/ACT nasal inhaler, 2 sprays into each nostril daily., Disp: , Rfl:   MEDICATION LIST AND ALLERGIES REVIEWED.    Family History   Problem Relation Age of Onset   • Lung cancer Mother    • Hyperlipidemia Mother    • Thyroid disease Mother    • Pancreatic cancer Mother    • Cancer Mother    • Diabetes Mother    • Hypertension Mother    • Heart disease Father    • Hypertension Father    • Heart failure Father    • Polycystic kidney disease Brother    • Hypertension Brother    • Hypertension Brother    • Cancer Brother    • Heart disease Brother    • Hyperlipidemia Brother    • Hypertension Brother    • Kidney disease Brother         Kidney Failure, Polycystic Kidney Disease   •  "Hyperlipidemia Brother    • Hypertension Brother    • Kidney disease Brother         Polycystic Kidney Disease   • Thyroid disease Son    • Breast cancer Neg Hx    • Ovarian cancer Neg Hx      Social History     Tobacco Use   • Smoking status: Never Smoker   • Smokeless tobacco: Never Used   Vaping Use   • Vaping Use: Never used   Substance Use Topics   • Alcohol use: Yes     Alcohol/week: 1.0 standard drink     Types: 1 Glasses of wine per week     Comment: occ   • Drug use: No     Social History     Social History Narrative    Non-smoker     FAMILY AND SOCIAL HISTORY REVIEWED.    Review of Systems  ALSO REFER TO SCANNED ROS SHEET FROM SAME DATE.    /80   Pulse 80   Temp 97.8 °F (36.6 °C)   Ht 145.3 cm (57.2\")   Wt 57.2 kg (126 lb 3.2 oz)   LMP  (LMP Unknown)   SpO2 97% Comment: resting, room air  Breastfeeding No   BMI 27.12 kg/m²   Physical Exam  Vitals and nursing note reviewed.   Constitutional:       General: She is not in acute distress.     Appearance: She is well-developed. She is not diaphoretic.   HENT:      Head: Normocephalic and atraumatic.   Neck:      Thyroid: No thyromegaly.   Cardiovascular:      Rate and Rhythm: Normal rate and regular rhythm.      Heart sounds: Normal heart sounds. No murmur heard.  Pulmonary:      Effort: Pulmonary effort is normal.      Breath sounds: Normal breath sounds. No stridor.   Chest:   Breasts:      Right: No supraclavicular adenopathy.      Left: No supraclavicular adenopathy.       Lymphadenopathy:      Cervical: No cervical adenopathy.      Upper Body:      Right upper body: No supraclavicular or epitrochlear adenopathy.      Left upper body: No supraclavicular or epitrochlear adenopathy.   Skin:     General: Skin is warm and dry.   Neurological:      Mental Status: She is alert and oriented to person, place, and time.   Psychiatric:         Behavior: Behavior normal.         Results     Hybridization CT scan of the chest revealed no interstitial lung " disease, bronchiectasis or other suspicious intrathoracic findings    PFTs today reveal no airway obstruction, no restriction, and a normal diffusion capacity    Immunization History   Administered Date(s) Administered   • COVID-19 (PFIZER) PURPLE CAP 01/27/2021, 02/17/2021, 09/18/2021   • FluMist 2-49yrs 10/01/2016   • Fluad Quad 65+ 10/01/2020, 10/09/2021   • Fluzone High Dose =>65 Years ([employer] ONLY) 01/01/2014, 10/10/2016, 10/20/2017, 11/06/2018, 10/31/2019, 10/11/2020, 11/22/2021   • Hepatitis A 11/06/2018, 09/03/2019   • Pneumococcal Conjugate 13-Valent (PCV13) 02/28/2018   • Pneumococcal Polysaccharide (PPSV23) 03/12/2019   • Tdap 02/19/2018     Problem List       ICD-10-CM ICD-9-CM   1. Dyspnea on exertion  R06.00 786.09   2. History of asthma  Z87.09 V12.69   3. Lupus (HCC)  M32.9 710.0   4. GERD  K21.9 530.81       Discussion     We reviewed her test results to date  Exam, CT scan of the chest, and PFTs are within normal limits  I reassured her that she does not have evidence of autoimmune related lung disease at this time    She carries a diagnosis of asthma  PFTs are normal  Her symptoms appear well controlled on Advair with occasional albuterol use    I will plan to see her back on an annual basis or earlier if there are any problems in the meantime    I would continue to recommend reflux precautions.    Moderate level of Medical Decision Making complexity based on 2 or more chronic stable illnesses and prescription drug management.    Hayden Gilliland MD  Note electronically signed    CC: Farzad Redding MD

## 2022-06-06 RX ORDER — METOPROLOL SUCCINATE 200 MG/1
200 TABLET, EXTENDED RELEASE ORAL DAILY
Qty: 90 TABLET | Refills: 1 | Status: SHIPPED | OUTPATIENT
Start: 2022-06-06 | End: 2022-11-20

## 2022-07-21 NOTE — PROGRESS NOTES
[facility] Cardiology    Encounter Date: 2022    Patient ID: Kavitha Barnhart is a 71 y.o. female.  : 1950     PCP: Farzad Redding MD       Chief Complaint: Palpitations      PROBLEM LIST:  1. Palpitations/possible atrial tachycardia:  a. Patient was admitted for diverticulitis complicated by bowel perforation on 2016, and while in the hospital began having palpitations and shortness of breath that showed possible atrial tachycardia versus atrial fibrillation.   b. Patient reports a long-standing history of palpitations and tachycardia.   c. Echo, 01/10/2016, [facility]: LVH with EF 60-65%. No significant valve disease.   d. 24h Holter, 2016: Sinus rhythm with average heart rate of 85. Rare PVCs and PACs with occasional short runs of SVT/PAT. No symptoms were reported.   e. 30d Monitor, 2019: No significant arrhythmias noted, symptoms reported twice correlated with sinus rhythm at normal rate.    f. Echo, 2022: EF 55% consistent with grade I impaired relaxation. Trace MR/TR with normal RVSP.   2. Chest pain:  1. Negative exercise/Cardiolite MPS, 2022: EF>70%.   3. Hypertension.   4. Diverticulitis complicated by perforated sigmoid colon:  a. Currently awaiting surgery at the beginning of 2016. He was hospitalized at [facility], 2016 for approximately 2 weeks and treated with antibiotics.   5. Lupus and mixed connective tissue disease.   6. Raynaud’s disease.  7. Sjögren's disease.  8. Hashimoto disease.  9. Surgical history:  a. .  b. Knee surgery.    History of Present Illness  Patient presents today for a 1 year follow-up with a history of palpitations and cardiac risk factors. Since last visit, patient was experiencing chest and arm pain in November. She was told last summer during her visit in our office that her cardiac condition was stable, so she did not think that this could be cardiovascular  related. Last visit, her chief complaint was palpitations and based on previously unremarkable echocardiogram and cardiac monitor results she was scheduled to revisit after a year.   She had an elective surgery which required an EKG. The computer showed this as abnormal. This scared her because she thought her chest pain could have been a cardiac, her brother  of a heart attack in March and she has history of heart disease in multiple family members.  She saw her PCP for further evaluation and her PCP explained that her EKG was nonspecific however due to her symptoms additional testing was performed including an echocardiogram and Cardiolite stress test which were both unremarkable.    At present she is not experiencing any chest pain however continues to experience occasional palpitations described as racing and this mostly occurs when she forgets to take her metoprolol.  These do not awaken her from sleep.  Her lifestyle is otherwise active, she tries to exercise regularly.  She has lupus and is aware that this can cause multisystem involvement.  She [facility] and states that she has opted not to be on any medications due to concerns about side effects.    No current edema orthopnea PND dizziness lightheadedness or syncope.      Allergies   Allergen Reactions   • Tenormin [Atenolol] Cough and Angioedema   • Macrobid [Nitrofurantoin Monohyd Macro] Swelling and Other (See Comments)     Swelling around eyes, fatigue   • Plaquenil [Hydroxychloroquine Sulfate] Other (See Comments)     Vision changes over time    • Omeprazole Diarrhea   • Ace Inhibitors Cough   • Sudafed [Pseudoephedrine Hcl] Other (See Comments)     INCREASED PRESSURE IN EYES          Current Outpatient Medications:   •  albuterol sulfate  (90 Base) MCG/ACT inhaler, Inhale 2 puffs Every 4 (Four) Hours As Needed for Wheezing., Disp: 1 g, Rfl: 5  •  celecoxib (CeleBREX) 100 MG capsule, TAKE  ONE CAPSULE BY MOUTH TWICE DAILY AS NEEDED for mild pain (Patient taking differently: Take 100 mg by mouth 2 (Two) Times a Day As Needed for Moderate Pain .), Disp: 60 capsule, Rfl: 2  •  diclofenac (VOLTAREN) 1 % gel gel, Apply 4 g topically to the appropriate area as directed 4 (Four) Times a Day. (Patient taking differently: Apply 4 g topically to the appropriate area as directed 4 (Four) Times a Day As Needed (joint pain).), Disp: 100 g, Rfl: 3  •  estradiol (ESTRACE) 0.1 MG/GM vaginal cream, Insert 0.5 g into the vagina 2 (Two) Times a Week., Disp: , Rfl:   •  fexofenadine (ALLEGRA) 180 MG tablet, Take 180 mg by mouth Daily As Needed (allergies)., Disp: , Rfl:   •  fluticasone-salmeterol (ADVAIR) 100-50 MCG/DOSE DISKUS, inhale 1 puffs by mouth twice daily (Patient taking differently: Inhale 1 puff 2 (Two) Times a Day.), Disp: 180 each, Rfl: 2  •  losartan-hydrochlorothiazide (HYZAAR) 100-25 MG per tablet, TAKE ONE TABLET BY MOUTH ONE TIME DAILY, Disp: 90 tablet, Rfl: 0  •  metoprolol succinate XL (TOPROL-XL) 200 MG 24 hr tablet, Take 1 tablet by mouth Daily., Disp: 90 tablet, Rfl: 1  •  sulfamethoxazole-trimethoprim (BACTRIM DS,SEPTRA DS) 800-160 MG per tablet, Take 1 tablet by mouth Every 12 (Twelve) Hours., Disp: , Rfl:   •  Synthroid 75 MCG tablet, Take 1 tablet by mouth Daily., Disp: 90 tablet, Rfl: 3  •  Triamcinolone Acetonide (NASACORT) 55 MCG/ACT nasal inhaler, 2 sprays into each nostril daily., Disp: , Rfl:     The following portions of the patient's history were reviewed and updated as appropriate: allergies, current medications, past family history, past medical history, past social history, past surgical history and problem list.    ROS  Review of Systems   Constitution: Negative for chills, fever, fatigue, generalized weakness.   Cardiovascular: Negative for chest pain, dyspnea on exertion, leg swelling, palpitations, orthopnea, and syncope.   Respiratory: Negative for cough, shortness of breath,  "and wheezing.  HENT: Negative for ear pain, nosebleeds, and tinnitus.  Gastrointestinal: Negative for abdominal pain, constipation, diarrhea, nausea and vomiting.   Genitourinary: No urinary symptoms.  Musculoskeletal: Negative for muscle cramps.  Neurological: Negative for dizziness, headaches, loss of balance, numbness, and symptoms of stroke.  Psychiatric: Normal mental status.     All other systems reviewed and are negative.        Objective:     /64 (BP Location: Right arm, Patient Position: Sitting)   Pulse 76   Ht 145.3 cm (57.2\")   Wt 57.6 kg (127 lb)   LMP  (LMP Unknown)   SpO2 96%   BMI 27.29 kg/m²    BP repeat: 148/80    Physical Exam  Constitutional: Patient appears well-developed and well-nourished.   HENT: HEENT exam unremarkable.   Neck: Neck supple. No JVD present. No carotid bruits.   Cardiovascular: Normal rate, regular rhythm and normal heart sounds. No murmur heard.   2+ symmetric pulses.   Pulmonary/Chest: Breath sounds normal. Does not exhibit tenderness.   Abdominal: Abdomen benign.   Musculoskeletal: Does not exhibit edema.   Neurological: Neurological exam unremarkable.   Vitals reviewed.    Data Review:   Lab Results   Component Value Date    GLUCOSE 85 04/19/2022    BUN 30 (H) 04/19/2022    CREATININE 1.11 (H) 04/19/2022    EGFRIFNONA 59 (L) 10/19/2021    BCR 27.0 (H) 04/19/2022    K 3.4 (L) 04/19/2022    CO2 24.4 04/19/2022    CALCIUM 10.0 04/19/2022    ALBUMIN 4.50 04/19/2022    AST 16 04/19/2022    ALT 29 04/19/2022     Lab Results   Component Value Date    CHOL 180 04/19/2022    TRIG 136 04/19/2022    HDL 42 04/19/2022     (H) 04/19/2022      Lab Results   Component Value Date    WBC 5.69 04/19/2022    RBC 4.97 04/19/2022    HGB 14.2 04/19/2022    HCT 41.2 04/19/2022    MCV 82.9 04/19/2022     04/19/2022     Lab Results   Component Value Date    TSH 0.627 04/19/2022       Procedures           Assessment:      Diagnosis Plan   1. Palpitations  Holter Monitor - " 48 Hour for further assessment, specifically to make sure she is not experiencing paroxysmal fibrillation.    Patient advised to cut back on caffeine consumption and to maintain good hydration.  Continue Toprol- mg daily.   2. Essential hypertension   this has been mostly well controlled, continue current dose of losartan HCTZ and Toprol- mg daily.   4. Hypercholesteremia   start Crestor 5 mg daily considering her multiple risk factors, recheck lipid panel and CMP in 2 months.     Plan:   Stable cardiac status.  We reviewed the findings of recent echocardiogram and stress test with the patient and she was reassured that no significant abnormalities are noted.  Continue current medications.  Restrict caffeine consumption, maintain good hydration.  48h holter monitor for further assessment of palpitations and to make sure she is not experiencing atrial fibrillation.  Recent echocardiogram did not show any cardiac chamber specifically left atrial enlargement.    She is currently free of chest pain and recent stress test was negative, we discussed that If patient experiences chest pain again, she can follow-up with us and we will consider further testing.    Initiate rosuvastatin 5 mg daily for treatment of dyslipidemia considering her multiple risk factors especially family medical history with lipid panel and CMP to be rechecked in 2 months.  The likely cardiac involvement from lupus discussed with the patient, at present she is stable and free of any apparent cardiac involvement.  FU in 6 MO, sooner as needed.  Thank you for allowing us to participate in the care of your patient.     Scribed for Naveen Sarmiento MD by Jnaell Parks. 7/22/2022 14:19 EDT    I, Naveen Sarmiento MD, personally performed the services described in this documentation as scribed by the above named individual in my presence, and it is both accurate and complete.  7/22/2022  15:34 EDT      Please note that portions of this note may have  been completed with a voice recognition program. Efforts were made to edit the dictations, but occasionally words are mistranscribed.

## 2022-07-22 ENCOUNTER — OFFICE VISIT (OUTPATIENT)
Dept: CARDIOLOGY | Facility: CLINIC | Age: 72
End: 2022-07-22

## 2022-07-22 VITALS
WEIGHT: 127 LBS | BODY MASS INDEX: 27.4 KG/M2 | SYSTOLIC BLOOD PRESSURE: 126 MMHG | HEART RATE: 76 BPM | DIASTOLIC BLOOD PRESSURE: 64 MMHG | OXYGEN SATURATION: 96 % | HEIGHT: 57 IN

## 2022-07-22 DIAGNOSIS — E78.00 HYPERCHOLESTEREMIA: ICD-10-CM

## 2022-07-22 DIAGNOSIS — I10 PRIMARY HYPERTENSION: ICD-10-CM

## 2022-07-22 DIAGNOSIS — R00.2 PALPITATIONS: Primary | ICD-10-CM

## 2022-07-22 DIAGNOSIS — I10 ESSENTIAL HYPERTENSION: ICD-10-CM

## 2022-07-22 PROCEDURE — 99214 OFFICE O/P EST MOD 30 MIN: CPT | Performed by: INTERNAL MEDICINE

## 2022-07-22 RX ORDER — ROSUVASTATIN CALCIUM 5 MG/1
5 TABLET, COATED ORAL DAILY
Qty: 90 TABLET | Refills: 3 | Status: SHIPPED | OUTPATIENT
Start: 2022-07-22

## 2022-07-22 RX ORDER — SULFAMETHOXAZOLE AND TRIMETHOPRIM 800; 160 MG/1; MG/1
1 TABLET ORAL EVERY 12 HOURS
COMMUNITY
Start: 2022-07-18 | End: 2022-09-02

## 2022-08-15 DIAGNOSIS — I10 ESSENTIAL HYPERTENSION: ICD-10-CM

## 2022-08-15 NOTE — TELEPHONE ENCOUNTER
Caller: Kavitha Barnhart    Relationship: Self    Best call back number: [phone]    Requested Prescriptions:   Requested Prescriptions     Pending Prescriptions Disp Refills   • losartan-hydrochlorothiazide (HYZAAR) 100-25 MG per tablet 90 tablet 0     Sig: Take 1 tablet by mouth Daily.        Pharmacy where request should be sent: [id number] - [place], [place] - [address]  - [phone]  - [phone] FX     Additional details provided by patient:  KAVITHA SAYS SHE HAS NO PILL LEFT.    Does the patient have less than a 3 day supply:  [x] Yes  [] No    Jenniffer Christianson Rep   08/15/22 13:47 EDT

## 2022-08-16 RX ORDER — LOSARTAN POTASSIUM AND HYDROCHLOROTHIAZIDE 25; 100 MG/1; MG/1
1 TABLET ORAL DAILY
Qty: 90 TABLET | Refills: 2 | Status: SHIPPED | OUTPATIENT
Start: 2022-08-16

## 2022-09-09 ENCOUNTER — OFFICE VISIT (OUTPATIENT)
Dept: GASTROENTEROLOGY | Facility: CLINIC | Age: 72
End: 2022-09-09

## 2022-09-09 VITALS
HEART RATE: 73 BPM | HEIGHT: 57 IN | SYSTOLIC BLOOD PRESSURE: 120 MMHG | WEIGHT: 128.2 LBS | DIASTOLIC BLOOD PRESSURE: 72 MMHG | BODY MASS INDEX: 27.66 KG/M2 | OXYGEN SATURATION: 97 % | TEMPERATURE: 97.3 F

## 2022-09-09 DIAGNOSIS — K21.9 GASTROESOPHAGEAL REFLUX DISEASE, UNSPECIFIED WHETHER ESOPHAGITIS PRESENT: ICD-10-CM

## 2022-09-09 DIAGNOSIS — R15.1 FECAL SMEARING: ICD-10-CM

## 2022-09-09 DIAGNOSIS — K58.2 IRRITABLE BOWEL SYNDROME WITH BOTH CONSTIPATION AND DIARRHEA: Primary | ICD-10-CM

## 2022-09-09 PROCEDURE — 99214 OFFICE O/P EST MOD 30 MIN: CPT | Performed by: NURSE PRACTITIONER

## 2022-09-09 RX ORDER — SULFAMETHOXAZOLE AND TRIMETHOPRIM 800; 160 MG/1; MG/1
TABLET ORAL
COMMUNITY
End: 2022-10-19

## 2022-09-09 RX ORDER — TRAMADOL HYDROCHLORIDE 50 MG/1
TABLET ORAL
COMMUNITY
End: 2022-10-19

## 2022-09-09 RX ORDER — DIGITAL THERAPEUTICS, IBS
1 MISCELLANEOUS MISCELLANEOUS DAILY
Qty: 1 EACH | Refills: 0 | Status: SHIPPED | OUTPATIENT
Start: 2022-09-09 | End: 2022-12-09

## 2022-09-09 RX ORDER — IBUPROFEN 600 MG/1
TABLET ORAL
COMMUNITY
End: 2022-10-19

## 2022-09-09 RX ORDER — DICYCLOMINE HCL 20 MG
TABLET ORAL
COMMUNITY
End: 2022-09-09

## 2022-09-09 NOTE — PROGRESS NOTES
Follow Up      Patient Name: Kavitha Barnhart  : 1950   MRN: [id number]     Chief Complaint:    Chief Complaint   Patient presents with   • Follow-up       History of Present Illness: Kavitha Barnhart is a 72 y.o. female who is here today for follow up on GERD, IBS-mixed    IBS-mixed:  Has alternating bowel habits within one day.  Typically has about 3-7 BMs a day that range between bristol scale 1-7.  The symptoms are worse with eating so she has to limit eating while out.  Sometimes this is urgency which makes her afraid to travel. There is bloating.  She only occasionally has abdominal pain but does have rectal pain.  Has had consult for [facility] for incontinence with Dr. Jacob but wants to hold off for now.      GERD:  Stable on pepcid as needed.  Has only needed it twice in the past several months. No dysphagia. Her appetite is good now but comes and goes.  She has not lost weight. She has never had an EGD.      CT Abdomen Pelvis Without Contrast (2022 20:18)-No acute process seen within the abdomen or pelvis.  US Abdomen Limited (2022 11:40)Cholelithiasis without evidence of cholecystitis.  Redemonstration of a few scattered small hepatic cysts.     SCANNED - COLONOSCOPY (2021)-widely patent lower rectal anastomosis, diverticuli and remaining colon without inflammation    Abdominal surgical history includes Luisa  There is a history of bowel perforation from complicated diverticulitis in 2015 with colon resection by Dr. Jacob, and appendectomy, and exploratory laparotomy.       Subjective      Review of Systems:   Review of Systems   Constitutional: Negative for appetite change and unexpected weight loss.   HENT: Negative for trouble swallowing.    Gastrointestinal: Positive for abdominal distention, abdominal pain, constipation, diarrhea and GERD. Negative for anal bleeding, blood in stool, nausea, rectal pain, vomiting and indigestion.       Medications:     Current Outpatient  Medications:   •  albuterol sulfate  (90 Base) MCG/ACT inhaler, Inhale 2 puffs Every 4 (Four) Hours As Needed for Wheezing., Disp: 1 g, Rfl: 5  •  celecoxib (CeleBREX) 100 MG capsule, TAKE ONE CAPSULE BY MOUTH TWICE DAILY AS NEEDED for mild pain (Patient taking differently: Take 100 mg by mouth 2 (Two) Times a Day As Needed for Moderate Pain .), Disp: 60 capsule, Rfl: 2  •  clindamycin (CLEOCIN T) 1 % external solution, APPLY TO AFFECTED AREA ONCE TO TWICE DAILY AFTER SHOWER, Disp: , Rfl:   •  diclofenac (VOLTAREN) 1 % gel gel, Apply 4 g topically to the appropriate area as directed 4 (Four) Times a Day. (Patient taking differently: Apply 4 g topically to the appropriate area as directed 4 (Four) Times a Day As Needed (joint pain).), Disp: 100 g, Rfl: 3  •  Digital Therapy (Mahana IBS) misc, 1 each Daily., Disp: 1 each, Rfl: 0  •  fexofenadine (ALLEGRA) 180 MG tablet, Take 180 mg by mouth Daily As Needed (allergies)., Disp: , Rfl:   •  fluticasone-salmeterol (ADVAIR) 100-50 MCG/DOSE DISKUS, inhale 1 puffs by mouth twice daily (Patient taking differently: Inhale 1 puff 2 (Two) Times a Day.), Disp: 180 each, Rfl: 2  •  ibuprofen (ADVIL,MOTRIN) 600 MG tablet, ibuprofen 600 mg tablet  PLEASE SEE ATTACHED FOR DETAILED DIRECTIONS, Disp: , Rfl:   •  losartan-hydrochlorothiazide (HYZAAR) 100-25 MG per tablet, Take 1 tablet by mouth Daily., Disp: 90 tablet, Rfl: 2  •  metoprolol succinate XL (TOPROL-XL) 200 MG 24 hr tablet, Take 1 tablet by mouth Daily., Disp: 90 tablet, Rfl: 1  •  rosuvastatin (CRESTOR) 5 MG tablet, Take 1 tablet by mouth Daily., Disp: 90 tablet, Rfl: 3  •  sulfamethoxazole-trimethoprim (BACTRIM DS,SEPTRA DS) 800-160 MG per tablet, sulfamethoxazole 800 mg-trimethoprim 160 mg tablet  TAKE 1 TABLET BY MOUTH EVERY 12 HOURS, Disp: , Rfl:   •  Synthroid 75 MCG tablet, Take 1 tablet by mouth Daily., Disp: 90 tablet, Rfl: 3  •  traMADol (ULTRAM) 50 MG tablet, tramadol 50 mg tablet, Disp: , Rfl:   •   Triamcinolone Acetonide (NASACORT) 55 MCG/ACT nasal inhaler, 2 sprays into each nostril daily., Disp: , Rfl:   •  Yuvafem 10 MCG tablet vaginal tablet, APPLY 1 TABLET IN THE VAGINA 2 TIMES WEEKLY., Disp: , Rfl:     Allergies:   Allergies   Allergen Reactions   • Tenormin [Atenolol] Cough and Angioedema   • Macrobid [Nitrofurantoin Monohyd Macro] Swelling and Other (See Comments)     Swelling around eyes, fatigue   • Plaquenil [Hydroxychloroquine Sulfate] Other (See Comments)     Vision changes over time    • Omeprazole Diarrhea   • Ace Inhibitors Cough   • Sudafed [Pseudoephedrine Hcl] Other (See Comments)     INCREASED PRESSURE IN EYES        Social History:   Social History     Socioeconomic History   • Marital status:    Tobacco Use   • Smoking status: Never Smoker   • Smokeless tobacco: Never Used   Vaping Use   • Vaping Use: Never used   Substance and Sexual Activity   • Alcohol use: Yes     Alcohol/week: 2.0 - 3.0 standard drinks     Types: 2 - 3 Glasses of wine per week     Comment: occ   • Drug use: No   • Sexual activity: Not Currently     Partners: Male     Birth control/protection: None        Surgical History:   Past Surgical History:   Procedure Laterality Date   • ADENOIDECTOMY      Removed as a child   • APPENDECTOMY  2016   • BONE GRAFT  2021   • BREAST BIOPSY Left 10/15/2021    excisional   • BREAST SURGERY  10/2021   •  SECTION  1970   • CHOLECYSTECTOMY N/A 2022    Procedure: CHOLECYSTECTOMY LAPAROSCOPIC;  Surgeon: Angie López MD;  Location: [facility] OR;  Service: General;  Laterality: N/A;   • COLON RESECTION  2016    sigmoid and partial rectum    • COLON SURGERY     • COLONOSCOPY     • EXPLORATORY LAPAROTOMY      fibroid   • ILEOSTOMY CLOSURE N/A 2016    Procedure: ILEOSTOMY  EXCISION AND TAKEDOWN;  Surgeon: Brooks Jacob MD;  Location:  [facility] OR;  Service:    • KNEE ARTHROSCOPY      right knee scope   • MYOMECTOMY     • SMALL INTESTINE SURGERY    "  • TONSILECTOMY, ADENOIDECTOMY, BILATERAL MYRINGOTOMY AND TUBES     • TONSILLECTOMY     • TOOTH EXTRACTION      05/2021   • TOOTH EXTRACTION          Medical History:   Past Medical History:   Diagnosis Date   • Allergic rhinitis Many years ago   • Anemia Prior to menopause   • Arthritis    • Asthma    • Asthma, intrinsic around 2000   • Cervical disc disease    • Disease of thyroid gland    • Diverticulitis of intestine with perforation    • E-coli UTI 2019    treated with antibioiutcs and urine rechecked and clean per pt report   • GERD (gastroesophageal reflux disease)    • Hashimoto's disease    • Headache    • Heart murmur     fast heart beat; sometimes irregular   • History of transfusion     several times, with surgeries post op and after c section, never a reaction   • Hyperlipidemia sometimes marginally high   • Hypertension    • Hyperthyroidism     Thyroid nodule, hashimoto's thyroiditis   • Hypothyroidism    • Irritable bowel syndrome    • Kidney stone    • Low back pain     sometimes   • Lupus (HCC)    • Neuromuscular disorder (HCC)     Nerve damage lower back   • Palpitations     POSSIBLE ATRIAL TACHYCARDIA   • PONV (postoperative nausea and vomiting)    • Raynaud's disease    • Sinusitis very long time   • Sjogren's disease (HCC)    • SVT (supraventricular tachycardia) (HCC)    • Thyroid nodule 2006   • Tremor     sometimes hands shake   • Visual impairment    • Vitamin D deficiency 2000        Objective     Physical Exam:  Vital Signs:   Vitals:    09/09/22 0903   BP: 120/72   BP Location: Left arm   Patient Position: Sitting   Cuff Size: Adult   Pulse: 73   Temp: 97.3 °F (36.3 °C)   TempSrc: Temporal   SpO2: 97%   Weight: 58.2 kg (128 lb 3.2 oz)   Height: 145.3 cm (57.21\")     Body mass index is 27.54 kg/m².     Physical Exam  Vitals and nursing note reviewed.   Constitutional:       General: She is not in acute distress.     Appearance: She is well-developed.   Pulmonary:      Effort: Pulmonary " effort is normal. No accessory muscle usage or respiratory distress.   Abdominal:      General: Bowel sounds are normal. There is no distension.      Palpations: Abdomen is soft.   Skin:     Coloration: Skin is not pale.      Findings: No erythema.   Neurological:      Mental Status: She is alert and oriented to person, place, and time.   Psychiatric:         Speech: Speech normal.         Behavior: Behavior normal.         Thought Content: Thought content normal.         Judgment: Judgment normal.         Assessment / Plan      Assessment/Plan:   Diagnoses and all orders for this visit:    1. Irritable bowel syndrome with both constipation and diarrhea (Primary)  -     Celiac Comprehensive Panel; Future  -     Ambulatory Referral to Physical Therapy Pelvic Floor  -     Digital Therapy ([facility] IBS) misc; 1 each Daily.  Dispense: 1 each; Refill: 0  I have given her a copy of the low FODMAPs diet to try.  We also discussed the [employer] zulma which I have prescribed.  2. Fecal smearing/fecal incontinence  -     Ambulatory Referral to Physical Therapy Pelvic Floor  She has been evaluated by Dr. Jacob for the InterStim but would like to hold off on this.  Refer to pelvic floor  3. Gastroesophageal reflux disease, unspecified whether esophagitis present  -     Stable, continue current treatment plan of pepcid as needed         Follow Up:   Return in about 8 weeks (around 11/4/2022), or if symptoms worsen or fail to improve.    Plan of care reviewed with the patient at the conclusion of today's visit.  Education was provided regarding diagnosis, management, and any prescribed or recommended OTC medications.  Patient verbalized understanding of and agreement with management plan.     Time Statement:   Discussed plan of care in detail with patient today. Patient verbally understands and agrees. I have spent30 minutes reviewing available diagnostics, obtaining history, examining the patient, developing a treatment plan, and  educating the patient on disease process and plan of care.     JACK Lares  [facility]

## 2022-09-10 ENCOUNTER — LAB (OUTPATIENT)
Dept: LAB | Facility: HOSPITAL | Age: 72
End: 2022-09-10

## 2022-09-10 DIAGNOSIS — E78.00 HYPERCHOLESTEREMIA: ICD-10-CM

## 2022-09-10 DIAGNOSIS — K58.2 IRRITABLE BOWEL SYNDROME WITH BOTH CONSTIPATION AND DIARRHEA: ICD-10-CM

## 2022-09-10 DIAGNOSIS — I10 ESSENTIAL HYPERTENSION: ICD-10-CM

## 2022-09-10 DIAGNOSIS — I10 PRIMARY HYPERTENSION: ICD-10-CM

## 2022-09-10 DIAGNOSIS — R00.2 PALPITATIONS: ICD-10-CM

## 2022-09-10 LAB
ALBUMIN SERPL-MCNC: 4.5 G/DL (ref 3.5–5.2)
ALBUMIN/GLOB SERPL: 2 G/DL
ALP SERPL-CCNC: 65 U/L (ref 39–117)
ALT SERPL W P-5'-P-CCNC: 16 U/L (ref 1–33)
ANION GAP SERPL CALCULATED.3IONS-SCNC: 8 MMOL/L (ref 5–15)
AST SERPL-CCNC: 20 U/L (ref 1–32)
BILIRUB SERPL-MCNC: 0.6 MG/DL (ref 0–1.2)
BUN SERPL-MCNC: 27 MG/DL (ref 8–23)
BUN/CREAT SERPL: 25.5 (ref 7–25)
CALCIUM SPEC-SCNC: 9.9 MG/DL (ref 8.6–10.5)
CHLORIDE SERPL-SCNC: 104 MMOL/L (ref 98–107)
CHOLEST SERPL-MCNC: 130 MG/DL (ref 0–200)
CO2 SERPL-SCNC: 28 MMOL/L (ref 22–29)
CREAT SERPL-MCNC: 1.06 MG/DL (ref 0.57–1)
EGFRCR SERPLBLD CKD-EPI 2021: 55.2 ML/MIN/1.73
GLOBULIN UR ELPH-MCNC: 2.3 GM/DL
GLUCOSE SERPL-MCNC: 100 MG/DL (ref 65–99)
HDLC SERPL-MCNC: 54 MG/DL (ref 40–60)
LDLC SERPL CALC-MCNC: 62 MG/DL (ref 0–100)
LDLC/HDLC SERPL: 1.17 {RATIO}
POTASSIUM SERPL-SCNC: 3.9 MMOL/L (ref 3.5–5.2)
PROT SERPL-MCNC: 6.8 G/DL (ref 6–8.5)
SODIUM SERPL-SCNC: 140 MMOL/L (ref 136–145)
TRIGL SERPL-MCNC: 65 MG/DL (ref 0–150)
VLDLC SERPL-MCNC: 14 MG/DL (ref 5–40)

## 2022-09-10 PROCEDURE — 86364 TISS TRNSGLTMNASE EA IG CLAS: CPT

## 2022-09-10 PROCEDURE — 82784 ASSAY IGA/IGD/IGG/IGM EACH: CPT

## 2022-09-10 PROCEDURE — 80061 LIPID PANEL: CPT

## 2022-09-10 PROCEDURE — 80053 COMPREHEN METABOLIC PANEL: CPT

## 2022-09-10 PROCEDURE — 86231 EMA EACH IG CLASS: CPT

## 2022-09-10 PROCEDURE — 36415 COLL VENOUS BLD VENIPUNCTURE: CPT

## 2022-09-10 PROCEDURE — 86258 DGP ANTIBODY EACH IG CLASS: CPT

## 2022-09-12 LAB
ENDOMYSIUM IGA SER QL: NEGATIVE
GLIADIN PEPTIDE IGA SER-ACNC: 3 UNITS (ref 0–19)
GLIADIN PEPTIDE IGG SER-ACNC: 1 UNITS (ref 0–19)
IGA SERPL-MCNC: 158 MG/DL (ref 64–422)
TTG IGA SER-ACNC: <2 U/ML (ref 0–3)
TTG IGG SER-ACNC: <2 U/ML (ref 0–5)

## 2022-09-13 ENCOUNTER — TELEPHONE (OUTPATIENT)
Dept: GASTROENTEROLOGY | Facility: CLINIC | Age: 72
End: 2022-09-13

## 2022-09-13 NOTE — TELEPHONE ENCOUNTER
----- Message from JACK Rainey sent at 9/13/2022  7:39 AM EDT -----  Can you let her know her celiac test was negative.  Her cholesterol was good.  Evidence of chronic kidney dysfunction that looks like it has been present for at least the past year.  Does she follow with her PCP for her chronic kidney disease?

## 2022-09-13 NOTE — TELEPHONE ENCOUNTER
I spoke with Ms Plummer. Lab results given. Patient stated she has been on Low fat, Lactose free, Low residue, and Whitman diet for almost 6 years. She has narrow down on what she can eat. Its consistent with the Diet you gave her.

## 2022-09-29 ENCOUNTER — TREATMENT (OUTPATIENT)
Dept: PHYSICAL THERAPY | Facility: CLINIC | Age: 72
End: 2022-09-29

## 2022-09-29 DIAGNOSIS — R15.1 FECAL SMEARING: ICD-10-CM

## 2022-09-29 DIAGNOSIS — M62.89 PELVIC FLOOR DYSFUNCTION: Primary | ICD-10-CM

## 2022-09-29 DIAGNOSIS — N81.89 PELVIC FLOOR WEAKNESS: ICD-10-CM

## 2022-09-29 DIAGNOSIS — M62.838 MUSCLE SPASM: ICD-10-CM

## 2022-09-29 DIAGNOSIS — K58.2 IRRITABLE BOWEL SYNDROME WITH BOTH CONSTIPATION AND DIARRHEA: ICD-10-CM

## 2022-09-29 PROCEDURE — 97163 PT EVAL HIGH COMPLEX 45 MIN: CPT | Performed by: PHYSICAL THERAPIST

## 2022-09-29 NOTE — PROGRESS NOTES
Physical Therapy Initial Evaluation and Plan of Care    Patient: Kavitha Barnhart   : 1950  Diagnosis/ICD-10 Code:  Pelvic floor dysfunction [M62.89]  Referring practitioner: JACK Rainey  Date of Initial Visit: 2022  Today's Date: 2022  Patient seen for Visit count could not be calculated. Make sure you are using a visit which is associated with an episode. sessions      Subjective  2015 she suddenly had severe stomach pain and went to ER. Had ruptured intestine and was in hospital for 3-4 weeks and did not do surgery at that time. Saw another MD after that and recommended surgery after healing 3 months later had anterior resection and removed 10 inches of colon and some other things because of a lot of scarring. Also found endometriosis. Never returned to normal bowel movements. Gets watery stools, fine one minute then urgency that hits and she has to rush to the bathroom. Very forceful. Most of the time she just goes a lot.     Chief Complaint:   Chief Complaint   Patient presents with   • Initial Evaluation      Functional Outcome Measure: PFDI-20 score: 140.6        Bladder function:    Incontinence: with cough/sneeze/laugh - has asthma  Recently having more UTI and has pain with that; takes cranberry and vitamin C to try to control it; doesn't want to take antibiotics  # of pads in 24 hours: 1 pad  Leak at night: no  Urination at night: 1x  Use bathroom “just in case”: no  Strong urinary urge: no  Leaking with urge: no  Complete bladder voiding: feels emptying  Urinary splaying: no  Post-micturition dribble: yes  Frequency of urination: 2 hours  Discomfort with urination: no unless UTI      Bowel function:  Can go from having to strain at beginning to having water loose stool at the end of multiple bowel movements.   How many episodes of leakage/month: 1-2x/month; watches what she eats; recently had neighborhood party and then had issue after having some wine, made it but had to run  home 2x; felt very embarrassed. Can't go to dinner and a play, has to do one or the other; feels she is good at controlling diet to a degree. Feels eating is extremely healthy. Eats low fat due to gallbladder, extremely low fiber and bland diet. Eats meat and white bread and potatoes and rice. Can eat some fruits/vegetables - can't be fresh.   Has hx of Lupus.   How many BM's/day: never less than 3, commonly up to 9; rushing at least 3 BMs per day  [place] Stool Scale Type: 1-7  Discomfort with BM: sometimes anus hurts very badly; just thinks from going so often  Complete bowel voiding: not emptying completely  Assistance to pass stool: straining; started trying 1/2 dose of medication recently thinks may help.   Incontinence with gas: pain with gas; able to hold in to some extent  Ability to pass gas: yes      Prior PT or other treatment: none    Diagnostics:    PMH:   Past Medical History:   Diagnosis Date   • Allergic rhinitis Many years ago   • Anemia Prior to menopause   • Arthritis    • Asthma    • Asthma, intrinsic around 2000   • Cervical disc disease    • Disease of thyroid gland    • Diverticulitis of intestine with perforation    • E-coli UTI 2019    treated with antibioiutcs and urine rechecked and clean per pt report   • GERD (gastroesophageal reflux disease)    • Hashimoto's disease    • Headache    • Heart murmur     fast heart beat; sometimes irregular   • History of transfusion     several times, with surgeries post op and after c section, never a reaction   • Hyperlipidemia sometimes marginally high   • Hypertension    • Hyperthyroidism     Thyroid nodule, hashimoto's thyroiditis   • Hypothyroidism    • Irritable bowel syndrome    • Kidney stone    • Low back pain     sometimes   • Lupus (HCC)    • Neuromuscular disorder (HCC)     Nerve damage lower back   • Palpitations     POSSIBLE ATRIAL TACHYCARDIA   • PONV (postoperative nausea and vomiting)    • Raynaud's disease    • Sinusitis very long time    • Sjogren's disease (HCC)    • SVT (supraventricular tachycardia) (HCC)    • Thyroid nodule    • Tremor     sometimes hands shake   • Visual impairment    • Vitamin D deficiency         Surgical HX:   Past Surgical History:   Procedure Laterality Date   • ADENOIDECTOMY      Removed as a child   • APPENDECTOMY  2016   • BONE GRAFT  2021   • BREAST BIOPSY Left 10/15/2021    excisional   • BREAST SURGERY  10/2021   •  SECTION     • CHOLECYSTECTOMY N/A 2022    Procedure: CHOLECYSTECTOMY LAPAROSCOPIC;  Surgeon: Angie López MD;  Location:  [facility] OR;  Service: General;  Laterality: N/A;   • COLON RESECTION  2016    sigmoid and partial rectum    • COLON SURGERY     • COLONOSCOPY     • EXPLORATORY LAPAROTOMY      fibroid   • ILEOSTOMY CLOSURE N/A 2016    Procedure: ILEOSTOMY  EXCISION AND TAKEDOWN;  Surgeon: Brooks Jacob MD;  Location:  [facility] OR;  Service:    • KNEE ARTHROSCOPY      right knee scope   • MYOMECTOMY     • SMALL INTESTINE SURGERY     • TONSILECTOMY, ADENOIDECTOMY, BILATERAL MYRINGOTOMY AND TUBES     • TONSILLECTOMY     • TOOTH EXTRACTION      2021   • TOOTH EXTRACTION        ; myomectomy for fibroids; 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    Menstrual cycle: n/a      Meds:   Current Outpatient Medications:   •  albuterol sulfate  (90 Base) MCG/ACT inhaler, Inhale 2 puffs Every 4 (Four) Hours As Needed for Wheezing., Disp: 1 g, Rfl: 5  •  celecoxib (CeleBREX) 100 MG capsule, TAKE ONE CAPSULE BY MOUTH TWICE DAILY AS NEEDED for mild pain (Patient taking differently: Take 100 mg by mouth 2 (Two) Times a Day As Needed for Moderate Pain .), Disp: 60 capsule, Rfl: 2  •  clindamycin (CLEOCIN T) 1 % external solution, APPLY TO AFFECTED AREA ONCE TO TWICE DAILY AFTER SHOWER, Disp: , Rfl:   •  diclofenac (VOLTAREN) 1 % gel gel, Apply 4 g topically to the appropriate area as directed 4 (Four) Times a Day. (Patient taking differently: Apply 4 g topically  to the appropriate area as directed 4 (Four) Times a Day As Needed (joint pain).), Disp: 100 g, Rfl: 3  •  Digital Therapy (Mahana IBS) misc, 1 each Daily., Disp: 1 each, Rfl: 0  •  fexofenadine (ALLEGRA) 180 MG tablet, Take 180 mg by mouth Daily As Needed (allergies)., Disp: , Rfl:   •  fluticasone-salmeterol (ADVAIR) 100-50 MCG/DOSE DISKUS, inhale 1 puffs by mouth twice daily (Patient taking differently: Inhale 1 puff 2 (Two) Times a Day.), Disp: 180 each, Rfl: 2  •  ibuprofen (ADVIL,MOTRIN) 600 MG tablet, ibuprofen 600 mg tablet  PLEASE SEE ATTACHED FOR DETAILED DIRECTIONS, Disp: , Rfl:   •  losartan-hydrochlorothiazide (HYZAAR) 100-25 MG per tablet, Take 1 tablet by mouth Daily., Disp: 90 tablet, Rfl: 2  •  metoprolol succinate XL (TOPROL-XL) 200 MG 24 hr tablet, Take 1 tablet by mouth Daily., Disp: 90 tablet, Rfl: 1  •  rosuvastatin (CRESTOR) 5 MG tablet, Take 1 tablet by mouth Daily., Disp: 90 tablet, Rfl: 3  •  sulfamethoxazole-trimethoprim (BACTRIM DS,SEPTRA DS) 800-160 MG per tablet, sulfamethoxazole 800 mg-trimethoprim 160 mg tablet  TAKE 1 TABLET BY MOUTH EVERY 12 HOURS, Disp: , Rfl:   •  Synthroid 75 MCG tablet, Take 1 tablet by mouth Daily., Disp: 90 tablet, Rfl: 3  •  traMADol (ULTRAM) 50 MG tablet, tramadol 50 mg tablet, Disp: , Rfl:   •  Triamcinolone Acetonide (NASACORT) 55 MCG/ACT nasal inhaler, 2 sprays into each nostril daily., Disp: , Rfl:   •  Yuvafem 10 MCG tablet vaginal tablet, APPLY 1 TABLET IN THE VAGINA 2 TIMES WEEKLY., Disp: , Rfl:      Occupation: retired      Objective    Patient independently ambulates into clinic.     Verbal consent obtained for internal pelvic exam/treatment with declined need for second person in room  Posture: Forward head and shoulders  Palpation:  Supine:  Breathing pattern: Shallow, chest  Abdominals: Mild tension B  Moderate scarring throughout abdomen  Iliacus : Moderate tension B 5/10 TTP  Psoas : Moderate tension B 3/10 TTP  Seated LE MMT: Grossly 4+/5  B  ASLR: Negative    PELVIC FLOOR   External:    Sensation: Intact B   Skin quality: Active hemorrhoids          Internal:   Anal wink: Absent  EAS: Mild tension 5/10 TTP  Puborectalis: Minimal tension  Sensation: Intact  Bulge: incomplete with poor coordination and mild contraction  Knack: Weak      PERF SCALE:  Power: 2    Endurance: 5    Repetitions: 3    Fast twitch: 4        See flowsheet for details of treatment following evaluation.    Basic information was provided regarding pelvic floor anatomy, explanation of the function of the pelvic floor, and contribution to symptoms.  Assessment/Plan:     Assessment/Plan    The patient is a 72 y.o. female who presents to physical therapy today for IBS, constipation, diarrhea and fecal incontinence. Upon initial evaluation, the patient demonstrates the following impairments: moderate scarring and tension in abdominals and hip flexors, decreased strength and coordination of pelvic floor muscles.. Due to these impairments, the patient is unable to participate in social activities without embarrassment. The patient would benefit from skilled pelvic PT services to address functional limitations and impairments and to improve patient quality of life.      Goals:   STG's: 4 weeks  · Patient will report > 25% reduction in bowel issues for improved social activities  · Patient will report having a BM no more than 5 times per day demonstrating improved emptying  · Patient will be able to perform HEP with minimal verbal cues    LTG's: By discharge  · Patient will report >75% improvement in bowel symptoms for improved quality of life  · Patient will have no fecal incontinence or smearing x2 weeks for improved quality of lifes  · Patient will be independent with HEP      Plan  Therapy options: will be seen for skilled physical therapy services  Planned modality interventions: TENS, ultrasound, cryotherapy, thermotherapy (hydrocollator packs)  Planned therapy interventions:  abdominal trunk stabilization, manual therapy, neuromuscular re-education, body mechanics training, flexibility, functional ROM exercises, gait training, home exercise program, joint mobilization, therapeutic activities, stretching, strengthening, spinal/joint mobilization, soft tissue mobilization and postural training, dry needling  Pt prognosis: Good  Frequency: Weekly  Duration in visits: 11  Duration in weeks: 16  Treatment plan discussed with: patient    Treatment Time: 1043 - 1115  Timed:         Manual Therapy:    0     mins  00515;     Therapeutic Exercise:    0     mins  21555;     Neuromuscular Rae:    0    mins  77363;    Therapeutic Activity:     0     mins  74647;     Gait Trainin     mins  59735;     Ultrasound:     0     mins  27334;    Ionto                               0    mins   90870  Self Care                       0     mins   19268  Canalith Repos    0     mins 94845      Un-Timed:  Electrical Stimulation:    0     mins  33448 ( );  Dry Needling     0     mins self-pay  Traction     0     mins 87633  Low Eval     0     Mins  67354  Mod Eval     0     Mins  00240  High Eval                       32     Mins  76844  Re-Eval                           0    mins  63319        Timed Treatment:   0   mins   Total Treatment:     32   mins    PT SIGNATURE:   Magen Smith PT   [place] License # [id number]    DATE TREATMENT INITIATED: 2022    Initial Certification  Certification Period: 2022  I certify that the therapy services are furnished while this patient is under my care.  The services outlined above are required by this patient, and will be reviewed every 90 days.       PHYSICIAN: Miroslava Salinas APRN  NPI: [id number]                                           DATE: 2022     Please sign and return via fax to [phone]. Thank you, [facility].

## 2022-10-04 ENCOUNTER — TREATMENT (OUTPATIENT)
Dept: PHYSICAL THERAPY | Facility: CLINIC | Age: 72
End: 2022-10-04

## 2022-10-04 DIAGNOSIS — K58.2 IRRITABLE BOWEL SYNDROME WITH BOTH CONSTIPATION AND DIARRHEA: Primary | ICD-10-CM

## 2022-10-04 DIAGNOSIS — M62.838 MUSCLE SPASM: ICD-10-CM

## 2022-10-04 DIAGNOSIS — M62.89 PELVIC FLOOR DYSFUNCTION: ICD-10-CM

## 2022-10-04 DIAGNOSIS — R15.1 FECAL SMEARING: ICD-10-CM

## 2022-10-04 DIAGNOSIS — N81.89 PELVIC FLOOR WEAKNESS: ICD-10-CM

## 2022-10-04 PROCEDURE — 97140 MANUAL THERAPY 1/> REGIONS: CPT | Performed by: PHYSICAL THERAPIST

## 2022-10-04 PROCEDURE — 97110 THERAPEUTIC EXERCISES: CPT | Performed by: PHYSICAL THERAPIST

## 2022-10-04 NOTE — PROGRESS NOTES
Physical Therapy Daily Treatment Note  Patient: Kavitha Barnhart   : 1950  Diagnosis/ICD-10 Code:  Irritable bowel syndrome with both constipation and diarrhea [K58.2]  Referring practitioner: JACK Rainey  Date of Initial Visit: Type: THERAPY  Noted: 2022  Today's Date: 10/7/2022  Patient seen for 1 sessions      Subjective   Kavitha Barnhart reports had horrible day on Friday where she went to bathroom 10 times. No BM today yet.     Pain Rating (0-10): 0      Objective   verbal consent obtained for external pelvic exam/treatment with declined need for second person in room     See Exercise, Manual, and Modality Logs for complete treatment.       Assessment/Plan  Patient tolerated manual therapy with mild discomfort that decreased with scar mobilization and trigger point release.  Patient instructed in abdominal stretching.  We will continue manual and progress as tolerated to decrease bowel symptoms.    Progress per Plan of Care         1533/1615   Timed:         Manual Therapy:    34     mins  20537;     Therapeutic Exercise:    8     mins  02460;     Neuromuscular Rae:    0    mins  98999;    Therapeutic Activity:     0     mins  21237;     Gait Trainin     mins  21737;     Ultrasound:     0     mins  96352;    Ionto                               0    mins   29947  Self Care                       0     mins   72738  Canalith Repos               0    mins  44920    Un-Timed:  Electrical Stimulation:    0     mins  55774 ( );  Dry Needling     0     mins self-pay  Traction     0     mins 98998  Low Eval     0     Mins  15300  Mod Eval     0     Mins  87173  High Eval                       0     Mins  84957  Re-Eval                           0    mins  29072    Timed Treatment:   42   mins   Total Treatment:     42   mins    NICOLE Muse License # [id number]  Physical Therapist

## 2022-10-10 ENCOUNTER — TREATMENT (OUTPATIENT)
Dept: PHYSICAL THERAPY | Facility: CLINIC | Age: 72
End: 2022-10-10

## 2022-10-10 DIAGNOSIS — R15.1 FECAL SMEARING: ICD-10-CM

## 2022-10-10 DIAGNOSIS — M62.838 MUSCLE SPASM: ICD-10-CM

## 2022-10-10 DIAGNOSIS — N81.89 PELVIC FLOOR WEAKNESS: ICD-10-CM

## 2022-10-10 DIAGNOSIS — K58.2 IRRITABLE BOWEL SYNDROME WITH BOTH CONSTIPATION AND DIARRHEA: Primary | ICD-10-CM

## 2022-10-10 DIAGNOSIS — M62.89 PELVIC FLOOR DYSFUNCTION: ICD-10-CM

## 2022-10-10 PROCEDURE — 97140 MANUAL THERAPY 1/> REGIONS: CPT | Performed by: PHYSICAL THERAPIST

## 2022-10-10 PROCEDURE — 97110 THERAPEUTIC EXERCISES: CPT | Performed by: PHYSICAL THERAPIST

## 2022-10-10 NOTE — PROGRESS NOTES
Physical Therapy Daily Treatment Note  Patient: Kavitha Barnhart   : 1950  Diagnosis/ICD-10 Code:  Irritable bowel syndrome with both constipation and diarrhea [K58.2]  Referring practitioner: JACK Rainey  Date of Initial Visit: Type: THERAPY  Noted: 2022  Today's Date: 10/12/2022  Patient seen for 2 sessions      Subjective   Kavitha Barnhart reports some soreness in R glute with doing stretch. When does exercise with back against the wall she feels nothing so she is doing it as cobra on bed. Next day had multiple BMs, the next few days only 3 per day and they have been okay.     Pain Rating (0-10): 2      Objective   verbal consent obtained for external pelvic exam/treatment with declined need for second person in room     See Exercise, Manual, and Modality Logs for complete treatment.       Assessment/Plan  Pt tolerated manual with mild discomfort that decreased with MFR and TPR. Hip stretch was modified to target hip flexors. Will continue manual and progress as tolerated to decrease bowel symptoms.     Progress per Plan of Care         1430/1515   Timed:         Manual Therapy:    35     mins  73257;     Therapeutic Exercise:    8     mins  56868;     Neuromuscular Rae:    0    mins  73324;    Therapeutic Activity:     0     mins  76013;     Gait Trainin     mins  13179;     Ultrasound:     0     mins  31468;    Ionto                               0    mins   52786  Self Care                       0     mins   37130  Canalith Repos               0    mins  95830    Un-Timed:  Electrical Stimulation:    0     mins  41044 ( );  Dry Needling     0     mins self-pay  Traction     0     mins 13267  Low Eval     0     Mins  07260  Mod Eval     0     Mins  65138  High Eval                       0     Mins  79459  Re-Eval                           0    mins  98809    Timed Treatment:   43   mins   Total Treatment:     43   mins    Magen Smith PT  [place] License # [id number]  Physical  Therapist

## 2022-10-19 ENCOUNTER — TREATMENT (OUTPATIENT)
Dept: PHYSICAL THERAPY | Facility: CLINIC | Age: 72
End: 2022-10-19

## 2022-10-19 ENCOUNTER — OFFICE VISIT (OUTPATIENT)
Dept: INTERNAL MEDICINE | Facility: CLINIC | Age: 72
End: 2022-10-19

## 2022-10-19 ENCOUNTER — LAB (OUTPATIENT)
Dept: LAB | Facility: HOSPITAL | Age: 72
End: 2022-10-19

## 2022-10-19 VITALS
RESPIRATION RATE: 16 BRPM | WEIGHT: 128.2 LBS | HEART RATE: 64 BPM | OXYGEN SATURATION: 97 % | HEIGHT: 57 IN | BODY MASS INDEX: 27.66 KG/M2 | SYSTOLIC BLOOD PRESSURE: 98 MMHG | DIASTOLIC BLOOD PRESSURE: 62 MMHG | TEMPERATURE: 97.8 F

## 2022-10-19 DIAGNOSIS — M62.89 PELVIC FLOOR DYSFUNCTION: ICD-10-CM

## 2022-10-19 DIAGNOSIS — M25.552 BILATERAL HIP PAIN: ICD-10-CM

## 2022-10-19 DIAGNOSIS — N81.89 PELVIC FLOOR WEAKNESS: ICD-10-CM

## 2022-10-19 DIAGNOSIS — K21.9 GASTROESOPHAGEAL REFLUX DISEASE WITHOUT ESOPHAGITIS: ICD-10-CM

## 2022-10-19 DIAGNOSIS — E78.5 HYPERLIPIDEMIA, UNSPECIFIED HYPERLIPIDEMIA TYPE: ICD-10-CM

## 2022-10-19 DIAGNOSIS — M25.551 BILATERAL HIP PAIN: ICD-10-CM

## 2022-10-19 DIAGNOSIS — Z23 IMMUNIZATION DUE: ICD-10-CM

## 2022-10-19 DIAGNOSIS — J45.20 MILD INTERMITTENT ASTHMA WITHOUT COMPLICATION: ICD-10-CM

## 2022-10-19 DIAGNOSIS — I10 ESSENTIAL HYPERTENSION: Primary | ICD-10-CM

## 2022-10-19 DIAGNOSIS — M62.838 MUSCLE SPASM: ICD-10-CM

## 2022-10-19 DIAGNOSIS — R15.1 FECAL SMEARING: ICD-10-CM

## 2022-10-19 DIAGNOSIS — K58.2 IRRITABLE BOWEL SYNDROME WITH BOTH CONSTIPATION AND DIARRHEA: Primary | ICD-10-CM

## 2022-10-19 DIAGNOSIS — E03.9 HYPOTHYROIDISM, UNSPECIFIED TYPE: ICD-10-CM

## 2022-10-19 PROCEDURE — 0124A COVID-19 (PFIZER) BIVALENT BOOSTER 12+YRS: CPT | Performed by: INTERNAL MEDICINE

## 2022-10-19 PROCEDURE — G0008 ADMIN INFLUENZA VIRUS VAC: HCPCS | Performed by: INTERNAL MEDICINE

## 2022-10-19 PROCEDURE — 97140 MANUAL THERAPY 1/> REGIONS: CPT | Performed by: PHYSICAL THERAPIST

## 2022-10-19 PROCEDURE — 99214 OFFICE O/P EST MOD 30 MIN: CPT | Performed by: INTERNAL MEDICINE

## 2022-10-19 PROCEDURE — 90662 IIV NO PRSV INCREASED AG IM: CPT | Performed by: INTERNAL MEDICINE

## 2022-10-19 PROCEDURE — 91312 COVID-19 (PFIZER) BIVALENT BOOSTER 12+YRS: CPT | Performed by: INTERNAL MEDICINE

## 2022-10-19 PROCEDURE — 84443 ASSAY THYROID STIM HORMONE: CPT | Performed by: INTERNAL MEDICINE

## 2022-10-19 PROCEDURE — 84439 ASSAY OF FREE THYROXINE: CPT | Performed by: INTERNAL MEDICINE

## 2022-10-19 RX ORDER — CELECOXIB 100 MG/1
100 CAPSULE ORAL 2 TIMES DAILY PRN
Qty: 60 CAPSULE | Refills: 2 | Status: SHIPPED | OUTPATIENT
Start: 2022-10-19

## 2022-10-19 RX ORDER — ALBUTEROL SULFATE 90 UG/1
2 AEROSOL, METERED RESPIRATORY (INHALATION) EVERY 4 HOURS PRN
Qty: 1 G | Refills: 5 | Status: SHIPPED | OUTPATIENT
Start: 2022-10-19

## 2022-10-19 NOTE — PROGRESS NOTES
Physical Therapy Daily Treatment Note  Patient: Kavitha Barnhart   : 1950  Diagnosis/ICD-10 Code:  Irritable bowel syndrome with both constipation and diarrhea [K58.2]  Referring practitioner: JACK Rainey  Date of Initial Visit: Type: THERAPY  Noted: 2022  Today's Date: 10/19/2022  Patient seen for 3 sessions      Subjective   Kavitha Barnhart reports feeling gassy today. Ate later in the day. Reports things have been better in GI area. Feels very encouraged. Feels not having the urgency as much and not quite going as much. Things feel calmer more of the time. Taking Pepcid for some chest pain she had over the weekend.   Pain Rating (0-10): 0      Objective   verbal consent obtained for external pelvic exam/treatment with declined need for second person in room     See Exercise, Manual, and Modality Logs for complete treatment.       Assessment/Plan  Patient compliant with HEP.  She feels she has had mild improvement with urgency and frequency of bowel movements.  She tolerated manual with minimal discomfort today.  We will continue manual and progress stretching and down training as indicated to reduce bowel symptoms.    Progress per Plan of Care         1533/1615   Timed:         Manual Therapy:    38     mins  70106;     Therapeutic Exercise:    4     mins  19125;     Neuromuscular Rae:    0    mins  97392;    Therapeutic Activity:     0     mins  71678;     Gait Trainin     mins  53957;     Ultrasound:     0     mins  34163;    Ionto                               0    mins   87322  Self Care                       0     mins   01406  Canalith Repos               0    mins  76712    Un-Timed:  Electrical Stimulation:    0     mins  67190 ( );  Dry Needling     0     mins self-pay  Traction     00     mins 14762  Low Eval     0     Mins  95144  Mod Eval     0     Mins  59315  High Eval                       0     Mins  00600  Re-Eval                           0    mins  90762    Timed  Treatment:   42   mins   Total Treatment:     42   mins    Magen Smith, PT  [place] License # [id number]  Physical Therapist

## 2022-10-19 NOTE — PROGRESS NOTES
Chief Complaint   Patient presents with   • Hypertension     6 month follow up       History of Present Illness      The patient presents for a follow-up related to hypertension. The patient reports that she has had no headaches, chest pain, dyspnea, edema, syncope, blurred vision or palpitations. She states that she is taking her medication as prescribed. She is not having medication side effects.    The patient presents for a follow-up related to hypothyroidism. She reports a good energy level. She reports no hair loss, heat intolerance, cold intolerance, diarrhea, constipation or sweats. She is taking her medication as prescribed.    The patient presents for a follow-up related to GERD. The patient is not on medication for her gastroesophageal reflux. She reports no abdominal pain, belching, dysphagia, early satiety, heartburn, hoarseness, nausea, odynophagia, rectal bleeding, vomiting or weight loss. The GERD has no known aggravating factors.    The patient presents for a follow-up related to hyperlipidemia. She is following a low fat diet. She reports that she is exercising. She is taking rosuvastatin. The patient is taking her medication as prescribed. She reports no medication side effects, including muscle cramps, abdominal pain, headaches or weakness. She denies orthopnea, paroxysmal nocturnal dyspnea or dyspnea on exertion.    Medications      Current Outpatient Medications:   •  albuterol sulfate  (90 Base) MCG/ACT inhaler, Inhale 2 puffs Every 4 (Four) Hours As Needed for Wheezing., Disp: 1 g, Rfl: 5  •  celecoxib (CeleBREX) 100 MG capsule, Take 1 capsule by mouth 2 (Two) Times a Day As Needed for Mild Pain., Disp: 60 capsule, Rfl: 2  •  clindamycin (CLEOCIN T) 1 % external solution, APPLY TO AFFECTED AREA ONCE TO TWICE DAILY AFTER SHOWER, Disp: , Rfl:   •  diclofenac (VOLTAREN) 1 % gel gel, Apply 4 g topically to the appropriate area as directed 4 (Four) Times a Day. (Patient taking differently:  Apply 4 g topically to the appropriate area as directed 4 (Four) Times a Day As Needed (joint pain).), Disp: 100 g, Rfl: 3  •  Digital Therapy (Mahana IBS) misc, 1 each Daily., Disp: 1 each, Rfl: 0  •  fexofenadine (ALLEGRA) 180 MG tablet, Take 180 mg by mouth Daily As Needed (allergies)., Disp: , Rfl:   •  fluticasone-salmeterol (ADVAIR) 100-50 MCG/DOSE DISKUS, inhale 1 puffs by mouth twice daily (Patient taking differently: Inhale 1 puff 2 (Two) Times a Day.), Disp: 180 each, Rfl: 2  •  losartan-hydrochlorothiazide (HYZAAR) 100-25 MG per tablet, Take 1 tablet by mouth Daily., Disp: 90 tablet, Rfl: 2  •  metoprolol succinate XL (TOPROL-XL) 200 MG 24 hr tablet, Take 1 tablet by mouth Daily., Disp: 90 tablet, Rfl: 1  •  rosuvastatin (CRESTOR) 5 MG tablet, Take 1 tablet by mouth Daily., Disp: 90 tablet, Rfl: 3  •  Synthroid 75 MCG tablet, Take 1 tablet by mouth Daily., Disp: 90 tablet, Rfl: 3  •  Triamcinolone Acetonide (NASACORT) 55 MCG/ACT nasal inhaler, 2 sprays into each nostril daily., Disp: , Rfl:   •  Yuvafem 10 MCG tablet vaginal tablet, APPLY 1 TABLET IN THE VAGINA 2 TIMES WEEKLY., Disp: , Rfl:      Allergies    Allergies   Allergen Reactions   • Tenormin [Atenolol] Cough and Angioedema   • Macrobid [Nitrofurantoin Monohyd Macro] Swelling and Other (See Comments)     Swelling around eyes, fatigue   • Plaquenil [Hydroxychloroquine Sulfate] Other (See Comments)     Vision changes over time    • Omeprazole Diarrhea   • Ace Inhibitors Cough   • Sudafed [Pseudoephedrine Hcl] Other (See Comments)     INCREASED PRESSURE IN EYES        Problem List    Patient Active Problem List   Diagnosis   • Hypertension   • Peripheral vascular disease (HCC)   • Diverticulosis   • Colon polyps   • Constipation   • Hashimoto's thyroiditis   • Mixed connective tissue disease (HCC)   • Lupus (HCC)   • Uterine fibroid   • Palpitations   • Solitary thyroid nodule   • History of asthma   • GERD   • Gallbladder calculus without  "cholecystitis and no obstruction       Medications, Allergies, Problems List and Past History were reviewed and updated.    Physical Examination    BP 98/62 (BP Location: Left arm, Patient Position: Sitting, Cuff Size: Adult)   Pulse 64   Temp 97.8 °F (36.6 °C) (Infrared)   Resp 16   Ht 145.4 cm (57.25\")   Wt 58.2 kg (128 lb 3.2 oz)   LMP  (LMP Unknown)   SpO2 97%   BMI 27.50 kg/m²     HEENT: Head- Normocephalic Atraumatic. Facies- Within normal limits. Pinnas- Normal texture and shape bilaterally. Canals- Normal bilaterally. TMs- Normal bilaterally. Nares- Patent bilaterally. Nasal Septum- is normal. There is no tenderness to palpation over the frontal or maxillary sinuses. Lids- Normal bilaterally. Conjunctiva- Clear bilaterally. Sclera- Anicteric bilaterally. Oropharynx- Moist with no lesions. Tonsils- No enlargement, erythema or exudate.    Neck: Thyroid- non enlarged, symmetric and has no nodules. No bruits are detected.    Lungs: Auscultation- Clear to auscultation bilaterally. There are no retractions, clubbing or cyanosis. The Expiratory to Inspiratory ratio is equal.    Cardiovascular: There are no carotid bruits. Heart- Normal Rate with Regular rhythm and no murmurs. There are no gallops. There are no rubs. In the lower extremities there is no edema. The upper extremities do not have edema.    Abdomen: Soft, benign, non-tender with no masses, hernias, organomegaly or scars.    Impression and Assessment    Encounter for Immunization Administration.    Essential Hypertension.    Hypothyroidism.    Gastroesophageal Reflux Disease.    Hyperlipidemia.    Plan    Gastroesophageal Reflux Disease Plan: The patient was instructed to continue the current medications.    Essential Hypertension Plan: The patient was instructed to continue the current medications.    Hyperlipidemia Plan: The patient was instructed to exercise daily, eat a low fat diet and continue her medications.    Hypothyroidism Plan: The " patient was instructed to continue the current medications.    Counseled regarding immunizations and applicable VIS given.    Immunizations Ordered and Administered: Fluzone High Dose 65+ years and Covid 19.    Diagnoses and all orders for this visit:    1. Essential hypertension (Primary)  -     Cancel: Comprehensive Metabolic Panel; Future    2. Hypothyroidism, unspecified type  -     TSH; Future  -     T4, Free; Future    3. Gastroesophageal reflux disease without esophagitis    4. Hyperlipidemia, unspecified hyperlipidemia type  -     Cancel: Comprehensive Metabolic Panel; Future  -     Cancel: Lipid Panel; Future    5. Bilateral hip pain  -     celecoxib (CeleBREX) 100 MG capsule; Take 1 capsule by mouth 2 (Two) Times a Day As Needed for Mild Pain.  Dispense: 60 capsule; Refill: 2    6. Mild intermittent asthma without complication  -     albuterol sulfate  (90 Base) MCG/ACT inhaler; Inhale 2 puffs Every 4 (Four) Hours As Needed for Wheezing.  Dispense: 1 g; Refill: 5    7. Immunization due  -     Fluzone High-Dose 65+yrs  -     COVID-19 Bivalent Booster (Pfizer) 12+yrs        Return to Office    The patient was instructed to return for follow-up in 6 months. The patient was instructed to return sooner if the condition changes, worsens, or does not resolve.

## 2022-10-20 LAB
T4 FREE SERPL-MCNC: 1.67 NG/DL (ref 0.93–1.7)
TSH SERPL DL<=0.05 MIU/L-ACNC: 2.01 UIU/ML (ref 0.27–4.2)

## 2022-11-07 ENCOUNTER — TREATMENT (OUTPATIENT)
Dept: PHYSICAL THERAPY | Facility: CLINIC | Age: 72
End: 2022-11-07

## 2022-11-07 DIAGNOSIS — M62.838 MUSCLE SPASM: ICD-10-CM

## 2022-11-07 DIAGNOSIS — N81.89 PELVIC FLOOR WEAKNESS: ICD-10-CM

## 2022-11-07 DIAGNOSIS — M62.89 PELVIC FLOOR DYSFUNCTION: ICD-10-CM

## 2022-11-07 DIAGNOSIS — K58.2 IRRITABLE BOWEL SYNDROME WITH BOTH CONSTIPATION AND DIARRHEA: Primary | ICD-10-CM

## 2022-11-07 DIAGNOSIS — R15.1 FECAL SMEARING: ICD-10-CM

## 2022-11-07 PROCEDURE — 97140 MANUAL THERAPY 1/> REGIONS: CPT | Performed by: PHYSICAL THERAPIST

## 2022-11-07 NOTE — PROGRESS NOTES
Re-Assessment / Progress note    PHYSICAL THERAPY    [address], [address]; [place] [address]        Patient: Kavitha Barnhart   : 1950  Diagnosis/ICD-10 Code:  Irritable bowel syndrome with both constipation and diarrhea [K58.2]  Referring practitioner: JACK Rainey  Date of Initial Visit: Type: THERAPY  Noted: 2022  Today's Date: 2022  Patient seen for 4 sessions      Subjective:   Kavitha Barnhart reports: was great after last visit for several days where went twice a day. By last week had more urgency.   Had to hurry and go to bathroom during a meeting. Made it. Going 5-9x per day since Friday. Has had only one episode of FI last night.     Subjective Questionnaire: PFDI 20: 120.7  Clinical Progress: improved  Home Program Compliance: Yes  Treatment has included: therapeutic exercise, neuromuscular re-education, manual therapy, therapeutic activity and moist heat    Objective   verbal consent obtained for external pelvic exam/treatment with declined need for second person in room   Abdominals: Mild tension B  Mild-mod scar tissue restrictions abdomen  Iliacus : Mild-Moderate tension B   Psoas : Mild-moderate tension B   See flowsheet for details of treatment following reassessment.     Assessment/Plan  Progress to physical therapy goals is good.She reports improved bowel urgency and frequency for > 1 week after last treatment though increasing over the last 3-4 days, so improvement is transient. She has met 2/3 short term goals and 0/3 long term goals.  Patient demonstrates decreasing tension of abdominal and hip muscles with improved mobility of abdominal scar. She will benefit from continued skilled physical therapy to address remaining impairments and functional limitations.   Progress toward previous goals: Partially Met    Goals:   STG's: 4 weeks  • Patient will report > 25% reduction in bowel issues for improved social activities - met  • Patient will report having a BM no more  than 5 times per day demonstrating improved emptying - met some days  • Patient will be able to perform HEP with minimal verbal cues - met     LTG's: By discharge  • Patient will report >75% improvement in bowel symptoms for improved quality of life  • Patient will have no fecal incontinence or smearing x2 weeks for improved quality of lifes  • Patient will be independent with HEP      Recommendations: Continue as planned  Timeframe: 2 months  Prognosis to achieve goals: good    PT Signature: Magen Smith PT      Based upon review of the patient's progress and continued therapy plan, it is my medical opinion that Kavitha Barnhart should continue physical therapy treatment at [facility]  [address] [place] [id number] [phone].    Signature: __________________________________    PHYSICIAN: Miroslava Salinas APRN  NPI: [id number]                                        1430/1515  Manual Therapy:    38     mins  27492;  Therapeutic Exercise:    5     mins  94455;     Neuromuscular Rae:    0    mins  66509;    Therapeutic Activity:     0     mins  55801;     Gait Trainin     mins  49675;     Ultrasound:     0     mins  09070;    Electrical Stimulation:    0     mins  92521 ( );  Dry Needling     0     mins self-pay    Timed Treatment:   43   mins   Total Treatment:     43   mins

## 2022-11-08 ENCOUNTER — OFFICE VISIT (OUTPATIENT)
Dept: INTERNAL MEDICINE | Facility: CLINIC | Age: 72
End: 2022-11-08

## 2022-11-08 VITALS
SYSTOLIC BLOOD PRESSURE: 118 MMHG | TEMPERATURE: 98 F | RESPIRATION RATE: 16 BRPM | BODY MASS INDEX: 27.89 KG/M2 | DIASTOLIC BLOOD PRESSURE: 72 MMHG | HEART RATE: 64 BPM | WEIGHT: 130 LBS

## 2022-11-08 DIAGNOSIS — L98.9 SKIN LESION: Primary | ICD-10-CM

## 2022-11-08 PROCEDURE — 11104 PUNCH BX SKIN SINGLE LESION: CPT | Performed by: INTERNAL MEDICINE

## 2022-11-08 NOTE — PROGRESS NOTES
Chief Complaint   Patient presents with   • Lesion removal       History of Present Illness    She presents for an initial evaluation with a lesion on the left breast. This has been present for many years. The condition is oozing and irritated. There are no exposures to people with similar lesions. There is no history of new cosmetic or detergent usage on the affected area. No prior treatment has been administered. There is no evidence of infection. The patient denies a dry cough, a wet cough, wheezing, facial pain, a headache, eye drainage, ear pain, ear drainage or nasal discharge.    Medications      Current Outpatient Medications:   •  albuterol sulfate  (90 Base) MCG/ACT inhaler, Inhale 2 puffs Every 4 (Four) Hours As Needed for Wheezing., Disp: 1 g, Rfl: 5  •  celecoxib (CeleBREX) 100 MG capsule, Take 1 capsule by mouth 2 (Two) Times a Day As Needed for Mild Pain., Disp: 60 capsule, Rfl: 2  •  diclofenac (VOLTAREN) 1 % gel gel, Apply 4 g topically to the appropriate area as directed 4 (Four) Times a Day. (Patient taking differently: Apply 4 g topically to the appropriate area as directed 4 (Four) Times a Day As Needed (joint pain).), Disp: 100 g, Rfl: 3  •  Digital Therapy (Mahana IBS) misc, 1 each Daily., Disp: 1 each, Rfl: 0  •  fexofenadine (ALLEGRA) 180 MG tablet, Take 180 mg by mouth Daily As Needed (allergies)., Disp: , Rfl:   •  fluticasone-salmeterol (ADVAIR) 100-50 MCG/DOSE DISKUS, inhale 1 puffs by mouth twice daily (Patient taking differently: Inhale 1 puff 2 (Two) Times a Day.), Disp: 180 each, Rfl: 2  •  losartan-hydrochlorothiazide (HYZAAR) 100-25 MG per tablet, Take 1 tablet by mouth Daily., Disp: 90 tablet, Rfl: 2  •  metoprolol succinate XL (TOPROL-XL) 200 MG 24 hr tablet, Take 1 tablet by mouth Daily., Disp: 90 tablet, Rfl: 1  •  rosuvastatin (CRESTOR) 5 MG tablet, Take 1 tablet by mouth Daily., Disp: 90 tablet, Rfl: 3  •  Synthroid 75 MCG tablet, Take 1 tablet by mouth Daily., Disp:  90 tablet, Rfl: 3  •  Triamcinolone Acetonide (NASACORT) 55 MCG/ACT nasal inhaler, 2 sprays into each nostril daily., Disp: , Rfl:   •  Yuvafem 10 MCG tablet vaginal tablet, APPLY 1 TABLET IN THE VAGINA 2 TIMES WEEKLY., Disp: , Rfl:   •  clindamycin (CLEOCIN T) 1 % external solution, APPLY TO AFFECTED AREA ONCE TO TWICE DAILY AFTER SHOWER, Disp: , Rfl:      Allergies    Allergies   Allergen Reactions   • Tenormin [Atenolol] Cough and Angioedema   • Macrobid [Nitrofurantoin Monohyd Macro] Swelling and Other (See Comments)     Swelling around eyes, fatigue   • Plaquenil [Hydroxychloroquine Sulfate] Other (See Comments)     Vision changes over time    • Omeprazole Diarrhea   • Ace Inhibitors Cough   • Sudafed [Pseudoephedrine Hcl] Other (See Comments)     INCREASED PRESSURE IN EYES        Problem List    Patient Active Problem List   Diagnosis   • Hypertension   • Peripheral vascular disease (HCC)   • Diverticulosis   • Colon polyps   • Constipation   • Hashimoto's thyroiditis   • Mixed connective tissue disease (HCC)   • Lupus (HCC)   • Uterine fibroid   • Palpitations   • Solitary thyroid nodule   • History of asthma   • GERD   • Gallbladder calculus without cholecystitis and no obstruction       Medications, Allergies, Problems List and Past History were reviewed and updated.    Physical Examination    /72 (BP Location: Left arm, Patient Position: Sitting, Cuff Size: Adult)   Pulse 64   Temp 98 °F (36.7 °C) (Infrared)   Resp 16   Wt 59 kg (130 lb)   LMP  (LMP Unknown)   BMI 27.89 kg/m²     Dermatologic: The patient has a lesion on the left breast. The lesion is flesh colored. The affected skin is nodular and the condition is noted to be well demarcated and has a central pore.    Impression and Assessment    Skin Lesion.    Plan    Skin Lesion Plan: An excision was performed.    Procedure Notes    The patient presents with a lesion on the left breast. The lesion is enlarging, subdermal and draining and is  less than 0.5 cm. The risks, benefits, and alternatives to the procedure were explained to the patient, and the patient had the opportunity to ask questions. The patient signed informed consent for an elliptical  excision utilizing a punch biopsy. After informed consent was obtained and signed, the lesion was prepped and draped in a sterile fashion. The area was anesthetized with 2 mL of 1% lidocaine with epineprine in a local infiltration pattern. The lesion was excised via a 4 mm punch. The wound was sutured. The wound was sutured with one 5-0 nylon sutures in a simple interupted fashion. Subcutaneous sutures were not placed. The patient tolerated the procedure well. Wound care was explained and an instruction sheet was given. The patient was instructed to use over the counter analgesics for pain control.  The patient was not given an antibiotic prescription. The area excised was sent for pathology. The patient will return in 9 days for wound check and suture removal.    Diagnoses and all orders for this visit:    1. Skin lesion (Primary)  -     TISSUE EXAM, [facility] ([facility]); Future          Return to Office    The patient was instructed to return for follow-up in 9 days. The patient was instructed to return sooner if the condition changes, worsens, or does not resolve.

## 2022-11-10 LAB — REF LAB TEST METHOD: NORMAL

## 2022-11-15 ENCOUNTER — TREATMENT (OUTPATIENT)
Dept: PHYSICAL THERAPY | Facility: CLINIC | Age: 72
End: 2022-11-15

## 2022-11-15 DIAGNOSIS — M62.89 PELVIC FLOOR DYSFUNCTION: ICD-10-CM

## 2022-11-15 DIAGNOSIS — M62.838 MUSCLE SPASM: ICD-10-CM

## 2022-11-15 DIAGNOSIS — N81.89 PELVIC FLOOR WEAKNESS: ICD-10-CM

## 2022-11-15 DIAGNOSIS — K58.2 IRRITABLE BOWEL SYNDROME WITH BOTH CONSTIPATION AND DIARRHEA: Primary | ICD-10-CM

## 2022-11-15 DIAGNOSIS — R15.1 FECAL SMEARING: ICD-10-CM

## 2022-11-15 PROCEDURE — 97140 MANUAL THERAPY 1/> REGIONS: CPT | Performed by: PHYSICAL THERAPIST

## 2022-11-15 NOTE — PROGRESS NOTES
Physical Therapy Daily Treatment Note  PHYSICAL THERAPY     [facility], [address]; [place] [address]  Patient: Kavitha Barnhart   : 1950  Diagnosis/ICD-10 Code:  Irritable bowel syndrome with both constipation and diarrhea [K58.2]  Referring practitioner: JACK Rainey  Date of Initial Visit: Type: THERAPY  Noted: 2022  Today's Date: 11/15/2022  Patient seen for 5 sessions      Subjective   Kavitha Barnhart reports having stress with work. HR is up. Good today. Yesterday had some messiness. Had eaten for breakfast and went out with group for early dinner. With eating she had to get up 3 times during the meal. Went from more normal to more liquid. Had to be in bathroom for long time and then was embarrassed. Not urgency and could walk. Had a cyst taken off breast last week.     Pain Rating (0-10): 0      Objective   verbal consent obtained for external pelvic exam/treatment with declined need for second person in room     See Exercise, Manual, and Modality Logs for complete treatment.       Assessment/Plan  Patient has been doing well with bowels and having less incontinence and urgency but continues to struggle with frequency which can cause embarrassment.  She is compliant with HEP and tolerated manual therapy today with mild discomfort.  She demonstrates decreasing tension of abdominal and hip muscles as well as decreasing scar restrictions.  Will likely progress to internal treatment or SEMG over the next 1-2 visits.    Progress per Plan of Care         1447/1530   Timed:         Manual Therapy:    38     mins  05047;     Therapeutic Exercise:    5     mins  08959;     Neuromuscular Rae:    0    mins  13787;    Therapeutic Activity:     0     mins  26362;     Gait Trainin     mins  40929;     Ultrasound:     0     mins  22837;    Ionto                               0    mins   39723  Self Care                       0     mins   96592  Canalith Repos               0    mins   81268    Un-Timed:  Electrical Stimulation:    0     mins  08492 ( );  Dry Needling     0     mins self-pay  Traction     0     mins 78853  Low Eval     0     Mins  21902  Mod Eval     0     Mins  92404  High Eval                       0     Mins  52211  Re-Eval                           0    mins  11814    Timed Treatment:   43   mins   Total Treatment:     43   mins    Magen Smith, PT  [place] License # [id number]  Physical Therapist

## 2022-11-16 ENCOUNTER — OFFICE VISIT (OUTPATIENT)
Dept: ENDOCRINOLOGY | Facility: CLINIC | Age: 72
End: 2022-11-16

## 2022-11-16 VITALS
HEIGHT: 59 IN | BODY MASS INDEX: 26.41 KG/M2 | WEIGHT: 131 LBS | DIASTOLIC BLOOD PRESSURE: 84 MMHG | HEART RATE: 74 BPM | SYSTOLIC BLOOD PRESSURE: 126 MMHG | OXYGEN SATURATION: 97 %

## 2022-11-16 DIAGNOSIS — E04.1 SOLITARY THYROID NODULE: Primary | ICD-10-CM

## 2022-11-16 DIAGNOSIS — E06.3 HASHIMOTO'S THYROIDITIS: ICD-10-CM

## 2022-11-16 PROCEDURE — 76536 US EXAM OF HEAD AND NECK: CPT | Performed by: INTERNAL MEDICINE

## 2022-11-16 PROCEDURE — 99213 OFFICE O/P EST LOW 20 MIN: CPT | Performed by: INTERNAL MEDICINE

## 2022-11-16 NOTE — ASSESSMENT & PLAN NOTE
A neck u/s was performed today.  This revealed normal size thyroid gland.  There was a somewhat indistinct hypoechoic nodule posteriorly in the right thyroid lobe.  This nodule was 8mm in size.  This appears stable compared to u/s from 5/16/2018.  No abnormal lymph nodes were seen.    Plan for another u/s in about 4 years.

## 2022-11-16 NOTE — PROGRESS NOTES
"     Office Note      Date: 2022  Patient Name: Kavitha Barnhart  MRN: [id number]  : 1950    Chief Complaint   Patient presents with   • solitary thyroid nodule        History of Present Illness:   Kavitha Barnhart is a 72 y.o. female who presents for solitary thyroid nodule     She remains on synthroid 75mcg qd. She is taking this correctly. She isn't taking any interfering meds concurrently. She hasn't noted any change in the size of her neck. She notes rare sensation of trouble swallowing with pills or dry bread. She denies any sxs of hypo- or hyperthyroidism at this time.    Subjective      Review of Systems:   Review of Systems   Constitutional: Negative.    Cardiovascular: Negative.    Gastrointestinal: Positive for constipation and diarrhea.   Endocrine: Negative.        The following portions of the patient's history were reviewed and updated as appropriate: allergies, current medications, past family history, past medical history, past social history, past surgical history and problem list.    Objective     Visit Vitals  /84   Pulse 74   Ht 148.6 cm (58.5\")   Wt 59.4 kg (131 lb)   LMP  (LMP Unknown)   SpO2 97%   BMI 26.91 kg/m²       Physical Exam:  Physical Exam  Constitutional:       Appearance: Normal appearance.   Neurological:      Mental Status: She is alert.         Labs:    TSH  No results found for: TSHBASE     Free T4  Free T4   Date Value Ref Range Status   10/19/2022 1.67 0.93 - 1.70 ng/dL Final       T3  No results found for: N0TCFMV      TPO  No results found for: THYROIDAB    TG AB  No results found for: THGAB    TG  No results found for: THYROGLB    CBC w/DIFF  Lab Results   Component Value Date    WBC 5.69 2022    RBC 4.97 2022    HGB 14.2 2022    HCT 41.2 2022    MCV 82.9 2022    MCH 28.6 2022    MCHC 34.5 2022    RDW 13.6 2022    RDWSD 40.9 2022    MPV 12.1 (H) 2022     2022    NEUTRORELPCT 46.9 " 04/19/2022    LYMPHORELPCT 37.6 04/19/2022    MONORELPCT 10.0 04/19/2022    EOSRELPCT 4.4 04/19/2022    BASORELPCT 0.9 04/19/2022    AUTOIGPER 0.2 04/19/2022    NEUTROABS 2.67 04/19/2022    LYMPHSABS 2.14 04/19/2022    MONOSABS 0.57 04/19/2022    EOSABS 0.25 04/19/2022    BASOSABS 0.05 04/19/2022    AUTOIGNUM 0.01 04/19/2022    NRBC 0.0 04/19/2022           Assessment / Plan      Assessment & Plan:  Diagnoses and all orders for this visit:    1. Solitary thyroid nodule (Primary)  Assessment & Plan:  A neck u/s was performed today.  This revealed normal size thyroid gland.  There was a somewhat indistinct hypoechoic nodule posteriorly in the right thyroid lobe.  This nodule was 8mm in size.  This appears stable compared to u/s from 5/16/2018.  No abnormal lymph nodes were seen.    Plan for another u/s in about 4 years.    Orders:  -     US Thyroid    2. Hashimoto's thyroiditis  Assessment & Plan:  Continue synthroid.  Recent TSH okay at 2.    Orders:  -     Cancel: TSH; Future      Return in about 6 months (around 5/16/2023) for Recheck with TSH.    Surya Wells MD   11/16/2022

## 2022-11-20 RX ORDER — METOPROLOL SUCCINATE 200 MG/1
TABLET, EXTENDED RELEASE ORAL
Qty: 90 TABLET | Refills: 1 | Status: SHIPPED | OUTPATIENT
Start: 2022-11-20

## 2022-11-21 ENCOUNTER — TELEPHONE (OUTPATIENT)
Dept: PHYSICAL THERAPY | Facility: CLINIC | Age: 72
End: 2022-11-21

## 2022-11-28 ENCOUNTER — TREATMENT (OUTPATIENT)
Dept: PHYSICAL THERAPY | Facility: CLINIC | Age: 72
End: 2022-11-28

## 2022-11-28 DIAGNOSIS — M62.89 PELVIC FLOOR DYSFUNCTION: ICD-10-CM

## 2022-11-28 DIAGNOSIS — N81.89 PELVIC FLOOR WEAKNESS: ICD-10-CM

## 2022-11-28 DIAGNOSIS — R15.1 FECAL SMEARING: ICD-10-CM

## 2022-11-28 DIAGNOSIS — K58.2 IRRITABLE BOWEL SYNDROME WITH BOTH CONSTIPATION AND DIARRHEA: Primary | ICD-10-CM

## 2022-11-28 DIAGNOSIS — M62.838 MUSCLE SPASM: ICD-10-CM

## 2022-11-28 PROCEDURE — 97110 THERAPEUTIC EXERCISES: CPT | Performed by: PHYSICAL THERAPIST

## 2022-11-28 PROCEDURE — 97140 MANUAL THERAPY 1/> REGIONS: CPT | Performed by: PHYSICAL THERAPIST

## 2022-11-28 NOTE — PROGRESS NOTES
Physical Therapy Daily Treatment Note  PHYSICAL THERAPY     [facility], [address]; [place] [address]  Patient: Kavitha Barnhart   : 1950  Diagnosis/ICD-10 Code:  Irritable bowel syndrome with both constipation and diarrhea [K58.2]  Referring practitioner: JACK Rainey  Date of Initial Visit: Type: THERAPY  Noted: 2022  Today's Date: 2022  Patient seen for 6 sessions      Subjective   Kavitha Barnhart reports had asthma issue last week. When was taking prednisone bowels really slow. Feels now has already been going 10x today. Also had a lot of pumpkin pie with cinnamon which bothers her. States took 3 COVID tests and they were all negative.     Pain Rating (0-10): 0      Objective   verbal consent obtained for external pelvic exam/treatment with declined need for second person in room     See Exercise, Manual, and Modality Logs for complete treatment.       Assessment/Plan  Compliant with HEP.  She tolerated manual today with minimal discomfort.  She demonstrates improving tension of abdominal and hip muscles, with decreasing scar restrictions.  We will reassess internal pelvic floor muscles next visit and likely implement biofeedback for improved coordination.    Progress per Plan of Care         1435/1515   Timed:         Manual Therapy:    32     mins  41616;     Therapeutic Exercise:    8     mins  16172;     Neuromuscular Rae:    0    mins  17233;    Therapeutic Activity:     0     mins  69443;     Gait Trainin     mins  19409;     Ultrasound:     0     mins  06391;    Ionto                               0    mins   05303  Self Care                       0     mins   07736  Canalith Repos               0    mins  35797    Un-Timed:  Electrical Stimulation:    0     mins  19104 ( );  Dry Needling     0     mins self-pay  Traction     0     mins 83901  Low Eval     0     Mins  26718  Mod Eval     0     Mins  09710  High Eval                       0     Mins  50544  Re-Eval                            0    mins  59389    Timed Treatment:   40   mins   Total Treatment:     40   mins    Mgaen Smith, PT  [place] License # [id number]  Physical Therapist

## 2022-12-01 DIAGNOSIS — J45.20 MILD INTERMITTENT ASTHMA WITHOUT COMPLICATION: ICD-10-CM

## 2022-12-01 RX ORDER — FLUTICASONE PROPIONATE AND SALMETEROL 100; 50 UG/1; UG/1
POWDER RESPIRATORY (INHALATION)
Qty: 180 EACH | Refills: 1 | Status: SHIPPED | OUTPATIENT
Start: 2022-12-01

## 2022-12-06 ENCOUNTER — TREATMENT (OUTPATIENT)
Dept: PHYSICAL THERAPY | Facility: CLINIC | Age: 72
End: 2022-12-06

## 2022-12-06 DIAGNOSIS — N81.89 PELVIC FLOOR WEAKNESS: ICD-10-CM

## 2022-12-06 DIAGNOSIS — M62.89 PELVIC FLOOR DYSFUNCTION: ICD-10-CM

## 2022-12-06 DIAGNOSIS — K58.2 IRRITABLE BOWEL SYNDROME WITH BOTH CONSTIPATION AND DIARRHEA: Primary | ICD-10-CM

## 2022-12-06 DIAGNOSIS — R15.1 FECAL SMEARING: ICD-10-CM

## 2022-12-06 DIAGNOSIS — M62.838 MUSCLE SPASM: ICD-10-CM

## 2022-12-06 PROCEDURE — 97140 MANUAL THERAPY 1/> REGIONS: CPT | Performed by: PHYSICAL THERAPIST

## 2022-12-06 NOTE — PROGRESS NOTES
Physical Therapy Daily Treatment Note  PHYSICAL THERAPY     [facility], [address]; [place] [address]  Patient: Kavitha Barnhart   : 1950  Diagnosis/ICD-10 Code:  Irritable bowel syndrome with both constipation and diarrhea [K58.2]  Referring practitioner: JACK Rainey  Date of Initial Visit: Type: THERAPY  Noted: 2022  Today's Date: 2022  Patient seen for 7 sessions      Subjective   Kavitha Barnhart reports started cooking and made a roast last night and ate it later and didn't cut off the fat and paid for it late in the night. Had really watery stool for about 4x.     Pain Rating (0-10): 0      Objective   verbal consent obtained for external pelvic exam/treatment with declined need for second person in room     See Exercise, Manual, and Modality Logs for complete treatment.       Assessment/Plan  Patient with increased loose stool related to increased fat consumption.  Otherwise having some improvement with bowels.  Tolerated manual with mild discomfort will continue to progress as tolerated.  Will likely Progress to treating internal muscles next visit.    Progress per Plan of Care         1537/1615   Timed:         Manual Therapy:    38     mins  60969;     Therapeutic Exercise:    0     mins  42245;     Neuromuscular Rae:    0    mins  24307;    Therapeutic Activity:     0     mins  22592;     Gait Trainin     mins  12274;     Ultrasound:     0     mins  14943;    Ionto                               0    mins   02040  Self Care                       0     mins   26430  Canalith Repos               0    mins  89959    Un-Timed:  Electrical Stimulation:    0     mins  78347 (MC );  Dry Needling     0     mins self-pay  Traction     0     mins 96562  Low Eval     0     Mins  88289  Mod Eval     0     Mins  97723  High Eval                       0     Mins  48866  Re-Eval                           0    mins  98513    Timed Treatment:   38   mins   Total Treatment:     38    belkys Smith, PT  [place] License # [id number]  Physical Therapist

## 2022-12-09 ENCOUNTER — OFFICE VISIT (OUTPATIENT)
Dept: GASTROENTEROLOGY | Facility: CLINIC | Age: 72
End: 2022-12-09

## 2022-12-09 VITALS
SYSTOLIC BLOOD PRESSURE: 124 MMHG | WEIGHT: 133.4 LBS | BODY MASS INDEX: 28 KG/M2 | HEIGHT: 58 IN | TEMPERATURE: 97.2 F | OXYGEN SATURATION: 98 % | DIASTOLIC BLOOD PRESSURE: 76 MMHG | HEART RATE: 114 BPM

## 2022-12-09 DIAGNOSIS — K58.2 IRRITABLE BOWEL SYNDROME WITH BOTH CONSTIPATION AND DIARRHEA: Primary | ICD-10-CM

## 2022-12-09 DIAGNOSIS — M62.89 PELVIC FLOOR DYSFUNCTION IN FEMALE: ICD-10-CM

## 2022-12-09 PROCEDURE — 99214 OFFICE O/P EST MOD 30 MIN: CPT | Performed by: NURSE PRACTITIONER

## 2022-12-09 RX ORDER — DICYCLOMINE HYDROCHLORIDE 10 MG/1
10 CAPSULE ORAL 2 TIMES DAILY
Qty: 60 CAPSULE | Refills: 5 | Status: SHIPPED | OUTPATIENT
Start: 2022-12-09

## 2022-12-09 NOTE — PROGRESS NOTES
Follow Up      Patient Name: Kavitha Barnhart  : 1950   MRN: [id number]     Chief Complaint:    Chief Complaint   Patient presents with   • Follow-up     IBS        History of Present Illness: Kavitha Barnhart is a 72 y.o. female who is here today for follow up on IBS.    IBS-mixed:  She is going to physical therapy for pelvic floor dysfunction. Having improvement in frequency and control.    Typically has about 3-5 BMs a day that range between bristol scale 1-7.  The symptoms are worse with eating so she has to limit eating while out. There is bloating.  She only occasionally has abdominal pain but does have rectal pain.  Has had consult for [place] [facility] for fecal incontinence with Dr. Jacob but wants to hold off for now  Last visit was given the Low FODMAP diet.  She did no find this very helpful as she is already avoiding her trigger foods. She very afraid to take anything that may constipate her after her history of colon resection.  She has tried dicyclomine and this was helpful but caused drowsiness.    Fiber upset her stomach and she could not take this.     Celiac antibody panel negative.    CT Abdomen Pelvis Without Contrast (2022 20:18)-No acute process seen within the abdomen or pelvis.  US Abdomen Limited (2022 11:40)Cholelithiasis without evidence of cholecystitis.  Redemonstration of a few scattered small hepatic cysts.     SCANNED - COLONOSCOPY (2021)-widely patent lower rectal anastomosis, diverticuli and remaining colon without inflammation     Abdominal surgical history includes Luisa  There is a history of bowel perforation from complicated diverticulitis in 2015 with colon resection by Dr. Jacob, and appendectomy, and exploratory laparotomy    Subjective      Review of Systems:   Review of Systems   Constitutional: Negative for appetite change and unexpected weight loss.   HENT: Negative for trouble swallowing.    Gastrointestinal: Positive for abdominal distention,  abdominal pain, constipation and diarrhea. Negative for anal bleeding, blood in stool, nausea, rectal pain, vomiting, GERD and indigestion.       Medications:     Current Outpatient Medications:   •  albuterol sulfate  (90 Base) MCG/ACT inhaler, Inhale 2 puffs Every 4 (Four) Hours As Needed for Wheezing., Disp: 1 g, Rfl: 5  •  celecoxib (CeleBREX) 100 MG capsule, Take 1 capsule by mouth 2 (Two) Times a Day As Needed for Mild Pain., Disp: 60 capsule, Rfl: 2  •  clindamycin (CLEOCIN T) 1 % external solution, APPLY TO AFFECTED AREA ONCE TO TWICE DAILY AFTER SHOWER, Disp: , Rfl:   •  diclofenac (VOLTAREN) 1 % gel gel, Apply 4 g topically to the appropriate area as directed 4 (Four) Times a Day. (Patient taking differently: Apply 4 g topically to the appropriate area as directed 4 (Four) Times a Day As Needed (joint pain).), Disp: 100 g, Rfl: 3  •  dicyclomine (BENTYL) 10 MG capsule, Take 1 capsule by mouth 2 (Two) Times a Day., Disp: 60 capsule, Rfl: 5  •  fexofenadine (ALLEGRA) 180 MG tablet, Take 180 mg by mouth Daily As Needed (allergies)., Disp: , Rfl:   •  losartan-hydrochlorothiazide (HYZAAR) 100-25 MG per tablet, Take 1 tablet by mouth Daily., Disp: 90 tablet, Rfl: 2  •  methylPREDNISolone (MEDROL) 4 MG dose pack, Take as directed on package instructions., Disp: 21 tablet, Rfl: 0  •  metoprolol succinate XL (TOPROL-XL) 200 MG 24 hr tablet, TAKE 1 TABLET BY MOUTH EVERY DAY, Disp: 90 tablet, Rfl: 1  •  rosuvastatin (CRESTOR) 5 MG tablet, Take 1 tablet by mouth Daily., Disp: 90 tablet, Rfl: 3  •  Synthroid 75 MCG tablet, Take 1 tablet by mouth Daily., Disp: 90 tablet, Rfl: 3  •  Triamcinolone Acetonide (NASACORT) 55 MCG/ACT nasal inhaler, 2 sprays into each nostril daily., Disp: , Rfl:   •  Wixela Inhub 100-50 MCG/ACT DISKUS, INHALE 1 PUFF BY MOUTH TWICE DAILY, Disp: 180 each, Rfl: 1  •  Yuvafem 10 MCG tablet vaginal tablet, APPLY 1 TABLET IN THE VAGINA 2 TIMES WEEKLY., Disp: , Rfl:     Allergies:   Allergies    Allergen Reactions   • Tenormin [Atenolol] Cough and Angioedema   • Macrobid [Nitrofurantoin Monohyd Macro] Swelling and Other (See Comments)     Swelling around eyes, fatigue   • Plaquenil [Hydroxychloroquine Sulfate] Other (See Comments)     Vision changes over time    • Omeprazole Diarrhea   • Ace Inhibitors Cough   • Sudafed [Pseudoephedrine Hcl] Other (See Comments)     INCREASED PRESSURE IN EYES        Social History:   Social History     Socioeconomic History   • Marital status:    Tobacco Use   • Smoking status: Never   • Smokeless tobacco: Never   Vaping Use   • Vaping Use: Never used   Substance and Sexual Activity   • Alcohol use: Yes     Alcohol/week: 1.0 - 2.0 standard drink     Types: 1 - 2 Glasses of wine per week     Comment: occ   • Drug use: No   • Sexual activity: Not Currently     Partners: Male     Birth control/protection: None        Surgical History:   Past Surgical History:   Procedure Laterality Date   • ADENOIDECTOMY      Removed as a child   • APPENDECTOMY  2016   • BONE GRAFT  2021   • BREAST BIOPSY Left 10/15/2021    excisional   • BREAST SURGERY  10/2021   •  SECTION     • CHOLECYSTECTOMY N/A 2022    Procedure: CHOLECYSTECTOMY LAPAROSCOPIC;  Surgeon: Angie López MD;  Location:  [facility];  Service: General;  Laterality: N/A;   • COLON RESECTION  2016    sigmoid and partial rectum    • COLON SURGERY     • COLONOSCOPY     • EXPLORATORY LAPAROTOMY      fibroid   • ILEOSTOMY CLOSURE N/A 2016    Procedure: ILEOSTOMY  EXCISION AND TAKEDOWN;  Surgeon: Brooks Jacob MD;  Location:  [facility];  Service:    • KNEE ARTHROSCOPY      right knee scope   • MYOMECTOMY     • SMALL INTESTINE SURGERY     • TONSILECTOMY, ADENOIDECTOMY, BILATERAL MYRINGOTOMY AND TUBES     • TONSILLECTOMY     • TOOTH EXTRACTION      2021   • TOOTH EXTRACTION          Medical History:   Past Medical History:   Diagnosis Date   • Allergic rhinitis Many years ago   •  "Anemia Prior to menopause   • Arthritis    • Asthma    • Asthma, intrinsic around 2000   • Cervical disc disease    • Disease of thyroid gland    • Diverticulitis of intestine with perforation    • E-coli UTI 2019    treated with antibioiutcs and urine rechecked and clean per pt report   • GERD (gastroesophageal reflux disease)    • Hashimoto's disease    • Headache    • Heart murmur     fast heart beat; sometimes irregular   • History of transfusion     several times, with surgeries post op and after c section, never a reaction   • Hyperlipidemia sometimes marginally high   • Hypertension    • Hyperthyroidism     Thyroid nodule, hashimoto's thyroiditis   • Hypothyroidism    • Irritable bowel syndrome    • Kidney stone    • Low back pain     sometimes   • Lupus (HCC)    • Neuromuscular disorder (HCC)     Nerve damage lower back   • Palpitations     POSSIBLE ATRIAL TACHYCARDIA   • PONV (postoperative nausea and vomiting)    • Raynaud's disease    • Sinusitis very long time   • Sjogren's disease (HCC)    • SVT (supraventricular tachycardia) (HCC)    • Thyroid nodule 2006   • Tremor     sometimes hands shake   • Visual impairment    • Vitamin D deficiency 2000        Objective     Physical Exam:  Vital Signs:   Vitals:    12/09/22 1130   BP: 124/76   BP Location: Left arm   Patient Position: Sitting   Cuff Size: Adult   Pulse: 114   Temp: 97.2 °F (36.2 °C)   TempSrc: Temporal   SpO2: 98%   Weight: 60.5 kg (133 lb 6.4 oz)   Height: 147.3 cm (57.99\")     Body mass index is 27.89 kg/m².     Physical Exam  Constitutional:       General: She is not in acute distress.     Appearance: She is well-developed.   Pulmonary:      Effort: Pulmonary effort is normal. No accessory muscle usage or respiratory distress.   Abdominal:      General: Bowel sounds are normal. There is no distension.      Palpations: Abdomen is soft.   Skin:     Coloration: Skin is not pale.      Findings: No erythema.   Neurological:      Mental Status: She " is alert and oriented to person, place, and time.   Psychiatric:         Speech: Speech normal.         Behavior: Behavior normal.         Thought Content: Thought content normal.         Judgment: Judgment normal.         Assessment / Plan      Assessment/Plan:   Diagnoses and all orders for this visit:    1. Irritable bowel syndrome with both constipation and diarrhea (Primary)  -     dicyclomine (BENTYL) 10 MG capsule; Take 1 capsule by mouth 2 (Two) Times a Day.  Dispense: 60 capsule; Refill: 5    2. Pelvic floor dysfunction in female    Continue with pelvic floor therapy.  Will prescribe 10 mg of dicyclomine (was given 20 mg in ED and experienced fatigue).  Continue to avoid trigger foods    Follow Up:   Return in about 3 months (around 3/9/2023), or if symptoms worsen or fail to improve.    Plan of care reviewed with the patient at the conclusion of today's visit.  Education was provided regarding diagnosis, management, and any prescribed or recommended OTC medications.  Patient verbalized understanding of and agreement with management plan.         JACK Lares  [facility]

## 2022-12-12 ENCOUNTER — TREATMENT (OUTPATIENT)
Dept: PHYSICAL THERAPY | Facility: CLINIC | Age: 72
End: 2022-12-12

## 2022-12-12 DIAGNOSIS — N81.89 PELVIC FLOOR WEAKNESS: ICD-10-CM

## 2022-12-12 DIAGNOSIS — R15.1 FECAL SMEARING: ICD-10-CM

## 2022-12-12 DIAGNOSIS — K58.2 IRRITABLE BOWEL SYNDROME WITH BOTH CONSTIPATION AND DIARRHEA: Primary | ICD-10-CM

## 2022-12-12 DIAGNOSIS — M62.89 PELVIC FLOOR DYSFUNCTION: ICD-10-CM

## 2022-12-12 DIAGNOSIS — M62.838 MUSCLE SPASM: ICD-10-CM

## 2022-12-12 PROCEDURE — 97140 MANUAL THERAPY 1/> REGIONS: CPT | Performed by: PHYSICAL THERAPIST

## 2022-12-12 PROCEDURE — 97112 NEUROMUSCULAR REEDUCATION: CPT | Performed by: PHYSICAL THERAPIST

## 2022-12-12 NOTE — PROGRESS NOTES
Re-Assessment / Progress Note  PHYSICAL THERAPY    [address] [facility], [address]; [place] [address]      Patient: Kavitha Barnhart   : 1950  Diagnosis/ICD-10 Code:  Irritable bowel syndrome with both constipation and diarrhea [K58.2]  Referring practitioner: JACK Rainey  Date of Initial Visit: Type: THERAPY  Noted: 2022  Today's Date: 2022  Patient seen for 8 sessions      Subjective:   Kavitha Barnhart reports: going to start Dicyclomine afraid to take it when has to go out and about.     Subjective Questionnaire: PFDI 20  Clinical Progress: improved  Home Program Compliance: Yes  Treatment has included: therapeutic exercise, neuromuscular re-education, manual therapy, therapeutic activity and moist heat      Objective   verbal consent obtained for external pelvic exam/treatment with declined need for second person in room  Mild-mod scar tissue restrictions throughout abdomen; mild tension iliopsoas, TrA   Rest 4.2, decreased to 0.8; avg 9.0, 17.6 max    See flowsheet for details of treatment following reassessment.     Assessment/Plan  Progress to physical therapy goals is fair.She reports improved bowel function and decreased frequency until starting new medication. She has met 3/3 short term goals and 0/3 long term goals.  She demonstrates decreasing tension of hip and abdominal muscles as well as decreasing scar tissue restrictions.  Began PFM coordination training with SEMG today.  Patient initially demonstrated higher resting tone that improved to normal with practice.  We will continue manual and progress coordination exercises and strengthening as indicated to reduce bowel issues. She will benefit from continued skilled physical therapy to address remaining impairments and functional limitations.   Progress toward previous goals: Partially Met    STG's: 4 weeks  • Patient will report > 25% reduction in bowel issues for improved social activities - met  • Patient will report having a BM no  more than 5 times per day demonstrating improved emptying - met prior to new meds  • Patient will be able to perform HEP with minimal verbal cues - met     LTG's: By discharge  • Patient will report >75% improvement in bowel symptoms for improved quality of life  • Patient will have no fecal incontinence or smearing x2 weeks for improved quality of lifes  • Patient will be independent with HEP      Recommendations: Continue as planned  Timeframe: 6 weeks  Prognosis to achieve goals: fair    PT Signature: Magen Smith PT      Based upon review of the patient's progress and continued therapy plan, it is my medical opinion that Kavitha Barnhart should continue physical therapy treatment at [facility] PH[facility]  [address] [place] [id number] [phone].    Signature: __________________________________    PHYSICIAN: Miroslava Salinas APRN  NPI: [id number]                                        1307/1345  Manual Therapy:    10     mins  15522;  Therapeutic Exercise:    0     mins  59272;     Neuromuscular Rae:    28    mins  26489;    Therapeutic Activity:     0     mins  95511;     Gait Trainin     mins  69278;     Ultrasound:     0     mins  09694;    Electrical Stimulation:    0     mins  13485 ( );  Dry Needling     0     mins self-pay    Timed Treatment:   38   mins   Total Treatment:     38   mins

## 2022-12-19 ENCOUNTER — TREATMENT (OUTPATIENT)
Dept: PHYSICAL THERAPY | Facility: CLINIC | Age: 72
End: 2022-12-19

## 2022-12-19 DIAGNOSIS — M62.838 MUSCLE SPASM: ICD-10-CM

## 2022-12-19 DIAGNOSIS — K58.2 IRRITABLE BOWEL SYNDROME WITH BOTH CONSTIPATION AND DIARRHEA: Primary | ICD-10-CM

## 2022-12-19 DIAGNOSIS — R15.1 FECAL SMEARING: ICD-10-CM

## 2022-12-19 DIAGNOSIS — M62.89 PELVIC FLOOR DYSFUNCTION: ICD-10-CM

## 2022-12-19 DIAGNOSIS — N81.89 PELVIC FLOOR WEAKNESS: ICD-10-CM

## 2022-12-19 PROCEDURE — 97140 MANUAL THERAPY 1/> REGIONS: CPT | Performed by: PHYSICAL THERAPIST

## 2022-12-19 NOTE — PROGRESS NOTES
Physical Therapy Daily Treatment Note  PHYSICAL THERAPY    [address] [facility], [address]; [place] [address]  Patient: Kavitha Barnhart   : 1950  Diagnosis/ICD-10 Code:  Irritable bowel syndrome with both constipation and diarrhea [K58.2]  Referring practitioner: JACK Rainey  Date of Initial Visit: Type: THERAPY  Noted: 2022  Today's Date: 2022  Patient seen for 9 sessions      Subjective   Kavitha Barnhart reports had bowel attack and gas before left house. Having tremendous amounts of gas. Didn't used to have that issue. Drinks lactose-free milk. Doing her HEP but it is still challenging.     Pain Rating (0-10): 0      Objective   verbal consent obtained for external pelvic exam/treatment with declined need for second person in room     See Exercise, Manual, and Modality Logs for complete treatment.       Assessment/Plan  Patient compliant with HEP and this continues to be challenging.  Patient to continue with exercise program at this time.  She tolerated manual with mild discomfort that decreased with MFR and scar mobilization.  We will continue manual and progress pelvic floor strengthening/coordination as indicated to reduce bowel symptoms.    Progress per Plan of Care         1300/1345 Timed:         Manual Therapy:    38     mins  50601;     Therapeutic Exercise:    5     mins  62412;     Neuromuscular Rae:    0    mins  22153;    Therapeutic Activity:     0     mins  84658;     Gait Trainin     mins  96549;     Ultrasound:     0     mins  87604;    Ionto                               0    mins   65292  Self Care                       0     mins   03365  Canalith Repos               0    mins  05494    Un-Timed:  Electrical Stimulation:    0     mins  26358 ( );  Dry Needling     0     mins self-pay  Traction     0     mins 96482  Low Eval     0     Mins  60411  Mod Eval     0     Mins  05933  High Eval                       0     Mins  76440  Re-Eval                            0    mins  34460    Timed Treatment:   43   mins   Total Treatment:     43   mins    Magen Smith, PT  [place] License # [id number]  Physical Therapist

## 2023-01-04 ENCOUNTER — TREATMENT (OUTPATIENT)
Dept: PHYSICAL THERAPY | Facility: CLINIC | Age: 73
End: 2023-01-04
Payer: MEDICARE

## 2023-01-04 DIAGNOSIS — M62.838 MUSCLE SPASM: ICD-10-CM

## 2023-01-04 DIAGNOSIS — M62.89 PELVIC FLOOR DYSFUNCTION: ICD-10-CM

## 2023-01-04 DIAGNOSIS — N81.89 PELVIC FLOOR WEAKNESS: ICD-10-CM

## 2023-01-04 DIAGNOSIS — R15.1 FECAL SMEARING: ICD-10-CM

## 2023-01-04 DIAGNOSIS — K58.2 IRRITABLE BOWEL SYNDROME WITH BOTH CONSTIPATION AND DIARRHEA: Primary | ICD-10-CM

## 2023-01-04 PROCEDURE — 97110 THERAPEUTIC EXERCISES: CPT | Performed by: PHYSICAL THERAPIST

## 2023-01-04 PROCEDURE — 97140 MANUAL THERAPY 1/> REGIONS: CPT | Performed by: PHYSICAL THERAPIST

## 2023-01-04 NOTE — PROGRESS NOTES
Physical Therapy Daily Treatment Note  PHYSICAL THERAPY     [facility], [address]; [place] [address]  Patient: Kavitha Barnhart   : 1950  Diagnosis/ICD-10 Code:  Irritable bowel syndrome with both constipation and diarrhea [K58.2]  Referring practitioner: JACK Rainey  Date of Initial Visit: Type: THERAPY  Noted: 2022  Today's Date: 2023  Patient seen for 10 sessions      Subjective   Kavitha Barnhart reports feels doing HEP wrong. Caused constipation extreme and then had explosive diarrhea and painful BM. After that was having hemorrhage in her eyes. Thinks related to straining due to constipated. Started taking pills that Miroslava Brad gave her once per day. Dicyclomine. This has been helpful.     Pain Rating (0-10): 0      Objective   verbal consent obtained for external pelvic exam/treatment with declined need for second person in room     See Exercise, Manual, and Modality Logs for complete treatment.     Patient Education: water consumption;     Assessment/Plan  With addition of PFM strengthening exercises patient reports increased constipation and difficulty voiding bowels.  She is to stop these exercises at this time.  Concurrently patient has started taking dicyclomine to improve consistency of stool.  This coupled with increased PFM activation/tension likely contributing to constipation.  Patient will continue current dose of medication and focus on relaxation exercises for PFM.  She tolerated addition of manual treatment to external PFM today with mild discomfort.  We will continue manual and progress as tolerated to improve bowel symptoms.    Progress per Plan of Care         1033/1115   Timed:         Manual Therapy:    32     mins  18021;     Therapeutic Exercise:    10     mins  54533;     Neuromuscular Rae:    0    mins  05260;    Therapeutic Activity:     0     mins  80445;     Gait Trainin     mins  83206;     Ultrasound:     0     mins  96797;    Ionto                                0    mins   48379  Self Care                       0     mins   13232  Canalith Repos               0    mins  70759    Un-Timed:  Electrical Stimulation:    0     mins  35407 ( );  Dry Needling     0     mins self-pay  Traction     0     mins 00865  Low Eval     0     Mins  34082  Mod Eval     0     Mins  67425  High Eval                       0     Mins  57071  Re-Eval                           0    mins  74284    Timed Treatment:   42   mins   Total Treatment:     42   mins    Magen Smith, PT  [place] License # [id number]  Physical Therapist

## 2023-01-16 ENCOUNTER — TREATMENT (OUTPATIENT)
Dept: PHYSICAL THERAPY | Facility: CLINIC | Age: 73
End: 2023-01-16
Payer: MEDICARE

## 2023-01-16 DIAGNOSIS — M62.89 PELVIC FLOOR DYSFUNCTION: ICD-10-CM

## 2023-01-16 DIAGNOSIS — R15.1 FECAL SMEARING: ICD-10-CM

## 2023-01-16 DIAGNOSIS — M62.838 MUSCLE SPASM: ICD-10-CM

## 2023-01-16 DIAGNOSIS — N81.89 PELVIC FLOOR WEAKNESS: ICD-10-CM

## 2023-01-16 DIAGNOSIS — K58.2 IRRITABLE BOWEL SYNDROME WITH BOTH CONSTIPATION AND DIARRHEA: Primary | ICD-10-CM

## 2023-01-16 PROCEDURE — 97140 MANUAL THERAPY 1/> REGIONS: CPT | Performed by: PHYSICAL THERAPIST

## 2023-01-16 NOTE — PROGRESS NOTES
Re-Assessment / Progress Note  PHYSICAL THERAPY     [facility], [address]; [place] [address]      Patient: Kavitha Barnhart   : 1950  Diagnosis/ICD-10 Code:  Irritable bowel syndrome with both constipation and diarrhea [K58.2]  Referring practitioner: JACK Rainey  Date of Initial Visit: Type: THERAPY  Noted: 2022  Today's Date: 2023  Patient seen for 11 sessions      Subjective:   Kavitha Barnhart reports: was taking blue pill and feels like it inhibited natural peristalsis. Feels can't take it every day. Went Thursday until today without taking it. Today having looser stool and urgency. Thinking maybe every 2-3 days take it. Rates improvement 60%. Thinks it is a great improvement. Usually one per day, no more than 4. Having more pain when having fewer bowel movements, feels related to not having normal rhythmic peristalsis, increased pressure - that was when taking daily. Taking a ballet class. Feels control is really important.      Subjective Questionnaire: PFDI 20  Clinical Progress: improved  Home Program Compliance: Yes  Treatment has included: therapeutic exercise, neuromuscular re-education, manual therapy, therapeutic activity and moist heat      Objective   verbal consent obtained for external pelvic exam/treatment with declined need for second person in room   Mild scar tissue restrictions throughout abdomen; mild tension iliopsoas, TrA  all B    See flowsheet for details of treatment following reassessment.   Assessment/Plan  Progress to physical therapy goals is good.She reports improved frequency of fecal incontinence allowing her to participate in a ballet class. She has met 3/3 short term goals and 1/3 long term goals. She will benefit from continued skilled physical therapy to address remaining impairments and functional limitations.   Progress toward previous goals: Partially Met    STG's: 4 weeks  • Patient will report > 25% reduction in bowel issues for improved social  activities - met  • Patient will report having a BM no more than 5 times per day demonstrating improved emptying - met   • Patient will be able to perform HEP with minimal verbal cues - met     LTG's: By discharge  • Patient will report >75% improvement in bowel symptoms for improved quality of life  • Patient will have no fecal incontinence or smearing x2 weeks for improved quality of life- met  • Patient will be independent with HEP      Recommendations: Continue as planned  Timeframe: 6 weeks  Prognosis to achieve goals: good    PT Signature: Magen Smith PT      Based upon review of the patient's progress and continued therapy plan, it is my medical opinion that Kavitha Barnhart should continue physical therapy treatment at [facility]  [address] [place] [id number] [phone].    Signature: __________________________________    PHYSICIAN: Miroslava Salinas APRN  NPI: [id number]                                        1306/1345  Manual Therapy:    35     mins  95217;  Therapeutic Exercise:    4     mins  36154;     Neuromuscular Rae:    0    mins  00692;    Therapeutic Activity:     0     mins  84925;     Gait Trainin     mins  22506;     Ultrasound:     0     mins  02508;    Electrical Stimulation:    0     mins  19918 ( );  Dry Needling     0     mins self-pay    Timed Treatment:   39   mins   Total Treatment:     39   mins

## 2023-01-30 ENCOUNTER — TREATMENT (OUTPATIENT)
Dept: PHYSICAL THERAPY | Facility: CLINIC | Age: 73
End: 2023-01-30
Payer: MEDICARE

## 2023-01-30 DIAGNOSIS — M62.89 PELVIC FLOOR DYSFUNCTION: ICD-10-CM

## 2023-01-30 DIAGNOSIS — R15.1 FECAL SMEARING: ICD-10-CM

## 2023-01-30 DIAGNOSIS — K58.2 IRRITABLE BOWEL SYNDROME WITH BOTH CONSTIPATION AND DIARRHEA: Primary | ICD-10-CM

## 2023-01-30 DIAGNOSIS — M62.838 MUSCLE SPASM: ICD-10-CM

## 2023-01-30 DIAGNOSIS — N81.89 PELVIC FLOOR WEAKNESS: ICD-10-CM

## 2023-01-30 PROCEDURE — 97140 MANUAL THERAPY 1/> REGIONS: CPT | Performed by: PHYSICAL THERAPIST

## 2023-01-30 PROCEDURE — 97530 THERAPEUTIC ACTIVITIES: CPT | Performed by: PHYSICAL THERAPIST

## 2023-02-02 NOTE — PROGRESS NOTES
Physical Therapy Daily Treatment Note  PHYSICAL THERAPY    [address] [facility], [address]; [place] [address]  Patient: Kavitha Barnhart   : 1950  Diagnosis/ICD-10 Code:  Irritable bowel syndrome with both constipation and diarrhea [K58.2]  Referring practitioner: JACK Rainey  Date of Initial Visit: Type: THERAPY  Noted: 2022  Today's Date: 2023  Patient seen for 12 sessions      Subjective   Kavitha Barnhart reports still working to get bowel consistency under control but it is better. She does not have fecal incontinence as often and continues to do ballet. She reports she sometimes feels it is harder to get stool past the rectal anastamosis.   Pain Rating (0-10): 0      Objective       See Exercise, Manual, and Modality Logs for complete treatment.       Assessment/Plan  Pt demonstrates improved abdominal and hip mm tension with improved scar mobility. She was educated on bowel positioning today to assist with ease of starting BM. Pt demonstrates decreasing physical impairments and continues to work on balance of medication, fiber and water to optimize stool consistency. Will see in 2 weeks and likely discharge in 1-2 visits.     Progress per Plan of Care         1300/1345   Timed:         Manual Therapy:    30     mins  53067;     Therapeutic Exercise:    0     mins  98880;     Neuromuscular Rae:    0    mins  40101;    Therapeutic Activity:     10     mins  23610;     Gait Trainin     mins  67929;     Ultrasound:     0     mins  06289;    Ionto                               0    mins   27424  Self Care                       0     mins   98883  Canalith Repos               0    mins  05358    Un-Timed:  Electrical Stimulation:    0     mins  54900 ( );  Dry Needling     0     mins self-pay  Traction     0     mins 17402  Low Eval     0     Mins  60595  Mod Eval     0     Mins  66513  High Eval                       0     Mins  43809  Re-Eval                           0    mins   19867    Timed Treatment:   40   mins   Total Treatment:     40   mins    Magen Smith, PT  [place] License # [id number]  Physical Therapist

## 2023-02-07 ENCOUNTER — TREATMENT (OUTPATIENT)
Dept: PHYSICAL THERAPY | Facility: CLINIC | Age: 73
End: 2023-02-07
Payer: MEDICARE

## 2023-02-07 DIAGNOSIS — K58.2 IRRITABLE BOWEL SYNDROME WITH BOTH CONSTIPATION AND DIARRHEA: Primary | ICD-10-CM

## 2023-02-07 DIAGNOSIS — R15.1 FECAL SMEARING: ICD-10-CM

## 2023-02-07 DIAGNOSIS — N81.89 PELVIC FLOOR WEAKNESS: ICD-10-CM

## 2023-02-07 DIAGNOSIS — M62.838 MUSCLE SPASM: ICD-10-CM

## 2023-02-07 DIAGNOSIS — M62.89 PELVIC FLOOR DYSFUNCTION: ICD-10-CM

## 2023-02-07 PROCEDURE — 97140 MANUAL THERAPY 1/> REGIONS: CPT | Performed by: PHYSICAL THERAPIST

## 2023-02-07 PROCEDURE — 97530 THERAPEUTIC ACTIVITIES: CPT | Performed by: PHYSICAL THERAPIST

## 2023-02-07 NOTE — PROGRESS NOTES
Physical Therapy Daily Treatment Note  PHYSICAL THERAPY    [address] [facility], [address]; [place] [address]  Patient: Kavitha Barnhart   : 1950  Diagnosis/ICD-10 Code:  Irritable bowel syndrome with both constipation and diarrhea [K58.2]  Referring practitioner: JACK Rainey  Date of Initial Visit: Type: THERAPY  Noted: 2022  Today's Date: 2/10/2023  Patient seen for 13 sessions      Subjective   Kavitha Barnhart reports was doing well until ate bety which have been an issue for her previously. Otherwise doing well. Taking Vitamin D, B12 and Magnesium 250 mg.   Pain Rating (0-10): 0      Objective   verbal consent obtained for external pelvic exam/treatment with declined need for second person in room     See Exercise, Manual, and Modality Logs for complete treatment.       Assessment/Plan  Pt doing well with bowels allowing for improved function with social and physical activity. She had one episode of bowel distress related to her diet. Will see Pt in 1 month for follow up and likely discharge at that time.     Progress per Plan of Care         1315/1400   Timed:         Manual Therapy:    30     mins  88294;     Therapeutic Exercise:    0     mins  92345;     Neuromuscular Rae:    0    mins  49076;    Therapeutic Activity:     10     mins  63928;     Gait Trainin     mins  15707;     Ultrasound:     0     mins  63624;    Ionto                               0    mins   80674  Self Care                       0     mins   59903  Canalith Repos               0    mins  50158    Un-Timed:  Electrical Stimulation:    0     mins  79572 ( );  Dry Needling     0     mins self-pay  Traction     0     mins 70052  Low Eval     0     Mins  68305  Mod Eval     0     Mins  76943  High Eval                       0     Mins  76692  Re-Eval                           0    mins  36443    Timed Treatment:   40   mins   Total Treatment:     40   mins    Magen Smith, PT  [place] License # [id number]  Physical  Therapist

## 2023-02-24 ENCOUNTER — OFFICE VISIT (OUTPATIENT)
Dept: CARDIOLOGY | Facility: CLINIC | Age: 73
End: 2023-02-24
Payer: MEDICARE

## 2023-02-24 VITALS
HEIGHT: 59 IN | DIASTOLIC BLOOD PRESSURE: 66 MMHG | BODY MASS INDEX: 26.81 KG/M2 | SYSTOLIC BLOOD PRESSURE: 102 MMHG | OXYGEN SATURATION: 96 % | HEART RATE: 66 BPM | WEIGHT: 133 LBS

## 2023-02-24 DIAGNOSIS — R00.2 PALPITATIONS: Primary | ICD-10-CM

## 2023-02-24 DIAGNOSIS — E78.00 HYPERCHOLESTEREMIA: ICD-10-CM

## 2023-02-24 DIAGNOSIS — I10 ESSENTIAL HYPERTENSION: ICD-10-CM

## 2023-02-24 PROCEDURE — 99214 OFFICE O/P EST MOD 30 MIN: CPT | Performed by: INTERNAL MEDICINE

## 2023-02-24 RX ORDER — CYCLOSPORINE 0.5 MG/ML
1 EMULSION OPHTHALMIC
COMMUNITY
Start: 2023-02-21 | End: 2023-05-22

## 2023-02-24 NOTE — PROGRESS NOTES
[facility] Cardiology    Encounter Date: 2023    Patient ID: Kavitha Barnhart is a 72 y.o. female.  : 1950     PCP: Farzad Redding MD       Chief Complaint: Palpitations      PROBLEM LIST:  1. Palpitations/possible atrial tachycardia:  a. Patient was admitted for diverticulitis complicated by bowel perforation on 2016, and while in the hospital began having palpitations and shortness of breath that showed possible atrial tachycardia versus atrial fibrillation.   b. Patient reports a long-standing history of palpitations and tachycardia.   c. Echo, 01/10/2016, [facility]: LVH with EF 60-65%. No significant valve disease.   d. 24h Holter, 2016: Sinus rhythm with average heart rate of 85. Rare PVCs and PACs with occasional short runs of SVT/PAT. No symptoms were reported.   e. 30d Monitor, 2019: No significant arrhythmias noted, symptoms reported twice correlated with sinus rhythm at normal rate.    f. Echo, 2022: EF 55% consistent with grade I impaired relaxation. Trace MR/TR with normal RVSP.   g. Holter, 2022: SR. Rare PAC. Rare short SVT/PAT up to 11 beats. No events.   2. Chest pain:  1. Negative exercise/Cardiolite MPS, 2022: EF>70%.   3. Hypertension.   4. Diverticulitis complicated by perforated sigmoid colon:  a. Currently awaiting surgery at the beginning of 2016. He was hospitalized at [facility], 2016 for approximately 2 weeks and treated with antibiotics.   5. Lupus and mixed connective tissue disease.   6. Raynaud’s disease.  7. Sjögren's disease.  8. Hashimoto disease.  9. Surgical history:  a. .  b. Knee surgery.    History of Present Illness  Patient presents today for a follow-up with a history of palpitations and cardiac risk factors. Since last visit, patient has been doing well overall from a cardiovascular standpoint. She states that she has been busy and active by taking an  advanced ballet class. She notices on her smart watch that she has.  No increase in heart rate of 80/100.  She rarely feels any palpitations and a couple of times has felt lightheaded.  No significant dizziness.  No syncope.  No chest pain shortness of breath edema orthopnea or PND.  Patient states that due to her history of lupus she is concerned about likelihood of cardiac involvement.      Allergies   Allergen Reactions   • Tenormin [Atenolol] Cough and Angioedema   • Macrobid [Nitrofurantoin Monohyd Macro] Swelling and Other (See Comments)     Swelling around eyes, fatigue   • Plaquenil [Hydroxychloroquine Sulfate] Other (See Comments)     Vision changes over time    • Omeprazole Diarrhea   • Ace Inhibitors Cough   • Sudafed [Pseudoephedrine Hcl] Other (See Comments)     INCREASED PRESSURE IN EYES          Current Outpatient Medications:   •  albuterol sulfate  (90 Base) MCG/ACT inhaler, Inhale 2 puffs Every 4 (Four) Hours As Needed for Wheezing., Disp: 1 g, Rfl: 5  •  celecoxib (CeleBREX) 100 MG capsule, Take 1 capsule by mouth 2 (Two) Times a Day As Needed for Mild Pain., Disp: 60 capsule, Rfl: 2  •  clindamycin (CLEOCIN T) 1 % external solution, APPLY TO AFFECTED AREA ONCE TO TWICE DAILY AFTER SHOWER, Disp: , Rfl:   •  cycloSPORINE (RESTASIS) 0.05 % ophthalmic emulsion, Apply 1 drop to eye(s) as directed by provider., Disp: , Rfl:   •  diclofenac (VOLTAREN) 1 % gel gel, Apply 4 g topically to the appropriate area as directed 4 (Four) Times a Day. (Patient taking differently: Apply 4 g topically to the appropriate area as directed 4 (Four) Times a Day As Needed (joint pain).), Disp: 100 g, Rfl: 3  •  dicyclomine (BENTYL) 10 MG capsule, Take 1 capsule by mouth 2 (Two) Times a Day., Disp: 60 capsule, Rfl: 5  •  fexofenadine (ALLEGRA) 180 MG tablet, Take 180 mg by mouth Daily As Needed (allergies)., Disp: , Rfl:   •  losartan-hydrochlorothiazide (HYZAAR) 100-25 MG per tablet, Take 1 tablet by mouth Daily.,  "Disp: 90 tablet, Rfl: 2  •  metoprolol succinate XL (TOPROL-XL) 200 MG 24 hr tablet, TAKE 1 TABLET BY MOUTH EVERY DAY, Disp: 90 tablet, Rfl: 1  •  rosuvastatin (CRESTOR) 5 MG tablet, Take 1 tablet by mouth Daily., Disp: 90 tablet, Rfl: 3  •  Synthroid 75 MCG tablet, Take 1 tablet by mouth Daily., Disp: 90 tablet, Rfl: 3  •  Triamcinolone Acetonide (NASACORT) 55 MCG/ACT nasal inhaler, 2 sprays into each nostril daily., Disp: , Rfl:   •  Wixela Inhub 100-50 MCG/ACT DISKUS, INHALE 1 PUFF BY MOUTH TWICE DAILY, Disp: 180 each, Rfl: 1  •  Yuvafem 10 MCG tablet vaginal tablet, APPLY 1 TABLET IN THE VAGINA 2 TIMES WEEKLY., Disp: , Rfl:     The following portions of the patient's history were reviewed and updated as appropriate: allergies, current medications, past family history, past medical history, past social history, past surgical history and problem list.    ROS  Review of Systems   14 point ROS negative except for that listed in the HPI.         Objective:     /66 (BP Location: Left arm, Patient Position: Sitting)   Pulse 66   Ht 149.9 cm (59\")   Wt 60.3 kg (133 lb)   LMP  (LMP Unknown)   SpO2 96%   BMI 26.86 kg/m²      Physical Exam  Constitutional: Patient appears well-developed and well-nourished.   HENT: HEENT exam unremarkable.   Neck: Neck supple. No JVD present. No carotid bruits.   Cardiovascular: Normal rate, regular rhythm and normal heart sounds. No murmur heard.   2+ symmetric pulses.   Pulmonary/Chest: Breath sounds normal. Does not exhibit tenderness.   Abdominal: Abdomen benign.   Musculoskeletal: Does not exhibit edema.   Neurological: Neurological exam unremarkable.   Vitals reviewed.    Data Review:     Lab date: 11/29/2022  • CMP: Glu 86, BUN 31, Creat 1.21, eGFR 47, Na 145, K 4.1, Cl 108, CO2 24, Ca 9.9, Alk Phos 75, AST 16, ALT 17  • CBC: WBC 8.3, RBC 5.16, HGB 14.8, HCT 44.0, MCV 85, MCH 28.7,     Lab Results   Component Value Date    CHOL 130 09/10/2022    TRIG 65 09/10/2022 "    HDL 54 09/10/2022    LDL 62 09/10/2022      Lab Results   Component Value Date    TSH 2.010 10/19/2022     Assessment:      Diagnosis Plan   1. Palpitations  Stable. Holter on 07/22/2022 showed rare PACs and rare short SVT/PAT up to 11 beats. Continue to maintain good hydration and limit caffeine intake. Continue on metoprolol for rate control and hypertension.    2. Essential hypertension  Well controlled. Continue on losartan-hydrochlorothiazide for hypertension.    3. Hypercholesteremia  Well controlled. LDL 62 on 09/10/2022. Continue on rosuvastatin for hypercholesteremia.         Plan:   Stable cardiac status.  No significant arrhythmias on cardiac monitor, unremarkable echocardiogram.  These test results were discussed and patient was reassured  Continue to maintain good hydration and limit caffeine intake.    Continue current medications.   FU in 12 MO, sooner as needed.  Thank you for allowing us to participate in the care of your patient.     Scribed for Naveen Sarmiento MD by Janell Parks. 2/24/2023 14:42 EST    I, Naveen Sarmiento MD, personally performed the services described in this documentation as scribed by the above named individual in my presence, and it is both accurate and complete.  2/24/2023  15:01 EST      Please note that portions of this note may have been completed with a voice recognition program. Efforts were made to edit the dictations, but occasionally words are mistranscribed.

## 2023-02-28 ENCOUNTER — TREATMENT (OUTPATIENT)
Dept: PHYSICAL THERAPY | Facility: CLINIC | Age: 73
End: 2023-02-28
Payer: MEDICARE

## 2023-02-28 DIAGNOSIS — N81.89 PELVIC FLOOR WEAKNESS: Primary | ICD-10-CM

## 2023-02-28 DIAGNOSIS — M62.89 PELVIC FLOOR DYSFUNCTION: ICD-10-CM

## 2023-02-28 DIAGNOSIS — R15.1 FECAL SMEARING: ICD-10-CM

## 2023-02-28 DIAGNOSIS — M62.838 MUSCLE SPASM: ICD-10-CM

## 2023-02-28 DIAGNOSIS — K58.2 IRRITABLE BOWEL SYNDROME WITH BOTH CONSTIPATION AND DIARRHEA: ICD-10-CM

## 2023-02-28 PROCEDURE — 97140 MANUAL THERAPY 1/> REGIONS: CPT | Performed by: PHYSICAL THERAPIST

## 2023-02-28 PROCEDURE — 97530 THERAPEUTIC ACTIVITIES: CPT | Performed by: PHYSICAL THERAPIST

## 2023-03-10 ENCOUNTER — OFFICE VISIT (OUTPATIENT)
Dept: GASTROENTEROLOGY | Facility: CLINIC | Age: 73
End: 2023-03-10
Payer: MEDICARE

## 2023-03-10 VITALS
HEIGHT: 59 IN | OXYGEN SATURATION: 97 % | BODY MASS INDEX: 26.49 KG/M2 | DIASTOLIC BLOOD PRESSURE: 72 MMHG | SYSTOLIC BLOOD PRESSURE: 124 MMHG | HEART RATE: 91 BPM | TEMPERATURE: 97.1 F | WEIGHT: 131.4 LBS

## 2023-03-10 DIAGNOSIS — R10.31 ABDOMINAL WALL PAIN IN RIGHT LOWER QUADRANT: ICD-10-CM

## 2023-03-10 DIAGNOSIS — M62.89 PELVIC FLOOR DYSFUNCTION IN FEMALE: ICD-10-CM

## 2023-03-10 DIAGNOSIS — K58.2 IRRITABLE BOWEL SYNDROME WITH BOTH CONSTIPATION AND DIARRHEA: Primary | ICD-10-CM

## 2023-03-10 PROCEDURE — 99213 OFFICE O/P EST LOW 20 MIN: CPT | Performed by: NURSE PRACTITIONER

## 2023-03-10 NOTE — PROGRESS NOTES
"     Follow Up      Patient Name: Kavitha Barnhart  : 1950   MRN: [id number]     Chief Complaint:    Chief Complaint   Patient presents with   • Follow-up     3 month   • Pain     Low abdominal yesterday, sore since. Possible blood vessel   • Irritable Bowel Syndrome     A lot better       History of Present Illness: Kavitha Barnhart is a 72 y.o. female who is here today for follow up on IBS.    IBS-mixed:  She is still going to physical therapy for pelvic floor dysfunction. Having dramatic improvement in frequency and control with this. She saves bentyl for \"emergencies\".     She reports she has been having severe RLQ when she was stretching and there is a \"blood blister\" in the area.    Has had consult for [facility] for fecal incontinence with Dr. Jacob but wants to hold off for now  Previously followed the Low FODMAP diet.  She did no find this very helpful as she is already avoiding her trigger foods. She very afraid to take anything that may constipate her after her history of colon resection.  She has tried dicyclomine and this was helpful but caused drowsiness.   Fiber upset her stomach and she could not tolerate this.      Celiac antibody panel negative.     CT Abdomen Pelvis Without Contrast (2022 20:18)-No acute process seen within the abdomen or pelvis.  US Abdomen Limited (2022 11:40)Cholelithiasis without evidence of cholecystitis.  Redemonstration of a few scattered small hepatic cysts.     SCANNED - COLONOSCOPY (2021)-widely patent lower rectal anastomosis, diverticuli and remaining colon without inflammation     Abdominal surgical history includes Luisa  There is a history of bowel perforation from complicated diverticulitis in 2015 with colon resection by Dr. Jacob, and appendectomy, and exploratory laparotomy    Subjective      Review of Systems:   Review of Systems   Constitutional: Negative for appetite change and unexpected weight loss.   HENT: Negative for trouble " swallowing.    Gastrointestinal: Positive for abdominal pain. Negative for abdominal distention, anal bleeding, blood in stool, constipation, diarrhea, nausea, rectal pain, vomiting, GERD and indigestion.        Increase stool frequency       Medications:     Current Outpatient Medications:   •  albuterol sulfate  (90 Base) MCG/ACT inhaler, Inhale 2 puffs Every 4 (Four) Hours As Needed for Wheezing., Disp: 1 g, Rfl: 5  •  celecoxib (CeleBREX) 100 MG capsule, Take 1 capsule by mouth 2 (Two) Times a Day As Needed for Mild Pain., Disp: 60 capsule, Rfl: 2  •  clindamycin (CLEOCIN T) 1 % external solution, APPLY TO AFFECTED AREA ONCE TO TWICE DAILY AFTER SHOWER, Disp: , Rfl:   •  cycloSPORINE (RESTASIS) 0.05 % ophthalmic emulsion, Apply 1 drop to eye(s) as directed by provider., Disp: , Rfl:   •  diclofenac (VOLTAREN) 1 % gel gel, Apply 4 g topically to the appropriate area as directed 4 (Four) Times a Day. (Patient taking differently: Apply 4 g topically to the appropriate area as directed 4 (Four) Times a Day As Needed (joint pain).), Disp: 100 g, Rfl: 3  •  dicyclomine (BENTYL) 10 MG capsule, Take 1 capsule by mouth 2 (Two) Times a Day., Disp: 60 capsule, Rfl: 5  •  fexofenadine (ALLEGRA) 180 MG tablet, Take 1 tablet by mouth Daily As Needed (allergies)., Disp: , Rfl:   •  losartan-hydrochlorothiazide (HYZAAR) 100-25 MG per tablet, Take 1 tablet by mouth Daily., Disp: 90 tablet, Rfl: 2  •  metoprolol succinate XL (TOPROL-XL) 200 MG 24 hr tablet, TAKE 1 TABLET BY MOUTH EVERY DAY, Disp: 90 tablet, Rfl: 1  •  rosuvastatin (CRESTOR) 5 MG tablet, Take 1 tablet by mouth Daily., Disp: 90 tablet, Rfl: 3  •  Synthroid 75 MCG tablet, Take 1 tablet by mouth Daily., Disp: 90 tablet, Rfl: 3  •  Triamcinolone Acetonide (NASACORT) 55 MCG/ACT nasal inhaler, 2 sprays into the nostril(s) as directed by provider Daily., Disp: , Rfl:   •  Wixela Inhub 100-50 MCG/ACT DISKUS, INHALE 1 PUFF BY MOUTH TWICE DAILY, Disp: 180 each, Rfl:  1  •  Yuvafem 10 MCG tablet vaginal tablet, APPLY 1 TABLET IN THE VAGINA 2 TIMES WEEKLY., Disp: , Rfl:     Allergies:   Allergies   Allergen Reactions   • Tenormin [Atenolol] Cough and Angioedema   • Macrobid [Nitrofurantoin Monohyd Macro] Swelling and Other (See Comments)     Swelling around eyes, fatigue   • Plaquenil [Hydroxychloroquine Sulfate] Other (See Comments)     Vision changes over time    • Omeprazole Diarrhea   • Ace Inhibitors Cough   • Sudafed [Pseudoephedrine Hcl] Other (See Comments)     INCREASED PRESSURE IN EYES        Social History:   Social History     Socioeconomic History   • Marital status:    Tobacco Use   • Smoking status: Never   • Smokeless tobacco: Never   Vaping Use   • Vaping Use: Never used   Substance and Sexual Activity   • Alcohol use: Yes     Alcohol/week: 1.0 - 2.0 standard drink     Types: 1 - 2 Glasses of wine per week     Comment: occ   • Drug use: No   • Sexual activity: Not Currently     Partners: Male     Birth control/protection: None        Surgical History:   Past Surgical History:   Procedure Laterality Date   • ADENOIDECTOMY      Removed as a child   • APPENDECTOMY  2016   • BONE GRAFT  2021   • BREAST BIOPSY Left 10/15/2021    excisional   • BREAST SURGERY  10/2021   •  SECTION  1970   • CHOLECYSTECTOMY N/A 2022    Procedure: CHOLECYSTECTOMY LAPAROSCOPIC;  Surgeon: Angie López MD;  Location:  [facility] OR;  Service: General;  Laterality: N/A;   • COLON RESECTION  2016    sigmoid and partial rectum    • COLON SURGERY     • COLONOSCOPY     • EXPLORATORY LAPAROTOMY      fibroid   • ILEOSTOMY CLOSURE N/A 2016    Procedure: ILEOSTOMY  EXCISION AND TAKEDOWN;  Surgeon: Brooks Jacob MD;  Location:  [facility] OR;  Service:    • KNEE ARTHROSCOPY      right knee scope   • MYOMECTOMY     • SMALL INTESTINE SURGERY     • TONSILECTOMY, ADENOIDECTOMY, BILATERAL MYRINGOTOMY AND TUBES     • TONSILLECTOMY     • TOOTH EXTRACTION      2021  "  • TOOTH EXTRACTION          Medical History:   Past Medical History:   Diagnosis Date   • Abnormal ECG but low risk assessment   • Allergic rhinitis Many years ago   • Anemia Prior to menopause   • Arrhythmia    • Arthritis    • Asthma    • Asthma, intrinsic around 2000   • Atrial fibrillation (HCC)     Received 4 to 5 notifications on [employer] re: I may have Atrial fib   • Cervical disc disease    • Disease of thyroid gland    • Diverticulitis of intestine with perforation    • E-coli UTI 2019    treated with antibioiutcs and urine rechecked and clean per pt report   • GERD (gastroesophageal reflux disease)    • Hashimoto's disease    • Headache    • Heart murmur     fast heart beat; sometimes irregular   • History of transfusion     several times, with surgeries post op and after c section, never a reaction   • Hyperlipidemia sometimes marginally high   • Hypertension    • Hyperthyroidism     Thyroid nodule, hashimoto's thyroiditis   • Hypothyroidism    • Irritable bowel syndrome    • Kidney stone    • Low back pain     sometimes   • Lupus (HCC)    • Neuromuscular disorder ([facility])     Nerve damage lower back   • Palpitations     POSSIBLE ATRIAL TACHYCARDIA   • PONV (postoperative nausea and vomiting)    • Raynaud's disease    • Sinusitis very long time   • Sjogren's disease ([facility])    • SVT (supraventricular tachycardia) ([facility])    • Thyroid nodule 2006   • Tremor     sometimes hands shake   • Visual impairment    • Vitamin D deficiency 2000        Objective     Physical Exam:  Vital Signs:   Vitals:    03/10/23 1254   BP: 124/72   BP Location: Left arm   Patient Position: Sitting   Cuff Size: Adult   Pulse: 91   Temp: 97.1 °F (36.2 °C)   TempSrc: Temporal   SpO2: 97%   Weight: 59.6 kg (131 lb 6.4 oz)   Height: 149.9 cm (59.02\")     Body mass index is 26.53 kg/m².     Physical Exam  Vitals and nursing note reviewed.   Constitutional:       General: She is not in acute distress.     Appearance: She is well-developed. She " is not diaphoretic.   Eyes:      General: No scleral icterus.     Conjunctiva/sclera: Conjunctivae normal.   Neck:      Thyroid: No thyromegaly.   Cardiovascular:      Rate and Rhythm: Normal rate and regular rhythm.   Pulmonary:      Effort: Pulmonary effort is normal.      Breath sounds: Normal breath sounds.   Abdominal:      General: Bowel sounds are normal. There is no distension.      Palpations: Abdomen is soft.      Tenderness: There is no abdominal tenderness. There is no guarding or rebound.      Hernia: No hernia is present.       Musculoskeletal:      Cervical back: Neck supple.      Right lower leg: No edema.      Left lower leg: No edema.   Skin:     General: Skin is warm and dry.      Capillary Refill: Capillary refill takes 2 to 3 seconds.      Coloration: Skin is not jaundiced or pale.      Findings: No bruising or petechiae.      Nails: There is no clubbing.   Neurological:      Mental Status: She is alert and oriented to person, place, and time.   Psychiatric:         Behavior: Behavior normal.         Thought Content: Thought content normal.         Judgment: Judgment normal.         Assessment / Plan      Assessment/Plan:   Diagnoses and all orders for this visit:    1. Irritable bowel syndrome with both constipation and diarrhea (Primary)  Seeing improvement with PT physical therapy, continue current treatment  2. Pelvic floor dysfunction in female  See #1  3. Abdominal wall pain in right lower quadrant  Symptoms consistent with pulled muscle or incisional hernia , however this was not reproducible today.  Continue to monitor       Follow Up:   Return in about 6 months (around 9/10/2023).    Plan of care reviewed with the patient at the conclusion of today's visit.  Education was provided regarding diagnosis, management, and any prescribed or recommended OTC medications.  Patient verbalized understanding of and agreement with management plan.     Time Statement:   Discussed plan of care in  detail with patient today. Patient verbally understands and agrees. I have spent 20 minutes reviewing available diagnostics, obtaining history, examining the patient, developing a treatment plan, and educating the patient on disease process and plan of care.     JACK Lares  [facility]

## 2023-03-14 ENCOUNTER — TREATMENT (OUTPATIENT)
Dept: PHYSICAL THERAPY | Facility: CLINIC | Age: 73
End: 2023-03-14
Payer: MEDICARE

## 2023-03-14 DIAGNOSIS — M62.89 PELVIC FLOOR DYSFUNCTION: ICD-10-CM

## 2023-03-14 DIAGNOSIS — K58.2 IRRITABLE BOWEL SYNDROME WITH BOTH CONSTIPATION AND DIARRHEA: ICD-10-CM

## 2023-03-14 DIAGNOSIS — M62.838 MUSCLE SPASM: ICD-10-CM

## 2023-03-14 DIAGNOSIS — R15.1 FECAL SMEARING: ICD-10-CM

## 2023-03-14 DIAGNOSIS — N81.89 PELVIC FLOOR WEAKNESS: Primary | ICD-10-CM

## 2023-03-14 PROCEDURE — 97140 MANUAL THERAPY 1/> REGIONS: CPT | Performed by: PHYSICAL THERAPIST

## 2023-03-14 NOTE — PROGRESS NOTES
Physical Therapy Daily Treatment Note  PHYSICAL THERAPY    [address], [address]; [place] [address]  Patient: Kavitha Barnhart   : 1950  Diagnosis/ICD-10 Code:  Pelvic floor weakness [N81.89]  Referring practitioner: JACK Rainey  Date of Initial Visit: Type: THERAPY  Noted: 2022  Today's Date: 3/17/2023  Patient seen for 15 sessions      Subjective   Kavitha Barnhart reports things feel about the same. Saw Miroslava Salinas and doing stretches and had excruciating pain in her R lower abdomen. Told MD that. She believes she has a hernia. Voiding bowels 1x yesterday. 1x today and feels might have 1 more but felt pretty thorough. Some days having more watery stool. None where she is having accidents and running to bathroom. Straining to start sometimes. Doing bowel massage.     Pain Rating (0-10): 0      Objective   verbal consent obtained for external pelvic exam/treatment with declined need for second person in room     See Exercise, Manual, and Modality Logs for complete treatment.       Assessment/Plan  Pt with improved bowel function overall. She demonstrates decreasing mm tension and scar restriction. She has been recently diagnosed with hernia and was instructed in avoiding that area with abdominal massage. She will be seen in 1 month to assess ability to maintain current level independently.     Progress per Plan of Care         1448/1530   Timed:         Manual Therapy:    38     mins  63250;     Therapeutic Exercise:    4     mins  80663;     Neuromuscular Rae:    0    mins  41344;    Therapeutic Activity:     0     mins  82254;     Gait Trainin     mins  90646;     Ultrasound:     0     mins  26504;    Ionto                               0    mins   44600  Self Care                       0     mins   21310  Canalith Repos               0    mins  17096    Un-Timed:  Electrical Stimulation:    0     mins  77723 ( );  Dry Needling     0     mins self-pay  Traction     0      mins 07265  Low Eval     0     Mins  41650  Mod Eval     0     Mins  05951  High Eval                       0     Mins  72712  Re-Eval                           0    mins  79497    Timed Treatment:   42   mins   Total Treatment:     42   mins    NICOLE Muse License # [id number]  Physical Therapist

## 2023-03-21 ENCOUNTER — TRANSCRIBE ORDERS (OUTPATIENT)
Dept: ADMINISTRATIVE | Facility: HOSPITAL | Age: 73
End: 2023-03-21
Payer: MEDICARE

## 2023-03-21 DIAGNOSIS — Z12.31 VISIT FOR SCREENING MAMMOGRAM: Primary | ICD-10-CM

## 2023-04-11 ENCOUNTER — TREATMENT (OUTPATIENT)
Dept: PHYSICAL THERAPY | Facility: CLINIC | Age: 73
End: 2023-04-11
Payer: MEDICARE

## 2023-04-11 DIAGNOSIS — M62.89 PELVIC FLOOR DYSFUNCTION: ICD-10-CM

## 2023-04-11 DIAGNOSIS — K58.2 IRRITABLE BOWEL SYNDROME WITH BOTH CONSTIPATION AND DIARRHEA: ICD-10-CM

## 2023-04-11 DIAGNOSIS — R15.1 FECAL SMEARING: ICD-10-CM

## 2023-04-11 DIAGNOSIS — M62.838 MUSCLE SPASM: ICD-10-CM

## 2023-04-11 DIAGNOSIS — N81.89 PELVIC FLOOR WEAKNESS: Primary | ICD-10-CM

## 2023-04-11 PROCEDURE — 97530 THERAPEUTIC ACTIVITIES: CPT | Performed by: PHYSICAL THERAPIST

## 2023-04-11 PROCEDURE — 97140 MANUAL THERAPY 1/> REGIONS: CPT | Performed by: PHYSICAL THERAPIST

## 2023-04-11 NOTE — PROGRESS NOTES
Re-Assessment / Progress Note  PHYSICAL THERAPY    [address], [address]; [place] [address]      Patient: Kavitha Barnhart   : 1950  Diagnosis/ICD-10 Code:  Pelvic floor weakness [N81.89]  Referring practitioner: JACK Rainey  Date of Initial Visit: Type: THERAPY  Noted: 2022  Today's Date: 2023  Patient seen for 16 sessions      Subjective:   Kavitha Barnhart reports: doing well mostly. Had more food on  and some different food yesterday. No issues with running to bathroom. Today has had multiple BMs and felt a little more bloated. Able to have more control. Thinks would be comfortable if took a trip on a plane.   Objective      verbal consent obtained for external pelvic exam/treatment with declined need for second person in room   Mild scar tissue restrictions throughout abdomen; mild tension iliopsoas, TrA  all B     See flowsheet for details of treatment following reassessment.     Assessment/Plan  Progress to physical therapy goals is good.She reports improved frequency of fecal incontinence allowing her to participate in a ballet class. She has met 3/3 short term goals and 1/3 long term goals. She will benefit from continued skilled physical therapy to address remaining impairments and functional limitations. Pt with difficulty maintaining normal bowel function with decreased frequency of treatment. She was instructed in self massage to assist with bowel motility today. Will see in 2 weeks to assess response.      Progress toward previous goals: Partially Met    Assessment/Plan  Progress to physical therapy goals is good.She reports improved bowel symptoms. She has met all goals. Will place on hold x 2 months to assess ability to self-manage symptoms.  She will be discharged in 2 months if she has not needed to return.     Progress toward previous goals: All Met  STG's: 4 weeks  • Patient will report > 25% reduction in bowel issues for improved social activities - met  • Patient  will report having a BM no more than 5 times per day demonstrating improved emptying - met   • Patient will be able to perform HEP with minimal verbal cues - met     LTG's: By discharge  • Patient will report >75% improvement in bowel symptoms for improved quality of life - met  • Patient will have no fecal incontinence or smearing x2 weeks for improved quality of life- met  • Patient will be independent with HEP - met  •       PT Signature: Magen Smith PT      Based upon review of the patient's progress and continued therapy plan, it is my medical opinion that Kavitha Barnhart should continue physical therapy treatment at [facility] PHYSICAL THERAPY  [address] [place] [id number] [phone].    Signature: __________________________________    PHYSICIAN: Miroslava Salinas APRN  NPI: [id number]                                        1300/1338  Manual Therapy:    30     mins  67177;  Therapeutic Exercise:    0     mins  33118;     Neuromuscular Rae:    0    mins  39369;    Therapeutic Activity:     8     mins  01127;     Gait Trainin     mins  78065;     Ultrasound:     0     mins  78709;    Electrical Stimulation:    0     mins  88521 ( );  Dry Needling     0     mins self-pay    Timed Treatment:   38   mins   Total Treatment:     38   mins

## 2023-04-24 ENCOUNTER — OFFICE VISIT (OUTPATIENT)
Dept: INTERNAL MEDICINE | Facility: CLINIC | Age: 73
End: 2023-04-24
Payer: MEDICARE

## 2023-04-24 VITALS
WEIGHT: 135.38 LBS | TEMPERATURE: 98 F | BODY MASS INDEX: 27.33 KG/M2 | HEART RATE: 72 BPM | DIASTOLIC BLOOD PRESSURE: 80 MMHG | RESPIRATION RATE: 20 BRPM | SYSTOLIC BLOOD PRESSURE: 132 MMHG

## 2023-04-24 DIAGNOSIS — E03.9 HYPOTHYROIDISM, UNSPECIFIED TYPE: ICD-10-CM

## 2023-04-24 DIAGNOSIS — K21.9 GASTROESOPHAGEAL REFLUX DISEASE WITHOUT ESOPHAGITIS: ICD-10-CM

## 2023-04-24 DIAGNOSIS — I10 ESSENTIAL HYPERTENSION: Primary | ICD-10-CM

## 2023-04-24 DIAGNOSIS — E78.5 HYPERLIPIDEMIA, UNSPECIFIED HYPERLIPIDEMIA TYPE: ICD-10-CM

## 2023-04-24 LAB
ALBUMIN SERPL-MCNC: 4.3 G/DL (ref 3.5–5.2)
ALBUMIN/GLOB SERPL: 1.7 G/DL
ALP SERPL-CCNC: 63 U/L (ref 39–117)
ALT SERPL W P-5'-P-CCNC: 20 U/L (ref 1–33)
ANION GAP SERPL CALCULATED.3IONS-SCNC: 9.3 MMOL/L (ref 5–15)
AST SERPL-CCNC: 19 U/L (ref 1–32)
BASOPHILS # BLD AUTO: 0.06 10*3/MM3 (ref 0–0.2)
BASOPHILS NFR BLD AUTO: 1 % (ref 0–1.5)
BILIRUB BLD-MCNC: NEGATIVE MG/DL
BILIRUB SERPL-MCNC: 0.7 MG/DL (ref 0–1.2)
BUN SERPL-MCNC: 24 MG/DL (ref 8–23)
BUN/CREAT SERPL: 19.8 (ref 7–25)
CALCIUM SPEC-SCNC: 10.3 MG/DL (ref 8.6–10.5)
CHLORIDE SERPL-SCNC: 106 MMOL/L (ref 98–107)
CHOLEST SERPL-MCNC: 143 MG/DL (ref 0–200)
CLARITY, POC: CLEAR
CO2 SERPL-SCNC: 25.7 MMOL/L (ref 22–29)
COLOR UR: YELLOW
CREAT SERPL-MCNC: 1.21 MG/DL (ref 0.57–1)
DEPRECATED RDW RBC AUTO: 41.7 FL (ref 37–54)
EGFRCR SERPLBLD CKD-EPI 2021: 47.1 ML/MIN/1.73
EOSINOPHIL # BLD AUTO: 0.22 10*3/MM3 (ref 0–0.4)
EOSINOPHIL NFR BLD AUTO: 3.5 % (ref 0.3–6.2)
ERYTHROCYTE [DISTWIDTH] IN BLOOD BY AUTOMATED COUNT: 13.4 % (ref 12.3–15.4)
EXPIRATION DATE: ABNORMAL
GLOBULIN UR ELPH-MCNC: 2.5 GM/DL
GLUCOSE SERPL-MCNC: 102 MG/DL (ref 65–99)
GLUCOSE UR STRIP-MCNC: NEGATIVE MG/DL
HCT VFR BLD AUTO: 41.6 % (ref 34–46.6)
HDLC SERPL-MCNC: 57 MG/DL (ref 40–60)
HGB BLD-MCNC: 14.3 G/DL (ref 12–15.9)
IMM GRANULOCYTES # BLD AUTO: 0.02 10*3/MM3 (ref 0–0.05)
IMM GRANULOCYTES NFR BLD AUTO: 0.3 % (ref 0–0.5)
KETONES UR QL: NEGATIVE
LDLC SERPL CALC-MCNC: 63 MG/DL (ref 0–100)
LDLC/HDLC SERPL: 1.05 {RATIO}
LEUKOCYTE EST, POC: NEGATIVE
LYMPHOCYTES # BLD AUTO: 2.11 10*3/MM3 (ref 0.7–3.1)
LYMPHOCYTES NFR BLD AUTO: 33.4 % (ref 19.6–45.3)
Lab: ABNORMAL
MCH RBC QN AUTO: 29.4 PG (ref 26.6–33)
MCHC RBC AUTO-ENTMCNC: 34.4 G/DL (ref 31.5–35.7)
MCV RBC AUTO: 85.6 FL (ref 79–97)
MONOCYTES # BLD AUTO: 0.78 10*3/MM3 (ref 0.1–0.9)
MONOCYTES NFR BLD AUTO: 12.4 % (ref 5–12)
NEUTROPHILS NFR BLD AUTO: 3.12 10*3/MM3 (ref 1.7–7)
NEUTROPHILS NFR BLD AUTO: 49.4 % (ref 42.7–76)
NITRITE UR-MCNC: NEGATIVE MG/ML
NRBC BLD AUTO-RTO: 0 /100 WBC (ref 0–0.2)
PH UR: 7 [PH] (ref 5–8)
PLATELET # BLD AUTO: 167 10*3/MM3 (ref 140–450)
PMV BLD AUTO: 11.6 FL (ref 6–12)
POTASSIUM SERPL-SCNC: 4.2 MMOL/L (ref 3.5–5.2)
PROT SERPL-MCNC: 6.8 G/DL (ref 6–8.5)
PROT UR STRIP-MCNC: NEGATIVE MG/DL
RBC # BLD AUTO: 4.86 10*6/MM3 (ref 3.77–5.28)
RBC # UR STRIP: NEGATIVE /UL
SODIUM SERPL-SCNC: 141 MMOL/L (ref 136–145)
SP GR UR: 1 (ref 1–1.03)
TRIGL SERPL-MCNC: 130 MG/DL (ref 0–150)
UROBILINOGEN UR QL: NORMAL
VLDLC SERPL-MCNC: 23 MG/DL (ref 5–40)
WBC NRBC COR # BLD: 6.31 10*3/MM3 (ref 3.4–10.8)

## 2023-04-24 PROCEDURE — 85025 COMPLETE CBC W/AUTO DIFF WBC: CPT | Performed by: INTERNAL MEDICINE

## 2023-04-24 PROCEDURE — 80053 COMPREHEN METABOLIC PANEL: CPT | Performed by: INTERNAL MEDICINE

## 2023-04-24 PROCEDURE — 99214 OFFICE O/P EST MOD 30 MIN: CPT | Performed by: INTERNAL MEDICINE

## 2023-04-24 PROCEDURE — 3079F DIAST BP 80-89 MM HG: CPT | Performed by: INTERNAL MEDICINE

## 2023-04-24 PROCEDURE — 84439 ASSAY OF FREE THYROXINE: CPT | Performed by: INTERNAL MEDICINE

## 2023-04-24 PROCEDURE — 80061 LIPID PANEL: CPT | Performed by: INTERNAL MEDICINE

## 2023-04-24 PROCEDURE — 84443 ASSAY THYROID STIM HORMONE: CPT | Performed by: INTERNAL MEDICINE

## 2023-04-24 PROCEDURE — 3075F SYST BP GE 130 - 139MM HG: CPT | Performed by: INTERNAL MEDICINE

## 2023-04-24 RX ORDER — ROSUVASTATIN CALCIUM 5 MG/1
5 TABLET, COATED ORAL DAILY
Qty: 90 TABLET | Refills: 3 | Status: SHIPPED | OUTPATIENT
Start: 2023-04-24

## 2023-04-24 NOTE — PROGRESS NOTES
Chief Complaint   Patient presents with   • Follow-up     6 month        History of Present Illness    The patient presents for a follow-up related to hypertension. The patient reports that she has had no headaches, chest pain, dyspnea, edema, syncope, blurred vision or palpitations. She states that she is taking her medication as prescribed. She is not having medication side effects.    The patient presents for a follow-up related to hypothyroidism. She reports that she does not have as much energy as usual. She reports no hair loss, heat intolerance, cold intolerance, diarrhea, constipation or sweats. She is taking her medication as prescribed.    The patient presents for a follow-up related to GERD. The patient is not on medication for her gastroesophageal reflux. She reports no abdominal pain, belching, dysphagia, early satiety, heartburn, hoarseness, nausea, odynophagia, rectal bleeding, vomiting or weight loss. The GERD has no known aggravating factors.    The patient presents for a follow-up related to hyperlipidemia. She is following a low fat diet. She reports that she is exercising. She is taking rosuvastatin. The patient is taking her medication as prescribed. She reports no medication side effects, including muscle cramps, abdominal pain, headaches or weakness. She denies orthopnea, paroxysmal nocturnal dyspnea or dyspnea on exertion.    Medications      Current Outpatient Medications:   •  albuterol sulfate  (90 Base) MCG/ACT inhaler, Inhale 2 puffs Every 4 (Four) Hours As Needed for Wheezing., Disp: 1 g, Rfl: 5  •  celecoxib (CeleBREX) 100 MG capsule, Take 1 capsule by mouth 2 (Two) Times a Day As Needed for Mild Pain., Disp: 60 capsule, Rfl: 2  •  clindamycin (CLEOCIN T) 1 % external solution, APPLY TO AFFECTED AREA ONCE TO TWICE DAILY AFTER SHOWER, Disp: , Rfl:   •  cycloSPORINE (RESTASIS) 0.05 % ophthalmic emulsion, Apply 1 drop to eye(s) as directed by provider., Disp: , Rfl:   •  diclofenac  (VOLTAREN) 1 % gel gel, Apply 4 g topically to the appropriate area as directed 4 (Four) Times a Day. (Patient taking differently: Apply 4 g topically to the appropriate area as directed 4 (Four) Times a Day As Needed (joint pain).), Disp: 100 g, Rfl: 3  •  dicyclomine (BENTYL) 10 MG capsule, Take 1 capsule by mouth 2 (Two) Times a Day., Disp: 60 capsule, Rfl: 5  •  fexofenadine (ALLEGRA) 180 MG tablet, Take 1 tablet by mouth Daily As Needed (allergies)., Disp: , Rfl:   •  losartan-hydrochlorothiazide (HYZAAR) 100-25 MG per tablet, Take 1 tablet by mouth Daily., Disp: 90 tablet, Rfl: 2  •  metoprolol succinate XL (TOPROL-XL) 200 MG 24 hr tablet, TAKE 1 TABLET BY MOUTH EVERY DAY, Disp: 90 tablet, Rfl: 1  •  rosuvastatin (CRESTOR) 5 MG tablet, Take 1 tablet by mouth Daily., Disp: 90 tablet, Rfl: 3  •  Synthroid 75 MCG tablet, Take 1 tablet by mouth Daily., Disp: 90 tablet, Rfl: 3  •  Triamcinolone Acetonide (NASACORT) 55 MCG/ACT nasal inhaler, 2 sprays into the nostril(s) as directed by provider Daily., Disp: , Rfl:   •  Wixela Inhub 100-50 MCG/ACT DISKUS, INHALE 1 PUFF BY MOUTH TWICE DAILY, Disp: 180 each, Rfl: 1  •  Yuvafem 10 MCG tablet vaginal tablet, APPLY 1 TABLET IN THE VAGINA 2 TIMES WEEKLY., Disp: , Rfl:      Allergies    Allergies   Allergen Reactions   • Tenormin [Atenolol] Cough and Angioedema   • Macrobid [Nitrofurantoin Monohyd Macro] Swelling and Other (See Comments)     Swelling around eyes, fatigue   • Plaquenil [Hydroxychloroquine Sulfate] Other (See Comments)     Vision changes over time    • Shingrix [Zoster Vac Recomb Adjuvanted] Rash   • Omeprazole Diarrhea   • Ace Inhibitors Cough   • Sudafed [Pseudoephedrine Hcl] Other (See Comments)     INCREASED PRESSURE IN EYES        Problem List    Patient Active Problem List   Diagnosis   • Hypertension   • Peripheral vascular disease   • Diverticulosis   • Colon polyps   • Constipation   • Hashimoto's thyroiditis   • Mixed connective tissue disease   •  Lupus   • Uterine fibroid   • Palpitations   • Solitary thyroid nodule   • History of asthma   • GERD   • Gallbladder calculus without cholecystitis and no obstruction       Medications, Allergies, Problems List and Past History were reviewed and updated.    Physical Examination    /80 (BP Location: Left arm, Patient Position: Sitting, Cuff Size: Adult)   Pulse 72   Temp 98 °F (36.7 °C) (Temporal)   Resp 20   Wt 61.4 kg (135 lb 6 oz)   LMP  (LMP Unknown)   BMI 27.33 kg/m²     HEENT: Facies- Within normal limits. Lids- Normal bilaterally. Conjunctiva- Clear bilaterally. Sclera- Anicteric bilaterally.    Neck: Thyroid- non enlarged, symmetric and has no nodules. No bruits are detected. ROM- Normal Range of Motion with no rigidity.    Lungs: Auscultation- Clear to auscultation bilaterally. There are no retractions, clubbing or cyanosis. The Expiratory to Inspiratory ratio is equal.    Cardiovascular: There are no carotid bruits. Heart- Normal Rate with Regular rhythm and no murmurs. There are no gallops. There are no rubs. In the lower extremities there is no edema. The upper extremities do not have edema.    Abdomen: Soft, benign, non-tender with no masses, hernias, organomegaly or scars.    Impression and Assessment    Essential Hypertension.    Hypothyroidism.    Gastroesophageal Reflux Disease.    Hyperlipidemia.    Plan    Gastroesophageal Reflux Disease Plan: The condition is stable. No change is needed in the current plan.    Essential Hypertension Plan: The patient was instructed to continue the current medications.    Hyperlipidemia Plan: She was instructed to eat a low fat diet. She was encouraged to exercise daily. Weight loss was encouraged. The patient was instructed to continue the current medications.    Hypothyroidism Plan: The patient was instructed to continue the current medications.    Diagnoses and all orders for this visit:    1. Essential hypertension (Primary)  -     CBC &  Differential; Future  -     Comprehensive Metabolic Panel; Future  -     POC Urinalysis Dipstick, Automated; Future  -     CBC & Differential  -     Comprehensive Metabolic Panel    2. Hypothyroidism, unspecified type  -     T4, Free; Future  -     TSH; Future  -     T4, Free  -     TSH    3. Gastroesophageal reflux disease without esophagitis    4. Hyperlipidemia, unspecified hyperlipidemia type  -     Comprehensive Metabolic Panel; Future  -     Lipid Panel; Future  -     rosuvastatin (CRESTOR) 5 MG tablet; Take 1 tablet by mouth Daily.  Dispense: 90 tablet; Refill: 3  -     Comprehensive Metabolic Panel  -     Lipid Panel        Return to Office    The patient was instructed to return for follow-up in 6 months. The patient was instructed to return sooner if the condition changes, worsens, or does not resolve.

## 2023-04-25 LAB
T4 FREE SERPL-MCNC: 1.48 NG/DL (ref 0.93–1.7)
TSH SERPL DL<=0.05 MIU/L-ACNC: 2.07 UIU/ML (ref 0.27–4.2)

## 2023-05-17 ENCOUNTER — OFFICE VISIT (OUTPATIENT)
Dept: ENDOCRINOLOGY | Facility: CLINIC | Age: 73
End: 2023-05-17
Payer: MEDICARE

## 2023-05-17 VITALS
HEART RATE: 68 BPM | SYSTOLIC BLOOD PRESSURE: 102 MMHG | HEIGHT: 59 IN | OXYGEN SATURATION: 98 % | BODY MASS INDEX: 27.01 KG/M2 | WEIGHT: 134 LBS | DIASTOLIC BLOOD PRESSURE: 64 MMHG

## 2023-05-17 DIAGNOSIS — E04.1 SOLITARY THYROID NODULE: ICD-10-CM

## 2023-05-17 DIAGNOSIS — E06.3 HASHIMOTO'S THYROIDITIS: Primary | ICD-10-CM

## 2023-05-17 PROCEDURE — 3078F DIAST BP <80 MM HG: CPT | Performed by: INTERNAL MEDICINE

## 2023-05-17 PROCEDURE — 3074F SYST BP LT 130 MM HG: CPT | Performed by: INTERNAL MEDICINE

## 2023-05-17 PROCEDURE — 99213 OFFICE O/P EST LOW 20 MIN: CPT | Performed by: INTERNAL MEDICINE

## 2023-05-17 RX ORDER — LEVOTHYROXINE SODIUM 75 MCG
75 TABLET ORAL DAILY
Qty: 90 TABLET | Refills: 3 | Status: SHIPPED | OUTPATIENT
Start: 2023-05-17

## 2023-05-17 NOTE — PROGRESS NOTES
"     Office Note      Date: 2023  Patient Name: Kavitha Barnhart  MRN: [id number]  : 1950    Chief Complaint   Patient presents with   • Hashimoto's Thyroiditis   • Thyroid Problem     Nodule       History of Present Illness:   Kavitha Barnhart is a 72 y.o. female who presents for Hashimoto's Thyroiditis and Thyroid Problem (Nodule)    She remains on synthroid 75mcg qd. She is taking this correctly. She isn't taking any interfering meds concurrently. She hasn't noted any change in the size of her neck. She notes rare sensation of trouble swallowing with pills or dry bread. She denies any sxs of hypo- or hyperthyroidism at this time.    Subjective      Review of Systems:   Review of Systems   Constitutional: Negative.    Cardiovascular: Negative.    Gastrointestinal: Negative.    Endocrine: Negative.        The following portions of the patient's history were reviewed and updated as appropriate: allergies, current medications, past family history, past medical history, past social history, past surgical history and problem list.    Objective     Visit Vitals  /64 (BP Location: Left arm, Patient Position: Sitting, Cuff Size: Adult)   Pulse 68   Ht 149.9 cm (59\")   Wt 60.8 kg (134 lb)   LMP  (LMP Unknown)   SpO2 98%   BMI 27.06 kg/m²       Physical Exam:  Physical Exam  Constitutional:       Appearance: Normal appearance.   Neck:      Thyroid: No thyroid mass, thyromegaly or thyroid tenderness.   Lymphadenopathy:      Cervical: No cervical adenopathy.   Neurological:      Mental Status: She is alert.         Labs:    TSH  No results found for: TSHBASE     Free T4  Free T4   Date Value Ref Range Status   2023 1.48 0.93 - 1.70 ng/dL Final       T3  No results found for: [id number]      TPO  No results found for: THYROIDAB    TG AB  No results found for: THGAB    TG  No results found for: THYROGLB    CBC w/DIFF  Lab Results   Component Value Date    WBC 6.31 2023    RBC 4.86 2023    HGB 14.3 " 04/24/2023    HCT 41.6 04/24/2023    MCV 85.6 04/24/2023    MCH 29.4 04/24/2023    MCHC 34.4 04/24/2023    RDW 13.4 04/24/2023    RDWSD 41.7 04/24/2023    MPV 11.6 04/24/2023     04/24/2023    NEUTRORELPCT 49.4 04/24/2023    LYMPHORELPCT 33.4 04/24/2023    MONORELPCT 12.4 (H) 04/24/2023    EOSRELPCT 3.5 04/24/2023    BASORELPCT 1.0 04/24/2023    AUTOIGPER 0.3 04/24/2023    NEUTROABS 3.12 04/24/2023    LYMPHSABS 2.11 04/24/2023    MONOSABS 0.78 04/24/2023    EOSABS 0.22 04/24/2023    BASOSABS 0.06 04/24/2023    AUTOIGNUM 0.02 04/24/2023    NRBC 0.0 04/24/2023           Assessment / Plan      Assessment & Plan:  Diagnoses and all orders for this visit:    1. Hashimoto's thyroiditis (Primary)  Assessment & Plan:  Continue synthroid.  Recent TSH at goal at 2.07.      2. Solitary thyroid nodule  Assessment & Plan:  Stable to palpation.  Plan for another u/s in about 3.5 years.      Other orders  -     Synthroid 75 MCG tablet; Take 1 tablet by mouth Daily.  Dispense: 90 tablet; Refill: 3    Current Outpatient Medications   Medication Instructions   • albuterol sulfate  (90 Base) MCG/ACT inhaler 2 puffs, Inhalation, Every 4 Hours PRN   • celecoxib (CELEBREX) 100 mg, Oral, 2 Times Daily PRN   • clindamycin (CLEOCIN T) 1 % external solution APPLY TO AFFECTED AREA ONCE TO TWICE DAILY AFTER SHOWER   • cycloSPORINE (RESTASIS) 0.05 % ophthalmic emulsion 1 drop, Ophthalmic   • diclofenac (VOLTAREN) 4 g, Topical, 4 Times Daily   • dicyclomine (BENTYL) 10 mg, Oral, 2 Times Daily   • fexofenadine (ALLEGRA) 180 mg, Oral, Daily PRN   • losartan-hydrochlorothiazide (HYZAAR) 100-25 MG per tablet 1 tablet, Oral, Daily   • metoprolol succinate XL (TOPROL-XL) 200 MG 24 hr tablet TAKE 1 TABLET BY MOUTH EVERY DAY   • rosuvastatin (CRESTOR) 5 mg, Oral, Daily   • Synthroid 75 mcg, Oral, Daily   • Triamcinolone Acetonide (NASACORT) 55 MCG/ACT nasal inhaler 2 sprays, Nasal, Daily   • Wixela Inhub 100-50 MCG/ACT DISKUS INHALE 1 PUFF  BY MOUTH TWICE DAILY   • Yuvafem 10 MCG tablet vaginal tablet APPLY 1 TABLET IN THE VAGINA 2 TIMES WEEKLY.      Return in about 6 months (around 11/17/2023) for Recheck with TSH.    Surya Wells MD   05/17/2023

## 2023-05-22 ENCOUNTER — HOSPITAL ENCOUNTER (OUTPATIENT)
Dept: MAMMOGRAPHY | Facility: HOSPITAL | Age: 73
Discharge: HOME | End: 2023-05-22
Admitting: OBSTETRICS & GYNECOLOGY
Payer: MEDICARE

## 2023-05-22 DIAGNOSIS — Z12.31 VISIT FOR SCREENING MAMMOGRAM: ICD-10-CM

## 2023-05-22 DIAGNOSIS — I10 ESSENTIAL HYPERTENSION: ICD-10-CM

## 2023-05-22 DIAGNOSIS — J45.20 MILD INTERMITTENT ASTHMA WITHOUT COMPLICATION: ICD-10-CM

## 2023-05-22 PROCEDURE — 77067 SCR MAMMO BI INCL CAD: CPT

## 2023-05-22 PROCEDURE — 77063 BREAST TOMOSYNTHESIS BI: CPT | Performed by: RADIOLOGY

## 2023-05-22 PROCEDURE — 77063 BREAST TOMOSYNTHESIS BI: CPT

## 2023-05-22 PROCEDURE — 77067 SCR MAMMO BI INCL CAD: CPT | Performed by: RADIOLOGY

## 2023-05-22 RX ORDER — FLUTICASONE PROPIONATE AND SALMETEROL 100; 50 UG/1; UG/1
1 POWDER RESPIRATORY (INHALATION) 2 TIMES DAILY
Qty: 180 EACH | Refills: 1 | Status: SHIPPED | OUTPATIENT
Start: 2023-05-22

## 2023-05-22 RX ORDER — METOPROLOL SUCCINATE 200 MG/1
TABLET, EXTENDED RELEASE ORAL
Qty: 90 TABLET | Refills: 1 | Status: SHIPPED | OUTPATIENT
Start: 2023-05-22

## 2023-05-22 RX ORDER — LOSARTAN POTASSIUM AND HYDROCHLOROTHIAZIDE 25; 100 MG/1; MG/1
TABLET ORAL
Qty: 90 TABLET | Refills: 1 | Status: SHIPPED | OUTPATIENT
Start: 2023-05-22

## 2023-05-22 NOTE — TELEPHONE ENCOUNTER
Caller: [facility] [place], [place] - [address] - [phone]  - [phone] FX    Relationship: Pharmacy    Best call back number: [phone]    Requested Prescriptions:   Requested Prescriptions     Pending Prescriptions Disp Refills   • Fluticasone-Salmeterol (Wixela Inhub) 100-50 MCG/ACT DISKUS 180 each 1     Sig: Inhale 1 puff 2 (Two) Times a Day.        Pharmacy where request should be sent: [facility], [place] - [address] - [phone]  - [phone] FX     Last office visit with prescribing clinician: 4/24/2023   Last telemedicine visit with prescribing clinician: 5/22/2023   Next office visit with prescribing clinician: 10/30/2023       Leola Milner, PCT   05/22/23 14:33 EDT

## 2023-06-07 ENCOUNTER — OFFICE VISIT (OUTPATIENT)
Dept: PULMONOLOGY | Facility: CLINIC | Age: 73
End: 2023-06-07
Payer: MEDICARE

## 2023-06-07 VITALS
WEIGHT: 133.8 LBS | DIASTOLIC BLOOD PRESSURE: 60 MMHG | BODY MASS INDEX: 26.97 KG/M2 | HEIGHT: 59 IN | OXYGEN SATURATION: 97 % | HEART RATE: 78 BPM | TEMPERATURE: 97.7 F | SYSTOLIC BLOOD PRESSURE: 120 MMHG

## 2023-06-07 DIAGNOSIS — Z87.09 HISTORY OF ASTHMA: Primary | ICD-10-CM

## 2023-06-07 DIAGNOSIS — I51.89 DIASTOLIC DYSFUNCTION: ICD-10-CM

## 2023-06-07 DIAGNOSIS — M35.1 MIXED CONNECTIVE TISSUE DISEASE: ICD-10-CM

## 2023-06-07 DIAGNOSIS — R06.09 DOE (DYSPNEA ON EXERTION): ICD-10-CM

## 2023-06-07 NOTE — LETTER
"June 7, 2023     Farzad Redding MD  [address] [facility] [address]  [facility] [address]    Patient: Kavitha Barnhart   YOB: 1950   Date of Visit: 6/7/2023     Dear Dr. Vahid MD:    Thank you for referring Kavitha Barnhart to me for evaluation. Below are the relevant portions of my assessment and plan of care.    If you have questions, please do not hesitate to call me. I look forward to following Kavitha along with you.         Sincerely,        Hayden Gilliland MD        CC: No Recipients      Progress Notes:    PULMONARY  NOTE    Chief Complaint     History of asthma, history of lupus/mixed connective tissue disease, reflux, non-smoker, diastolic dysfunction    History of Present Illness     72-year-old female returns today for follow-up  Last saw her about a year ago    She is a non-smoker    She has a history of \"cough variant asthma\"  This was diagnosed many years ago and she has been on Advair with albuterol    She consistently reports worsening cough when she goes off of her Advair    She indicates that she has been more short of breath over the last year  She had an exacerbation of asthma last year that required a course of prednisone    She has a history of lupus and mixed connective tissue disease  She is followed by Dr. Luke    She has a history of diastolic dysfunction but has not noted lower extremity edema    Patient Active Problem List   Diagnosis   • Hypertension   • Peripheral vascular disease   • Diverticulosis   • Colon polyps   • Constipation   • Hashimoto's thyroiditis   • Mixed connective tissue disease   • Lupus   • Uterine fibroid   • Palpitations   • Solitary thyroid nodule   • History of asthma   • GERD   • Gallbladder calculus without cholecystitis and no obstruction   • HUA (dyspnea on exertion)   • Diastolic dysfunction     Allergies   Allergen Reactions   • Tenormin [Atenolol] Cough and Angioedema   • Macrobid [Nitrofurantoin Monohyd Macro] Swelling and Other (See " Comments)     Swelling around eyes, fatigue   • Plaquenil [Hydroxychloroquine Sulfate] Other (See Comments)     Vision changes over time    • Shingrix [Zoster Vac Recomb Adjuvanted] Rash   • Omeprazole Diarrhea   • Ace Inhibitors Cough   • Sudafed [Pseudoephedrine Hcl] Other (See Comments)     INCREASED PRESSURE IN EYES        Current Outpatient Medications:   •  albuterol sulfate  (90 Base) MCG/ACT inhaler, Inhale 2 puffs Every 4 (Four) Hours As Needed for Wheezing., Disp: 1 g, Rfl: 5  •  celecoxib (CeleBREX) 100 MG capsule, Take 1 capsule by mouth 2 (Two) Times a Day As Needed for Mild Pain., Disp: 60 capsule, Rfl: 2  •  clindamycin (CLEOCIN T) 1 % external solution, APPLY TO AFFECTED AREA ONCE TO TWICE DAILY AFTER SHOWER, Disp: , Rfl:   •  diclofenac (VOLTAREN) 1 % gel gel, Apply 4 g topically to the appropriate area as directed 4 (Four) Times a Day. (Patient taking differently: Apply 4 g topically to the appropriate area as directed 4 (Four) Times a Day As Needed (joint pain).), Disp: 100 g, Rfl: 3  •  dicyclomine (BENTYL) 10 MG capsule, Take 1 capsule by mouth 2 (Two) Times a Day., Disp: 60 capsule, Rfl: 5  •  fexofenadine (ALLEGRA) 180 MG tablet, Take 1 tablet by mouth Daily As Needed (allergies)., Disp: , Rfl:   •  Fluticasone-Salmeterol (Wixela Inhub) 100-50 MCG/ACT DISKUS, Inhale 1 puff 2 (Two) Times a Day., Disp: 180 each, Rfl: 1  •  losartan-hydrochlorothiazide (HYZAAR) 100-25 MG per tablet, TAKE 1 TABLET BY MOUTH EVERY DAY., Disp: 90 tablet, Rfl: 1  •  metoprolol succinate XL (TOPROL-XL) 200 MG 24 hr tablet, TAKE 1 TABLET BY MOUTH EVERY DAY., Disp: 90 tablet, Rfl: 1  •  rosuvastatin (CRESTOR) 5 MG tablet, Take 1 tablet by mouth Daily., Disp: 90 tablet, Rfl: 3  •  Synthroid 75 MCG tablet, Take 1 tablet by mouth Daily., Disp: 90 tablet, Rfl: 3  •  Triamcinolone Acetonide (NASACORT) 55 MCG/ACT nasal inhaler, 2 sprays into the nostril(s) as directed by provider Daily., Disp: , Rfl:   •  Yuvafem 10 MCG  "tablet vaginal tablet, APPLY 1 TABLET IN THE VAGINA 2 TIMES WEEKLY., Disp: , Rfl:   MEDICATION LIST AND ALLERGIES REVIEWED.    Family History   Problem Relation Age of Onset   • Lung cancer Mother    • Hyperlipidemia Mother    • Thyroid disease Mother    • Pancreatic cancer Mother    • Cancer Mother    • Diabetes Mother    • Hypertension Mother    • Pancreatitis Mother    • Heart disease Father    • Hypertension Father    • Heart failure Father    • Heart attack Father    • Polycystic kidney disease Brother    • Hypertension Brother    • Hypertension Brother    • Cancer Brother    • Heart disease Brother         Recently  of heart attack   • Hyperlipidemia Brother    • Hypertension Brother    • Kidney disease Brother         Kidney Failure, Polycystic Kidney Disease   • Liver disease Brother    • Hyperlipidemia Brother    • Hypertension Brother    • Kidney disease Brother         Polycystic Kidney Disease   • Crohn's disease Brother    • Thyroid disease Son    • Breast cancer Neg Hx    • Ovarian cancer Neg Hx    • Colon cancer Neg Hx    • Colon polyps Neg Hx    • Esophageal cancer Neg Hx      Social History     Tobacco Use   • Smoking status: Never   • Smokeless tobacco: Never   Vaping Use   • Vaping Use: Never used   Substance Use Topics   • Alcohol use: Yes     Alcohol/week: 1.0 - 2.0 standard drink     Types: 1 - 2 Glasses of wine per week     Comment: social   • Drug use: No     Social History     Social History Narrative    Non-smoker     FAMILY AND SOCIAL HISTORY REVIEWED.    Review of Systems  IF PRESENT REFER TO SCANNED ROS SHEET FROM SAME DATE  OTHERWISE ROS OBTAINED AND NON-CONTRIBUTORY OVER HPI.    /60   Pulse 78   Temp 97.7 °F (36.5 °C)   Ht 149.9 cm (59\")   Wt 60.7 kg (133 lb 12.8 oz)   LMP  (LMP Unknown)   SpO2 97% Comment: resting, room air  BMI 27.02 kg/m²   Physical Exam  Vitals and nursing note reviewed.   Constitutional:       General: She is not in acute distress.     Appearance: " She is well-developed. She is not diaphoretic.   HENT:      Head: Normocephalic and atraumatic.   Neck:      Thyroid: No thyromegaly.   Cardiovascular:      Rate and Rhythm: Normal rate and regular rhythm.      Heart sounds: Normal heart sounds. No murmur heard.  Pulmonary:      Effort: Pulmonary effort is normal.      Breath sounds: Normal breath sounds. No stridor.   Musculoskeletal:      Right lower leg: No edema.      Left lower leg: No edema.   Lymphadenopathy:      Cervical: No cervical adenopathy.      Upper Body:      Right upper body: No supraclavicular or epitrochlear adenopathy.      Left upper body: No supraclavicular or epitrochlear adenopathy.   Skin:     General: Skin is warm and dry.   Neurological:      Mental Status: She is alert and oriented to person, place, and time.   Psychiatric:         Behavior: Behavior normal.       Results     PFTs reveal no airway obstruction, no restriction, and a normal diffusion capacity    Chest x-ray reveals no effusions, infiltrates, or consolidation    Immunization History   Administered Date(s) Administered   • COVID-19 (PFIZER) BIVALENT 12+YRS 10/19/2022   • COVID-19 (PFIZER) Purple Cap Monovalent 01/27/2021, 02/17/2021, 09/18/2021   • Covid-19 (Pfizer) Gray Cap Monovalent 05/20/2022   • FluMist 2-49yrs 10/01/2016   • Fluad Quad 65+ 10/01/2020, 10/09/2021   • Fluzone High Dose =>65 Years (Vaxcare ONLY) 01/01/2014, 10/10/2016, 10/20/2017, 11/06/2018, 10/31/2019, 10/11/2020, 11/22/2021   • Fluzone High-Dose 65+yrs 10/19/2022   • Hepatitis A 11/06/2018, 09/03/2019   • Pneumococcal Conjugate 13-Valent (PCV13) 02/28/2018   • Pneumococcal Polysaccharide (PPSV23) 03/12/2019   • Shingrix 03/28/2023   • Tdap 02/19/2018     Problem List       ICD-10-CM ICD-9-CM   1. History of asthma  Z87.09 V12.69   2. HUA (dyspnea on exertion)  R06.09 786.09   3. Diastolic dysfunction  I51.89 429.9   4. Mixed connective tissue disease  M35.1 710.8       Discussion     We reviewed her  test results  Exam, PFTs, and chest x-ray are unremarkable/normal    We discussed her shortness of breath  It appears that she is more short of breath than she was a year ago  She feels that she cannot keep up with her peers  She does have a history of asthma although our spirometry of always been normal  In addition, she is much more concerned about being on chronic ICS therapy    At this point I have recommended a cardiopulmonary exercise test  She does have a history of diastolic dysfunction but does not have clinical evidence of decompensated fluid retention today  Further work-up or treatment based on the results of her CPX    Moderate level of Medical Decision Making complexity based on 2 or more chronic stable illnesses and an independent review of test results and/or prescription drug management.    Hayden Gilliland MD  Note electronically signed    CC: Farzad Redding MD

## 2023-06-07 NOTE — PROGRESS NOTES
"  PULMONARY  NOTE    Chief Complaint     History of asthma, history of lupus/mixed connective tissue disease, reflux, non-smoker, diastolic dysfunction    History of Present Illness     72-year-old female returns today for follow-up  Last saw her about a year ago    She is a non-smoker    She has a history of \"cough variant asthma\"  This was diagnosed many years ago and she has been on Advair with albuterol    She consistently reports worsening cough when she goes off of her Advair    She indicates that she has been more short of breath over the last year  She had an exacerbation of asthma last year that required a course of prednisone    She has a history of lupus and mixed connective tissue disease  She is followed by Dr. Luke    She has a history of diastolic dysfunction but has not noted lower extremity edema    Patient Active Problem List   Diagnosis    Hypertension    Peripheral vascular disease    Diverticulosis    Colon polyps    Constipation    Hashimoto's thyroiditis    Mixed connective tissue disease    Lupus    Uterine fibroid    Palpitations    Solitary thyroid nodule    History of asthma    GERD    Gallbladder calculus without cholecystitis and no obstruction    HUA (dyspnea on exertion)    Diastolic dysfunction     Allergies   Allergen Reactions    Tenormin [Atenolol] Cough and Angioedema    Macrobid [Nitrofurantoin Monohyd Macro] Swelling and Other (See Comments)     Swelling around eyes, fatigue    Plaquenil [Hydroxychloroquine Sulfate] Other (See Comments)     Vision changes over time     Shingrix [Zoster Vac Recomb Adjuvanted] Rash    Omeprazole Diarrhea    Ace Inhibitors Cough    Sudafed [Pseudoephedrine Hcl] Other (See Comments)     INCREASED PRESSURE IN EYES        Current Outpatient Medications:     albuterol sulfate  (90 Base) MCG/ACT inhaler, Inhale 2 puffs Every 4 (Four) Hours As Needed for Wheezing., Disp: 1 g, Rfl: 5    celecoxib (CeleBREX) 100 MG capsule, Take 1 capsule by mouth 2 " (Two) Times a Day As Needed for Mild Pain., Disp: 60 capsule, Rfl: 2    clindamycin (CLEOCIN T) 1 % external solution, APPLY TO AFFECTED AREA ONCE TO TWICE DAILY AFTER SHOWER, Disp: , Rfl:     diclofenac (VOLTAREN) 1 % gel gel, Apply 4 g topically to the appropriate area as directed 4 (Four) Times a Day. (Patient taking differently: Apply 4 g topically to the appropriate area as directed 4 (Four) Times a Day As Needed (joint pain).), Disp: 100 g, Rfl: 3    dicyclomine (BENTYL) 10 MG capsule, Take 1 capsule by mouth 2 (Two) Times a Day., Disp: 60 capsule, Rfl: 5    fexofenadine (ALLEGRA) 180 MG tablet, Take 1 tablet by mouth Daily As Needed (allergies)., Disp: , Rfl:     Fluticasone-Salmeterol (Wixela Inhub) 100-50 MCG/ACT DISKUS, Inhale 1 puff 2 (Two) Times a Day., Disp: 180 each, Rfl: 1    losartan-hydrochlorothiazide (HYZAAR) 100-25 MG per tablet, TAKE 1 TABLET BY MOUTH EVERY DAY., Disp: 90 tablet, Rfl: 1    metoprolol succinate XL (TOPROL-XL) 200 MG 24 hr tablet, TAKE 1 TABLET BY MOUTH EVERY DAY., Disp: 90 tablet, Rfl: 1    rosuvastatin (CRESTOR) 5 MG tablet, Take 1 tablet by mouth Daily., Disp: 90 tablet, Rfl: 3    Synthroid 75 MCG tablet, Take 1 tablet by mouth Daily., Disp: 90 tablet, Rfl: 3    Triamcinolone Acetonide (NASACORT) 55 MCG/ACT nasal inhaler, 2 sprays into the nostril(s) as directed by provider Daily., Disp: , Rfl:     Yuvafem 10 MCG tablet vaginal tablet, APPLY 1 TABLET IN THE VAGINA 2 TIMES WEEKLY., Disp: , Rfl:   MEDICATION LIST AND ALLERGIES REVIEWED.    Family History   Problem Relation Age of Onset    Lung cancer Mother     Hyperlipidemia Mother     Thyroid disease Mother     Pancreatic cancer Mother     Cancer Mother     Diabetes Mother     Hypertension Mother     Pancreatitis Mother     Heart disease Father     Hypertension Father     Heart failure Father     Heart attack Father     Polycystic kidney disease Brother     Hypertension Brother     Hypertension Brother     Cancer Brother      "Heart disease Brother         Recently  of heart attack    Hyperlipidemia Brother     Hypertension Brother     Kidney disease Brother         Kidney Failure, Polycystic Kidney Disease    Liver disease Brother     Hyperlipidemia Brother     Hypertension Brother     Kidney disease Brother         Polycystic Kidney Disease    Crohn's disease Brother     Thyroid disease Son     Breast cancer Neg Hx     Ovarian cancer Neg Hx     Colon cancer Neg Hx     Colon polyps Neg Hx     Esophageal cancer Neg Hx      Social History     Tobacco Use    Smoking status: Never    Smokeless tobacco: Never   Vaping Use    Vaping Use: Never used   Substance Use Topics    Alcohol use: Yes     Alcohol/week: 1.0 - 2.0 standard drink     Types: 1 - 2 Glasses of wine per week     Comment: social    Drug use: No     Social History     Social History Narrative    Non-smoker     FAMILY AND SOCIAL HISTORY REVIEWED.    Review of Systems  IF PRESENT REFER TO SCANNED ROS SHEET FROM SAME DATE  OTHERWISE ROS OBTAINED AND NON-CONTRIBUTORY OVER HPI.    /60   Pulse 78   Temp 97.7 °F (36.5 °C)   Ht 149.9 cm (59\")   Wt 60.7 kg (133 lb 12.8 oz)   LMP  (LMP Unknown)   SpO2 97% Comment: resting, room air  BMI 27.02 kg/m²   Physical Exam  Vitals and nursing note reviewed.   Constitutional:       General: She is not in acute distress.     Appearance: She is well-developed. She is not diaphoretic.   HENT:      Head: Normocephalic and atraumatic.   Neck:      Thyroid: No thyromegaly.   Cardiovascular:      Rate and Rhythm: Normal rate and regular rhythm.      Heart sounds: Normal heart sounds. No murmur heard.  Pulmonary:      Effort: Pulmonary effort is normal.      Breath sounds: Normal breath sounds. No stridor.   Musculoskeletal:      Right lower leg: No edema.      Left lower leg: No edema.   Lymphadenopathy:      Cervical: No cervical adenopathy.      Upper Body:      Right upper body: No supraclavicular or epitrochlear adenopathy.      Left " upper body: No supraclavicular or epitrochlear adenopathy.   Skin:     General: Skin is warm and dry.   Neurological:      Mental Status: She is alert and oriented to person, place, and time.   Psychiatric:         Behavior: Behavior normal.       Results     PFTs reveal no airway obstruction, no restriction, and a normal diffusion capacity    Chest x-ray reveals no effusions, infiltrates, or consolidation    Immunization History   Administered Date(s) Administered    COVID-19 (PFIZER) BIVALENT 12+YRS 10/19/2022    COVID-19 (PFIZER) Purple Cap Monovalent 01/27/2021, 02/17/2021, 09/18/2021    Covid-19 (Pfizer) Gray Cap Monovalent 05/20/2022    FluMist 2-49yrs 10/01/2016    Fluad Quad 65+ 10/01/2020, 10/09/2021    Fluzone High Dose =>65 Years (Vaxcare ONLY) 01/01/2014, 10/10/2016, 10/20/2017, 11/06/2018, 10/31/2019, 10/11/2020, 11/22/2021    Fluzone High-Dose 65+yrs 10/19/2022    Hepatitis A 11/06/2018, 09/03/2019    Pneumococcal Conjugate 13-Valent (PCV13) 02/28/2018    Pneumococcal Polysaccharide (PPSV23) 03/12/2019    Shingrix 03/28/2023    Tdap 02/19/2018     Problem List       ICD-10-CM ICD-9-CM   1. History of asthma  Z87.09 V12.69   2. HUA (dyspnea on exertion)  R06.09 786.09   3. Diastolic dysfunction  I51.89 429.9   4. Mixed connective tissue disease  M35.1 710.8       Discussion     We reviewed her test results  Exam, PFTs, and chest x-ray are unremarkable/normal    We discussed her shortness of breath  It appears that she is more short of breath than she was a year ago  She feels that she cannot keep up with her peers  She does have a history of asthma although our spirometry of always been normal  In addition, she is much more concerned about being on chronic ICS therapy    At this point I have recommended a cardiopulmonary exercise test  She does have a history of diastolic dysfunction but does not have clinical evidence of decompensated fluid retention today  Further work-up or treatment based on the  results of her CPX    Moderate level of Medical Decision Making complexity based on 2 or more chronic stable illnesses and an independent review of test results and/or prescription drug management.    Hayden Gilliland MD  Note electronically signed    CC: Farzad Redding MD

## 2023-08-01 ENCOUNTER — DOCUMENTATION (OUTPATIENT)
Dept: PULMONOLOGY | Facility: CLINIC | Age: 73
End: 2023-08-01

## 2023-08-01 ENCOUNTER — OFFICE VISIT (OUTPATIENT)
Dept: PULMONOLOGY | Facility: CLINIC | Age: 73
End: 2023-08-01
Payer: MEDICARE

## 2023-08-01 DIAGNOSIS — Z87.09 HISTORY OF ASTHMA: Primary | ICD-10-CM

## 2023-08-01 DIAGNOSIS — R06.09 DOE (DYSPNEA ON EXERTION): ICD-10-CM

## 2023-08-09 ENCOUNTER — TRANSCRIBE ORDERS (OUTPATIENT)
Dept: PHYSICAL THERAPY | Facility: CLINIC | Age: 73
End: 2023-08-09
Payer: MEDICARE

## 2023-08-09 DIAGNOSIS — M25.569 KNEE PAIN, UNSPECIFIED CHRONICITY, UNSPECIFIED LATERALITY: Primary | ICD-10-CM

## 2023-08-14 ENCOUNTER — OFFICE VISIT (OUTPATIENT)
Dept: INTERNAL MEDICINE | Facility: CLINIC | Age: 73
End: 2023-08-14
Payer: MEDICARE

## 2023-08-14 VITALS
BODY MASS INDEX: 27.01 KG/M2 | HEIGHT: 59 IN | DIASTOLIC BLOOD PRESSURE: 76 MMHG | WEIGHT: 134 LBS | SYSTOLIC BLOOD PRESSURE: 120 MMHG | TEMPERATURE: 98.2 F | HEART RATE: 68 BPM | RESPIRATION RATE: 16 BRPM

## 2023-08-14 DIAGNOSIS — M25.562 ACUTE PAIN OF LEFT KNEE: Primary | ICD-10-CM

## 2023-08-14 PROCEDURE — 3074F SYST BP LT 130 MM HG: CPT | Performed by: NURSE PRACTITIONER

## 2023-08-14 PROCEDURE — 3078F DIAST BP <80 MM HG: CPT | Performed by: NURSE PRACTITIONER

## 2023-08-14 PROCEDURE — 99214 OFFICE O/P EST MOD 30 MIN: CPT | Performed by: NURSE PRACTITIONER

## 2023-08-14 PROCEDURE — 1159F MED LIST DOCD IN RCRD: CPT | Performed by: NURSE PRACTITIONER

## 2023-08-14 PROCEDURE — 1160F RVW MEDS BY RX/DR IN RCRD: CPT | Performed by: NURSE PRACTITIONER

## 2023-08-14 RX ORDER — METHYLPREDNISOLONE 4 MG/1
TABLET ORAL
Qty: 21 TABLET | Refills: 0 | Status: SHIPPED | OUTPATIENT
Start: 2023-08-14

## 2023-08-14 NOTE — PROGRESS NOTES
"Chief Complaint  Knee Pain (Patient fell on concrete last month. )    Subjective          Kavitha Barnhart presents to [facility]  History of Present Illness    The patient presents today for evaluation of left knee pain.    Left knee pain  In 07/2023 the patient was walking on concrete with heels on and she fell landing on her bilateral knees. She states for the first 2 weeks after falling she did not have pain. She now has bilateral knee pain that is located at the patella and posterior knee, left worse than right. She confirms the left knee is warm to touch with swelling. She states that she was going to see a rheumatologist but by the time she got there, she was having excruciating pain in both knees. An x-ray was obtained that noted mild arthritis and no fracture. She states that her left knee gets \"locked up\" intermittently which causes excruciating pain. She is not attending physical therapy at this time; however, she plans to attend at [facility]. The patient reports that when she was getting off of a very tall bar stool, she reached down with her left leg and it was dangling about a foot and something slipped in her knee. She can extend with the right knee but not the left. She can sometimes feel a slipping motion in her knee. She has taken steroids in the past and tolerated them well. The patient reports that she has been diagnosed with mixed connective tissue disease in the past. She states that she has been diagnosed with lupus separately.  She reports she has had issues with her knees since she was 10-years-old.     Current Outpatient Medications:     albuterol sulfate  (90 Base) MCG/ACT inhaler, Inhale 2 puffs Every 4 (Four) Hours As Needed for Wheezing., Disp: 1 g, Rfl: 5    celecoxib (CeleBREX) 100 MG capsule, Take 1 capsule by mouth 2 (Two) Times a Day As Needed for Mild Pain., Disp: 60 capsule, Rfl: 2    clindamycin (CLEOCIN T) 1 % external " "solution, APPLY TO AFFECTED AREA ONCE TO TWICE DAILY AFTER SHOWER, Disp: , Rfl:     diclofenac (VOLTAREN) 1 % gel gel, Apply 4 g topically to the appropriate area as directed 4 (Four) Times a Day. (Patient taking differently: Apply 4 g topically to the appropriate area as directed 4 (Four) Times a Day As Needed (joint pain).), Disp: 100 g, Rfl: 3    dicyclomine (BENTYL) 10 MG capsule, Take 1 capsule by mouth 2 (Two) Times a Day., Disp: 60 capsule, Rfl: 5    fexofenadine (ALLEGRA) 180 MG tablet, Take 1 tablet by mouth Daily As Needed (allergies)., Disp: , Rfl:     Fluticasone-Salmeterol (Wixela Inhub) 100-50 MCG/ACT DISKUS, Inhale 1 puff 2 (Two) Times a Day., Disp: 180 each, Rfl: 1    losartan-hydrochlorothiazide (HYZAAR) 100-25 MG per tablet, TAKE 1 TABLET BY MOUTH EVERY DAY., Disp: 90 tablet, Rfl: 1    metoprolol succinate XL (TOPROL-XL) 200 MG 24 hr tablet, TAKE 1 TABLET BY MOUTH EVERY DAY., Disp: 90 tablet, Rfl: 1    rosuvastatin (CRESTOR) 5 MG tablet, Take 1 tablet by mouth Daily., Disp: 90 tablet, Rfl: 3    Synthroid 75 MCG tablet, Take 1 tablet by mouth Daily., Disp: 90 tablet, Rfl: 3    Triamcinolone Acetonide (NASACORT) 55 MCG/ACT nasal inhaler, 2 sprays into the nostril(s) as directed by provider Daily., Disp: , Rfl:     Yuvafem 10 MCG tablet vaginal tablet, APPLY 1 TABLET IN THE VAGINA 2 TIMES WEEKLY., Disp: , Rfl:     methylPREDNISolone (MEDROL) 4 MG dose pack, Take as directed on package instructions., Disp: 21 tablet, Rfl: 0    Nirmatrelvir&Ritonavir 150/100 (Paxlovid, 150/100,) 10 x 150 MG & 10 x 100MG tablet therapy pack tablet (for renal adjustment), Take 2 tablets by mouth 2 (Two) Times a Day for 5 days., Disp: 20 tablet, Rfl: 0         Objective   Vital Signs:   /76 (BP Location: Right arm, Patient Position: Sitting, Cuff Size: Adult)   Pulse 68   Temp 98.2 øF (36.8 øC) (Infrared)   Resp 16   Ht 149.9 cm (59\")   Wt 60.8 kg (134 lb)   BMI 27.06 kg/mý     Physical Exam  Vitals and nursing " note reviewed.   Constitutional:       General: She is not in acute distress.     Appearance: Normal appearance. She is well-developed. She is not ill-appearing.   HENT:      Head: Normocephalic and atraumatic.   Eyes:      General: No scleral icterus.  Neck:      Thyroid: No thyromegaly.   Cardiovascular:      Rate and Rhythm: Normal rate and regular rhythm.      Pulses: Normal pulses.           Carotid pulses are 2+ on the right side and 2+ on the left side.       Radial pulses are 2+ on the right side and 2+ on the left side.        Femoral pulses are 2+ on the right side and 2+ on the left side.       Popliteal pulses are 2+ on the right side and 2+ on the left side.        Dorsalis pedis pulses are 2+ on the right side and 2+ on the left side.        Posterior tibial pulses are 2+ on the right side and 2+ on the left side.   Pulmonary:      Effort: Pulmonary effort is normal.      Breath sounds: Normal breath sounds.   Abdominal:      General: Bowel sounds are normal. There is no distension.      Palpations: Abdomen is soft.      Tenderness: There is no abdominal tenderness.   Musculoskeletal:      Left knee: No swelling or erythema.      Instability Tests: Anterior drawer test negative. Posterior drawer test negative.      Right lower leg: No edema.      Left lower leg: No edema.      Comments: Knee without swelling, erythema, or warmth. Negative valgus, varus, anterior and posterior drawer sign.  No immediate tenderness to palpate over the patella. Generally painful inside the knee.  Strength 5 out of 5   Lymphadenopathy:      Cervical: No cervical adenopathy.   Skin:     Capillary Refill: Capillary refill takes 2 to 3 seconds.      Coloration: Skin is not pale.   Neurological:      Mental Status: She is alert and oriented to person, place, and time.      Deep Tendon Reflexes:      Reflex Scores:       Patellar reflexes are 2+ on the right side and 2+ on the left side.       Achilles reflexes are 2+ on the  right side and 2+ on the left side.  Psychiatric:         Mood and Affect: Mood normal.         Behavior: Behavior normal.      Result Review :                 Assessment and Plan    Diagnoses and all orders for this visit:    1. Acute pain of left knee (Primary)  -     Ambulatory Referral to Orthopedic Surgery  -     methylPREDNISolone (MEDROL) 4 MG dose pack; Take as directed on package instructions.  Dispense: 21 tablet; Refill: 0      Left knee pain  - The patient will be referred to orthopedics for further evaluation and treatment.  - A prescription for steroid was sent to the patient's pharmacy. She was advised to take with food.  - She was advised to avoid bike riding or any activity.      Transcribed from ambient dictation for JACK Burdick by Qiana Hardy.  08/14/23   15:42 EDT    Patient or patient representative verbalized consent to the visit recording.  I have personally performed the services described in this document as transcribed by the above individual, and it is both accurate and complete.   Follow Up   Return if symptoms worsen or fail to improve.  Patient was given instructions and counseling regarding her condition or for health maintenance advice. Please see specific information pulled into the AVS if appropriate.     RTC/call  If symptoms worsen  Meds MOA and SE's reviewed and pt v/u    JACK Burdick  [facility]  [address] [place] [phone]  Fax [phone]  Phone [phone]

## 2023-08-16 ENCOUNTER — OFFICE VISIT (OUTPATIENT)
Dept: ORTHOPEDIC SURGERY | Facility: CLINIC | Age: 73
End: 2023-08-16
Payer: MEDICARE

## 2023-08-16 VITALS — HEIGHT: 59 IN | WEIGHT: 130 LBS | BODY MASS INDEX: 26.21 KG/M2

## 2023-08-16 DIAGNOSIS — M35.1 MIXED CONNECTIVE TISSUE DISEASE: ICD-10-CM

## 2023-08-16 DIAGNOSIS — M25.561 PAIN IN BOTH KNEES, UNSPECIFIED CHRONICITY: Primary | ICD-10-CM

## 2023-08-16 DIAGNOSIS — M25.562 PAIN IN BOTH KNEES, UNSPECIFIED CHRONICITY: Primary | ICD-10-CM

## 2023-08-16 PROCEDURE — 99203 OFFICE O/P NEW LOW 30 MIN: CPT | Performed by: ORTHOPAEDIC SURGERY

## 2023-08-16 NOTE — PROGRESS NOTES
[facility] Note        Subjective     Pain of the Left Knee and Pain of the Right Knee      HPI    Kavitha Barnhart is a 72 y.o. female.  She fell in June she had both knees.  She started oral steroids this week.  They are helping.  She goes to physical therapy tomorrow.  She has a lupus-like condition with a connective tissue disease.  She sees a rheumatologist.  She had x-rays and rheumatology on the 16th.  They are negative.    Past Medical History:   Diagnosis Date    Abnormal ECG but low risk assessment    Allergic rhinitis Many years ago    Anemia Prior to menopause    Arrhythmia     Arthritis     Asthma     Asthma, intrinsic around 2000    Atrial fibrillation     Received 4 to 5 notifications on [employer] re: I may have Atrial fib    Cervical disc disease     Diastolic dysfunction 6/7/2023    Disease of thyroid gland     Diverticulitis of intestine with perforation     E-coli UTI 2019    treated with antibioiutcs and urine rechecked and clean per pt report    GERD (gastroesophageal reflux disease)     Hashimoto's disease     Headache     Heart murmur     fast heart beat; sometimes irregular    History of transfusion     several times, with surgeries post op and after c section, never a reaction    Hyperlipidemia sometimes marginally high    Hypertension     Hyperthyroidism     Thyroid nodule, hashimoto's thyroiditis    Hypothyroidism     Irritable bowel syndrome     Kidney stone     Low back pain     sometimes    Lupus     Neuromuscular disorder     Nerve damage lower back    Palpitations     POSSIBLE ATRIAL TACHYCARDIA    PONV (postoperative nausea and vomiting)     Raynaud's disease     Sinusitis very long time    Sjogren's disease     SVT (supraventricular tachycardia)     Thyroid nodule 2006    Tremor     sometimes hands shake    Visual impairment     Vitamin D deficiency 2000      Past Surgical History:   Procedure Laterality Date    ADENOIDECTOMY      Removed as a child     APPENDECTOMY  2016    BONE GRAFT  2021    BREAST BIOPSY Left 10/15/2021    excisional    BREAST SURGERY  10/2021     SECTION  1970    CHOLECYSTECTOMY N/A 2022    Procedure: CHOLECYSTECTOMY LAPAROSCOPIC;  Surgeon: Angie López MD;  Location:  [facility];  Service: General;  Laterality: N/A;    COLON RESECTION  2016    sigmoid and partial rectum     COLON SURGERY      COLONOSCOPY      EXPLORATORY LAPAROTOMY      fibroid    ILEOSTOMY CLOSURE N/A 2016    Procedure: ILEOSTOMY  EXCISION AND TAKEDOWN;  Surgeon: Brooks Jacob MD;  Location:  [facility];  Service:     KNEE ARTHROSCOPY      right knee scope    MYOMECTOMY  1970    SMALL INTESTINE SURGERY      TONSILECTOMY, ADENOIDECTOMY, BILATERAL MYRINGOTOMY AND TUBES      TONSILLECTOMY      TOOTH EXTRACTION      2021    TOOTH EXTRACTION        Family History   Problem Relation Age of Onset    Lung cancer Mother     Hyperlipidemia Mother     Thyroid disease Mother     Pancreatic cancer Mother     Cancer Mother     Diabetes Mother     Hypertension Mother     Pancreatitis Mother     Heart disease Father     Hypertension Father     Heart failure Father     Heart attack Father     Polycystic kidney disease Brother     Hypertension Brother     Hypertension Brother     Cancer Brother     Heart disease Brother         Recently  of heart attack    Hyperlipidemia Brother     Hypertension Brother     Kidney disease Brother         Kidney Failure, Polycystic Kidney Disease    Liver disease Brother     Hyperlipidemia Brother     Hypertension Brother     Kidney disease Brother         Polycystic Kidney Disease    Crohn's disease Brother     Thyroid disease Son     Breast cancer Neg Hx     Ovarian cancer Neg Hx     Colon cancer Neg Hx     Colon polyps Neg Hx     Esophageal cancer Neg Hx      Social History     Socioeconomic History    Marital status:    Tobacco Use    Smoking status: Never    Smokeless tobacco: Never   Vaping Use    Vaping  Use: Never used   Substance and Sexual Activity    Alcohol use: Yes     Alcohol/week: 1.0 - 2.0 standard drink     Types: 1 - 2 Glasses of wine per week     Comment: social    Drug use: No    Sexual activity: Not Currently     Partners: Male     Birth control/protection: None      Current Outpatient Medications on File Prior to Visit   Medication Sig Dispense Refill    albuterol sulfate  (90 Base) MCG/ACT inhaler Inhale 2 puffs Every 4 (Four) Hours As Needed for Wheezing. 1 g 5    celecoxib (CeleBREX) 100 MG capsule Take 1 capsule by mouth 2 (Two) Times a Day As Needed for Mild Pain. 60 capsule 2    clindamycin (CLEOCIN T) 1 % external solution APPLY TO AFFECTED AREA ONCE TO TWICE DAILY AFTER SHOWER      diclofenac (VOLTAREN) 1 % gel gel Apply 4 g topically to the appropriate area as directed 4 (Four) Times a Day. (Patient taking differently: Apply 4 g topically to the appropriate area as directed 4 (Four) Times a Day As Needed (joint pain).) 100 g 3    dicyclomine (BENTYL) 10 MG capsule Take 1 capsule by mouth 2 (Two) Times a Day. 60 capsule 5    fexofenadine (ALLEGRA) 180 MG tablet Take 1 tablet by mouth Daily As Needed (allergies).      Fluticasone-Salmeterol (Wixela Inhub) 100-50 MCG/ACT DISKUS Inhale 1 puff 2 (Two) Times a Day. 180 each 1    losartan-hydrochlorothiazide (HYZAAR) 100-25 MG per tablet TAKE 1 TABLET BY MOUTH EVERY DAY. 90 tablet 1    methylPREDNISolone (MEDROL) 4 MG dose pack Take as directed on package instructions. 21 tablet 0    metoprolol succinate XL (TOPROL-XL) 200 MG 24 hr tablet TAKE 1 TABLET BY MOUTH EVERY DAY. 90 tablet 1    rosuvastatin (CRESTOR) 5 MG tablet Take 1 tablet by mouth Daily. 90 tablet 3    Synthroid 75 MCG tablet Take 1 tablet by mouth Daily. 90 tablet 3    Triamcinolone Acetonide (NASACORT) 55 MCG/ACT nasal inhaler 2 sprays into the nostril(s) as directed by provider Daily.      Yuvafem 10 MCG tablet vaginal tablet APPLY 1 TABLET IN THE VAGINA 2 TIMES WEEKLY.    "    No current facility-administered medications on file prior to visit.      Allergies   Allergen Reactions    Tenormin [Atenolol] Cough and Angioedema    Macrobid [Nitrofurantoin Monohyd Macro] Swelling and Other (See Comments)     Swelling around eyes, fatigue    Plaquenil [Hydroxychloroquine Sulfate] Other (See Comments)     Vision changes over time     Shingrix [Zoster Vac Recomb Adjuvanted] Rash    Omeprazole Diarrhea    Ace Inhibitors Cough    Sudafed [Pseudoephedrine Hcl] Other (See Comments)     INCREASED PRESSURE IN EYES           Review of Systems   Constitutional: Negative.    HENT: Negative.     Eyes: Negative.    Respiratory: Negative.     Cardiovascular: Negative.    Gastrointestinal: Negative.    Endocrine: Negative.    Genitourinary: Negative.    Musculoskeletal:  Positive for arthralgias.   Skin: Negative.    Allergic/Immunologic: Negative.    Neurological: Negative.    Hematological: Negative.    Psychiatric/Behavioral: Negative.        I reviewed the patient's chief complaint, history of present illness, review of systems, past medical history, surgical history, family history, social history, medications and allergy list.        Objective      Physical Exam  Ht 149.3 cm (58.76\")   Wt 59 kg (130 lb)   LMP  (LMP Unknown)   BMI 26.47 kg/mý     Body mass index is 26.47 kg/mý.    General  Mental Status - alert  General Appearance - cooperative, well groomed, not in acute distress  Orientation - Oriented X3  Build & Nutrition - well developed and well nourished  Posture - normal posture  Gait - normal gait       Ortho Exam  Minimal swelling to the left knee.  Stable ligaments.  Normal gait.  Full motion full-strength    Imaging/Studies  Imaging Results (Last 24 Hours)       Procedure Component Value Units Date/Time    XR Knee 4+ View Bilateral [[id number]] Resulted: 08/16/23 1412     Updated: 08/16/23 1413    Narrative:      Bilateral Knee X-Rays  Indication: Pain    Upright AP, Lateral, skiers and " [place] views of bilateral knees     Findings: Mild medial compartment joint space narrowing in both knees less   than 50% of joint space narrowing medially  No fracture    No prior studies were available for comparison.              Assessment    Assessment:  1. Pain in both knees, unspecified chronicity    2. Mixed connective tissue disease        Plan:  Continue over-the-counter medication as needed for discomfort  She will continue oral steroids this week.  She starts physical therapy tomorrow.  She will follow-up in 3 weeks.  If not better consider cortisone injection        Matthew Buckner MD  08/16/23  14:20 EDT      Dictated Utilizing Dragon Dictation.

## 2023-08-17 ENCOUNTER — TREATMENT (OUTPATIENT)
Dept: PHYSICAL THERAPY | Facility: CLINIC | Age: 73
End: 2023-08-17
Payer: MEDICARE

## 2023-08-17 DIAGNOSIS — M25.562 CHRONIC PAIN OF BOTH KNEES: Primary | ICD-10-CM

## 2023-08-17 DIAGNOSIS — G89.29 CHRONIC PAIN OF BOTH KNEES: Primary | ICD-10-CM

## 2023-08-17 DIAGNOSIS — M25.561 CHRONIC PAIN OF BOTH KNEES: Primary | ICD-10-CM

## 2023-08-17 NOTE — PROGRESS NOTES
Physical Therapy Initial Evaluation and Plan of Care    [address], [address]  [place] [address]    Patient: Kavitha Barnhart   : 1950  Diagnosis/ICD-10 Code:  Chronic pain of both knees [M25.561, M25.562, G89.29]  Referring practitioner: JACK Hopper  Date of Initial Visit: 2023  Today's Date: 2023  Patient seen for 1 sessions           Subjective Questionnaire: LEFS: 5180      Subjective Evaluation    History of Present Illness  Mechanism of injury: L>R knee pain. Patient has experienced intermittent polyarthralgias since she was 10 years old.  Almost 2 months ago, she was running in heals on the [place] sidewalks, landing on both knees. She did not have any pain, but she noticed L>R knee pain about 2 weeks after. Her L knee feels very sharp and can come close to giving way. Patient has received multiple bouts of PT for her various joints and reports generally positive response.    CLOF: UE assist with intermittent step to pattern, avoids squating due to pain, walks up to a mile, B UE assist for sit to stand    Medical history: Lupus-like connective tissue disorder, transient B LE weakness (can last 1/2 day to a week), L hallux rigidus      Patient Occupation: retired  Pain  Current pain ratin  At best pain ratin  At worst pain rating: 10 (prior to steroid)  Alleviating factors: steroids, NSAIDs.  Exacerbated by: when her L knee would catch or give way, prolonged positioning.    Social Support  Lives in: multiple-level home    Patient Goals  Patient goal: avoid injections or possible surgeries, alleviate pain         Objective          Palpation   Left   Hypertonic in the medial gastrocnemius.   Tenderness of the distal semitendinosus and medial gastrocnemius.     Right   Hypertonic in the medial gastrocnemius. Tenderness of the distal semitendinosus and medial gastrocnemius.     Additional Palpation Details  Also TTP distal hip adductors    Tenderness   Left Knee    Tenderness in the LCL (distal), LCL (proximal), MCL (distal), MCL (proximal) and pes anserinus. No tenderness in the lateral joint line, medial joint line, patellar tendon and quadriceps tendon.     Right Knee   Tenderness in the LCL (distal), LCL (proximal), MCL (distal), MCL (proximal) and pes anserinus. No tenderness in the lateral joint line, medial joint line, patellar tendon and quadriceps tendon.     Active Range of Motion   Left Knee   Flexion: 133 degrees with pain  Extension: WFL  Extensor lag: WFL    Right Knee   Flexion: 135 degrees   Extension: WFL  Extensor lag: WFL    Additional Active Range of Motion Details  Increased effort with L SLR compared to R    Passive Range of Motion   Left Knee   Flexion: WFL and with pain  Extension: WFL and with pain    Right Knee   Flexion: WFL  Extension: WFL    Patellar Mobility   Left Knee Patellar tendons within functional limits include the lateral. Hypomobile in the left medial, left superior and left inferior patellar tendon(s).     Right Knee Hypomobile in the medial, lateral, superior and inferior patellar tendon(s).     Patellar Mobility Comments   Left lateral tendon comments: replicates her main c/o of pain. Right lateral tendon comments: replicates her main c/o of pain.     Strength/Myotome Testing     Left Hip   Planes of Motion   Flexion: 4-  Extension: 3+  Abduction: 3+  External rotation: 4    Right Hip   Planes of Motion   Flexion: 4+  Extension: 4-  Abduction: 3+  External rotation: 4    Left Knee   Flexion: 4-  Extension: 4- (pain)  Quadriceps contraction: fair    Right Knee   Flexion: 4+  Extension: 4  Quadriceps contraction: fair    Tests     Left Knee   Negative valgus stress test at 0 degrees, valgus stress test at 30 degrees, varus stress test at 0 degrees and varus stress test at 30 degrees.     Right Knee   Negative valgus stress test at 0 degrees, valgus stress test at 30 degrees, varus stress test at 0 degrees and varus stress test at 30  degrees.     Ambulation     Ambulation: Stairs     Additional Stairs Ambulation Details  Handrail assist, reciprocal pattern ascending and step to pattern leading with R LE descending    Comments   Decreased B step length, decreased L stance time    Functional Assessment     Single Leg Stance   Left: 30 (shaky, knee pain after 25 sec) seconds  Right: 20 (shaky) seconds    Comments  5x sit to stand: 14 sec, knee pain        Assessment & Plan       Assessment  Impairments: abnormal gait, abnormal muscle firing, abnormal or restricted ROM, activity intolerance, impaired balance, impaired physical strength, lacks appropriate home exercise program, pain with function, safety issue and weight-bearing intolerance   Functional limitations: walking, uncomfortable because of pain, standing and stooping   Assessment details: Patient presents with chronic L>R knee pain after falling onto her knees almost 2 months ago. She demonstrates pain free R knee ROM with slight L knee pain/restriction into flexion and extension. Marked B quad weakness noted, as well as gross L LE weakness. Signs and symptoms are consistent with B patellofemoral pain syndrome, which is consistent with her OBINNA.  Barriers to therapy: comorbidities  Prognosis: good    Goals  Plan Goals: Short term goals (4 weeks)  1. Patient to be compliant with initial HEP.  2. Patient to demonstrate pain-free and symmetrical L knee flexion PROM  3. Patient to improve LEFS to greater than or equal to 56/80.    Long Term goals (8 weeks)  1. Patient to demonstrate >30 SLR on each LE to demonstrate functional quadriceps strength.  2. Patient to demonstrate pain free and normal strength with MMTs.  3. Patient to ascend and descend eight 8 inch steps reciprocally with one handrail with minimal to no antalgia or difficulty.  4. Patient to demonstrate independence with home exercise program.  5. Patient to improve LEFS to greater than or equal to 61/80.  6. 5x sit to  <9  sec.       Plan  Therapy options: will be seen for skilled therapy services  Planned modality interventions: thermotherapy (hydrocollator packs), TENS, cryotherapy, dry needling and electrical stimulation/Russian stimulation  Planned therapy interventions: manual therapy, neuromuscular re-education, soft tissue mobilization, strengthening, stretching, therapeutic activities, joint mobilization, home exercise program, functional ROM exercises, balance/weight-bearing training, abdominal trunk stabilization, motor coordination training, postural training, spinal/joint mobilization, gait training, flexibility and ADL retraining  Frequency: 2x week  Duration in weeks: 8  Treatment plan discussed with: patient  Plan details: 2x/week for 8 weeks      Timed:  Manual Therapy:    0     mins  81445;  Therapeutic Exercise:    8     mins  57020;     Neuromuscular Rae:    0    mins  03888;    Therapeutic Activity:     15     mins  42762;     Gait Trainin     mins  66483;     Ultrasound:     0     mins  17011;    Electrical Stimulation:    0     mins  33405 ( );    Untimed:  Electrical Stimulation:    0     mins  43796 ( );  Mechanical Traction:    0     mins  85751;     Timed Treatment:   23   mins   Total Treatment:     52   mins    PT SIGNATURE: Valentín Donald PT   License Number: [id number]  DATE TREATMENT INITIATED: 2023    Initial Certification  Certification Period: 11/15/2023  I certify that the therapy services are furnished while this patient is under my care.  The services outlined above are required by this patient, and will be reviewed every 90 days.     PHYSICIAN: Link Mcdaniel APRN      DATE:     Please sign and return via fax to [phone].. Thank you, [facility].

## 2023-08-21 ENCOUNTER — TELEPHONE (OUTPATIENT)
Dept: INTERNAL MEDICINE | Facility: CLINIC | Age: 73
End: 2023-08-21
Payer: MEDICARE

## 2023-08-21 DIAGNOSIS — U07.1 COVID-19 VIRUS INFECTION: Primary | ICD-10-CM

## 2023-08-21 RX ORDER — NIRMATRELVIR AND RITONAVIR 150-100 MG
2 KIT ORAL 2 TIMES DAILY
Qty: 20 TABLET | Refills: 0 | Status: SHIPPED | OUTPATIENT
Start: 2023-08-21 | End: 2023-08-26

## 2023-08-21 NOTE — TELEPHONE ENCOUNTER
Caller: Kavitha Barnhart    Relationship to patient: Self    Best call back number: [phone]    Date of exposure: 08/17/2023    Date of positive COVID19 test: 08/21/2023    Date if possible COVID19 exposure: 08/17/2023    COVID19 symptoms: FEVER, COUGH, FATIGUE, NOTHING TASTES GOOD    Date of initial quarantine: 08/21/2023    Additional information or concerns: PATIENT TOOK  AN AT HOME COVID TEST TODAY AND CAME BACK POSITIVE. PATIENT DOES HAVE ASTHMA AND WAS NOT SURE WHAT TO DO. PLEASE ADVISE     What is the patients preferred pharmacy: [facility] [phone]

## 2023-08-21 NOTE — TELEPHONE ENCOUNTER
"Spoke with patient, informed that Dr Redding said   \"I have sent in paxlovid.  She should not take the wixela while on the paxlovid.  She should hold rosuvastatin while on the paxlovid.  1.  Maintain hydration  2.  Monitor oxygen saturation- call or ER if < 93%.  3.  Tylenol as needed for aches, pains, fevers.  4.  Quarantine per the CDC quarantine guidelines.  They can be found at https://www.cdc.gov/coronavirus/2019-ncov/your-health/quarantine-isolation.html#  If symptoms worsen, don't improve, develops a fever needs to be seen.\"    Verbalized good understanding, agreement and appreciation.          "

## 2023-08-21 NOTE — TELEPHONE ENCOUNTER
I have sent in paxlovid.  She should not take the wixela while on the paxlovid.  She should hold rosuvastatin while on the paxlovid.  1.  Maintain hydration  2.  Monitor oxygen saturation- call or ER if < 93%.  3.  Tylenol as needed for aches, pains, fevers.  4.  Quarantine per the CDC quarantine guidelines.  They can be found at https://www.cdc.gov/coronavirus/2019-ncov/your-health/quarantine-isolation.html#  If symptoms worsen, don't improve, develops a fever needs to be seen.

## 2023-09-05 ENCOUNTER — TREATMENT (OUTPATIENT)
Dept: PHYSICAL THERAPY | Facility: CLINIC | Age: 73
End: 2023-09-05
Payer: MEDICARE

## 2023-09-05 DIAGNOSIS — G89.29 CHRONIC PAIN OF BOTH KNEES: Primary | ICD-10-CM

## 2023-09-05 DIAGNOSIS — M25.562 CHRONIC PAIN OF BOTH KNEES: Primary | ICD-10-CM

## 2023-09-05 DIAGNOSIS — M25.561 CHRONIC PAIN OF BOTH KNEES: Primary | ICD-10-CM

## 2023-09-05 NOTE — PROGRESS NOTES
Physical Therapy Daily Progress Note     [facility], [address]  [place] [address]      Patient: Kavitha Barnhart   : 1950  Diagnosis/ICD-10 Code:  Chronic pain of both knees [M25.561, M25.562, G89.29]  Referring practitioner: JACK Hopper  Date of Initial Visit: Type: THERAPY  Noted: 2023  Today's Date: 2023  Patient seen for 2 sessions           Patient reports: feeling recovered from COVID. She did not complete HEP until about 5 days ago due to feeling bad, but she has tolerated the exercises well and biked a long distance yesterday without pain. Her L knee is feeling better overall.      Objective          Tenderness   Left Knee   Tenderness in the patellar tendon.     Active Range of Motion   Left Knee   Flexion: WFL and with pain  Extension: WFL    Right Knee   Flexion: WFL  Extension: WFL    Passive Range of Motion   Left Knee   Flexion: WFL  Extension: WFL    Right Knee   Flexion: WFL and with pain  Extension: WFL    Strength/Myotome Testing     Left Knee   Flexion: 4 (pain)  Left knee extension strength: pain.  Quadriceps contraction: good    Right Knee   Flexion: 4  Extension: 5  Quadriceps contraction: good    See Exercise, Manual, and Modality Logs for complete treatment.       Assessment/Plan  L knee appears better than at eval, with less pain with ROM. R knee is stronger than at eval. Anterior aspects of L knee remain TTP, replicating her main c/o of pain. Correction of HEP increased B quad fatigue. Added posterior chain strengthening with good tolerance.            Timed:  Manual Therapy:    0     mins  00292;  Therapeutic Exercise:    10     mins  35199;     Neuromuscular Rae:   8    mins  27927;    Therapeutic Activity:     20     mins  89073;     Gait Trainin     mins  82788;     Ultrasound:     0     mins  96130;    Electrical Stimulation:    0     mins  37317 ( );    Untimed:  Electrical Stimulation:    0     mins  03142 ( );  Mechanical Traction:     0     mins  74411;     Timed Treatment:   38   mins   Total Treatment:     44   mins    Valentín Donald, PT  Physical Therapist

## 2023-09-07 ENCOUNTER — TREATMENT (OUTPATIENT)
Dept: PHYSICAL THERAPY | Facility: CLINIC | Age: 73
End: 2023-09-07
Payer: MEDICARE

## 2023-09-07 DIAGNOSIS — M25.562 CHRONIC PAIN OF BOTH KNEES: Primary | ICD-10-CM

## 2023-09-07 DIAGNOSIS — G89.29 CHRONIC PAIN OF BOTH KNEES: Primary | ICD-10-CM

## 2023-09-07 DIAGNOSIS — M25.561 CHRONIC PAIN OF BOTH KNEES: Primary | ICD-10-CM

## 2023-09-07 NOTE — PROGRESS NOTES
Physical Therapy Daily Progress Note     [facility], [address]  [place] [address]      Patient: Kavitha Barnhart   : 1950  Diagnosis/ICD-10 Code:  Chronic pain of both knees [M25.561, M25.562, G89.29]  Referring practitioner: JACK Hopper  Date of Initial Visit: Type: THERAPY  Noted: 2023  Today's Date: 2023  Patient seen for 3 sessions           Patient reports: her knees felt good following last visit but her L toes are hurting today.    Arrived 10 min late.      Objective   See Exercise, Manual, and Modality Logs for complete treatment.       Assessment/Plan  Introduced lateral hip strengthening and progressed quad strengthening. Mild R knee pain reported with heavier weight with TKEs, but reducing weight eliminated her symptoms. No pain reported after treatment.            Timed:  Manual Therapy:    0     mins  20142;  Therapeutic Exercise:    15     mins  29868;     Neuromuscular Rae:   10    mins  82708;    Therapeutic Activity:     0     mins  61786;     Gait Trainin     mins  38074;     Ultrasound:     0     mins  64464;    Electrical Stimulation:    0     mins  82967 ( );    Untimed:  Electrical Stimulation:    0     mins  60660 ( );  Mechanical Traction:    0     mins  15473;     Timed Treatment:   25   mins   Total Treatment:     38   mins    Valentín Donald PT  Physical Therapist

## 2023-09-08 ENCOUNTER — OFFICE VISIT (OUTPATIENT)
Dept: ORTHOPEDIC SURGERY | Facility: CLINIC | Age: 73
End: 2023-09-08
Payer: MEDICARE

## 2023-09-08 VITALS
HEIGHT: 59 IN | BODY MASS INDEX: 26.21 KG/M2 | DIASTOLIC BLOOD PRESSURE: 80 MMHG | SYSTOLIC BLOOD PRESSURE: 124 MMHG | WEIGHT: 130 LBS

## 2023-09-08 DIAGNOSIS — M35.1 MIXED CONNECTIVE TISSUE DISEASE: ICD-10-CM

## 2023-09-08 DIAGNOSIS — M25.562 PAIN IN BOTH KNEES, UNSPECIFIED CHRONICITY: Primary | ICD-10-CM

## 2023-09-08 DIAGNOSIS — M25.561 PAIN IN BOTH KNEES, UNSPECIFIED CHRONICITY: Primary | ICD-10-CM

## 2023-09-11 ENCOUNTER — TREATMENT (OUTPATIENT)
Dept: PHYSICAL THERAPY | Facility: CLINIC | Age: 73
End: 2023-09-11
Payer: MEDICARE

## 2023-09-11 DIAGNOSIS — M25.562 CHRONIC PAIN OF BOTH KNEES: Primary | ICD-10-CM

## 2023-09-11 DIAGNOSIS — M25.561 CHRONIC PAIN OF BOTH KNEES: Primary | ICD-10-CM

## 2023-09-11 DIAGNOSIS — G89.29 CHRONIC PAIN OF BOTH KNEES: Primary | ICD-10-CM

## 2023-09-11 NOTE — PROGRESS NOTES
Physical Therapy Daily Progress Note     [facility], [address]  [place] [address]      Patient: Kavitha Barnhart   : 1950  Diagnosis/ICD-10 Code:  Chronic pain of both knees [M25.561, M25.562, G89.29]  Referring practitioner: JACK Hopper  Date of Initial Visit: Type: THERAPY  Noted: 2023  Today's Date: 2023  Patient seen for 4 sessions           Patient reports: her knees are feeling better though she did not complete any car transfers this weekend to see if her L knee feels like it will give way.      Objective   See Exercise, Manual, and Modality Logs for complete treatment.       Assessment/Plan  Mobilized patellae for the first time, though this had been part of POC. She reported less pain/stiffness after mobilization. Progressed HS strengthening without knee pain, but she was limited by c/o lightheadedness that she normally feels with hypertension. Sitting R UE BP was 138/86 with  bpm at that time. Due to feeling symptomatic, the session was ended at this time.            Timed:  Manual Therapy:    15     mins  80820;  Therapeutic Exercise:    19     mins  32807;     Neuromuscular Rae:   0    mins  71839;    Therapeutic Activity:     6     mins  31586;     Gait Trainin     mins  82997;     Ultrasound:     0     mins  39440;    Electrical Stimulation:    0     mins  64447 ( );    Untimed:  Electrical Stimulation:    0     mins  75018 ( );  Mechanical Traction:    0     mins  41880;     Timed Treatment:   40   mins   Total Treatment:     40   mins    Valentín Donald PT  Physical Therapist

## 2023-09-12 ENCOUNTER — OFFICE VISIT (OUTPATIENT)
Dept: PULMONOLOGY | Facility: CLINIC | Age: 73
End: 2023-09-12
Payer: MEDICARE

## 2023-09-12 VITALS
OXYGEN SATURATION: 97 % | WEIGHT: 131.8 LBS | TEMPERATURE: 97.8 F | BODY MASS INDEX: 26.57 KG/M2 | DIASTOLIC BLOOD PRESSURE: 80 MMHG | HEART RATE: 77 BPM | HEIGHT: 59 IN | SYSTOLIC BLOOD PRESSURE: 132 MMHG

## 2023-09-12 DIAGNOSIS — K21.9 CHRONIC GERD: ICD-10-CM

## 2023-09-12 DIAGNOSIS — R06.09 DOE (DYSPNEA ON EXERTION): Primary | ICD-10-CM

## 2023-09-12 DIAGNOSIS — I51.89 DIASTOLIC DYSFUNCTION: ICD-10-CM

## 2023-09-12 DIAGNOSIS — M35.1 MIXED CONNECTIVE TISSUE DISEASE: ICD-10-CM

## 2023-09-12 DIAGNOSIS — Z87.09 HISTORY OF ASTHMA: ICD-10-CM

## 2023-09-12 DIAGNOSIS — M32.9 LUPUS: ICD-10-CM

## 2023-09-12 NOTE — PROGRESS NOTES
"  PULMONARY  NOTE    Chief Complaint     History of asthma, history of lupus/mixed connective tissue disease, reflux, non-smoker, diastolic dysfunction    History of Present Illness     33-year-old female returns today for follow-up  Last saw her 6/7/2023    She is a non-smoker  She has a history of \"cough variant asthma\"  This was diagnosed many years ago and she has been on Advair with albuterol  No recent acute exacerbation of symptoms    She has a history of lupus and mixed connective tissue disease  She is followed by Dr. Luke    We have been monitoring her for evidence of interstitial lung disease  She has no evidence of autoimmune related interstitial lung disease    When I saw her in June she was having dyspnea on exertion greater than her baseline  PFTs at that time were normal as was a chest x-ray    Therefore, she underwent a cardiopulmonary exercise test on 8/1/2023 as noted below    Patient Active Problem List   Diagnosis    Hypertension    Peripheral vascular disease    Diverticulosis    Colon polyps    Constipation    Hashimoto's thyroiditis    Mixed connective tissue disease    Lupus    Uterine fibroid    Palpitations    Solitary thyroid nodule    History of asthma    GERD    Gallbladder calculus without cholecystitis and no obstruction    HUA (dyspnea on exertion)    Diastolic dysfunction      Allergies   Allergen Reactions    Tenormin [Atenolol] Cough and Angioedema    Macrobid [Nitrofurantoin Monohyd Macro] Swelling and Other (See Comments)     Swelling around eyes, fatigue    Plaquenil [Hydroxychloroquine Sulfate] Other (See Comments)     Vision changes over time     Shingrix [Zoster Vac Recomb Adjuvanted] Rash    Omeprazole Diarrhea    Ace Inhibitors Cough    Sudafed [Pseudoephedrine Hcl] Other (See Comments)     INCREASED PRESSURE IN EYES        Current Outpatient Medications:     albuterol sulfate  (90 Base) MCG/ACT inhaler, Inhale 2 puffs Every 4 (Four) Hours As Needed for Wheezing., " Disp: 1 g, Rfl: 5    celecoxib (CeleBREX) 100 MG capsule, Take 1 capsule by mouth 2 (Two) Times a Day As Needed for Mild Pain., Disp: 60 capsule, Rfl: 2    clindamycin (CLEOCIN T) 1 % external solution, APPLY TO AFFECTED AREA ONCE TO TWICE DAILY AFTER SHOWER, Disp: , Rfl:     diclofenac (VOLTAREN) 1 % gel gel, Apply 4 g topically to the appropriate area as directed 4 (Four) Times a Day. (Patient taking differently: Apply 4 g topically to the appropriate area as directed 4 (Four) Times a Day As Needed (joint pain).), Disp: 100 g, Rfl: 3    dicyclomine (BENTYL) 10 MG capsule, Take 1 capsule by mouth 2 (Two) Times a Day., Disp: 60 capsule, Rfl: 5    fexofenadine (ALLEGRA) 180 MG tablet, Take 1 tablet by mouth Daily As Needed (allergies)., Disp: , Rfl:     Fluticasone-Salmeterol (Wixela Inhub) 100-50 MCG/ACT DISKUS, Inhale 1 puff 2 (Two) Times a Day., Disp: 180 each, Rfl: 1    losartan-hydrochlorothiazide (HYZAAR) 100-25 MG per tablet, TAKE 1 TABLET BY MOUTH EVERY DAY., Disp: 90 tablet, Rfl: 1    methylPREDNISolone (MEDROL) 4 MG dose pack, Take as directed on package instructions., Disp: 21 tablet, Rfl: 0    metoprolol succinate XL (TOPROL-XL) 200 MG 24 hr tablet, TAKE 1 TABLET BY MOUTH EVERY DAY., Disp: 90 tablet, Rfl: 1    rosuvastatin (CRESTOR) 5 MG tablet, Take 1 tablet by mouth Daily., Disp: 90 tablet, Rfl: 3    Synthroid 75 MCG tablet, Take 1 tablet by mouth Daily., Disp: 90 tablet, Rfl: 3    Triamcinolone Acetonide (NASACORT) 55 MCG/ACT nasal inhaler, 2 sprays into the nostril(s) as directed by provider Daily., Disp: , Rfl:     Yuvafem 10 MCG tablet vaginal tablet, APPLY 1 TABLET IN THE VAGINA 2 TIMES WEEKLY., Disp: , Rfl:   MEDICATION LIST AND ALLERGIES REVIEWED.    Family History   Problem Relation Age of Onset    Lung cancer Mother     Hyperlipidemia Mother     Thyroid disease Mother     Pancreatic cancer Mother     Cancer Mother     Diabetes Mother     Hypertension Mother     Pancreatitis Mother     Heart  "disease Father     Hypertension Father     Heart failure Father     Heart attack Father     Polycystic kidney disease Brother     Hypertension Brother     Hypertension Brother     Cancer Brother     Heart disease Brother         Recently  of heart attack    Hyperlipidemia Brother     Hypertension Brother     Kidney disease Brother         Kidney Failure, Polycystic Kidney Disease    Liver disease Brother     Hyperlipidemia Brother     Hypertension Brother     Kidney disease Brother         Polycystic Kidney Disease    Crohn's disease Brother     Thyroid disease Son     Breast cancer Neg Hx     Ovarian cancer Neg Hx     Colon cancer Neg Hx     Colon polyps Neg Hx     Esophageal cancer Neg Hx      Social History     Tobacco Use    Smoking status: Never    Smokeless tobacco: Never   Vaping Use    Vaping Use: Never used   Substance Use Topics    Alcohol use: Yes     Alcohol/week: 1.0 - 2.0 standard drink     Types: 1 - 2 Glasses of wine per week     Comment: social    Drug use: No     Social History     Social History Narrative    Non-smoker     FAMILY AND SOCIAL HISTORY REVIEWED.    Review of Systems  IF PRESENT REFER TO SCANNED ROS SHEET FROM SAME DATE  OTHERWISE ROS OBTAINED AND NON-CONTRIBUTORY OVER HPI.    /80 (BP Location: Right arm, Patient Position: Sitting, Cuff Size: Adult)   Pulse 77   Temp 97.8 °F (36.6 °C) (Infrared)   Ht 149.3 cm (58.78\")   Wt 59.8 kg (131 lb 12.8 oz)   LMP  (LMP Unknown)   SpO2 97% Comment: Room air at rest  BMI 26.82 kg/m²   Physical Exam  Vitals and nursing note reviewed.   Constitutional:       General: She is not in acute distress.     Appearance: She is well-developed. She is not diaphoretic.   HENT:      Head: Normocephalic and atraumatic.   Neck:      Thyroid: No thyromegaly.   Cardiovascular:      Rate and Rhythm: Normal rate and regular rhythm.      Heart sounds: Normal heart sounds. No murmur heard.  Pulmonary:      Effort: Pulmonary effort is normal.      " Breath sounds: Normal breath sounds. No stridor.   Lymphadenopathy:      Cervical: No cervical adenopathy.      Upper Body:      Right upper body: No supraclavicular or epitrochlear adenopathy.      Left upper body: No supraclavicular or epitrochlear adenopathy.   Skin:     General: Skin is warm and dry.   Neurological:      Mental Status: She is alert and oriented to person, place, and time.   Psychiatric:         Behavior: Behavior normal.       Results     Cardiopulmonary exercise test reviewed  As below  Immunization History   Administered Date(s) Administered    COVID-19 (PFIZER) BIVALENT 12+YRS 10/19/2022    COVID-19 (PFIZER) Purple Cap Monovalent 01/27/2021, 02/17/2021, 09/18/2021    Covid-19 (Pfizer) Gray Cap Monovalent 05/20/2022    FluMist 2-49yrs 10/01/2016    Fluad Quad 65+ 10/01/2020, 10/09/2021    Fluzone High Dose =>65 Years ([employer] ONLY) 01/01/2014, 10/10/2016, 10/20/2017, 11/06/2018, 10/31/2019, 10/11/2020, 11/22/2021    Fluzone High-Dose 65+yrs 10/19/2022    Hepatitis A 11/06/2018, 09/03/2019    Pneumococcal Conjugate 13-Valent (PCV13) 02/28/2018    Pneumococcal Polysaccharide (PPSV23) 03/12/2019    Shingrix 03/28/2023    Tdap 02/19/2018     Problem List       ICD-10-CM ICD-9-CM   1. HUA (dyspnea on exertion)  R06.09 786.09   2. History of asthma  Z87.09 V12.69   3. GERD  K21.9 530.81   4. Mixed connective tissue disease  M35.1 710.8   5. Lupus  M32.9 710.0   6. Diastolic dysfunction  I51.89 429.9       Discussion     We reviewed her CPX  She actually did quite well and appears conditioned  However, had quite a reduced O2 pulse suggestive of a cardiac limitation  Most recent echocardiogram done over a year ago was unremarkable other than grade 1 diastolic dysfunction    I would recommend cardiac reevaluation  At the very least, would consider a stress echocardiogram    Otherwise, no evidence of autoimmune related lung disease  And, no inducible airway obstruction despite her history of asthma.  Of  course, the study was done on her Advair    I will just see her back in 6 months or earlier if there are any problems in the meantime    Moderate level of Medical Decision Making complexity based on 2 or more chronic stable illnesses and an independent review of test results and/or prescription drug management.    Hayden Gilliland MD  Note electronically signed    CC: Farzad Redding MD

## 2023-09-14 ENCOUNTER — TREATMENT (OUTPATIENT)
Dept: PHYSICAL THERAPY | Facility: CLINIC | Age: 73
End: 2023-09-14
Payer: MEDICARE

## 2023-09-14 DIAGNOSIS — M25.562 CHRONIC PAIN OF BOTH KNEES: Primary | ICD-10-CM

## 2023-09-14 DIAGNOSIS — M25.561 CHRONIC PAIN OF BOTH KNEES: Primary | ICD-10-CM

## 2023-09-14 DIAGNOSIS — G89.29 CHRONIC PAIN OF BOTH KNEES: Primary | ICD-10-CM

## 2023-09-14 NOTE — PROGRESS NOTES
Physical Therapy Daily Progress Note     [facility], [address]  [place] [address]      Patient: Kavitha Barnhart   : 1950  Diagnosis/ICD-10 Code:  Chronic pain of both knees [M25.561, M25.562, G89.29]  Referring practitioner: JACK Hopper  Date of Initial Visit: Type: THERAPY  Noted: 2023  Today's Date: 2023  Patient seen for 5 sessions           Patient reports: her knees hurt at times during abigail chi but especially afterward, when sitting in high bar seats.      Objective   See Exercise, Manual, and Modality Logs for complete treatment.       Assessment/Plan  Patient requested her L ankle/foot be examined as contributor to LE function. She has long history of L great toe pain and stiffness, improving after multiple bouts of PT but becoming stiff once she neglects HEP. She indeed demonstrates decreased L great toe extension ROM, with pain. Ankle DF is also restricted, with hypomobile talocrural joint. Instructed patient on stretching for these deficits, with no pain during each exercise.            Timed:  Manual Therapy:    8     mins  93976;  Therapeutic Exercise:    10     mins  47099;     Neuromuscular Rae:   0    mins  91391;    Therapeutic Activity:     20     mins  52076;     Gait Trainin     mins  88170;     Ultrasound:     0     mins  35738;    Electrical Stimulation:    0     mins  33471 ( );    Untimed:  Electrical Stimulation:    0     mins  14230 ( );  Mechanical Traction:    0      mins  59166;     Timed Treatment:   38   mins   Total Treatment:     40   mins    Valentín Donald PT  Physical Therapist

## 2023-09-15 ENCOUNTER — OFFICE VISIT (OUTPATIENT)
Dept: GASTROENTEROLOGY | Facility: CLINIC | Age: 73
End: 2023-09-15
Payer: MEDICARE

## 2023-09-15 VITALS
BODY MASS INDEX: 27.08 KG/M2 | SYSTOLIC BLOOD PRESSURE: 124 MMHG | HEIGHT: 59 IN | OXYGEN SATURATION: 99 % | HEART RATE: 85 BPM | WEIGHT: 134.3 LBS | DIASTOLIC BLOOD PRESSURE: 62 MMHG | TEMPERATURE: 96.9 F

## 2023-09-15 DIAGNOSIS — K58.2 IRRITABLE BOWEL SYNDROME WITH BOTH CONSTIPATION AND DIARRHEA: Primary | ICD-10-CM

## 2023-09-15 DIAGNOSIS — M62.89 PELVIC FLOOR DYSFUNCTION IN FEMALE: ICD-10-CM

## 2023-09-15 DIAGNOSIS — K83.8 DILATED BILE DUCT: ICD-10-CM

## 2023-09-15 PROCEDURE — 1160F RVW MEDS BY RX/DR IN RCRD: CPT | Performed by: NURSE PRACTITIONER

## 2023-09-15 PROCEDURE — 3078F DIAST BP <80 MM HG: CPT | Performed by: NURSE PRACTITIONER

## 2023-09-15 PROCEDURE — 99214 OFFICE O/P EST MOD 30 MIN: CPT | Performed by: NURSE PRACTITIONER

## 2023-09-15 PROCEDURE — 3074F SYST BP LT 130 MM HG: CPT | Performed by: NURSE PRACTITIONER

## 2023-09-15 PROCEDURE — 1159F MED LIST DOCD IN RCRD: CPT | Performed by: NURSE PRACTITIONER

## 2023-09-15 RX ORDER — DICYCLOMINE HYDROCHLORIDE 10 MG/1
10 CAPSULE ORAL 2 TIMES DAILY
Qty: 60 CAPSULE | Refills: 5 | Status: SHIPPED | OUTPATIENT
Start: 2023-09-15

## 2023-09-21 ENCOUNTER — TREATMENT (OUTPATIENT)
Dept: PHYSICAL THERAPY | Facility: CLINIC | Age: 73
End: 2023-09-21
Payer: MEDICARE

## 2023-09-21 DIAGNOSIS — G89.29 CHRONIC PAIN OF BOTH KNEES: Primary | ICD-10-CM

## 2023-09-21 DIAGNOSIS — M25.562 CHRONIC PAIN OF BOTH KNEES: Primary | ICD-10-CM

## 2023-09-21 DIAGNOSIS — M25.561 CHRONIC PAIN OF BOTH KNEES: Primary | ICD-10-CM

## 2023-09-21 NOTE — PROGRESS NOTES
Re-Assessment / Re-Certification      [address] [facility], [address]  [place] [address]    Patient: Kavitha Barnhart   : 1950  Diagnosis/ICD-10 Code:  Chronic pain of both knees [M25.561, M25.562, G89.29]  Referring practitioner: JACK Hopper  Date of Initial Visit: Type: THERAPY  Noted: 2023  Today's Date: 2023  Patient seen for 6 sessions      Subjective:     Subjective Questionnaire: LEFS: 66/80  Clinical Progress: improved  Home Program Compliance: Yes  Treatment has included: therapeutic exercise, neuromuscular re-education, manual therapy, and therapeutic activity    Subjective Evaluation    History of Present Illness  Mechanism of injury: CLOF: UE assist with stairs using step to pattern when descending, avoids squating due to pain, has not attempted walking >1 mile but no issues at this distance, no UE assist for sit to stand    Medical history: Lupus-like connective tissue disorder, transient B LE weakness (can last 1/2 day to a week), L hallux rigidus    Subjective comment: Patient reports 75% improvement in her knees compared to prior to falling onto her knees  Patient Occupation: retired  Pain  Current pain ratin  At best pain ratin  At worst pain ratin (when they catch, lasts seconds)       Objective          Tenderness   Left Knee   Tenderness in the LCL (distal), LCL (proximal), MCL (distal), MCL (proximal), patellar tendon and pes anserinus. No tenderness in the lateral joint line, medial joint line and quadriceps tendon.     Right Knee   Tenderness in the LCL (distal), LCL (proximal), MCL (distal), MCL (proximal), patellar tendon and pes anserinus. No tenderness in the lateral joint line, medial joint line and quadriceps tendon.     Active Range of Motion   Left Knee   Flexion: 135 degrees   Extension: WFL  Extensor lag: WFL    Right Knee   Flexion: 135 degrees   Extension: WFL  Extensor lag: WFL    Additional Active Range of Motion Details  No lag with  SLR    Passive Range of Motion   Left Knee   Flexion: WFL and with pain  Extension: WFL    Right Knee   Flexion: WFL  Extension: WFL    Patellar Mobility   Left Knee Patellar tendons within functional limits include the medial, lateral, superior and inferior.     Right Knee Patellar tendons within functional limits include the lateral, superior and inferior. Hypomobile in the medial patellar tendon(s).     Strength/Myotome Testing     Left Hip   Planes of Motion   Flexion: 4+  Extension: 4-  Abduction: 3+  External rotation: 4    Right Hip   Planes of Motion   Flexion: 5  Extension: 4-  Abduction: 4  External rotation: 4+    Left Knee   Flexion: 4  Extension: 4+  Quadriceps contraction: good    Right Knee   Flexion: 5  Extension: 5  Quadriceps contraction: good    Functional Assessment     Single Leg Stance   Left: 30 seconds  Right: 16 seconds    Comments  5x sit to stand: 9 sec, no knee pain    Assessment & Plan       Assessment  Impairments: abnormal gait, abnormal muscle firing, abnormal or restricted ROM, activity intolerance, impaired balance, impaired physical strength, pain with function and weight-bearing intolerance   Functional limitations: walking, uncomfortable because of pain, standing and stooping   Assessment details: Patient presents with chronic L>R knee pain after falling onto her knees almost 2 months ago. She demonstrates pain free R knee ROM with slight L knee pain/restriction into flexion and extension. Marked B quad weakness noted, as well as gross L LE weakness. Signs and symptoms are consistent with B patellofemoral pain syndrome, which is consistent with her OBINNA.    9/21- Patient demonstrates significant improvements in B knee strength and ROM, though L knee remains weaker. L hip strength has lagged behind her R, partially due to patient holding off on 2 of her HEP exercises for glute med/max strengthening (she did not tell her PT until today, so alternatives were given). Patient may have  developed mild B patellar tendinitis since starting exercise, but is expected to respond well with continued PT. Overall, patient is improving as expected.  Barriers to therapy: comorbidities  Prognosis: good    Goals  Plan Goals: Short term goals (4 weeks)  1. Patient to be compliant with initial HEP. Met   2. Patient to demonstrate pain-free and symmetrical L knee flexion PROM. In progress  3. Patient to improve LEFS to greater than or equal to 56/80. Met     Long Term goals (8 weeks)  1. Patient to demonstrate >30 SLR on each LE to demonstrate functional quadriceps strength. In progress  2. Patient to demonstrate pain free and normal strength with MMTs. In progress  3. Patient to ascend and descend eight 8 inch steps reciprocally with one handrail with minimal to no antalgia or difficulty. In progress  4. Patient to demonstrate independence with home exercise program. In progress  5. Patient to improve LEFS to greater than or equal to 61/80. Met   6. 5x sit to  <9 sec. Met        Plan  Therapy options: will be seen for skilled therapy services  Planned modality interventions: thermotherapy (hydrocollator packs), TENS, cryotherapy, dry needling and electrical stimulation/Russian stimulation  Planned therapy interventions: manual therapy, neuromuscular re-education, soft tissue mobilization, strengthening, stretching, therapeutic activities, joint mobilization, home exercise program, functional ROM exercises, balance/weight-bearing training, abdominal trunk stabilization, motor coordination training, postural training, spinal/joint mobilization, gait training, flexibility and ADL retraining  Frequency: 2x week  Duration in weeks: 4  Treatment plan discussed with: patient  Plan details: 2x/week for 4 weeks    Progress toward previous goals: Partially Met        Timeframe: 1 month  Prognosis to achieve goals: good    PT Signature: Valentín Donald, PT  PT License [id number]    Based upon review of the patient's  progress and continued therapy plan, it is my medical opinion that Kavitha Barnhart should continue physical therapy treatment at [facility]  [address] [place] [id number] [phone].    Signature: __________________________________  Link Mcdaniel APRN    Timed:  Manual Therapy:    10     mins  47163;  Therapeutic Exercise:    0     mins  59884;     Neuromuscular Rae:    0    mins  67641;    Therapeutic Activity:     28     mins  47425;     Gait Trainin     mins  49369;     Ultrasound:     0     mins  80959;    Electrical Stimulation:    0     mins  80565 ( );    Untimed:  Electrical Stimulation:    0     mins  22983 ( );  Mechanical Traction:    0     mins  93112;     Timed Treatment:   38   mins   Total Treatment:     60   mins

## 2023-09-25 ENCOUNTER — TREATMENT (OUTPATIENT)
Dept: PHYSICAL THERAPY | Facility: CLINIC | Age: 73
End: 2023-09-25

## 2023-09-25 DIAGNOSIS — M25.561 CHRONIC PAIN OF BOTH KNEES: Primary | ICD-10-CM

## 2023-09-25 DIAGNOSIS — G89.29 CHRONIC PAIN OF BOTH KNEES: Primary | ICD-10-CM

## 2023-09-25 DIAGNOSIS — M25.562 CHRONIC PAIN OF BOTH KNEES: Primary | ICD-10-CM

## 2023-09-25 NOTE — PROGRESS NOTES
Physical Therapy Daily Progress Note     [address], [address]  [place] [address]      Patient: Kavitha Barnhart   : 1950  Diagnosis/ICD-10 Code:  Chronic pain of both knees [M25.561, M25.562, G89.29]  Referring practitioner: JACK Hopper  Date of Initial Visit: Type: THERAPY  Noted: 2023  Today's Date: 2023  Patient seen for 7 sessions           Patient reports: her knees have been hurting more often but with less intensity.      Objective   See Exercise, Manual, and Modality Logs for complete treatment.       Assessment/Plan  Corrected HEP as she had failed to overload eccentric portion of sit to stand. She did well with these today, though L knee pain reported. This was reduced with correction to align her knee as it was mildly rotating inward.    Plie squat remained painful but she gained depth following treatment.        Timed:  Manual Therapy:    0     mins  29353;  Therapeutic Exercise:    28     mins  11868;     Neuromuscular Rae:   0    mins  50582;    Therapeutic Activity:     10     mins  72794;     Gait Trainin     mins  91581;     Ultrasound:     0     mins  37173;    Electrical Stimulation:    0     mins  91108 ( );    Untimed:  Electrical Stimulation:    0     mins  14528 ( );  Mechanical Traction:    0     mins  31824;     Timed Treatment:   38   mins   Total Treatment:     38   mins    Valentín Donald PT  Physical Therapist

## 2023-09-28 ENCOUNTER — TREATMENT (OUTPATIENT)
Dept: PHYSICAL THERAPY | Facility: CLINIC | Age: 73
End: 2023-09-28
Payer: MEDICARE

## 2023-09-28 DIAGNOSIS — M25.562 CHRONIC PAIN OF BOTH KNEES: Primary | ICD-10-CM

## 2023-09-28 DIAGNOSIS — M25.561 CHRONIC PAIN OF BOTH KNEES: Primary | ICD-10-CM

## 2023-09-28 DIAGNOSIS — G89.29 CHRONIC PAIN OF BOTH KNEES: Primary | ICD-10-CM

## 2023-09-28 NOTE — PROGRESS NOTES
"Physical Therapy Daily Progress Note    [address] [facility], [address]  [place] [address]      Patient: Kavitha Barnhart   : 1950  Diagnosis/ICD-10 Code:  Chronic pain of both knees [M25.561, M25.562, G89.29]  Referring practitioner: JACK Hopper  Date of Initial Visit: Type: THERAPY  Noted: 2023  Today's Date: 2023  Patient seen for 8 sessions           Patient reports: muscle soreness after last visit. Her L knee continues to be stiff and she has to use her \"knee hack\" during car transfers to avoid locking up.      Objective   See Exercise, Manual, and Modality Logs for complete treatment.       Assessment/Plan  L knee tightness significantly improved after STM to gastroc and L ankle mobs, which were implicated due to the area of c/o tension. Continued B quad eccentrics with good tolerance, though she fatigued.             Timed:  Manual Therapy:    13     mins  80997;  Therapeutic Exercise:    10     mins  41096;     Neuromuscular Rae:   0    mins  62938;    Therapeutic Activity:     0     mins  97492;     Gait Trainin     mins  92044;     Ultrasound:     0     mins  72138;    Electrical Stimulation:    0     mins  09805 ( );    Untimed:  Electrical Stimulation:    0     mins  73301 (MC );  Mechanical Traction:    0     mins  68036;     Timed Treatment:   23   mins   Total Treatment:     44   mins    Valentín Donald PT  Physical Therapist  "

## 2023-10-02 ENCOUNTER — TREATMENT (OUTPATIENT)
Dept: PHYSICAL THERAPY | Facility: CLINIC | Age: 73
End: 2023-10-02
Payer: MEDICARE

## 2023-10-02 DIAGNOSIS — M25.562 CHRONIC PAIN OF BOTH KNEES: Primary | ICD-10-CM

## 2023-10-02 DIAGNOSIS — M25.561 CHRONIC PAIN OF BOTH KNEES: Primary | ICD-10-CM

## 2023-10-02 DIAGNOSIS — G89.29 CHRONIC PAIN OF BOTH KNEES: Primary | ICD-10-CM

## 2023-10-02 PROCEDURE — 97110 THERAPEUTIC EXERCISES: CPT | Performed by: PHYSICAL THERAPIST

## 2023-10-02 PROCEDURE — 97530 THERAPEUTIC ACTIVITIES: CPT | Performed by: PHYSICAL THERAPIST

## 2023-10-02 PROCEDURE — 97140 MANUAL THERAPY 1/> REGIONS: CPT | Performed by: PHYSICAL THERAPIST

## 2023-10-02 NOTE — PROGRESS NOTES
Physical Therapy Daily Progress Note    Patient: Kavitha Barnhart   : 1950  Diagnosis/ICD-10 Code:  Chronic pain of both knees [M25.561, M25.562, G89.29]  Referring practitioner: JACK Hopper  Date of Initial Visit: Type: THERAPY  Noted: 2023  Today's Date: 10/2/2023  Patient seen for 9 sessions         Kavitha Barnhart reports feeling better overall.  But, has had knee soreness over the past weekend.  May be related to last visit.  But, not sure.        Subjective     Objective   See Exercise, Manual, and Modality Logs for complete treatment.     *Mid knee circumference:  R->36 cm; L->38 cm  *PROM knee ext/flx:  R->0-120 deg; L->0-115 deg  *(+) p! With pressure application along proximal posterior tibial soft tissue structures    Assessment/Plan  Focused visit on reducing knee sensitivity bilaterally with manual therapy.  Combined with isometric exercise to decrease knee sensitivity, knee muscle force output and proximal hip strength in non weight-bearing.           Manual Therapy:    12     mins  45927;  Therapeutic Exercise:    18     mins  82572;     Neuromuscular Rae:        mins  90320;    Therapeutic Activity:     9     mins  48058;     Gait Training:           mins  09277;     Ultrasound:         mins  97467;    Electrical Stimulation:         mins  72218 ( );  Dry Needling          mins self-pay    Timed Treatment:   39   mins   Total Treatment:     39   mins    Farzad Gonzalez PT  Physical Therapist

## 2023-10-04 ENCOUNTER — TREATMENT (OUTPATIENT)
Dept: PHYSICAL THERAPY | Facility: CLINIC | Age: 73
End: 2023-10-04
Payer: MEDICARE

## 2023-10-04 DIAGNOSIS — G89.29 CHRONIC PAIN OF BOTH KNEES: Primary | ICD-10-CM

## 2023-10-04 DIAGNOSIS — M25.561 CHRONIC PAIN OF BOTH KNEES: Primary | ICD-10-CM

## 2023-10-04 DIAGNOSIS — M25.562 CHRONIC PAIN OF BOTH KNEES: Primary | ICD-10-CM

## 2023-10-06 ENCOUNTER — OFFICE VISIT (OUTPATIENT)
Dept: ORTHOPEDIC SURGERY | Facility: CLINIC | Age: 73
End: 2023-10-06
Payer: MEDICARE

## 2023-10-06 VITALS
SYSTOLIC BLOOD PRESSURE: 126 MMHG | DIASTOLIC BLOOD PRESSURE: 68 MMHG | WEIGHT: 134.26 LBS | HEIGHT: 59 IN | BODY MASS INDEX: 27.07 KG/M2

## 2023-10-06 DIAGNOSIS — M25.561 PAIN IN BOTH KNEES, UNSPECIFIED CHRONICITY: ICD-10-CM

## 2023-10-06 DIAGNOSIS — M25.562 PAIN IN BOTH KNEES, UNSPECIFIED CHRONICITY: ICD-10-CM

## 2023-10-06 DIAGNOSIS — I10 PRIMARY HYPERTENSION: ICD-10-CM

## 2023-10-06 DIAGNOSIS — M17.12 PRIMARY OSTEOARTHRITIS OF LEFT KNEE: Primary | ICD-10-CM

## 2023-10-06 RX ORDER — BUPIVACAINE HYDROCHLORIDE 5 MG/ML
4 INJECTION, SOLUTION EPIDURAL; INTRACAUDAL
Status: COMPLETED | OUTPATIENT
Start: 2023-10-06 | End: 2023-10-06

## 2023-10-06 RX ORDER — LIDOCAINE HYDROCHLORIDE 10 MG/ML
4 INJECTION, SOLUTION EPIDURAL; INFILTRATION; INTRACAUDAL; PERINEURAL
Status: COMPLETED | OUTPATIENT
Start: 2023-10-06 | End: 2023-10-06

## 2023-10-06 RX ORDER — TRIAMCINOLONE ACETONIDE 40 MG/ML
1 INJECTION, SUSPENSION INTRA-ARTICULAR; INTRAMUSCULAR
Status: COMPLETED | OUTPATIENT
Start: 2023-10-06 | End: 2023-10-06

## 2023-10-06 RX ADMIN — TRIAMCINOLONE ACETONIDE 1 ML: 40 INJECTION, SUSPENSION INTRA-ARTICULAR; INTRAMUSCULAR at 11:22

## 2023-10-06 RX ADMIN — BUPIVACAINE HYDROCHLORIDE 4 ML: 5 INJECTION, SOLUTION EPIDURAL; INTRACAUDAL at 11:22

## 2023-10-06 RX ADMIN — LIDOCAINE HYDROCHLORIDE 4 ML: 10 INJECTION, SOLUTION EPIDURAL; INFILTRATION; INTRACAUDAL; PERINEURAL at 11:22

## 2023-10-06 NOTE — PROGRESS NOTES
[facility]        Subjective     CC: Follow-up (4 week follow up --Pain in both knees, unspecified chronicity)      GRECIA Barnhart is a 73 y.o. female.  She fell in June she had both knees. She started oral steroids this week. They are helping. She goes to physical therapy tomorrow. She has a lupus-like condition with a connective tissue disease. She sees a rheumatologist. She had x-rays and rheumatology on the 16th. They are negative.       Overall, patient's symptoms are worse in the left knee.  Right knee started to hurt recently.    ROS:    Constiutional:Pt denies fever, chills, nausea, or vomiting.  MSK:as above        Objective      Past Medical History  Past Medical History:   Diagnosis Date    Abnormal ECG but low risk assessment    Allergic rhinitis Many years ago    Anemia Prior to menopause    Arrhythmia     Arthritis     Arthritis of back     Arthritis of neck     Asthma     Asthma, intrinsic around 2000    Atrial fibrillation     Received 4 to 5 notifications on [employer] re: I may have Atrial fib    Cervical disc disease     CTS (carpal tunnel syndrome)     Diastolic dysfunction 06/07/2023    Disease of thyroid gland     Diverticulitis of intestine with perforation     E-coli UTI 2019    treated with antibioiutcs and urine rechecked and clean per pt report    GERD (gastroesophageal reflux disease)     Hashimoto's disease     Headache     Heart murmur     fast heart beat; sometimes irregular    Hip arthrosis     History of transfusion     several times, with surgeries post op and after c section, never a reaction    Hyperlipidemia sometimes marginally high    Hypertension     Hyperthyroidism     Thyroid nodule, hashimoto's thyroiditis    Hypothyroidism     Irritable bowel syndrome     Kidney stone     Knee sprain     Knee swelling     Low back pain     sometimes    Lumbosacral disc disease     Lupus     Neuromuscular disorder     Nerve damage lower back    Palpitations   "   POSSIBLE ATRIAL TACHYCARDIA    Periarthritis of shoulder     PONV (postoperative nausea and vomiting)     Raynaud's disease     Sinusitis very long time    Sjogren's disease     SVT (supraventricular tachycardia)     Tennis elbow     Thyroid nodule 2006    Tremor     sometimes hands shake    Visual impairment     Vitamin D deficiency 2000     Social History     Socioeconomic History    Marital status:    Tobacco Use    Smoking status: Never    Smokeless tobacco: Never   Vaping Use    Vaping Use: Never used   Substance and Sexual Activity    Alcohol use: Yes     Alcohol/week: 2.0 - 3.0 standard drinks     Types: 2 - 3 Glasses of wine per week     Comment: social    Drug use: No    Sexual activity: Not Currently     Partners: Male     Birth control/protection: Post-menopausal, None          Physical Exam  /68   Ht 149.3 cm (58.78\")   Wt 60.9 kg (134 lb 4.2 oz)   LMP  (LMP Unknown)   BMI 27.32 kg/m²     Body mass index is 27.32 kg/m².    Patient is well nourished and well developed.        Ortho Exam  No change in knee exam    Imaging/Labs/EMG Reviewed:  Imaging Results (Last 24 Hours)       ** No results found for the last 24 hours. **              Assessment    Assessment:  1. Primary osteoarthritis of left knee    2. Pain in both knees, unspecified chronicity    3. Primary hypertension        Plan:  Recommend over the counter anti-inflammatories for pain and/or swelling  I injected the left knee with cortisone.  I discussed with the patient the potential benefits of performing a therapeutic injection of the cortisone as well as potential risks including but not limited to infection, swelling, pain, bleeding, bruising, nerve/vessel damage, skin color changes, transient elevation in blood glucose levels, and fat atrophy. After informed consent and verifying correct patient, procedure site, and type of procedure, the area was prepped with Hibiclens, ethyl chloride was used to numb the skin. The " patient tolerated the procedure well. There were no complications.  She will monitor her blood pressure after the injection and therefore we will only do 1 injection today and if she tolerates it well we can inject her other knee next week.      Mathtew Buckner MD  10/06/23  11:27 EDT      Dictated Utilizing Dragon Dictation.

## 2023-10-06 NOTE — PROGRESS NOTES
Procedure   - Large Joint Arthrocentesis: L knee on 10/6/2023 11:22 AM  Indications: pain  Details: 21 G needle, anterolateral approach  Medications: 4 mL bupivacaine (PF) 0.5 %; 4 mL lidocaine PF 1% 1 %; 1 mL triamcinolone acetonide 40 MG/ML  Outcome: tolerated well, no immediate complications  Procedure, treatment alternatives, risks and benefits explained, specific risks discussed. Consent was given by the patient. Immediately prior to procedure a time out was called to verify the correct patient, procedure, equipment, support staff and site/side marked as required. Patient was prepped and draped in the usual sterile fashion.      I injected her left knee with cortisone.  If the right knee does not get better she will call to come in next week for injection to the right knee.

## 2023-10-09 ENCOUNTER — TELEPHONE (OUTPATIENT)
Dept: PHYSICAL THERAPY | Facility: CLINIC | Age: 73
End: 2023-10-09

## 2023-10-09 NOTE — TELEPHONE ENCOUNTER
Caller: Kavitha Barnhart    Relationship: Self         What was the call regarding: NOT FEELING WELL

## 2023-10-26 ENCOUNTER — HOSPITAL ENCOUNTER (OUTPATIENT)
Dept: ULTRASOUND IMAGING | Facility: HOSPITAL | Age: 73
Discharge: HOME | End: 2023-10-26
Admitting: NURSE PRACTITIONER
Payer: MEDICARE

## 2023-10-26 DIAGNOSIS — K83.8 DILATED BILE DUCT: ICD-10-CM

## 2023-10-26 PROCEDURE — 76705 ECHO EXAM OF ABDOMEN: CPT

## 2023-10-30 ENCOUNTER — OFFICE VISIT (OUTPATIENT)
Dept: INTERNAL MEDICINE | Facility: CLINIC | Age: 73
End: 2023-10-30
Payer: MEDICARE

## 2023-10-30 VITALS
WEIGHT: 134 LBS | DIASTOLIC BLOOD PRESSURE: 84 MMHG | SYSTOLIC BLOOD PRESSURE: 138 MMHG | BODY MASS INDEX: 27.27 KG/M2 | HEART RATE: 76 BPM | TEMPERATURE: 97.5 F | RESPIRATION RATE: 16 BRPM

## 2023-10-30 DIAGNOSIS — R06.02 SHORTNESS OF BREATH: ICD-10-CM

## 2023-10-30 DIAGNOSIS — Z23 IMMUNIZATION DUE: ICD-10-CM

## 2023-10-30 DIAGNOSIS — I10 ESSENTIAL HYPERTENSION: Primary | ICD-10-CM

## 2023-10-30 DIAGNOSIS — E78.5 HYPERLIPIDEMIA, UNSPECIFIED HYPERLIPIDEMIA TYPE: ICD-10-CM

## 2023-10-30 DIAGNOSIS — E03.9 HYPOTHYROIDISM, UNSPECIFIED TYPE: ICD-10-CM

## 2023-10-30 DIAGNOSIS — K21.9 GASTROESOPHAGEAL REFLUX DISEASE WITHOUT ESOPHAGITIS: ICD-10-CM

## 2023-10-30 LAB
BASOPHILS # BLD AUTO: 0.05 10*3/MM3 (ref 0–0.2)
BASOPHILS NFR BLD AUTO: 0.9 % (ref 0–1.5)
BILIRUB BLD-MCNC: NEGATIVE MG/DL
CLARITY, POC: CLEAR
COLOR UR: YELLOW
D DIMER PPP FEU-MCNC: <0.27 MCGFEU/ML (ref 0–0.75)
DEPRECATED RDW RBC AUTO: 42.5 FL (ref 37–54)
EOSINOPHIL # BLD AUTO: 0.14 10*3/MM3 (ref 0–0.4)
EOSINOPHIL NFR BLD AUTO: 2.4 % (ref 0.3–6.2)
ERYTHROCYTE [DISTWIDTH] IN BLOOD BY AUTOMATED COUNT: 13.9 % (ref 12.3–15.4)
EXPIRATION DATE: NORMAL
GLUCOSE UR STRIP-MCNC: NEGATIVE MG/DL
HCT VFR BLD AUTO: 41.4 % (ref 34–46.6)
HGB BLD-MCNC: 14.2 G/DL (ref 12–15.9)
IMM GRANULOCYTES # BLD AUTO: 0.02 10*3/MM3 (ref 0–0.05)
IMM GRANULOCYTES NFR BLD AUTO: 0.3 % (ref 0–0.5)
KETONES UR QL: NEGATIVE
LEUKOCYTE EST, POC: NEGATIVE
LYMPHOCYTES # BLD AUTO: 2.34 10*3/MM3 (ref 0.7–3.1)
LYMPHOCYTES NFR BLD AUTO: 40.6 % (ref 19.6–45.3)
Lab: NORMAL
MCH RBC QN AUTO: 29 PG (ref 26.6–33)
MCHC RBC AUTO-ENTMCNC: 34.3 G/DL (ref 31.5–35.7)
MCV RBC AUTO: 84.7 FL (ref 79–97)
MONOCYTES # BLD AUTO: 0.67 10*3/MM3 (ref 0.1–0.9)
MONOCYTES NFR BLD AUTO: 11.6 % (ref 5–12)
NEUTROPHILS NFR BLD AUTO: 2.55 10*3/MM3 (ref 1.7–7)
NEUTROPHILS NFR BLD AUTO: 44.2 % (ref 42.7–76)
NITRITE UR-MCNC: NEGATIVE MG/ML
NRBC BLD AUTO-RTO: 0 /100 WBC (ref 0–0.2)
PH UR: 5 [PH] (ref 5–8)
PLATELET # BLD AUTO: 177 10*3/MM3 (ref 140–450)
PMV BLD AUTO: 11.4 FL (ref 6–12)
PROT UR STRIP-MCNC: NEGATIVE MG/DL
RBC # BLD AUTO: 4.89 10*6/MM3 (ref 3.77–5.28)
RBC # UR STRIP: NEGATIVE /UL
SP GR UR: 1.01 (ref 1–1.03)
UROBILINOGEN UR QL: NORMAL
WBC NRBC COR # BLD: 5.77 10*3/MM3 (ref 3.4–10.8)

## 2023-10-30 PROCEDURE — 84439 ASSAY OF FREE THYROXINE: CPT | Performed by: INTERNAL MEDICINE

## 2023-10-30 PROCEDURE — 81003 URINALYSIS AUTO W/O SCOPE: CPT | Performed by: INTERNAL MEDICINE

## 2023-10-30 PROCEDURE — 90662 IIV NO PRSV INCREASED AG IM: CPT | Performed by: INTERNAL MEDICINE

## 2023-10-30 PROCEDURE — 80053 COMPREHEN METABOLIC PANEL: CPT | Performed by: INTERNAL MEDICINE

## 2023-10-30 PROCEDURE — 85025 COMPLETE CBC W/AUTO DIFF WBC: CPT | Performed by: INTERNAL MEDICINE

## 2023-10-30 PROCEDURE — G0008 ADMIN INFLUENZA VIRUS VAC: HCPCS | Performed by: INTERNAL MEDICINE

## 2023-10-30 PROCEDURE — 3075F SYST BP GE 130 - 139MM HG: CPT | Performed by: INTERNAL MEDICINE

## 2023-10-30 PROCEDURE — 84443 ASSAY THYROID STIM HORMONE: CPT | Performed by: INTERNAL MEDICINE

## 2023-10-30 PROCEDURE — 80061 LIPID PANEL: CPT | Performed by: INTERNAL MEDICINE

## 2023-10-30 PROCEDURE — 3079F DIAST BP 80-89 MM HG: CPT | Performed by: INTERNAL MEDICINE

## 2023-10-30 PROCEDURE — 36415 COLL VENOUS BLD VENIPUNCTURE: CPT | Performed by: INTERNAL MEDICINE

## 2023-10-30 PROCEDURE — 85379 FIBRIN DEGRADATION QUANT: CPT | Performed by: INTERNAL MEDICINE

## 2023-10-30 PROCEDURE — 99214 OFFICE O/P EST MOD 30 MIN: CPT | Performed by: INTERNAL MEDICINE

## 2023-10-30 NOTE — PROGRESS NOTES
Chief Complaint   Patient presents with    Follow-up     6 month follow up chronic medical problems       History of Present Illness      The patient presents for a follow-up related to hypertension. The patient reports that she has had dyspnea but she denies having headaches, chest pain, edema, syncope or blurred vision. She states that she is taking her medication as prescribed. She is not having medication side effects.    The patient presents for a follow-up related to hypothyroidism. She reports a good energy level. She reports no hair loss, heat intolerance, cold intolerance, diarrhea, constipation or sweats. She is taking her medication as prescribed.    The patient presents for a follow-up related to hyperlipidemia. She is following a low fat diet. She reports that she is exercising. She is taking rosuvastatin. The patient is taking her medication as prescribed. She reports no medication side effects, including muscle cramps, abdominal pain, headaches or weakness. She denies orthopnea, paroxysmal nocturnal dyspnea or dyspnea on exertion.    The patient presents for a follow-up related to GERD. The patient is not on medication for her gastroesophageal reflux. She reports no abdominal pain, belching, dysphagia, early satiety, heartburn, hoarseness, nausea, odynophagia, rectal bleeding, vomiting or weight loss. The GERD has no known aggravating factors.    Patient presents with a ongoing history of intermittent shortness of breath. She has noted no alleviating factors. The shortness of breath has a sudden onset. The symptoms is made worse with activity and it is relieved with rest. The shortness of breath lasts between five and 30 minutes. The dyspnea is not associated with indigestion, diaphoresis, dizziness, lightheadedness, clamminess or cough. The patient denies orthopnea. She has seen pulmonary and had a pulmonary stress test which was essentially normal. An echo in 4/2022 was also essentially normal  except for mild diastolic dysfunction.       The patient does not smoke.    Medications      Current Outpatient Medications:     albuterol sulfate  (90 Base) MCG/ACT inhaler, Inhale 2 puffs Every 4 (Four) Hours As Needed for Wheezing., Disp: 1 g, Rfl: 5    celecoxib (CeleBREX) 100 MG capsule, Take 1 capsule by mouth 2 (Two) Times a Day As Needed for Mild Pain., Disp: 60 capsule, Rfl: 2    clindamycin (CLEOCIN T) 1 % external solution, APPLY TO AFFECTED AREA ONCE TO TWICE DAILY AFTER SHOWER, Disp: , Rfl:     diclofenac (VOLTAREN) 1 % gel gel, Apply 4 g topically to the appropriate area as directed 4 (Four) Times a Day. (Patient taking differently: Apply 4 g topically to the appropriate area as directed 4 (Four) Times a Day As Needed (joint pain).), Disp: 100 g, Rfl: 3    dicyclomine (BENTYL) 10 MG capsule, Take 1 capsule by mouth 2 (Two) Times a Day., Disp: 60 capsule, Rfl: 5    fexofenadine (ALLEGRA) 180 MG tablet, Take 1 tablet by mouth Daily As Needed (allergies)., Disp: , Rfl:     Fluticasone-Salmeterol (Wixela Inhub) 100-50 MCG/ACT DISKUS, Inhale 1 puff 2 (Two) Times a Day., Disp: 180 each, Rfl: 1    losartan-hydrochlorothiazide (HYZAAR) 100-25 MG per tablet, TAKE 1 TABLET BY MOUTH EVERY DAY., Disp: 90 tablet, Rfl: 1    metoprolol succinate XL (TOPROL-XL) 200 MG 24 hr tablet, TAKE 1 TABLET BY MOUTH EVERY DAY., Disp: 90 tablet, Rfl: 1    rosuvastatin (CRESTOR) 5 MG tablet, Take 1 tablet by mouth Daily., Disp: 90 tablet, Rfl: 3    Synthroid 75 MCG tablet, Take 1 tablet by mouth Daily., Disp: 90 tablet, Rfl: 3    Triamcinolone Acetonide (NASACORT) 55 MCG/ACT nasal inhaler, 2 sprays into the nostril(s) as directed by provider Daily., Disp: , Rfl:     Yuvafem 10 MCG tablet vaginal tablet, APPLY 1 TABLET IN THE VAGINA 2 TIMES WEEKLY., Disp: , Rfl:      Allergies    Allergies   Allergen Reactions    Tenormin [Atenolol] Cough and Angioedema    Macrobid [Nitrofurantoin Monohyd Macro] Swelling and Other (See  Comments)     Swelling around eyes, fatigue    Plaquenil [Hydroxychloroquine Sulfate] Other (See Comments)     Vision changes over time     Shingrix [Zoster Vac Recomb Adjuvanted] Rash    Omeprazole Diarrhea    Ace Inhibitors Cough    Sudafed [Pseudoephedrine Hcl] Other (See Comments)     INCREASED PRESSURE IN EYES        Problem List    Patient Active Problem List   Diagnosis    Hypertension    Peripheral vascular disease    Diverticulosis    Colon polyps    Constipation    Hashimoto's thyroiditis    Mixed connective tissue disease    Lupus    Uterine fibroid    Palpitations    Solitary thyroid nodule    History of asthma    GERD    Gallbladder calculus without cholecystitis and no obstruction    HUA (dyspnea on exertion)    Diastolic dysfunction       Medications, Allergies, Problems List and Past History were reviewed and updated.    Physical Examination    /84 (BP Location: Left arm, Patient Position: Sitting, Cuff Size: Adult)   Pulse 76   Temp 97.5 °F (36.4 °C) (Infrared)   Resp 16   Wt 60.8 kg (134 lb)   LMP  (LMP Unknown)   BMI 27.27 kg/m²       HEENT: Facies- Within normal limits. Lids- Normal bilaterally. Conjunctiva- Clear bilaterally. Sclera- Anicteric bilaterally.    Neck: Thyroid- non enlarged, symmetric and has no nodules. No bruits are detected.    Lungs: Auscultation- Clear to auscultation bilaterally. There are no retractions, clubbing or cyanosis. The Expiratory to Inspiratory ratio is equal.    Cardiovascular: There are no carotid bruits. Heart- Normal Rate with Regular rhythm and no murmurs. There are no gallops. There are no rubs. In the lower extremities there is no edema. The upper extremities do not have edema.    Abdomen: Soft, benign, non-tender with no masses, hernias, organomegaly or scars.    Impression and Assessment    Encounter for Immunization Administration.    Essential Hypertension.    Hypothyroidism.    Hyperlipidemia.    Gastroesophageal Reflux Disease.    Shortness  of Breath.    Plan    Gastroesophageal Reflux Disease Plan: The condition is stable. No change is needed in the current plan.    Essential Hypertension Plan: The patient was instructed to continue the current medications.    Hyperlipidemia Plan: The patient was instructed to exercise daily, eat a low fat diet and continue her medications.    Hypothyroidism Plan: The patient was instructed to continue the current medications.    Shortness of Breath Plan: Will obtain a stress echocardiogram. If D-dimer is positive, consider outpatient CT with PE protocol. She will follow-up with pulmonary and has a appointment scheduled with cardiology.    Counseled regarding immunizations and applicable VIS given.    Immunizations Ordered and Administered: Fluzone High Dose 65+ years.    Diagnoses and all orders for this visit:    1. Essential hypertension (Primary)  -     CBC & Differential; Future  -     Comprehensive Metabolic Panel; Future  -     POC Urinalysis Dipstick, Automated; Future  -     CBC & Differential  -     Comprehensive Metabolic Panel  -     POC Urinalysis Dipstick, Automated    2. Hyperlipidemia, unspecified hyperlipidemia type  -     Comprehensive Metabolic Panel; Future  -     Lipid Panel; Future  -     Comprehensive Metabolic Panel  -     Lipid Panel    3. Gastroesophageal reflux disease without esophagitis    4. Hypothyroidism, unspecified type  -     TSH; Future  -     T4, Free; Future  -     TSH  -     T4, Free    5. Shortness of breath  -     Adult Stress Echo W/ Cont or Stress Agent if Necessary Per Protocol; Future  -     D-dimer, Quantitative; Future  -     D-dimer, Quantitative    6. Immunization due  -     Fluzone High-Dose 65+yrs        Return to Office    The patient was instructed to return for follow-up in 1 month. The patient was instructed to return sooner if the condition changes, worsens, or does not resolve.

## 2023-10-31 LAB
ALBUMIN SERPL-MCNC: 4.2 G/DL (ref 3.5–5.2)
ALBUMIN/GLOB SERPL: 1.8 G/DL
ALP SERPL-CCNC: 59 U/L (ref 39–117)
ALT SERPL W P-5'-P-CCNC: 19 U/L (ref 1–33)
ANION GAP SERPL CALCULATED.3IONS-SCNC: 9 MMOL/L (ref 5–15)
AST SERPL-CCNC: 18 U/L (ref 1–32)
BILIRUB SERPL-MCNC: 0.4 MG/DL (ref 0–1.2)
BUN SERPL-MCNC: 24 MG/DL (ref 8–23)
BUN/CREAT SERPL: 20.7 (ref 7–25)
CALCIUM SPEC-SCNC: 10.1 MG/DL (ref 8.6–10.5)
CHLORIDE SERPL-SCNC: 107 MMOL/L (ref 98–107)
CHOLEST SERPL-MCNC: 142 MG/DL (ref 0–200)
CO2 SERPL-SCNC: 25 MMOL/L (ref 22–29)
CREAT SERPL-MCNC: 1.16 MG/DL (ref 0.57–1)
EGFRCR SERPLBLD CKD-EPI 2021: 49.3 ML/MIN/1.73
GLOBULIN UR ELPH-MCNC: 2.3 GM/DL
GLUCOSE SERPL-MCNC: 97 MG/DL (ref 65–99)
HDLC SERPL-MCNC: 48 MG/DL (ref 40–60)
LDLC SERPL CALC-MCNC: 56 MG/DL (ref 0–100)
LDLC/HDLC SERPL: 0.97 {RATIO}
POTASSIUM SERPL-SCNC: 3.7 MMOL/L (ref 3.5–5.2)
PROT SERPL-MCNC: 6.5 G/DL (ref 6–8.5)
SODIUM SERPL-SCNC: 141 MMOL/L (ref 136–145)
T4 FREE SERPL-MCNC: 1.44 NG/DL (ref 0.93–1.7)
TRIGL SERPL-MCNC: 238 MG/DL (ref 0–150)
TSH SERPL DL<=0.05 MIU/L-ACNC: 1.39 UIU/ML (ref 0.27–4.2)
VLDLC SERPL-MCNC: 38 MG/DL (ref 5–40)

## 2023-11-01 ENCOUNTER — HOSPITAL ENCOUNTER (OUTPATIENT)
Dept: CARDIOLOGY | Facility: HOSPITAL | Age: 73
Discharge: HOME | End: 2023-11-01
Payer: MEDICARE

## 2023-11-01 DIAGNOSIS — R06.02 SHORTNESS OF BREATH: ICD-10-CM

## 2023-11-01 LAB
BH CV ECHO MEAS - AO MAX PG: 6.7 MMHG
BH CV ECHO MEAS - AO MEAN PG: 4 MMHG
BH CV ECHO MEAS - AO ROOT DIAM: 2.8 CM
BH CV ECHO MEAS - AO V2 MAX: 129 CM/SEC
BH CV ECHO MEAS - AO V2 VTI: 29 CM
BH CV ECHO MEAS - AVA(I,D): 2.27 CM2
BH CV ECHO MEAS - EDV(CUBED): 59.3 ML
BH CV ECHO MEAS - EDV(MOD-SP2): 55.1 ML
BH CV ECHO MEAS - EDV(MOD-SP4): 65.7 ML
BH CV ECHO MEAS - EF(MOD-BP): 58.4 %
BH CV ECHO MEAS - EF(MOD-SP2): 55.9 %
BH CV ECHO MEAS - EF(MOD-SP4): 59.7 %
BH CV ECHO MEAS - ESV(CUBED): 13.8 ML
BH CV ECHO MEAS - ESV(MOD-SP2): 24.3 ML
BH CV ECHO MEAS - ESV(MOD-SP4): 26.5 ML
BH CV ECHO MEAS - FS: 38.5 %
BH CV ECHO MEAS - IVS/LVPW: 1 CM
BH CV ECHO MEAS - IVSD: 1.1 CM
BH CV ECHO MEAS - LA DIMENSION: 4.1 CM
BH CV ECHO MEAS - LAT PEAK E' VEL: 7.1 CM/SEC
BH CV ECHO MEAS - LV MASS(C)D: 140.1 GRAMS
BH CV ECHO MEAS - LV MAX PG: 3.6 MMHG
BH CV ECHO MEAS - LV MEAN PG: 2 MMHG
BH CV ECHO MEAS - LV V1 MAX: 94.3 CM/SEC
BH CV ECHO MEAS - LV V1 VTI: 21 CM
BH CV ECHO MEAS - LVIDD: 3.9 CM
BH CV ECHO MEAS - LVIDS: 2.4 CM
BH CV ECHO MEAS - LVOT AREA: 3.1 CM2
BH CV ECHO MEAS - LVOT DIAM: 2 CM
BH CV ECHO MEAS - LVPWD: 1.1 CM
BH CV ECHO MEAS - MED PEAK E' VEL: 9.5 CM/SEC
BH CV ECHO MEAS - MV A MAX VEL: 84.9 CM/SEC
BH CV ECHO MEAS - MV DEC TIME: 0.17 SEC
BH CV ECHO MEAS - MV E MAX VEL: 107 CM/SEC
BH CV ECHO MEAS - MV E/A: 1.26
BH CV ECHO MEAS - PA ACC TIME: 0.2 SEC
BH CV ECHO MEAS - PA V2 MAX: 81.8 CM/SEC
BH CV ECHO MEAS - SV(LVOT): 66 ML
BH CV ECHO MEAS - SV(MOD-SP2): 30.8 ML
BH CV ECHO MEAS - SV(MOD-SP4): 39.2 ML
BH CV ECHO MEAS - TAPSE (>1.6): 2.01 CM
BH CV ECHO MEAS - TR MAX PG: 20.2 MMHG
BH CV ECHO MEAS - TR MAX VEL: 224.6 CM/SEC
BH CV ECHO MEASUREMENTS AVERAGE E/E' RATIO: 12.89
BH CV STRESS BP STAGE 1: NORMAL
BH CV STRESS BP STAGE 2: NORMAL
BH CV STRESS DURATION MIN STAGE 1: 3
BH CV STRESS DURATION MIN STAGE 2: 3
BH CV STRESS DURATION MIN STAGE 3: 0
BH CV STRESS DURATION SEC STAGE 1: 0
BH CV STRESS DURATION SEC STAGE 2: 0
BH CV STRESS DURATION SEC STAGE 3: 44
BH CV STRESS ECHO POST STRESS EJECTION FRACTION EF: 80 %
BH CV STRESS GRADE STAGE 1: 10
BH CV STRESS GRADE STAGE 2: 12
BH CV STRESS GRADE STAGE 3: 14
BH CV STRESS HR STAGE 1: 136
BH CV STRESS HR STAGE 2: 160
BH CV STRESS HR STAGE 3: 162
BH CV STRESS METS STAGE 1: 5
BH CV STRESS METS STAGE 2: 7.5
BH CV STRESS METS STAGE 3: 10
BH CV STRESS O2 STAGE 1: 99
BH CV STRESS O2 STAGE 2: 100
BH CV STRESS O2 STAGE 3: 98
BH CV STRESS PROTOCOL 1: NORMAL
BH CV STRESS RECOVERY BP: NORMAL MMHG
BH CV STRESS RECOVERY HR: 101 BPM
BH CV STRESS RECOVERY O2: 99 %
BH CV STRESS SPEED STAGE 1: 1.7
BH CV STRESS SPEED STAGE 2: 2.5
BH CV STRESS SPEED STAGE 3: 3.4
BH CV STRESS STAGE 1: 1
BH CV STRESS STAGE 2: 2
BH CV STRESS STAGE 3: 3
BH CV VAS BP LEFT ARM: NORMAL MMHG
BH CV XLRA - TDI S': 13.4 CM/SEC
LEFT ATRIUM VOLUME INDEX: 26.9 ML/M2
MAXIMAL PREDICTED HEART RATE: 145 BPM
PERCENT MAX PREDICTED HR: 116.55 %
STRESS BASELINE BP: NORMAL MMHG
STRESS BASELINE HR: 87 BPM
STRESS O2 SAT REST: 98 %
STRESS PERCENT HR: 137 %
STRESS POST ESTIMATED WORKLOAD: 8 METS
STRESS POST EXERCISE DUR MIN: 6 MIN
STRESS POST EXERCISE DUR SEC: 44 SEC
STRESS POST O2 SAT PEAK: 98 %
STRESS POST PEAK BP: NORMAL MMHG
STRESS POST PEAK HR: 169 BPM
STRESS TARGET HR: 123 BPM

## 2023-11-01 PROCEDURE — 93350 STRESS TTE ONLY: CPT

## 2023-11-01 PROCEDURE — 93320 DOPPLER ECHO COMPLETE: CPT

## 2023-11-01 PROCEDURE — 93017 CV STRESS TEST TRACING ONLY: CPT

## 2023-11-01 PROCEDURE — 25010000002 SULFUR HEXAFLUORIDE MICROSPH 60.7-25 MG RECONSTITUTED SUSPENSION: Performed by: INTERNAL MEDICINE

## 2023-11-01 PROCEDURE — 93325 DOPPLER ECHO COLOR FLOW MAPG: CPT

## 2023-11-01 RX ADMIN — SULFUR HEXAFLUORIDE 5 ML: KIT at 08:30

## 2023-11-06 ENCOUNTER — OFFICE VISIT (OUTPATIENT)
Dept: ENDOCRINOLOGY | Facility: CLINIC | Age: 73
End: 2023-11-06
Payer: MEDICARE

## 2023-11-06 VITALS
WEIGHT: 135 LBS | HEIGHT: 59 IN | HEART RATE: 63 BPM | SYSTOLIC BLOOD PRESSURE: 120 MMHG | BODY MASS INDEX: 27.21 KG/M2 | DIASTOLIC BLOOD PRESSURE: 78 MMHG | OXYGEN SATURATION: 97 %

## 2023-11-06 DIAGNOSIS — E06.3 HASHIMOTO'S THYROIDITIS: Primary | ICD-10-CM

## 2023-11-06 DIAGNOSIS — E04.1 SOLITARY THYROID NODULE: ICD-10-CM

## 2023-11-06 PROCEDURE — 1159F MED LIST DOCD IN RCRD: CPT | Performed by: INTERNAL MEDICINE

## 2023-11-06 PROCEDURE — 3078F DIAST BP <80 MM HG: CPT | Performed by: INTERNAL MEDICINE

## 2023-11-06 PROCEDURE — 1160F RVW MEDS BY RX/DR IN RCRD: CPT | Performed by: INTERNAL MEDICINE

## 2023-11-06 PROCEDURE — 3074F SYST BP LT 130 MM HG: CPT | Performed by: INTERNAL MEDICINE

## 2023-11-06 PROCEDURE — 99213 OFFICE O/P EST LOW 20 MIN: CPT | Performed by: INTERNAL MEDICINE

## 2023-11-06 NOTE — PROGRESS NOTES
"     Office Note      Date: 2023  Patient Name: Kavitha Barnhart  MRN: [id number]  : 1950    Chief Complaint   Patient presents with    Hashimoto's Thyroiditis       History of Present Illness:   Kavitha Barnhart is a 73 y.o. female who presents for Hashimoto's Thyroiditis    She remains on synthroid 75mcg qd. She is taking this correctly. She isn't taking any interfering meds concurrently. She hasn't noted any change in the size of her neck. She notes rare sensation of trouble swallowing with pills or dry bread. She denies any sxs of hypo- or hyperthyroidism at this time.     She has noted some HUA recently.      Subjective      Review of Systems:   Review of Systems   Constitutional: Negative.    Cardiovascular: Negative.    Gastrointestinal: Negative.    Endocrine: Negative.        The following portions of the patient's history were reviewed and updated as appropriate: allergies, current medications, past family history, past medical history, past social history, past surgical history, and problem list.    Objective     Visit Vitals  /78 (BP Location: Left arm, Patient Position: Sitting, Cuff Size: Adult)   Pulse 63   Ht 149.1 cm (58.7\")   Wt 61.2 kg (135 lb)   LMP  (LMP Unknown)   SpO2 97%   BMI 27.55 kg/m²       Physical Exam:  Physical Exam  Constitutional:       Appearance: Normal appearance.   Neck:      Thyroid: No thyroid mass, thyromegaly or thyroid tenderness.   Lymphadenopathy:      Cervical: No cervical adenopathy.   Neurological:      Mental Status: She is alert.         Labs:    TSH  No results found for: \"TSHBASE\"     Free T4  Free T4   Date Value Ref Range Status   10/30/2023 1.44 0.93 - 1.70 ng/dL Final       T3  No results found for: \"L9VOTOS\"      TPO  No results found for: \"THYROIDAB\"    TG AB  No results found for: \"THGAB\"    TG  No results found for: \"THYROGLB\"    CBC w/DIFF  Lab Results   Component Value Date    WBC 5.77 10/30/2023    RBC 4.89 10/30/2023    HGB 14.2 " 10/30/2023    HCT 41.4 10/30/2023    MCV 84.7 10/30/2023    MCH 29.0 10/30/2023    MCHC 34.3 10/30/2023    RDW 13.9 10/30/2023    RDWSD 42.5 10/30/2023    MPV 11.4 10/30/2023     10/30/2023    NEUTRORELPCT 44.2 10/30/2023    LYMPHORELPCT 40.6 10/30/2023    MONORELPCT 11.6 10/30/2023    EOSRELPCT 2.4 10/30/2023    BASORELPCT 0.9 10/30/2023    AUTOIGPER 0.3 10/30/2023    NEUTROABS 2.55 10/30/2023    LYMPHSABS 2.34 10/30/2023    MONOSABS 0.67 10/30/2023    EOSABS 0.14 10/30/2023    BASOSABS 0.05 10/30/2023    AUTOIGNUM 0.02 10/30/2023    NRBC 0.0 10/30/2023           Assessment / Plan      Assessment & Plan:  Diagnoses and all orders for this visit:    1. Hashimoto's thyroiditis (Primary)  Assessment & Plan:  Continue Synthroid.  Recent TFTs at goal.      2. Solitary thyroid nodule  Assessment & Plan:  Plan for neck u/s in about 3 years.        Current Outpatient Medications   Medication Instructions    albuterol sulfate  (90 Base) MCG/ACT inhaler 2 puffs, Inhalation, Every 4 Hours PRN    celecoxib (CELEBREX) 100 mg, Oral, 2 Times Daily PRN    clindamycin (CLEOCIN T) 1 % external solution APPLY TO AFFECTED AREA ONCE TO TWICE DAILY AFTER SHOWER    diclofenac (VOLTAREN) 4 g, Topical, 4 Times Daily    dicyclomine (BENTYL) 10 mg, Oral, 2 Times Daily    fexofenadine (ALLEGRA) 180 mg, Oral, Daily PRN    Fluticasone-Salmeterol (Wixela Inhub) 100-50 MCG/ACT DISKUS 1 puff, Inhalation, 2 Times Daily    losartan-hydrochlorothiazide (HYZAAR) 100-25 MG per tablet TAKE 1 TABLET BY MOUTH EVERY DAY.    metoprolol succinate XL (TOPROL-XL) 200 MG 24 hr tablet TAKE 1 TABLET BY MOUTH EVERY DAY.    rosuvastatin (CRESTOR) 5 mg, Oral, Daily    Synthroid 75 mcg, Oral, Daily    Triamcinolone Acetonide (NASACORT) 55 MCG/ACT nasal inhaler 2 sprays, Nasal, Daily    Yuvafem 10 MCG tablet vaginal tablet APPLY 1 TABLET IN THE VAGINA 2 TIMES WEEKLY.      Return in about 6 months (around 5/6/2024) for Recheck with TSH.    Surya Gibson  MD [place]   11/06/2023

## 2023-12-11 ENCOUNTER — OFFICE VISIT (OUTPATIENT)
Dept: INTERNAL MEDICINE | Facility: CLINIC | Age: 73
End: 2023-12-11
Payer: MEDICARE

## 2023-12-11 VITALS
TEMPERATURE: 98 F | DIASTOLIC BLOOD PRESSURE: 76 MMHG | HEART RATE: 70 BPM | RESPIRATION RATE: 16 BRPM | WEIGHT: 133.13 LBS | SYSTOLIC BLOOD PRESSURE: 128 MMHG | BODY MASS INDEX: 27.16 KG/M2

## 2023-12-11 DIAGNOSIS — R06.02 SHORTNESS OF BREATH: ICD-10-CM

## 2023-12-11 DIAGNOSIS — I10 ESSENTIAL HYPERTENSION: Primary | ICD-10-CM

## 2023-12-11 RX ORDER — VARENICLINE 0.03 MG/.05ML
SPRAY NASAL
COMMUNITY
Start: 2023-11-10

## 2023-12-11 RX ORDER — METOPROLOL SUCCINATE 100 MG/1
100 TABLET, EXTENDED RELEASE ORAL DAILY
Qty: 30 TABLET | Refills: 2 | Status: SHIPPED | OUTPATIENT
Start: 2023-12-11

## 2023-12-11 NOTE — PROGRESS NOTES
Chief Complaint   Patient presents with    Essential hypertension     6 month follow up       History of Present Illness    The patient presents for a follow-up related to hypertension. The patient reports that she has had dyspnea but she denies having headaches, chest pain, edema, syncope or blurred vision. She states that she is taking her medication as prescribed. She is not having medication side effects. Her echocardiogram is reviewed and is essentially normal.    Patient presents with a chronic history of intermittent shortness of breath. She has noted no alleviating factors. The shortness of breath has a sudden onset. The symptoms is made worse with activity and it is relieved with rest. The shortness of breath lasts between five and 30 minutes. The dyspnea is not associated with belching, nausea, indigestion, vomiting, diaphoresis, dizziness, lightheadedness, clamminess, fatigue or cough. The patient denies orthopnea.       The patient does not smoke.    Medications      Current Outpatient Medications:     albuterol sulfate  (90 Base) MCG/ACT inhaler, Inhale 2 puffs Every 4 (Four) Hours As Needed for Wheezing., Disp: 1 g, Rfl: 5    celecoxib (CeleBREX) 100 MG capsule, Take 1 capsule by mouth 2 (Two) Times a Day As Needed for Mild Pain., Disp: 60 capsule, Rfl: 2    clindamycin (CLEOCIN T) 1 % external solution, APPLY TO AFFECTED AREA ONCE TO TWICE DAILY AFTER SHOWER, Disp: , Rfl:     diclofenac (VOLTAREN) 1 % gel gel, Apply 4 g topically to the appropriate area as directed 4 (Four) Times a Day. (Patient taking differently: Apply 4 g topically to the appropriate area as directed 4 (Four) Times a Day As Needed (joint pain).), Disp: 100 g, Rfl: 3    dicyclomine (BENTYL) 10 MG capsule, Take 1 capsule by mouth 2 (Two) Times a Day., Disp: 60 capsule, Rfl: 5    fexofenadine (ALLEGRA) 180 MG tablet, Take 1 tablet by mouth Daily As Needed (allergies)., Disp: , Rfl:     Fluticasone-Salmeterol (Wixela Inhub)  100-50 MCG/ACT DISKUS, Inhale 1 puff 2 (Two) Times a Day., Disp: 180 each, Rfl: 1    losartan-hydrochlorothiazide (HYZAAR) 100-25 MG per tablet, TAKE 1 TABLET BY MOUTH EVERY DAY., Disp: 90 tablet, Rfl: 1    metoprolol succinate XL (TOPROL-XL) 200 MG 24 hr tablet, TAKE 1 TABLET BY MOUTH EVERY DAY., Disp: 90 tablet, Rfl: 1    rosuvastatin (CRESTOR) 5 MG tablet, Take 1 tablet by mouth Daily., Disp: 90 tablet, Rfl: 3    Synthroid 75 MCG tablet, Take 1 tablet by mouth Daily., Disp: 90 tablet, Rfl: 3    Triamcinolone Acetonide (NASACORT) 55 MCG/ACT nasal inhaler, 2 sprays into the nostril(s) as directed by provider Daily., Disp: , Rfl:     Tyrvaya 0.03 MG/ACT solution, , Disp: , Rfl:     Yuvafem 10 MCG tablet vaginal tablet, APPLY 1 TABLET IN THE VAGINA 2 TIMES WEEKLY., Disp: , Rfl:      Allergies    Allergies   Allergen Reactions    Tenormin [Atenolol] Cough and Angioedema    Macrobid [Nitrofurantoin Monohyd Macro] Swelling and Other (See Comments)     Swelling around eyes, fatigue    Plaquenil [Hydroxychloroquine Sulfate] Other (See Comments)     Vision changes over time     Shingrix [Zoster Vac Recomb Adjuvanted] Rash    Omeprazole Diarrhea    Ace Inhibitors Cough    Sudafed [Pseudoephedrine Hcl] Other (See Comments)     INCREASED PRESSURE IN EYES        Problem List    Patient Active Problem List   Diagnosis    Hypertension    Peripheral vascular disease    Diverticulosis    Colon polyps    Constipation    Hashimoto's thyroiditis    Mixed connective tissue disease    Lupus    Uterine fibroid    Palpitations    Solitary thyroid nodule    History of asthma    GERD    Gallbladder calculus without cholecystitis and no obstruction    HUA (dyspnea on exertion)    Diastolic dysfunction       Medications, Allergies, Problems List and Past History were reviewed and updated.    Physical Examination    /76 (BP Location: Right arm, Patient Position: Sitting, Cuff Size: Adult)   Pulse 70   Temp 98 °F (36.7 °C) (Infrared)    Resp 16   Wt 60.4 kg (133 lb 2 oz)   LMP  (LMP Unknown)   BMI 27.16 kg/m²       HEENT: Head- Normocephalic Atraumatic. Facies- Within normal limits. Pinnas- Normal texture and shape bilaterally. Canals- Normal bilaterally. TMs- Normal bilaterally. Nares- Patent bilaterally. Nasal Septum- is normal. There is no tenderness to palpation over the frontal or maxillary sinuses. Lids- Normal bilaterally. Conjunctiva- Clear bilaterally. Sclera- Anicteric bilaterally. Oropharynx- Moist with no lesions. Tonsils- No enlargement, erythema or exudate.    Neck: Thyroid- non enlarged, symmetric and has no nodules. No bruits are detected. ROM- Normal Range of Motion with no rigidity.    Lungs: Auscultation- Clear to auscultation bilaterally. There are no retractions, clubbing or cyanosis. The Expiratory to Inspiratory ratio is equal.    Cardiovascular: There are no carotid bruits. Heart- Normal Rate with Regular rhythm and no murmurs. There are no gallops. There are no rubs. In the lower extremities there is no edema. The upper extremities do not have edema.    Abdomen: Soft, benign, non-tender with no masses, hernias, organomegaly or scars.    Impression and Assessment    Essential Hypertension.    Shortness of Breath.    Plan    Essential Hypertension Plan: The medications were adjusted as noted.    Shortness of Breath Plan: She will follow-up with pulmonology. A medication adjustment will be made as noted below.    Diagnoses and all orders for this visit:    1. Essential hypertension (Primary)  -     metoprolol succinate XL (Toprol XL) 100 MG 24 hr tablet; Take 1 tablet by mouth Daily.  Dispense: 30 tablet; Refill: 2    2. Shortness of breath      Medications Discontinued During This Encounter   Medication Reason    metoprolol succinate XL (TOPROL-XL) 200 MG 24 hr tablet        Return to Office    The patient was instructed to return for follow-up in 1 month. The patient was instructed to return sooner if the condition  changes, worsens, or does not resolve.

## 2024-01-08 DIAGNOSIS — I10 ESSENTIAL HYPERTENSION: ICD-10-CM

## 2024-01-08 RX ORDER — LOSARTAN POTASSIUM AND HYDROCHLOROTHIAZIDE 25; 100 MG/1; MG/1
TABLET ORAL
Qty: 90 TABLET | Refills: 1 | Status: SHIPPED | OUTPATIENT
Start: 2024-01-08

## 2024-01-25 ENCOUNTER — OFFICE VISIT (OUTPATIENT)
Dept: CARDIOLOGY | Facility: CLINIC | Age: 74
End: 2024-01-25
Payer: MEDICARE

## 2024-01-25 VITALS
HEART RATE: 77 BPM | SYSTOLIC BLOOD PRESSURE: 144 MMHG | DIASTOLIC BLOOD PRESSURE: 84 MMHG | HEIGHT: 59 IN | BODY MASS INDEX: 27.26 KG/M2 | WEIGHT: 135.2 LBS | OXYGEN SATURATION: 97 %

## 2024-01-25 DIAGNOSIS — R06.09 DOE (DYSPNEA ON EXERTION): Primary | ICD-10-CM

## 2024-01-25 DIAGNOSIS — I10 PRIMARY HYPERTENSION: ICD-10-CM

## 2024-01-25 DIAGNOSIS — I47.19 ATRIAL TACHYCARDIA: ICD-10-CM

## 2024-01-25 PROBLEM — I51.89 DIASTOLIC DYSFUNCTION: Status: RESOLVED | Noted: 2023-06-07 | Resolved: 2024-01-25

## 2024-01-25 RX ORDER — MINOXIDIL 2.5 MG/1
2.5 TABLET ORAL DAILY
Qty: 90 TABLET | Refills: 3 | Status: SHIPPED | OUTPATIENT
Start: 2024-01-25

## 2024-01-25 RX ORDER — CYCLOSPORINE 0.5 MG/ML
1 EMULSION OPHTHALMIC 2 TIMES DAILY
COMMUNITY

## 2024-01-25 NOTE — PROGRESS NOTES
"OFFICE VISIT  NOTE  [facility] CARDIOLOGY      Name: Kavitha Barnhart    Date: 2024  MRN:  [id number]  :  1950      REFERRING/PRIMARY PROVIDER:  Farzad Redding MD    Chief Complaint   Patient presents with    Palpitations       HPI: Kavitha Barnhart is a 73 y.o. female who presents today for SVT/PAT.  Negative stress test 3/2022, last Holter 2022 rare PACs short SVT/PAT 11 beats.  History of lupus, Raynaud's, Sjogren's, Hashimoto's and diverticulitis.  The patient was formally a patient of Dr. Baron Alvarado but wanted a second opinion regarding her shortness of breath.  She has been following Dr. Gilliland with pulmonology.  She underwent various testings including a PFT that was reportedly normal.  She had a cardiopulmonary stress test that did show a reduced O2 pulse suggestive of a cardiac limitation and he recommended a stress echocardiogram which was performed in November and was normal.  No diastolic dysfunction was noted and LVEF was normal.  The patient reports \"gasping\" for breath when she is trying to do her ballroom dancing.  She knows something is not right.  She feels her palpitations are well-controlled on metoprolol.  She used to be on 200 daily but this was decreased by Dr. Redding sometime back and she does feel better on the lower dose as she is not as fatigued.  During her most recent stress echo and stress test in  it appears she had a hypertensive response to stress.  Blood pressure today is not optimal and is 140 over 80s        Past Medical History:   Diagnosis Date    Abnormal ECG but low risk assessment    Allergic rhinitis Many years ago    Anemia Prior to menopause    Arrhythmia     Arthritis     Arthritis of back     Arthritis of neck     Asthma     Asthma, intrinsic around     Atrial fibrillation     Received 4 to 5 notifications on [employer] re: I may have Atrial fib    Cervical disc disease     CTS (carpal tunnel syndrome)     Diastolic dysfunction " 2023    Disease of thyroid gland     Diverticulitis of intestine with perforation     E-coli UTI     treated with antibioiutcs and urine rechecked and clean per pt report    GERD (gastroesophageal reflux disease)     Hashimoto's disease     Headache     Heart murmur     fast heart beat; sometimes irregular    Hip arthrosis     History of transfusion     several times, with surgeries post op and after c section, never a reaction    Hyperlipidemia sometimes marginally high    Hypertension     Hyperthyroidism     Thyroid nodule, hashimoto's thyroiditis    Hypothyroidism     Irritable bowel syndrome     Kidney stone     Knee sprain     Knee swelling     Low back pain     sometimes    Lumbosacral disc disease     Lupus     Neuromuscular disorder     Nerve damage lower back    Palpitations     POSSIBLE ATRIAL TACHYCARDIA    Periarthritis of shoulder     PONV (postoperative nausea and vomiting)     Raynaud's disease     Sinusitis very long time    Sjogren's disease     SVT (supraventricular tachycardia)     Tennis elbow     Thyroid nodule     Tremor     sometimes hands shake    Visual impairment     Vitamin D deficiency        Past Surgical History:   Procedure Laterality Date    ADENOIDECTOMY      Removed as a child    APPENDECTOMY  2016    BONE GRAFT  2021    BREAST BIOPSY Left 10/15/2021    excisional    BREAST SURGERY  10/2021     SECTION  1970    CHOLECYSTECTOMY N/A 2022    Procedure: CHOLECYSTECTOMY LAPAROSCOPIC;  Surgeon: Angie López MD;  Location:  [facility];  Service: General;  Laterality: N/A;    COLON RESECTION  2016    sigmoid and partial rectum     COLON SURGERY      COLONOSCOPY      EXPLORATORY LAPAROTOMY      fibroid    ILEOSTOMY CLOSURE N/A 2016    Procedure: ILEOSTOMY  EXCISION AND TAKEDOWN;  Surgeon: Brooks Jacob MD;  Location:  [facility];  Service:     KNEE ARTHROSCOPY      right knee scope    KNEE SURGERY      MYOMECTOMY  1970    PULMONARY STRESS  TEST  2023    SMALL INTESTINE SURGERY      TONSILECTOMY, ADENOIDECTOMY, BILATERAL MYRINGOTOMY AND TUBES      TONSILLECTOMY      TOOTH EXTRACTION      2021    TOOTH EXTRACTION      UPPER GASTROINTESTINAL ENDOSCOPY         Social History     Socioeconomic History    Marital status:    Tobacco Use    Smoking status: Never     Passive exposure: Past    Smokeless tobacco: Never   Vaping Use    Vaping Use: Never used   Substance and Sexual Activity    Alcohol use: Yes     Alcohol/week: 2.0 - 3.0 standard drinks of alcohol     Types: 2 - 3 Glasses of wine per week     Comment: social    Drug use: No    Sexual activity: Not Currently     Partners: Male     Birth control/protection: Post-menopausal, None       Family History   Problem Relation Age of Onset    Lung cancer Mother     Hyperlipidemia Mother     Thyroid disease Mother     Pancreatic cancer Mother     Cancer Mother     Diabetes Mother     Hypertension Mother     Pancreatitis Mother     Heart disease Father     Hypertension Father     Heart failure Father     Heart attack Father     Polycystic kidney disease Brother     Hypertension Brother     Hypertension Brother     Cancer Brother     Heart disease Brother         Recently  of heart attack    Hyperlipidemia Brother     Hypertension Brother     Kidney disease Brother         Kidney Failure, Polycystic Kidney Disease    Liver disease Brother     Hyperlipidemia Brother     Hypertension Brother     Kidney disease Brother         Polycystic Kidney Disease    Crohn's disease Brother     Thyroid disease Son     Breast cancer Neg Hx     Ovarian cancer Neg Hx     Colon cancer Neg Hx     Colon polyps Neg Hx     Esophageal cancer Neg Hx         ROS:   Constitutional no fever,  no weight loss   Skin no rash, no subcutaneous nodules   Otolaryngeal no difficulty swallowing   Cardiovascular See HPI   Pulmonary no cough, no sputum production   Gastrointestinal no constipation, no diarrhea   Genitourinary no  dysuria, no hematuria   Hematologic no easy bruisability, no abnormal bleeding   Musculoskeletal no muscle pain   Neurologic no dizziness, no falls         Allergies   Allergen Reactions    Tenormin [Atenolol] Cough and Angioedema    Macrobid [Nitrofurantoin Monohyd Macro] Swelling and Other (See Comments)     Swelling around eyes, fatigue    Plaquenil [Hydroxychloroquine Sulfate] Other (See Comments)     Vision changes over time     Shingrix [Zoster Vac Recomb Adjuvanted] Rash    Omeprazole Diarrhea    Ace Inhibitors Cough    Sudafed [Pseudoephedrine Hcl] Other (See Comments)     INCREASED PRESSURE IN EYES          Current Outpatient Medications:     albuterol sulfate  (90 Base) MCG/ACT inhaler, Inhale 2 puffs Every 4 (Four) Hours As Needed for Wheezing., Disp: 1 g, Rfl: 5    celecoxib (CeleBREX) 100 MG capsule, Take 1 capsule by mouth 2 (Two) Times a Day As Needed for Mild Pain., Disp: 60 capsule, Rfl: 2    clindamycin (CLEOCIN T) 1 % external solution, APPLY TO AFFECTED AREA ONCE TO TWICE DAILY AFTER SHOWER, Disp: , Rfl:     cycloSPORINE (RESTASIS) 0.05 % ophthalmic emulsion, Administer 1 drop to both eyes 2 (Two) Times a Day., Disp: , Rfl:     diclofenac (VOLTAREN) 1 % gel gel, Apply 4 g topically to the appropriate area as directed 4 (Four) Times a Day., Disp: 100 g, Rfl: 3    dicyclomine (BENTYL) 10 MG capsule, Take 1 capsule by mouth 2 (Two) Times a Day., Disp: 60 capsule, Rfl: 5    fexofenadine (ALLEGRA) 180 MG tablet, Take 1 tablet by mouth Daily As Needed (allergies)., Disp: , Rfl:     Fluticasone-Salmeterol (Wixela Inhub) 100-50 MCG/ACT DISKUS, Inhale 1 puff 2 (Two) Times a Day., Disp: 180 each, Rfl: 1    losartan-hydrochlorothiazide (HYZAAR) 100-25 MG per tablet, TAKE 1 TABLET BY MOUTH EVERY DAY., Disp: 90 tablet, Rfl: 1    metoprolol succinate XL (Toprol XL) 100 MG 24 hr tablet, Take 1 tablet by mouth Daily., Disp: 30 tablet, Rfl: 2    rosuvastatin (CRESTOR) 5 MG tablet, Take 1 tablet by mouth  "Daily., Disp: 90 tablet, Rfl: 3    Synthroid 75 MCG tablet, Take 1 tablet by mouth Daily., Disp: 90 tablet, Rfl: 3    Tyrvaya 0.03 MG/ACT solution, , Disp: , Rfl:     Yuvafem 10 MCG tablet vaginal tablet, APPLY 1 TABLET IN THE VAGINA 2 TIMES WEEKLY., Disp: , Rfl:     minoxidil (LONITEN) 2.5 MG tablet, Take 1 tablet by mouth Daily., Disp: 90 tablet, Rfl: 3    Vitals:    01/25/24 1143   BP: 144/84   BP Location: Right arm   Patient Position: Sitting   Cuff Size: Adult   Pulse: 77   SpO2: 97%   Weight: 61.3 kg (135 lb 3.2 oz)   Height: 149.9 cm (59\")     Body mass index is 27.31 kg/m².    PHYSICAL EXAM:    General Appearance:   well developed  well nourished  HENT:   oropharynx moist  lips not cyanotic  Neck:  thyroid not enlarged  supple  Respiratory:  no respiratory distress  normal breath sounds  no rales  Cardiovascular:  no jugular venous distention  regular rhythm  apical impulse normal  S1 normal, S2 normal  no S3, no S4   no murmur  no rub, no thrill  carotid pulses normal; no bruit  lower extremity edema: none      Musculoskeletal:  no clubbing of fingers.   normocephalic, head atraumatic  Skin:   warm, dry  Psychiatric:  judgement and insight appropriate  normal mood and affect    RESULTS:   Procedures    Results for orders placed during the hospital encounter of 11/01/23    Adult Stress Echo W/ Cont or Stress Agent if Necessary Per Protocol    Interpretation Summary    Normal stress echo with no significant echocardiographic evidence for myocardial ischemia.    Patient experienced dyspnea at peak exertion (O2 saturation % on RA), symptoms resolved within rest in recovery.    Expected exercise duration 5:10. Actual exercise duration 6:44. ISAIAH (-20).    Left ventricular systolic function is normal. Calculated left ventricular EF = 58.4%    Left ventricular wall thickness is consistent with borderline concentric hypertrophy.    Left ventricular diastolic function was normal.    There is mild calcification " "of the aortic valve.        Labs:  Lab Results   Component Value Date    CHOL 142 10/30/2023    TRIG 238 (H) 10/30/2023    HDL 48 10/30/2023    LDL 56 10/30/2023    AST 18 10/30/2023    ALT 19 10/30/2023     Lab Results   Component Value Date    HGBA1C 5.20 08/01/2016     No components found for: \"CREATINININE\"  eGFR Non  Amer   Date Value Ref Range Status   10/19/2021 59 (L) >60 mL/min/1.73 Final   10/13/2021 48 (L) >60 mL/min/1.73 Final   04/26/2021 49 (L) >60 mL/min/1.73 Final       Most recent PCP note, imaging tests, and labs reviewed.    ASSESSMENT:  Problem List Items Addressed This Visit       Hypertension    Relevant Medications    minoxidil (LONITEN) 2.5 MG tablet    Atrial tachycardia    Overview     POSSIBLE ATRIAL TACHYCARDIA         HUA (dyspnea on exertion) - Primary    Overview     Stress echocardiogram (11/2023): No ischemia. Normal            PLAN:    1.  SVT/PAT  Controlled on Metoprolol therapy      2.  Hypertension:  Goal blood pressure less than 130/80  B/P not well controlled  Resume minoxidil low-dose per patient request.    3.  Hyperlipidemia:  Goal LDL less than 100  Well-controlled on most recent labs, continue statin.    4.  Chest pain/shortness of breath:  Low risk stress echocardiogram 11/1/2023  Patient exercised for 6 minutes and 44 seconds without ECG or echocardiographic evidence of ischemia.    If chest pain worsens or does not improve with blood pressure control, will consider coronary CTA versus traditional angiography.    Advance Care Planning   ACP discussion was held with the patient during this visit. Patient has an advance directive (not in EMR), copy requested.         Return to clinic in 9 months, or sooner as needed.    Thank you for the opportunity to share in the care of your patient; please do not hesitate to call me with any questions.     Roque Saenz MD, [facility], [facility]  Office: [phone] [address] " [id number]    01/25/24

## 2024-01-31 ENCOUNTER — OFFICE VISIT (OUTPATIENT)
Dept: INTERNAL MEDICINE | Facility: CLINIC | Age: 74
End: 2024-01-31
Payer: MEDICARE

## 2024-01-31 ENCOUNTER — TELEPHONE (OUTPATIENT)
Dept: INTERNAL MEDICINE | Facility: CLINIC | Age: 74
End: 2024-01-31

## 2024-01-31 VITALS
DIASTOLIC BLOOD PRESSURE: 62 MMHG | WEIGHT: 136 LBS | HEART RATE: 80 BPM | BODY MASS INDEX: 27.47 KG/M2 | TEMPERATURE: 98 F | SYSTOLIC BLOOD PRESSURE: 112 MMHG | RESPIRATION RATE: 18 BRPM

## 2024-01-31 DIAGNOSIS — I10 ESSENTIAL HYPERTENSION: Primary | ICD-10-CM

## 2024-01-31 DIAGNOSIS — M25.551 BILATERAL HIP PAIN: ICD-10-CM

## 2024-01-31 DIAGNOSIS — M25.552 BILATERAL HIP PAIN: ICD-10-CM

## 2024-01-31 RX ORDER — CELECOXIB 100 MG/1
100 CAPSULE ORAL 2 TIMES DAILY PRN
Qty: 60 CAPSULE | Refills: 2 | Status: SHIPPED | OUTPATIENT
Start: 2024-01-31

## 2024-01-31 NOTE — PROGRESS NOTES
Chief Complaint   Patient presents with    Follow-up     1 month follow up hypertension       History of Present Illness      The patient presents for a follow-up related to hypertension. The patient reports that she has had no headaches, chest pain, dyspnea, edema, syncope, blurred vision or palpitations. She states that she is taking her medication as prescribed. She is not having medication side effects.    Medications      Current Outpatient Medications:     albuterol sulfate  (90 Base) MCG/ACT inhaler, Inhale 2 puffs Every 4 (Four) Hours As Needed for Wheezing., Disp: 1 g, Rfl: 5    celecoxib (CeleBREX) 100 MG capsule, Take 1 capsule by mouth 2 (Two) Times a Day As Needed for Mild Pain., Disp: 60 capsule, Rfl: 2    clindamycin (CLEOCIN T) 1 % external solution, Daily As Needed., Disp: , Rfl:     cycloSPORINE (RESTASIS) 0.05 % ophthalmic emulsion, Administer 1 drop to both eyes 2 (Two) Times a Day., Disp: , Rfl:     diclofenac (VOLTAREN) 1 % gel gel, Apply 4 g topically to the appropriate area as directed 4 (Four) Times a Day., Disp: 100 g, Rfl: 3    dicyclomine (BENTYL) 10 MG capsule, Take 1 capsule by mouth 2 (Two) Times a Day., Disp: 60 capsule, Rfl: 5    fexofenadine (ALLEGRA) 180 MG tablet, Take 1 tablet by mouth Daily As Needed (allergies)., Disp: , Rfl:     Fluticasone-Salmeterol (Wixela Inhub) 100-50 MCG/ACT DISKUS, Inhale 1 puff 2 (Two) Times a Day., Disp: 180 each, Rfl: 1    losartan-hydrochlorothiazide (HYZAAR) 100-25 MG per tablet, TAKE 1 TABLET BY MOUTH EVERY DAY., Disp: 90 tablet, Rfl: 1    metoprolol succinate XL (Toprol XL) 100 MG 24 hr tablet, Take 1 tablet by mouth Daily., Disp: 30 tablet, Rfl: 2    minoxidil (LONITEN) 2.5 MG tablet, Take 1 tablet by mouth Daily., Disp: 90 tablet, Rfl: 3    rosuvastatin (CRESTOR) 5 MG tablet, Take 1 tablet by mouth Daily., Disp: 90 tablet, Rfl: 3    Synthroid 75 MCG tablet, Take 1 tablet by mouth Daily., Disp: 90 tablet, Rfl: 3    Tyrvaya 0.03 MG/ACT  solution, , Disp: , Rfl:     Yuvafem 10 MCG tablet vaginal tablet, APPLY 1 TABLET IN THE VAGINA 2 TIMES WEEKLY., Disp: , Rfl:      Allergies    Allergies   Allergen Reactions    Tenormin [Atenolol] Cough and Angioedema    Macrobid [Nitrofurantoin Monohyd Macro] Swelling and Other (See Comments)     Swelling around eyes, fatigue    Plaquenil [Hydroxychloroquine Sulfate] Other (See Comments)     Vision changes over time     Shingrix [Zoster Vac Recomb Adjuvanted] Rash    Omeprazole Diarrhea    Ace Inhibitors Cough    Sudafed [Pseudoephedrine Hcl] Other (See Comments)     INCREASED PRESSURE IN EYES        Problem List    Patient Active Problem List   Diagnosis    Hypertension    Peripheral vascular disease    Diverticulosis    Colon polyps    Constipation    Hashimoto's thyroiditis    Mixed connective tissue disease    Lupus    Uterine fibroid    Atrial tachycardia    Solitary thyroid nodule    History of asthma    GERD    Gallbladder calculus without cholecystitis and no obstruction    HUA (dyspnea on exertion)       Medications, Allergies, Problems List and Past History were reviewed and updated.    Physical Examination    /62 (BP Location: Left arm, Patient Position: Sitting, Cuff Size: Adult)   Pulse 80   Temp 98 °F (36.7 °C) (Infrared)   Resp 18   Wt 61.7 kg (136 lb)   LMP  (LMP Unknown)   BMI 27.47 kg/m²       Neck: Thyroid- non enlarged, symmetric and has no nodules. No bruits are detected.     Lungs: Auscultation- Clear to auscultation bilaterally. There are no retractions, clubbing or cyanosis. The Expiratory to Inspiratory ratio is equal.    Cardiovascular: There are no carotid bruits. Heart- Normal Rate with Regular rhythm and no murmurs. There are no gallops. There are no rubs. In the lower extremities there is no edema. The upper extremities do not have edema.    Impression and Assessment    Essential Hypertension.    Plan    Essential Hypertension Plan: The patient was instructed to continue  the current medications.    Diagnoses and all orders for this visit:    1. Essential hypertension (Primary)    2. Bilateral hip pain  -     celecoxib (CeleBREX) 100 MG capsule; Take 1 capsule by mouth 2 (Two) Times a Day As Needed for Mild Pain.  Dispense: 60 capsule; Refill: 2        Return to Office    The patient was instructed to return for follow-up in 4 months. The patient was instructed to return sooner if the condition changes, worsens, or does not resolve.

## 2024-02-10 DIAGNOSIS — I10 ESSENTIAL HYPERTENSION: ICD-10-CM

## 2024-02-12 RX ORDER — METOPROLOL SUCCINATE 100 MG/1
100 TABLET, EXTENDED RELEASE ORAL DAILY
Qty: 30 TABLET | Refills: 2 | Status: SHIPPED | OUTPATIENT
Start: 2024-02-12

## 2024-02-14 DIAGNOSIS — E78.5 HYPERLIPIDEMIA, UNSPECIFIED HYPERLIPIDEMIA TYPE: ICD-10-CM

## 2024-02-14 RX ORDER — ROSUVASTATIN CALCIUM 5 MG/1
5 TABLET, COATED ORAL DAILY
Qty: 90 TABLET | Refills: 3 | Status: SHIPPED | OUTPATIENT
Start: 2024-02-14

## 2024-03-15 ENCOUNTER — OFFICE VISIT (OUTPATIENT)
Dept: GASTROENTEROLOGY | Facility: CLINIC | Age: 74
End: 2024-03-15
Payer: MEDICARE

## 2024-03-15 VITALS
TEMPERATURE: 97.1 F | BODY MASS INDEX: 27.21 KG/M2 | OXYGEN SATURATION: 98 % | HEART RATE: 78 BPM | WEIGHT: 135 LBS | DIASTOLIC BLOOD PRESSURE: 70 MMHG | HEIGHT: 59 IN | SYSTOLIC BLOOD PRESSURE: 102 MMHG

## 2024-03-15 DIAGNOSIS — M62.89 PELVIC FLOOR DYSFUNCTION IN FEMALE: ICD-10-CM

## 2024-03-15 DIAGNOSIS — K58.2 IRRITABLE BOWEL SYNDROME WITH BOTH CONSTIPATION AND DIARRHEA: Primary | ICD-10-CM

## 2024-03-15 PROCEDURE — 99213 OFFICE O/P EST LOW 20 MIN: CPT | Performed by: NURSE PRACTITIONER

## 2024-03-15 PROCEDURE — 3074F SYST BP LT 130 MM HG: CPT | Performed by: NURSE PRACTITIONER

## 2024-03-15 PROCEDURE — 1160F RVW MEDS BY RX/DR IN RCRD: CPT | Performed by: NURSE PRACTITIONER

## 2024-03-15 PROCEDURE — 1159F MED LIST DOCD IN RCRD: CPT | Performed by: NURSE PRACTITIONER

## 2024-03-15 PROCEDURE — 3078F DIAST BP <80 MM HG: CPT | Performed by: NURSE PRACTITIONER

## 2024-03-15 NOTE — PROGRESS NOTES
Follow Up      Patient Name: Kavitha Barnhart  : 1950   MRN: [id number]     Chief Complaint:    Chief Complaint   Patient presents with    Follow-up       History of Present Illness: Kavitha Barnhart is a 73 y.o. female who is here today for follow up on IBS      IBS-mixed:   Having weeks of diarrhea and weeks of hard stools but frequent. Symptoms do coorelate with increased stress.  She is using abdominal massage she learned in PT which is greatly beneficial.    She has tried mindfullness. She did not find improvement with LiquidM ibs zulma.         Previously followed the Low FODMAP diet.  She did not find this very helpful as she is already avoiding her trigger foods. She very afraid to take anything that may constipate her after her history of colon resection. Found the most benefit with PT pelvic floor.      Fiber upset her stomach and she could not tolerate this. Probiotic caused diarrhea.       Celiac antibody panel negative.     CT Abdomen Pelvis Without Contrast (2022 20:18)-No acute process seen within the abdomen or pelvis.  US Abdomen Limited (2022 11:40)Cholelithiasis without evidence of cholecystitis.  Redemonstration of a few scattered small hepatic cysts.     SCANNED - COLONOSCOPY (2021)-(Dr Jacob)widely patent lower rectal anastomosis, diverticuli and remaining colon without inflammation     Abdominal surgical history includes Luisa  There is a history of bowel perforation from complicated diverticulitis in 2015 with colon resection by Dr. Jacob, and appendectomy, and exploratory laparotomy     Subjective      Review of Systems:   Review of Systems   Constitutional:  Negative for appetite change and unexpected weight loss.   HENT:  Negative for trouble swallowing.    Gastrointestinal:  Positive for abdominal pain and constipation. Negative for abdominal distention, anal bleeding, blood in stool, diarrhea, nausea, rectal pain, vomiting, GERD and indigestion.        Increase  stool frequency       Medications:     Current Outpatient Medications:     albuterol sulfate  (90 Base) MCG/ACT inhaler, Inhale 2 puffs Every 4 (Four) Hours As Needed for Wheezing., Disp: 1 g, Rfl: 5    celecoxib (CeleBREX) 100 MG capsule, Take 1 capsule by mouth 2 (Two) Times a Day As Needed for Mild Pain., Disp: 60 capsule, Rfl: 2    clindamycin (CLEOCIN T) 1 % external solution, Daily As Needed., Disp: , Rfl:     cycloSPORINE (RESTASIS) 0.05 % ophthalmic emulsion, Administer 1 drop to both eyes 2 (Two) Times a Day., Disp: , Rfl:     diclofenac (VOLTAREN) 1 % gel gel, Apply 4 g topically to the appropriate area as directed 4 (Four) Times a Day., Disp: 100 g, Rfl: 3    dicyclomine (BENTYL) 10 MG capsule, Take 1 capsule by mouth 2 (Two) Times a Day., Disp: 60 capsule, Rfl: 5    fexofenadine (ALLEGRA) 180 MG tablet, Take 1 tablet by mouth Daily As Needed (allergies)., Disp: , Rfl:     Fluticasone-Salmeterol (Wixela Inhub) 100-50 MCG/ACT DISKUS, Inhale 1 puff 2 (Two) Times a Day., Disp: 180 each, Rfl: 1    losartan-hydrochlorothiazide (HYZAAR) 100-25 MG per tablet, TAKE 1 TABLET BY MOUTH EVERY DAY., Disp: 90 tablet, Rfl: 1    metoprolol succinate XL (TOPROL-XL) 100 MG 24 hr tablet, TAKE 1 TABLET BY MOUTH DAILY., Disp: 30 tablet, Rfl: 2    minoxidil (LONITEN) 2.5 MG tablet, Take 1 tablet by mouth Daily., Disp: 90 tablet, Rfl: 3    rosuvastatin (CRESTOR) 5 MG tablet, TAKE 1 TABLET BY MOUTH DAILY., Disp: 90 tablet, Rfl: 3    Synthroid 75 MCG tablet, Take 1 tablet by mouth Daily., Disp: 90 tablet, Rfl: 3    Tyrvaya 0.03 MG/ACT solution, , Disp: , Rfl:     Yuvafem 10 MCG tablet vaginal tablet, APPLY 1 TABLET IN THE VAGINA 2 TIMES WEEKLY., Disp: , Rfl:     Allergies:   Allergies   Allergen Reactions    Tenormin [Atenolol] Cough and Angioedema    Macrobid [Nitrofurantoin Monohyd Macro] Swelling and Other (See Comments)     Swelling around eyes, fatigue    Plaquenil [Hydroxychloroquine Sulfate] Other (See Comments)      Vision changes over time     Shingrix [Zoster Vac Recomb Adjuvanted] Rash    Omeprazole Diarrhea    Ace Inhibitors Cough    Sudafed [Pseudoephedrine Hcl] Other (See Comments)     INCREASED PRESSURE IN EYES        Social History:   Social History     Socioeconomic History    Marital status:    Tobacco Use    Smoking status: Never     Passive exposure: Past    Smokeless tobacco: Never   Vaping Use    Vaping status: Never Used   Substance and Sexual Activity    Alcohol use: Yes     Alcohol/week: 2.0 - 3.0 standard drinks of alcohol     Types: 2 - 3 Glasses of wine per week     Comment: social    Drug use: No    Sexual activity: Not Currently     Partners: Male     Birth control/protection: Post-menopausal, None        Surgical History:   Past Surgical History:   Procedure Laterality Date    ADENOIDECTOMY      Removed as a child    APPENDECTOMY  2016    BONE GRAFT  2021    BREAST BIOPSY Left 10/15/2021    excisional    BREAST SURGERY  10/2021     SECTION  1970    CHOLECYSTECTOMY N/A 2022    Procedure: CHOLECYSTECTOMY LAPAROSCOPIC;  Surgeon: Angie López MD;  Location:  [facility];  Service: General;  Laterality: N/A;    COLON RESECTION  2016    sigmoid and partial rectum     COLON SURGERY      COLONOSCOPY      EXPLORATORY LAPAROTOMY      fibroid    ILEOSTOMY CLOSURE N/A 2016    Procedure: ILEOSTOMY  EXCISION AND TAKEDOWN;  Surgeon: Brooks Jacob MD;  Location:  [facility];  Service:     KNEE ARTHROSCOPY      right knee scope    KNEE SURGERY      MYOMECTOMY  1970    PULMONARY STRESS TEST  2023    SMALL INTESTINE SURGERY      TONSILECTOMY, ADENOIDECTOMY, BILATERAL MYRINGOTOMY AND TUBES      TONSILLECTOMY      TOOTH EXTRACTION      2021    TOOTH EXTRACTION      UPPER GASTROINTESTINAL ENDOSCOPY          Medical History:   Past Medical History:   Diagnosis Date    Abnormal ECG but low risk assessment    Allergic rhinitis Many years ago    Anemia Prior to menopause     "Arrhythmia     Arthritis     Arthritis of back     Arthritis of neck     Asthma     Asthma, intrinsic around 2000    Atrial fibrillation     Received 4 to 5 notifications on [employer] re: I may have Atrial fib    Cervical disc disease     CTS (carpal tunnel syndrome)     Diastolic dysfunction 06/07/2023    Disease of thyroid gland     Diverticulitis of intestine with perforation     E-coli UTI 2019    treated with antibioiutcs and urine rechecked and clean per pt report    GERD (gastroesophageal reflux disease)     Hashimoto's disease     Headache     Heart murmur     fast heart beat; sometimes irregular    Hip arthrosis     History of transfusion     several times, with surgeries post op and after c section, never a reaction    Hyperlipidemia sometimes marginally high    Hypertension     Hyperthyroidism     Thyroid nodule, hashimoto's thyroiditis    Hypothyroidism     Irritable bowel syndrome     Kidney stone     Knee sprain     Knee swelling     Low back pain     sometimes    Lumbosacral disc disease     Lupus     Neuromuscular disorder     Nerve damage lower back    Palpitations     POSSIBLE ATRIAL TACHYCARDIA    Periarthritis of shoulder     PONV (postoperative nausea and vomiting)     Raynaud's disease     Sinusitis very long time    Sjogren's disease     SVT (supraventricular tachycardia)     Tennis elbow     Thyroid nodule 2006    Tremor     sometimes hands shake    Visual impairment     Vitamin D deficiency 2000        Objective     Physical Exam:  Vital Signs:   Vitals:    03/15/24 1259   BP: 102/70   Pulse: 78   Temp: 97.1 °F (36.2 °C)   SpO2: 98%   Weight: 61.2 kg (135 lb)   Height: 149.9 cm (59.02\")     Body mass index is 27.25 kg/m².     Physical Exam  Constitutional:       General: She is not in acute distress.     Appearance: She is well-developed.   Pulmonary:      Effort: Pulmonary effort is normal. No accessory muscle usage or respiratory distress.   Abdominal:      General: Bowel sounds are normal. " There is no distension.      Palpations: Abdomen is soft.   Skin:     Coloration: Skin is not pale.      Findings: No erythema.   Neurological:      Mental Status: She is alert and oriented to person, place, and time.   Psychiatric:         Speech: Speech normal.         Behavior: Behavior normal.         Thought Content: Thought content normal.         Judgment: Judgment normal.         Assessment / Plan      Assessment/Plan:   Diagnoses and all orders for this visit:    1. Irritable bowel syndrome with both constipation and diarrhea (Primary)    2. Pelvic floor dysfunction in female    Overall, she feels she is stable.  We discussed some lifestyle measures and foods that may help with her symptoms as well as taking a stool softener as needed.         Follow Up:   Return in about 6 months (around 9/15/2024), or if symptoms worsen or fail to improve.    Plan of care reviewed with the patient at the conclusion of today's visit.  Education was provided regarding diagnosis, management, and any prescribed or recommended OTC medications.  Patient verbalized understanding of and agreement with management plan.     Time Statement:   Discussed plan of care in detail with patient today. Patient verbally understands and agrees. I have spent 20 minutes reviewing available diagnostics, obtaining history, examining the patient, developing a treatment plan, and educating the patient on disease process and plan of care.     JACK Lares  [facility]

## 2024-04-05 DIAGNOSIS — I10 ESSENTIAL HYPERTENSION: ICD-10-CM

## 2024-04-05 RX ORDER — LEVOTHYROXINE SODIUM 75 MCG
75 TABLET ORAL DAILY
Qty: 90 TABLET | Refills: 3 | Status: SHIPPED | OUTPATIENT
Start: 2024-04-05

## 2024-04-05 NOTE — TELEPHONE ENCOUNTER
Rx Refill Note  Requested Prescriptions     Pending Prescriptions Disp Refills    Synthroid 75 MCG tablet [Pharmacy Med Name: SYNTHROID 75 MCG TABLET 75 Tablet] 90 tablet 3     Sig: Take 1 tablet by mouth Daily.      Last office visit with prescribing clinician: 11/6/2023      Next office visit with prescribing clinician: Visit date not found                  Elisabeth Wesley MA  04/05/24, 14:13 EDT

## 2024-04-08 RX ORDER — LOSARTAN POTASSIUM AND HYDROCHLOROTHIAZIDE 25; 100 MG/1; MG/1
TABLET ORAL
Qty: 90 TABLET | Refills: 1 | Status: SHIPPED | OUTPATIENT
Start: 2024-04-08

## 2024-05-10 DIAGNOSIS — I10 ESSENTIAL HYPERTENSION: ICD-10-CM

## 2024-05-10 RX ORDER — METOPROLOL SUCCINATE 100 MG/1
100 TABLET, EXTENDED RELEASE ORAL DAILY
Qty: 30 TABLET | Refills: 2 | Status: SHIPPED | OUTPATIENT
Start: 2024-05-10

## 2024-05-30 ENCOUNTER — OFFICE VISIT (OUTPATIENT)
Dept: INTERNAL MEDICINE | Facility: CLINIC | Age: 74
End: 2024-05-30
Payer: MEDICARE

## 2024-05-30 VITALS
SYSTOLIC BLOOD PRESSURE: 118 MMHG | WEIGHT: 135 LBS | TEMPERATURE: 97.8 F | BODY MASS INDEX: 27.25 KG/M2 | HEART RATE: 72 BPM | DIASTOLIC BLOOD PRESSURE: 62 MMHG | RESPIRATION RATE: 16 BRPM

## 2024-05-30 DIAGNOSIS — E78.5 HYPERLIPIDEMIA, UNSPECIFIED HYPERLIPIDEMIA TYPE: ICD-10-CM

## 2024-05-30 DIAGNOSIS — I10 ESSENTIAL HYPERTENSION: Primary | ICD-10-CM

## 2024-05-30 LAB
BILIRUB BLD-MCNC: NEGATIVE MG/DL
CLARITY, POC: CLEAR
COLOR UR: YELLOW
EXPIRATION DATE: ABNORMAL
GLUCOSE UR STRIP-MCNC: NEGATIVE MG/DL
KETONES UR QL: NEGATIVE
LEUKOCYTE EST, POC: NEGATIVE
Lab: ABNORMAL
NITRITE UR-MCNC: NEGATIVE MG/ML
PH UR: 6 [PH] (ref 5–8)
PROT UR STRIP-MCNC: NEGATIVE MG/DL
RBC # UR STRIP: NEGATIVE /UL
SP GR UR: 1 (ref 1–1.03)
UROBILINOGEN UR QL: NORMAL

## 2024-05-30 PROCEDURE — 1160F RVW MEDS BY RX/DR IN RCRD: CPT | Performed by: INTERNAL MEDICINE

## 2024-05-30 PROCEDURE — 1159F MED LIST DOCD IN RCRD: CPT | Performed by: INTERNAL MEDICINE

## 2024-05-30 PROCEDURE — G2211 COMPLEX E/M VISIT ADD ON: HCPCS | Performed by: INTERNAL MEDICINE

## 2024-05-30 PROCEDURE — 36415 COLL VENOUS BLD VENIPUNCTURE: CPT | Performed by: INTERNAL MEDICINE

## 2024-05-30 PROCEDURE — 3074F SYST BP LT 130 MM HG: CPT | Performed by: INTERNAL MEDICINE

## 2024-05-30 PROCEDURE — 81003 URINALYSIS AUTO W/O SCOPE: CPT | Performed by: INTERNAL MEDICINE

## 2024-05-30 PROCEDURE — 3078F DIAST BP <80 MM HG: CPT | Performed by: INTERNAL MEDICINE

## 2024-05-30 PROCEDURE — 1126F AMNT PAIN NOTED NONE PRSNT: CPT | Performed by: INTERNAL MEDICINE

## 2024-05-30 PROCEDURE — 99214 OFFICE O/P EST MOD 30 MIN: CPT | Performed by: INTERNAL MEDICINE

## 2024-05-30 NOTE — PROGRESS NOTES
Chief Complaint   Patient presents with    Follow-up     4 month follow up chronic medical problems       History of Present Illness    The patient presents for a follow-up related to hyperlipidemia. He is following a low fat diet. He reports that he is exercising. He is taking rosuvastatin. The patient is taking his medication as prescribed. He reports no medication side effects, including muscle cramps, abdominal pain, headaches or weakness. He denies chest pain, shortness of breath, orthopnea, paroxysmal nocturnal dyspnea, dyspnea on exertion, edema, palpitations or syncope.    The patient presents for a follow-up related to hypertension. The patient reports that he has had no headaches or blurred vision. He states that he is taking his medication as prescribed. He is not having medication side effects.    Medications      Current Outpatient Medications:     albuterol sulfate  (90 Base) MCG/ACT inhaler, Inhale 2 puffs Every 4 (Four) Hours As Needed for Wheezing., Disp: 1 g, Rfl: 5    celecoxib (CeleBREX) 100 MG capsule, Take 1 capsule by mouth 2 (Two) Times a Day As Needed for Mild Pain., Disp: 60 capsule, Rfl: 2    clindamycin (CLEOCIN T) 1 % external solution, Daily As Needed., Disp: , Rfl:     cycloSPORINE (RESTASIS) 0.05 % ophthalmic emulsion, Administer 1 drop to both eyes 2 (Two) Times a Day., Disp: , Rfl:     diclofenac (VOLTAREN) 1 % gel gel, Apply 4 g topically to the appropriate area as directed 4 (Four) Times a Day., Disp: 100 g, Rfl: 3    dicyclomine (BENTYL) 10 MG capsule, Take 1 capsule by mouth 2 (Two) Times a Day. (Patient taking differently: Take 1 capsule by mouth 2 (Two) Times a Day As Needed for Abdominal Cramping.), Disp: 60 capsule, Rfl: 5    fexofenadine (ALLEGRA) 180 MG tablet, Take 1 tablet by mouth Daily As Needed (allergies)., Disp: , Rfl:     Fluticasone-Salmeterol (Wixela Inhub) 100-50 MCG/ACT DISKUS, Inhale 1 puff 2 (Two) Times a Day., Disp: 180 each, Rfl: 1     losartan-hydrochlorothiazide (HYZAAR) 100-25 MG per tablet, TAKE 1 TABLET BY MOUTH EVERY DAY., Disp: 90 tablet, Rfl: 1    metoprolol succinate XL (TOPROL-XL) 100 MG 24 hr tablet, TAKE 1 TABLET BY MOUTH DAILY., Disp: 30 tablet, Rfl: 2    minoxidil (LONITEN) 2.5 MG tablet, Take 1 tablet by mouth Daily. (Patient taking differently: Take 1 tablet by mouth Daily. 5/30/24 Reports taking 1/2 tablet), Disp: 90 tablet, Rfl: 3    rosuvastatin (CRESTOR) 5 MG tablet, TAKE 1 TABLET BY MOUTH DAILY., Disp: 90 tablet, Rfl: 3    Synthroid 75 MCG tablet, TAKE 1 TABLET BY MOUTH DAILY., Disp: 90 tablet, Rfl: 3    Yuvafem 10 MCG tablet vaginal tablet, APPLY 1 TABLET IN THE VAGINA 2 TIMES WEEKLY., Disp: , Rfl:      Allergies    Allergies   Allergen Reactions    Tenormin [Atenolol] Cough and Angioedema    Macrobid [Nitrofurantoin Monohyd Macro] Swelling and Other (See Comments)     Swelling around eyes, fatigue    Plaquenil [Hydroxychloroquine Sulfate] Other (See Comments)     Vision changes over time     Shingrix [Zoster Vac Recomb Adjuvanted] Rash    Omeprazole Diarrhea    Ace Inhibitors Cough    Sudafed [Pseudoephedrine Hcl] Other (See Comments)     INCREASED PRESSURE IN EYES        Problem List    Patient Active Problem List   Diagnosis    Hypertension    Peripheral vascular disease    Diverticulosis    Colon polyps    Constipation    Hashimoto's thyroiditis    Mixed connective tissue disease    Lupus    Uterine fibroid    Atrial tachycardia    Solitary thyroid nodule    History of asthma    GERD    Gallbladder calculus without cholecystitis and no obstruction    HUA (dyspnea on exertion)       Medications, Allergies, Problems List and Past History were reviewed and updated.    Physical Examination    /62 (BP Location: Left arm, Patient Position: Sitting, Cuff Size: Adult)   Pulse 72   Temp 97.8 °F (36.6 °C) (Infrared)   Resp 16   Wt 61.2 kg (135 lb)   LMP  (LMP Unknown)   BMI 27.25 kg/m²       HEENT: Head- Normocephalic  Atraumatic. Facies- Within normal limits. Pinnas- Normal texture and shape bilaterally. Canals- Normal bilaterally. TMs- Normal bilaterally. Nares- Patent bilaterally. Nasal Septum- is normal. There is no tenderness to palpation over the frontal or maxillary sinuses. Lids- Normal bilaterally. Conjunctiva- Clear bilaterally. Sclera- Anicteric bilaterally. Oropharynx- Moist with no lesions. Tonsils- No enlargement, erythema or exudate.    Neck: Thyroid- non enlarged, symmetric and has no nodules. No bruits are detected. ROM- Normal Range of Motion with no rigidity.    Lungs: Auscultation- Clear to auscultation bilaterally. There are no retractions, clubbing or cyanosis. The Expiratory to Inspiratory ratio is equal.    Cardiovascular: There are no carotid bruits. Heart- Normal Rate with Regular rhythm and no murmurs. There are no gallops. There are no rubs. In the lower extremities there is no edema. The upper extremities do not have edema.    Abdomen: Soft, benign, non-tender with no masses, hernias, organomegaly or scars.    Impression and Assessment    Hyperlipidemia.    Essential Hypertension.    Plan    Hyperlipidemia Plan: The patient was instructed to exercise daily, eat a low fat diet and continue his medications.    Essential Hypertension Plan: The patient was instructed to continue the current medications.    Diagnoses and all orders for this visit:    1. Essential hypertension (Primary)  -     Comprehensive Metabolic Panel; Future  -     POC Urinalysis Dipstick, Automated; Future    2. Hyperlipidemia, unspecified hyperlipidemia type  -     Comprehensive Metabolic Panel; Future  -     Lipid Panel; Future        Return to Office    The patient was instructed to return for follow-up in 6 months. The patient was instructed to return sooner if the condition changes, worsens, or does not resolve.

## 2024-06-19 ENCOUNTER — OFFICE VISIT (OUTPATIENT)
Dept: PULMONOLOGY | Facility: CLINIC | Age: 74
End: 2024-06-19
Payer: MEDICARE

## 2024-06-19 VITALS
BODY MASS INDEX: 27.42 KG/M2 | TEMPERATURE: 98 F | HEIGHT: 59 IN | WEIGHT: 136 LBS | DIASTOLIC BLOOD PRESSURE: 70 MMHG | SYSTOLIC BLOOD PRESSURE: 126 MMHG | OXYGEN SATURATION: 97 % | HEART RATE: 102 BPM

## 2024-06-19 DIAGNOSIS — M35.1 MIXED CONNECTIVE TISSUE DISEASE: ICD-10-CM

## 2024-06-19 DIAGNOSIS — R06.09 DOE (DYSPNEA ON EXERTION): Primary | ICD-10-CM

## 2024-06-19 DIAGNOSIS — Z87.09 HISTORY OF ASTHMA: ICD-10-CM

## 2024-06-19 DIAGNOSIS — J45.20 MILD INTERMITTENT ASTHMA WITHOUT COMPLICATION: ICD-10-CM

## 2024-06-19 PROCEDURE — 1159F MED LIST DOCD IN RCRD: CPT | Performed by: INTERNAL MEDICINE

## 2024-06-19 PROCEDURE — 99214 OFFICE O/P EST MOD 30 MIN: CPT | Performed by: INTERNAL MEDICINE

## 2024-06-19 PROCEDURE — 3074F SYST BP LT 130 MM HG: CPT | Performed by: INTERNAL MEDICINE

## 2024-06-19 PROCEDURE — 94010 BREATHING CAPACITY TEST: CPT | Performed by: INTERNAL MEDICINE

## 2024-06-19 PROCEDURE — 94726 PLETHYSMOGRAPHY LUNG VOLUMES: CPT | Performed by: INTERNAL MEDICINE

## 2024-06-19 PROCEDURE — 94729 DIFFUSING CAPACITY: CPT | Performed by: INTERNAL MEDICINE

## 2024-06-19 PROCEDURE — 1160F RVW MEDS BY RX/DR IN RCRD: CPT | Performed by: INTERNAL MEDICINE

## 2024-06-19 PROCEDURE — 3078F DIAST BP <80 MM HG: CPT | Performed by: INTERNAL MEDICINE

## 2024-06-19 NOTE — PROGRESS NOTES
"  PULMONARY  NOTE    Chief Complaint     Dyspnea on exertion, history of asthma, history of lupus/mixed connective tissue disease, reflux, non-smoker, diastolic dysfunction    History of Present Illness     73-year-old female returns today for follow-up  I last saw her 9/12/2023    She is a non-smoker with a past history of \"cough variant asthma    She has a history of lupus and mixed connective tissue disease  She is been followed by Dr. Luke    She has a history of dyspnea on exertion  Workup has included chest imaging, PFTs, and ultimately a cardiopulmonary exercise test  The only abnormality on the cardiopulmonary exercise test was a reduced O2 pulse suggesting a cardiac limitation  After that she was reevaluated by cardiology and underwent a stress echocardiogram which was unremarkable/normal    She returns today indicating that she continues to have dyspnea on exertion  She feels short of breath going up a flight of stairs  No regular cough or sputum production    She continues to follow with Dr. Luke regarding her lupus and mixed connective tissue disease    Patient Active Problem List   Diagnosis    Hypertension    Peripheral vascular disease    Diverticulosis    Colon polyps    Constipation    Hashimoto's thyroiditis    Mixed connective tissue disease    Lupus    Uterine fibroid    Atrial tachycardia    Solitary thyroid nodule    History of asthma    GERD    Gallbladder calculus without cholecystitis and no obstruction    HUA (dyspnea on exertion)      Allergies   Allergen Reactions    Tenormin [Atenolol] Cough and Angioedema    Macrobid [Nitrofurantoin Monohyd Macro] Swelling and Other (See Comments)     Swelling around eyes, fatigue    Plaquenil [Hydroxychloroquine Sulfate] Other (See Comments)     Vision changes over time     Shingrix [Zoster Vac Recomb Adjuvanted] Rash    Omeprazole Diarrhea    Ace Inhibitors Cough    Sudafed [Pseudoephedrine Hcl] Other (See Comments)     INCREASED PRESSURE IN EYES  "       Current Outpatient Medications:     albuterol sulfate  (90 Base) MCG/ACT inhaler, Inhale 2 puffs Every 4 (Four) Hours As Needed for Wheezing., Disp: 1 g, Rfl: 5    celecoxib (CeleBREX) 100 MG capsule, Take 1 capsule by mouth 2 (Two) Times a Day As Needed for Mild Pain., Disp: 60 capsule, Rfl: 2    clindamycin (CLEOCIN T) 1 % external solution, Daily As Needed., Disp: , Rfl:     cycloSPORINE (RESTASIS) 0.05 % ophthalmic emulsion, Administer 1 drop to both eyes 2 (Two) Times a Day., Disp: , Rfl:     diclofenac (VOLTAREN) 1 % gel gel, Apply 4 g topically to the appropriate area as directed 4 (Four) Times a Day., Disp: 100 g, Rfl: 3    dicyclomine (BENTYL) 10 MG capsule, Take 1 capsule by mouth 2 (Two) Times a Day. (Patient taking differently: Take 1 capsule by mouth 2 (Two) Times a Day As Needed for Abdominal Cramping.), Disp: 60 capsule, Rfl: 5    fexofenadine (ALLEGRA) 180 MG tablet, Take 1 tablet by mouth Daily As Needed (allergies)., Disp: , Rfl:     Fluticasone-Salmeterol (Wixela Inhub) 100-50 MCG/ACT DISKUS, Inhale 1 puff 2 (Two) Times a Day., Disp: 180 each, Rfl: 1    losartan-hydrochlorothiazide (HYZAAR) 100-25 MG per tablet, TAKE 1 TABLET BY MOUTH EVERY DAY., Disp: 90 tablet, Rfl: 1    metoprolol succinate XL (TOPROL-XL) 100 MG 24 hr tablet, TAKE 1 TABLET BY MOUTH DAILY., Disp: 30 tablet, Rfl: 2    minoxidil (LONITEN) 2.5 MG tablet, Take 1 tablet by mouth Daily. (Patient taking differently: Take 1 tablet by mouth Daily. 5/30/24 Reports taking 1/2 tablet), Disp: 90 tablet, Rfl: 3    rosuvastatin (CRESTOR) 5 MG tablet, TAKE 1 TABLET BY MOUTH DAILY., Disp: 90 tablet, Rfl: 3    Synthroid 75 MCG tablet, TAKE 1 TABLET BY MOUTH DAILY., Disp: 90 tablet, Rfl: 3    Yuvafem 10 MCG tablet vaginal tablet, APPLY 1 TABLET IN THE VAGINA 2 TIMES WEEKLY., Disp: , Rfl:   MEDICATION LIST AND ALLERGIES REVIEWED.    Family History   Problem Relation Age of Onset    Lung cancer Mother     Hyperlipidemia Mother      "Thyroid disease Mother     Pancreatic cancer Mother     Cancer Mother     Diabetes Mother     Hypertension Mother     Pancreatitis Mother     Heart disease Father     Hypertension Father     Heart failure Father     Heart attack Father     Polycystic kidney disease Brother     Hypertension Brother     Hypertension Brother     Cancer Brother     Heart disease Brother         Recently  of heart attack    Hyperlipidemia Brother     Hypertension Brother     Kidney disease Brother         Kidney Failure, Polycystic Kidney Disease    Liver disease Brother     Hyperlipidemia Brother     Hypertension Brother     Kidney disease Brother         Polycystic Kidney Disease    Crohn's disease Brother     Thyroid disease Son     Breast cancer Neg Hx     Ovarian cancer Neg Hx     Colon cancer Neg Hx     Colon polyps Neg Hx     Esophageal cancer Neg Hx      Social History     Tobacco Use    Smoking status: Never     Passive exposure: Past    Smokeless tobacco: Never   Vaping Use    Vaping status: Never Used   Substance Use Topics    Alcohol use: Yes     Alcohol/week: 2.0 - 3.0 standard drinks of alcohol     Types: 2 - 3 Glasses of wine per week     Comment: social    Drug use: No     Social History     Social History Narrative    Non-smoker     FAMILY AND SOCIAL HISTORY REVIEWED.    Review of Systems  IF PRESENT REFER TO SCANNED ROS SHEET FROM SAME DATE  OTHERWISE ROS OBTAINED AND NON-CONTRIBUTORY OVER HPI.    /70   Pulse 102   Temp 98 °F (36.7 °C)   Ht 149.9 cm (59.02\")   Wt 61.7 kg (136 lb)   LMP  (LMP Unknown)   SpO2 97%   BMI 27.45 kg/m²   Physical Exam  Vitals and nursing note reviewed.   Constitutional:       General: She is not in acute distress.     Appearance: She is well-developed. She is not diaphoretic.   HENT:      Head: Normocephalic and atraumatic.   Neck:      Thyroid: No thyromegaly.   Cardiovascular:      Rate and Rhythm: Normal rate and regular rhythm.      Heart sounds: Normal heart sounds. No " murmur heard.  Pulmonary:      Effort: Pulmonary effort is normal.      Breath sounds: Normal breath sounds. No stridor.   Lymphadenopathy:      Cervical: No cervical adenopathy.      Upper Body:      Right upper body: No supraclavicular or epitrochlear adenopathy.      Left upper body: No supraclavicular or epitrochlear adenopathy.   Skin:     General: Skin is warm and dry.   Neurological:      Mental Status: She is alert and oriented to person, place, and time.   Psychiatric:         Behavior: Behavior normal.         Results     PFTs today reveal no airway obstruction no restriction and a normal diffusion capacity    Immunization History   Administered Date(s) Administered    COVID-19 (PFIZER) BIVALENT 12+YRS 10/19/2022    COVID-19 (PFIZER) Purple Cap Monovalent 01/27/2021, 02/17/2021, 09/18/2021    COVID-19 F23 (PFIZER) 12YRS+ (COMIRNATY) 11/25/2023    Covid-19 (Pfizer) Gray Cap Monovalent 05/20/2022    FluMist 2-49yrs 10/01/2016    Fluad Quad 65+ 10/01/2020, 10/09/2021    Fluzone High Dose =>65 Years ([employer] ONLY) 01/01/2014, 10/10/2016, 10/20/2017, 11/06/2018, 10/31/2019, 10/11/2020, 11/22/2021    Fluzone High-Dose 65+yrs 10/19/2022, 10/30/2023    Hepatitis A 11/06/2018, 09/03/2019    Pneumococcal Conjugate 13-Valent (PCV13) 02/28/2018    Pneumococcal Polysaccharide (PPSV23) 03/12/2019    Shingrix 03/28/2023    Tdap 02/19/2018     Problem List       ICD-10-CM ICD-9-CM   1. HUA (dyspnea on exertion)  R06.09 786.09   2. History of asthma  Z87.09 V12.69   3. Mixed connective tissue disease  M35.1 710.8       Discussion     We reviewed her PFTs from today as well as her extensive prior workup for dyspnea on exertion  Specifically we reviewed her cardiopulmonary exercise test from August 2023 and the subsequent stress echocardiogram that she had in October 2023    At this point there is no evidence of a pulmonary or cardiac process that would be contributing to shortness of breath    I have reassured her that  there is no evidence of autoimmune related interstitial lung disease    I have recommended a regular exercise program    I have recommended albuterol to be used on an as-needed basis    I will plan to see her back in a year with repeat PFTs or earlier if there are any problems in the meantime    Moderate level of Medical Decision Making complexity based on 2 or more chronic stable illnesses and an independent review of test results and/or prescription drug management.    Hayden Gilliland MD  Note electronically signed    CC: Farzad Redding MD

## 2024-06-20 RX ORDER — FLUTICASONE PROPIONATE AND SALMETEROL 100; 50 UG/1; UG/1
1 POWDER RESPIRATORY (INHALATION) 2 TIMES DAILY
Qty: 60 EACH | Refills: 5 | Status: SHIPPED | OUTPATIENT
Start: 2024-06-20

## 2024-07-16 ENCOUNTER — TRANSCRIBE ORDERS (OUTPATIENT)
Dept: ADMINISTRATIVE | Facility: HOSPITAL | Age: 74
End: 2024-07-16
Payer: MEDICARE

## 2024-07-16 DIAGNOSIS — Z12.31 VISIT FOR SCREENING MAMMOGRAM: Primary | ICD-10-CM

## 2024-07-22 PROBLEM — K21.9 GASTROESOPHAGEAL REFLUX DISEASE: Status: ACTIVE | Noted: 2024-07-22

## 2024-07-22 PROBLEM — M25.561 CHRONIC PAIN OF RIGHT KNEE: Status: ACTIVE | Noted: 2024-07-22

## 2024-07-22 PROBLEM — R06.02 SHORTNESS OF BREATH: Status: ACTIVE | Noted: 2024-07-22

## 2024-07-22 PROBLEM — M25.50 MULTIPLE JOINT PAIN: Status: ACTIVE | Noted: 2024-07-22

## 2024-07-22 PROBLEM — M18.0 PRIMARY OSTEOARTHRITIS OF BOTH FIRST CARPOMETACARPAL JOINTS: Status: ACTIVE | Noted: 2024-07-22

## 2024-07-22 PROBLEM — L56.8 PHOTOSENSITIVITY OF SKIN: Status: ACTIVE | Noted: 2024-07-22

## 2024-07-22 PROBLEM — G89.29 CHRONIC PAIN OF RIGHT KNEE: Status: ACTIVE | Noted: 2024-07-22

## 2024-07-22 PROBLEM — M85.89 OSTEOPENIA OF MULTIPLE SITES: Status: ACTIVE | Noted: 2024-07-22

## 2024-07-23 ENCOUNTER — TELEPHONE (OUTPATIENT)
Dept: INTERNAL MEDICINE | Facility: CLINIC | Age: 74
End: 2024-07-23
Payer: MEDICARE

## 2024-07-23 NOTE — TELEPHONE ENCOUNTER
Caller: Kavitha Barnhart    Relationship: Self    Best call back number: [phone]        What test was performed: BLOOD WORK     When was the test performed: END OF MAY     Where was the test performed: IN OFFICE     Additional notes: THE PATIENT RECEIVED HER URINE TEST RESULTS BUT HAS NOT RECEIVED THE BLOOD WORK RESULTS

## 2024-07-23 NOTE — TELEPHONE ENCOUNTER
Caller: Kavitha Barnhart    Relationship to patient: Self    Best call back number:[phone]    Chief complaint: DIZZINESS     Type of visit: OFFICE     Requested date: AS SOON AS POSSIBLE     Additional notes:THE PATIENT WENT TO URGENT CARE AND WAS TREATED FOR DIZZINESS AND EAR PAIN THE PATIENT STILL HAS MINOR EAR PAIN BUT SHE GETTING DIZZY IN THE MORNINGS THE PATIENT WOULD LIKE TO BE SEEN AS SOON AS POSSIBLE

## 2024-07-24 ENCOUNTER — OFFICE VISIT (OUTPATIENT)
Dept: INTERNAL MEDICINE | Facility: CLINIC | Age: 74
End: 2024-07-24
Payer: MEDICARE

## 2024-07-24 VITALS
SYSTOLIC BLOOD PRESSURE: 126 MMHG | RESPIRATION RATE: 18 BRPM | WEIGHT: 134 LBS | HEART RATE: 68 BPM | BODY MASS INDEX: 27.05 KG/M2 | TEMPERATURE: 98 F | DIASTOLIC BLOOD PRESSURE: 78 MMHG

## 2024-07-24 DIAGNOSIS — R42 DIZZINESS: Primary | ICD-10-CM

## 2024-07-24 PROCEDURE — 3074F SYST BP LT 130 MM HG: CPT | Performed by: INTERNAL MEDICINE

## 2024-07-24 PROCEDURE — 1126F AMNT PAIN NOTED NONE PRSNT: CPT | Performed by: INTERNAL MEDICINE

## 2024-07-24 PROCEDURE — 3078F DIAST BP <80 MM HG: CPT | Performed by: INTERNAL MEDICINE

## 2024-07-24 PROCEDURE — 99214 OFFICE O/P EST MOD 30 MIN: CPT | Performed by: INTERNAL MEDICINE

## 2024-07-24 RX ORDER — PREDNISONE 10 MG/1
TABLET ORAL
Qty: 37 TABLET | Refills: 0 | Status: SHIPPED | OUTPATIENT
Start: 2024-07-24

## 2024-07-24 NOTE — PROGRESS NOTES
Chief Complaint   Patient presents with    Dizziness       History of Present Illness      The patient presents with a three week history of intermittent dizziness. The sensation is described as a sensation of spinning. The patient denies nausea, vomiting, tinnitus, headache, ear pain, double vision, syncope, paresthesias, hearing loss or tremor. The symptoms tend to have a sudden onset. The symptoms are not aggravated by a change in position, turning the head, standing up, or coughing. There has not been a recent change in eyeglasses. There has not been a recent upper respiratory infection.    Medications      Current Outpatient Medications:     albuterol sulfate  (90 Base) MCG/ACT inhaler, Inhale 2 puffs Every 4 (Four) Hours As Needed for Wheezing., Disp: 1 g, Rfl: 5    celecoxib (CeleBREX) 100 MG capsule, Take 1 capsule by mouth 2 (Two) Times a Day As Needed for Mild Pain., Disp: 60 capsule, Rfl: 2    cycloSPORINE (RESTASIS) 0.05 % ophthalmic emulsion, Administer 1 drop to both eyes 2 (Two) Times a Day., Disp: , Rfl:     diclofenac (VOLTAREN) 1 % gel gel, Apply 4 g topically to the appropriate area as directed 4 (Four) Times a Day., Disp: 100 g, Rfl: 3    dicyclomine (BENTYL) 10 MG capsule, Take 1 capsule by mouth 2 (Two) Times a Day. (Patient taking differently: Take 1 capsule by mouth 2 (Two) Times a Day As Needed for Abdominal Cramping.), Disp: 60 capsule, Rfl: 5    fexofenadine (ALLEGRA) 180 MG tablet, Take 1 tablet by mouth Daily As Needed (allergies)., Disp: , Rfl:     Fluticasone-Salmeterol (ADVAIR/WIXELA) 100-50 MCG/ACT DISKUS, INHALE 1 PUFF 2 (TWO) TIMES A DAY., Disp: 60 each, Rfl: 5    losartan-hydrochlorothiazide (HYZAAR) 100-25 MG per tablet, TAKE 1 TABLET BY MOUTH EVERY DAY., Disp: 90 tablet, Rfl: 1    metoprolol succinate XL (TOPROL-XL) 100 MG 24 hr tablet, TAKE 1 TABLET BY MOUTH DAILY., Disp: 30 tablet, Rfl: 2    rosuvastatin (CRESTOR) 5 MG tablet, TAKE 1 TABLET BY MOUTH DAILY., Disp: 90  tablet, Rfl: 3    Synthroid 75 MCG tablet, TAKE 1 TABLET BY MOUTH DAILY., Disp: 90 tablet, Rfl: 3    TRIAMCINOLONE ACETONIDE IN, Inhale. triamcinolone acetonide 55 mcg nasal spray aerosol spray 1 by nasal route  every day, Disp: , Rfl:     Yuvafem 10 MCG tablet vaginal tablet, APPLY 1 TABLET IN THE VAGINA 2 TIMES WEEKLY., Disp: , Rfl:     meclizine (ANTIVERT) 25 MG tablet, Take 1 tablet by mouth 3 (Three) Times a Day As Needed for Dizziness. (Patient not taking: Reported on 7/24/2024), Disp: 15 tablet, Rfl: 0    predniSONE (DELTASONE) 10 MG tablet, 2 po bid x 5 days; 1 po bid x 5 days; 1 po daily x 5 days; 1/2 po daily x 4 days, Disp: 37 tablet, Rfl: 0     Allergies    Allergies   Allergen Reactions    Tenormin [Atenolol] Cough and Angioedema    Macrobid [Nitrofurantoin Monohyd Macro] Swelling and Other (See Comments)     Swelling around eyes, fatigue    Plaquenil [Hydroxychloroquine Sulfate] Other (See Comments)     Vision changes over time     Shingrix [Zoster Vac Recomb Adjuvanted] Rash    Omeprazole Diarrhea    Ace Inhibitors Cough    Sudafed [Pseudoephedrine Hcl] Other (See Comments)     INCREASED PRESSURE IN EYES        Problem List    Patient Active Problem List   Diagnosis    Hypertension    Peripheral vascular disease    Diverticulosis    Colon polyps    Constipation    Hashimoto's thyroiditis    Mixed connective tissue disease    Lupus    Uterine fibroid    Atrial tachycardia    Solitary thyroid nodule    History of asthma    GERD    Gallbladder calculus without cholecystitis and no obstruction    HUA (dyspnea on exertion)    Chronic pain of right knee    Shortness of breath    Multiple joint pain    Osteopenia of multiple sites    Photosensitivity of skin    Gastroesophageal reflux disease    Primary osteoarthritis of both first carpometacarpal joints       Medications, Allergies, Problems List and Past History were reviewed and updated.    Physical Examination    /78 (BP Location: Right arm,  Patient Position: Sitting, Cuff Size: Adult)   Pulse 68   Temp 98 °F (36.7 °C) (Infrared)   Resp 18   Wt 60.8 kg (134 lb)   LMP  (LMP Unknown)   BMI 27.05 kg/m²       HEENT: Head- Normocephalic Atraumatic. Facies- Within normal limits. Pinnas- Normal texture and shape bilaterally. Canals- Normal bilaterally. TMs- Normal bilaterally. Nares- Patent bilaterally. Nasal Septum- is normal. There is no tenderness to palpation over the frontal or maxillary sinuses. Lids- Normal bilaterally. Conjunctiva- Clear bilaterally. Sclera- Anicteric bilaterally. Oropharynx- Moist with no lesions. Tonsils- No enlargement, erythema or exudate.    Lungs: Auscultation- Clear to auscultation bilaterally. There are no retractions, clubbing or cyanosis. The Expiratory to Inspiratory ratio is equal.    Cardiovascular: There are no carotid bruits. Heart- Normal Rate with Regular rhythm and no murmurs. There are no gallops. There are no rubs. In the lower extremities there is no edema. The upper extremities do not have edema.    Neurologic Exam:    Cranial Nerves: Cranial Nerve 1 (Olfactory Nerve) Deferred.    Cranial Nerve 2 (Optic Nerve): Deferred.    Cranial Nerves 3,4,6 (Oculomotor, Abducens and Trochlear Nerves): Extraocular Motions Intact. She does report diplopia when following finger laterally.    Cranial Nerve 5 (Trigeminal Nerve): Normal [place] and Temporal Muscle Function and Normal Sensation V1,V2,V3. Cranial Nerve 7 (Facial Nerve): Bilateral Normal Muscles of Facial Expression and Eye Closure.    Cranial Nerve 8 (Acoustic Nerve): Normal Auditory Function Bilaterally.    Cranial Nerve 9 (Glossopharyngeal Nerve) and Cranial Nerve 10 (Vagus Nerve): Normal Palate Elevation.    Cranial Nerve 11 (Spinal Accessory Nerve): Normal Shoulder Shrug Bilaterally.    Cranial Nerve 12 (Hypoglossal Nerve): Tongue Protrusion Midline.    Upper Extremity Neuro Exam: Muscle Strength is 5/5 in all major upper extremity muscle groups. Deep  Tendon Reflexes are 2+ and equal in the biceps, triceps and brachioradialis distributions bilaterally. Sensation is normal in all distributions.    Lower Extremity Neuro Exam: Muscle Strength is 5/5 in all major lower extremity muscle groups. Deep Tendon Reflexes are 2+ and equal in the patellar and Achilles distributions bilaterally. Sensation is normal in all distributions.    Gait: Normal.    Cerebellar Function: The Finger to Nose test is normal.    Impression and Assessment    Dizziness.    Plan    Dizziness Plan: Medication will be added as noted. I recommended an MRI but she declined. Will consider if symptoms don't resolve.    Diagnoses and all orders for this visit:    1. Dizziness (Primary)  -     predniSONE (DELTASONE) 10 MG tablet; 2 po bid x 5 days; 1 po bid x 5 days; 1 po daily x 5 days; 1/2 po daily x 4 days  Dispense: 37 tablet; Refill: 0        Return to Office    The patient was instructed to return for follow-up in 1 month. The patient was instructed to return sooner if the condition changes, worsens, or does not resolve.

## 2024-07-26 ENCOUNTER — HOSPITAL ENCOUNTER (OUTPATIENT)
Dept: MAMMOGRAPHY | Facility: HOSPITAL | Age: 74
Discharge: HOME | End: 2024-07-26
Admitting: OBSTETRICS & GYNECOLOGY
Payer: MEDICARE

## 2024-07-26 DIAGNOSIS — Z12.31 VISIT FOR SCREENING MAMMOGRAM: ICD-10-CM

## 2024-07-26 PROCEDURE — 77067 SCR MAMMO BI INCL CAD: CPT

## 2024-07-26 PROCEDURE — 77063 BREAST TOMOSYNTHESIS BI: CPT

## 2024-07-27 LAB
NCCN CRITERIA FLAG: ABNORMAL
TYRER CUZICK SCORE: 2.3

## 2024-07-30 DIAGNOSIS — Z13.79 GENETIC TESTING: Primary | ICD-10-CM

## 2024-08-01 ENCOUNTER — LAB (OUTPATIENT)
Dept: LAB | Facility: HOSPITAL | Age: 74
End: 2024-08-01
Payer: MEDICARE

## 2024-08-01 DIAGNOSIS — Z13.79 GENETIC TESTING: ICD-10-CM

## 2024-08-01 PROCEDURE — 36415 COLL VENOUS BLD VENIPUNCTURE: CPT

## 2024-08-13 LAB
AMBRY INTERPRETATION REPORT: NORMAL
GENE DIS ANL INTERP-IMP: NORMAL

## 2024-08-14 DIAGNOSIS — I10 ESSENTIAL HYPERTENSION: ICD-10-CM

## 2024-08-14 RX ORDER — METOPROLOL SUCCINATE 100 MG/1
100 TABLET, EXTENDED RELEASE ORAL DAILY
Qty: 90 TABLET | Refills: 1 | Status: SHIPPED | OUTPATIENT
Start: 2024-08-14

## 2024-08-21 LAB
AMBRY INTERPRETATION REPORT: NORMAL
GENE DIS ANL INTERP-IMP: NORMAL

## 2024-08-29 ENCOUNTER — OFFICE VISIT (OUTPATIENT)
Dept: CARDIOLOGY | Facility: CLINIC | Age: 74
End: 2024-08-29
Payer: MEDICARE

## 2024-08-29 VITALS
HEART RATE: 68 BPM | DIASTOLIC BLOOD PRESSURE: 64 MMHG | SYSTOLIC BLOOD PRESSURE: 114 MMHG | WEIGHT: 140.4 LBS | HEIGHT: 58 IN | BODY MASS INDEX: 29.47 KG/M2 | OXYGEN SATURATION: 97 %

## 2024-08-29 DIAGNOSIS — I73.9 PERIPHERAL VASCULAR DISEASE: ICD-10-CM

## 2024-08-29 DIAGNOSIS — R06.09 DOE (DYSPNEA ON EXERTION): ICD-10-CM

## 2024-08-29 DIAGNOSIS — I47.19 ATRIAL TACHYCARDIA: Primary | ICD-10-CM

## 2024-08-29 DIAGNOSIS — I10 PRIMARY HYPERTENSION: ICD-10-CM

## 2024-08-29 DIAGNOSIS — R07.9 CHEST PAIN, UNSPECIFIED TYPE: ICD-10-CM

## 2024-08-29 PROCEDURE — 1159F MED LIST DOCD IN RCRD: CPT | Performed by: INTERNAL MEDICINE

## 2024-08-29 PROCEDURE — 3074F SYST BP LT 130 MM HG: CPT | Performed by: INTERNAL MEDICINE

## 2024-08-29 PROCEDURE — 93000 ELECTROCARDIOGRAM COMPLETE: CPT | Performed by: INTERNAL MEDICINE

## 2024-08-29 PROCEDURE — 3078F DIAST BP <80 MM HG: CPT | Performed by: INTERNAL MEDICINE

## 2024-08-29 PROCEDURE — 99214 OFFICE O/P EST MOD 30 MIN: CPT | Performed by: INTERNAL MEDICINE

## 2024-08-29 PROCEDURE — 1160F RVW MEDS BY RX/DR IN RCRD: CPT | Performed by: INTERNAL MEDICINE

## 2024-08-29 RX ORDER — METOPROLOL TARTRATE 50 MG
50 TABLET ORAL
OUTPATIENT
Start: 2024-08-29

## 2024-08-29 RX ORDER — METOPROLOL TARTRATE 25 MG/1
50 TABLET, FILM COATED ORAL ONCE
OUTPATIENT
Start: 2024-08-29

## 2024-08-29 RX ORDER — METOPROLOL TARTRATE 25 MG/1
100 TABLET, FILM COATED ORAL ONCE
OUTPATIENT
Start: 2024-08-29

## 2024-08-29 RX ORDER — METOPROLOL TARTRATE 25 MG/1
200 TABLET, FILM COATED ORAL ONCE
OUTPATIENT
Start: 2024-08-29 | End: 2024-08-29

## 2024-08-29 RX ORDER — SODIUM CHLORIDE 9 MG/ML
40 INJECTION, SOLUTION INTRAVENOUS AS NEEDED
OUTPATIENT
Start: 2024-08-29

## 2024-08-29 RX ORDER — METOPROLOL TARTRATE 25 MG/1
25 TABLET, FILM COATED ORAL ONCE
OUTPATIENT
Start: 2024-08-29

## 2024-08-29 RX ORDER — IVABRADINE 5 MG/1
15 TABLET, FILM COATED ORAL ONCE
OUTPATIENT
Start: 2024-08-29 | End: 2024-08-29

## 2024-08-29 RX ORDER — SODIUM CHLORIDE 0.9 % (FLUSH) 0.9 %
10 SYRINGE (ML) INJECTION EVERY 12 HOURS SCHEDULED
OUTPATIENT
Start: 2024-08-29

## 2024-08-29 RX ORDER — SODIUM CHLORIDE 0.9 % (FLUSH) 0.9 %
10 SYRINGE (ML) INJECTION AS NEEDED
OUTPATIENT
Start: 2024-08-29

## 2024-08-29 RX ORDER — NITROGLYCERIN 0.4 MG/1
0.8 TABLET SUBLINGUAL
OUTPATIENT
Start: 2024-08-29

## 2024-08-29 RX ORDER — METOPROLOL TARTRATE 1 MG/ML
5 INJECTION, SOLUTION INTRAVENOUS
OUTPATIENT
Start: 2024-08-29

## 2024-08-29 RX ORDER — METOPROLOL TARTRATE 25 MG/1
150 TABLET, FILM COATED ORAL ONCE
OUTPATIENT
Start: 2024-08-29

## 2024-08-29 RX ORDER — NITROGLYCERIN 0.4 MG/1
0.4 TABLET SUBLINGUAL
OUTPATIENT
Start: 2024-08-29 | End: 2024-08-29

## 2024-08-29 NOTE — PATIENT INSTRUCTIONS
We will schedule a cardiac cat scan, take your normal metoprolol dose morning of cat scan, then take an extra half of a metoprolol tablet one hour prior to the scan.

## 2024-08-29 NOTE — PROGRESS NOTES
OFFICE VISIT  NOTE  [facility] CARDIOLOGY      Name: Kavitha Barnhart    Date: 2024  MRN:  [id number]  :  1950      REFERRING/PRIMARY PROVIDER:  Farzad Redding MD    Chief Complaint   Patient presents with    HUA (dyspnea on exertion)       HPI: Kavitha Barnhart is a 73 y.o. female who presents for SVT/PAT.  Negative stress test 3/2022, last Holter 2022 rare PACs short SVT/PAT 11 beats.  History of lupus, Raynaud's, Sjogren's, Hashimoto's and diverticulitis. She has been following Dr. Gilliland with pulmonology.  She underwent various testings including a PFT that was reportedly normal.  She had a cardiopulmonary stress test that did show a reduced O2 pulse suggestive of a cardiac limitation and he recommended a stress echocardiogram which was performed in  and was normal.  No diastolic dysfunction was noted and LVEF was normal.      Main complaint is dizziness but is improving after treatment per primary care.  Still having shortness of breath with exertion only when going up stairs, pulmonary told her lungs are normal.        Past Medical History:   Diagnosis Date    Abnormal ECG but low risk assessment    Allergic rhinitis Many years ago    Anemia Prior to menopause    Arrhythmia     Arthritis     Arthritis of back     Arthritis of neck     Asthma     Asthma, intrinsic around     Atrial fibrillation     Received 4 to 5 notifications on [employer] re: I may have Atrial fib    Cervical disc disease     CTS (carpal tunnel syndrome)     Diastolic dysfunction 2023    Disease of thyroid gland     Diverticulitis of intestine with perforation     E-coli UTI     treated with antibioiutcs and urine rechecked and clean per pt report    GERD (gastroesophageal reflux disease)     Hashimoto's disease     Headache     Heart murmur     fast heart beat; sometimes irregular    Hip arthrosis     History of transfusion     several times, with surgeries post op and after c  section, never a reaction    Hyperlipidemia sometimes marginally high    Hypertension     Hyperthyroidism     Thyroid nodule, hashimoto's thyroiditis    Hypothyroidism     Irritable bowel syndrome     Kidney stone     Knee sprain     Knee swelling     Low back pain     sometimes    Lumbosacral disc disease     Lupus     Neuromuscular disorder     Nerve damage lower back    Osteopenia     Palpitations     POSSIBLE ATRIAL TACHYCARDIA    Periarthritis of shoulder     PONV (postoperative nausea and vomiting)     Raynaud's disease     Seizure disorder     Sinusitis very long time    Sjogren's disease     SVT (supraventricular tachycardia)     Tennis elbow     Thyroid nodule     Tremor     sometimes hands shake    Visual impairment     Vitamin D deficiency        Past Surgical History:   Procedure Laterality Date    ADENOIDECTOMY      Removed as a child    APPENDECTOMY  2016    BONE GRAFT  2021    BREAST BIOPSY Left 10/15/2021    excisional    BREAST SURGERY  10/2021     SECTION  1970    CHOLECYSTECTOMY N/A 2022    Procedure: CHOLECYSTECTOMY LAPAROSCOPIC;  Surgeon: Angie López MD;  Location:  [email or web address] OR;  Service: General;  Laterality: N/A;    COLON RESECTION  2016    sigmoid and partial rectum     COLON SURGERY      COLONOSCOPY      EXPLORATORY LAPAROTOMY      fibroid    ILEOSTOMY CLOSURE N/A 2016    Procedure: ILEOSTOMY  EXCISION AND TAKEDOWN;  Surgeon: Brooks Jacob MD;  Location:  [email or web address] OR;  Service:     KNEE ARTHROSCOPY      right knee scope    KNEE SURGERY      MYOMECTOMY  1970    PULMONARY STRESS TEST  2023    SMALL INTESTINE SURGERY      TONSILECTOMY, ADENOIDECTOMY, BILATERAL MYRINGOTOMY AND TUBES      TONSILLECTOMY      TOOTH EXTRACTION      2021    TOOTH EXTRACTION      UPPER GASTROINTESTINAL ENDOSCOPY         Social History     Socioeconomic History    Marital status:    Tobacco Use    Smoking status: Never     Passive exposure: Past    Smokeless  tobacco: Never   Vaping Use    Vaping status: Never Used   Substance and Sexual Activity    Alcohol use: Yes     Alcohol/week: 2.0 - 3.0 standard drinks of alcohol     Types: 2 - 3 Glasses of wine per week     Comment: social    Drug use: No    Sexual activity: Not Currently     Partners: Male     Birth control/protection: Post-menopausal, None       Family History   Problem Relation Age of Onset    Lung cancer Mother     Hyperlipidemia Mother     Thyroid disease Mother     Pancreatic cancer Mother     Cancer Mother     Diabetes Mother     Hypertension Mother     Pancreatitis Mother     Heart disease Father     Hypertension Father     Heart failure Father     Heart attack Father     Polycystic kidney disease Brother     Hypertension Brother     Hypertension Brother     Cancer Brother     Heart disease Brother         Recently  of heart attack    Hyperlipidemia Brother     Hypertension Brother     Kidney disease Brother         Kidney Failure, Polycystic Kidney Disease    Liver disease Brother     Hyperlipidemia Brother     Hypertension Brother     Kidney disease Brother         Polycystic Kidney Disease    Crohn's disease Brother     Thyroid disease Son     Breast cancer Neg Hx     Ovarian cancer Neg Hx     Colon cancer Neg Hx     Colon polyps Neg Hx     Esophageal cancer Neg Hx         ROS:   Constitutional no fever,  no weight loss   Skin no rash, no subcutaneous nodules   Otolaryngeal no difficulty swallowing   Cardiovascular See HPI   Pulmonary no cough, no sputum production   Gastrointestinal no constipation, no diarrhea   Genitourinary no dysuria, no hematuria   Hematologic no easy bruisability, no abnormal bleeding   Musculoskeletal no muscle pain   Neurologic no dizziness, no falls         Allergies   Allergen Reactions    Tenormin [Atenolol] Cough and Angioedema    Macrobid [Nitrofurantoin Monohyd Macro] Swelling and Other (See Comments)     Swelling around eyes, fatigue    Minoxidil Swelling     Feet  swelling.    Plaquenil [Hydroxychloroquine Sulfate] Other (See Comments)     Vision changes over time     Shingrix [Zoster Vac Recomb Adjuvanted] Rash    Omeprazole Diarrhea    Ace Inhibitors Cough    Sudafed [Pseudoephedrine Hcl] Other (See Comments)     INCREASED PRESSURE IN EYES          Current Outpatient Medications:     albuterol sulfate  (90 Base) MCG/ACT inhaler, Inhale 2 puffs Every 4 (Four) Hours As Needed for Wheezing., Disp: 1 g, Rfl: 5    celecoxib (CeleBREX) 100 MG capsule, Take 1 capsule by mouth 2 (Two) Times a Day As Needed for Mild Pain., Disp: 60 capsule, Rfl: 2    cycloSPORINE (RESTASIS) 0.05 % ophthalmic emulsion, Administer 1 drop to both eyes 2 (Two) Times a Day., Disp: , Rfl:     dicyclomine (BENTYL) 10 MG capsule, Take 1 capsule by mouth 2 (Two) Times a Day. (Patient taking differently: Take 1 capsule by mouth 2 (Two) Times a Day As Needed for Abdominal Cramping.), Disp: 60 capsule, Rfl: 5    fexofenadine (ALLEGRA) 180 MG tablet, Take 1 tablet by mouth Daily As Needed (allergies)., Disp: , Rfl:     Fluticasone-Salmeterol (ADVAIR/WIXELA) 100-50 MCG/ACT DISKUS, INHALE 1 PUFF 2 (TWO) TIMES A DAY., Disp: 60 each, Rfl: 5    losartan-hydrochlorothiazide (HYZAAR) 100-25 MG per tablet, TAKE 1 TABLET BY MOUTH EVERY DAY., Disp: 90 tablet, Rfl: 1    metoprolol succinate XL (TOPROL-XL) 100 MG 24 hr tablet, TAKE 1 TABLET BY MOUTH DAILY., Disp: 90 tablet, Rfl: 1    rosuvastatin (CRESTOR) 5 MG tablet, TAKE 1 TABLET BY MOUTH DAILY., Disp: 90 tablet, Rfl: 3    Synthroid 75 MCG tablet, TAKE 1 TABLET BY MOUTH DAILY., Disp: 90 tablet, Rfl: 3    TRIAMCINOLONE ACETONIDE IN, Inhale. triamcinolone acetonide 55 mcg nasal spray aerosol spray 1 by nasal route  every day, Disp: , Rfl:     Yuvafem 10 MCG tablet vaginal tablet, APPLY 1 TABLET IN THE VAGINA 2 TIMES WEEKLY., Disp: , Rfl:     Vitals:    08/29/24 1034   BP: 114/64   BP Location: Right arm   Patient Position: Sitting   Cuff Size: Adult   Pulse: 68  "  SpO2: 97%   Weight: 63.7 kg (140 lb 6.4 oz)   Height: 147.3 cm (58\")       Body mass index is 29.34 kg/m².    PHYSICAL EXAM:    General Appearance:   well developed  well nourished  HENT:   oropharynx moist  lips not cyanotic  Neck:  thyroid not enlarged  supple  Respiratory:  no respiratory distress  normal breath sounds  no rales  Cardiovascular:  no jugular venous distention  regular rhythm  apical impulse normal  S1 normal, S2 normal  no S3, no S4   no murmur  no rub, no thrill  carotid pulses normal; no bruit  lower extremity edema: none      Musculoskeletal:  no clubbing of fingers.   normocephalic, head atraumatic  Skin:   warm, dry  Psychiatric:  judgement and insight appropriate  normal mood and affect    RESULTS:     ECG 12 Lead    Date/Time: 8/29/2024 11:02 AM  Performed by: Roque Saenz MD    Authorized by: Roque Saenz MD  Comparison: compared with previous ECG from 4/12/2022  Similar to previous ECG  Rhythm: sinus rhythm  Rate: normal  QRS axis: normal    Clinical impression: non-specific ECG          Results for orders placed during the hospital encounter of 11/01/23    Adult Stress Echo W/ Cont or Stress Agent if Necessary Per Protocol    Interpretation Summary    Normal stress echo with no significant echocardiographic evidence for myocardial ischemia.    Patient experienced dyspnea at peak exertion (O2 saturation % on RA), symptoms resolved within rest in recovery.    Expected exercise duration 5:10. Actual exercise duration 6:44. ISAIAH (-20).    Left ventricular systolic function is normal. Calculated left ventricular EF = 58.4%    Left ventricular wall thickness is consistent with borderline concentric hypertrophy.    Left ventricular diastolic function was normal.    There is mild calcification of the aortic valve.        Labs:  Lab Results   Component Value Date    CHOL 142 10/30/2023    TRIG 238 (H) 10/30/2023    HDL 48 10/30/2023    LDL 56 10/30/2023    AST 18 10/30/2023    " "ALT 19 10/30/2023     Lab Results   Component Value Date    HGBA1C 5.20 08/01/2016     No components found for: \"CREATINININE\"  eGFR Non  Amer   Date Value Ref Range Status   10/19/2021 59 (L) >60 mL/min/1.73 Final   10/13/2021 48 (L) >60 mL/min/1.73 Final   04/26/2021 49 (L) >60 mL/min/1.73 Final       Most recent PCP note, imaging tests, and labs reviewed.    ASSESSMENT:  Problem List Items Addressed This Visit       Primary hypertension    Peripheral vascular disease    Atrial tachycardia - Primary    Overview     POSSIBLE ATRIAL TACHYCARDIA         HUA (dyspnea on exertion)    Overview     Stress echocardiogram (11/2023): No ischemia. Normal         Relevant Orders    CT Angiogram Coronary    CT Angiogram Coronary-Cardiology Interpretation    No Caffeine or Nicotine 4 Hours Prior to CTA Appointment    Nothing to Eat or Drink 4 Hours Prior to CTA Appointment     Other Visit Diagnoses       Chest pain, unspecified type        Relevant Orders    CT Angiogram Coronary    CT Angiogram Coronary-Cardiology Interpretation    No Caffeine or Nicotine 4 Hours Prior to CTA Appointment    Nothing to Eat or Drink 4 Hours Prior to CTA Appointment            PLAN:    1.  SVT/PAT  Controlled on Metoprolol therapy      2.  Hypertension:  Goal blood pressure less than 130/80  Blood pressure well-controlled, did not tolerate minoxidil due to lower extremity edema    3.  Hyperlipidemia:  Goal LDL less than 100  Well-controlled on most recent labs, continue statin.    4.  Chest pain/shortness of breath:  Low risk stress echocardiogram 11/1/2023  Dyspnea exertion is worsening concern for anginal equivalent we will proceed with coronary CTA.    Advance Care Planning   ACP discussion was held with the patient during this visit. Patient has an advance directive (not in EMR), copy requested.         Return to clinic in 12 months, or sooner as needed.    Thank you for the opportunity to share in the care of your patient; please do " not hesitate to call me with any questions.     Roque Saenz MD, [facility], [facility]  Office: [phone] [address] [place] [address]    08/29/24

## 2024-09-16 ENCOUNTER — HOSPITAL ENCOUNTER (OUTPATIENT)
Facility: HOSPITAL | Age: 74
Discharge: HOME | End: 2024-09-16
Payer: MEDICARE

## 2024-09-16 VITALS
OXYGEN SATURATION: 98 % | HEART RATE: 72 BPM | TEMPERATURE: 97.9 F | SYSTOLIC BLOOD PRESSURE: 126 MMHG | DIASTOLIC BLOOD PRESSURE: 65 MMHG | RESPIRATION RATE: 20 BRPM

## 2024-09-16 DIAGNOSIS — R06.09 DOE (DYSPNEA ON EXERTION): ICD-10-CM

## 2024-09-16 DIAGNOSIS — R07.9 CHEST PAIN, UNSPECIFIED TYPE: ICD-10-CM

## 2024-09-16 PROCEDURE — 25510000001 IOPAMIDOL PER 1 ML: Performed by: INTERNAL MEDICINE

## 2024-09-16 PROCEDURE — 75574 CT ANGIO HRT W/3D IMAGE: CPT

## 2024-09-16 RX ORDER — NITROGLYCERIN 0.4 MG/1
0.8 TABLET SUBLINGUAL
Status: COMPLETED | OUTPATIENT
Start: 2024-09-16 | End: 2024-09-16

## 2024-09-16 RX ORDER — METOPROLOL TARTRATE 25 MG/1
50 TABLET, FILM COATED ORAL
Status: DISCONTINUED | OUTPATIENT
Start: 2024-09-16 | End: 2024-09-17 | Stop reason: HOSPADM

## 2024-09-16 RX ORDER — METOPROLOL TARTRATE 25 MG/1
25 TABLET, FILM COATED ORAL ONCE
Status: COMPLETED | OUTPATIENT
Start: 2024-09-16 | End: 2024-09-16

## 2024-09-16 RX ORDER — SODIUM CHLORIDE 9 MG/ML
40 INJECTION, SOLUTION INTRAVENOUS AS NEEDED
Status: DISCONTINUED | OUTPATIENT
Start: 2024-09-16 | End: 2024-09-17 | Stop reason: HOSPADM

## 2024-09-16 RX ORDER — SODIUM CHLORIDE 0.9 % (FLUSH) 0.9 %
10 SYRINGE (ML) INJECTION AS NEEDED
Status: DISCONTINUED | OUTPATIENT
Start: 2024-09-16 | End: 2024-09-17 | Stop reason: HOSPADM

## 2024-09-16 RX ORDER — SODIUM CHLORIDE 0.9 % (FLUSH) 0.9 %
10 SYRINGE (ML) INJECTION EVERY 12 HOURS SCHEDULED
Status: DISCONTINUED | OUTPATIENT
Start: 2024-09-16 | End: 2024-09-17 | Stop reason: HOSPADM

## 2024-09-16 RX ORDER — NITROGLYCERIN 0.4 MG/1
0.4 TABLET SUBLINGUAL
Status: COMPLETED | OUTPATIENT
Start: 2024-09-16 | End: 2024-09-16

## 2024-09-16 RX ORDER — METOPROLOL TARTRATE 25 MG/1
50 TABLET, FILM COATED ORAL ONCE
Status: COMPLETED | OUTPATIENT
Start: 2024-09-16 | End: 2024-09-16

## 2024-09-16 RX ORDER — METOPROLOL TARTRATE 1 MG/ML
5 INJECTION, SOLUTION INTRAVENOUS
Status: DISCONTINUED | OUTPATIENT
Start: 2024-09-16 | End: 2024-09-17 | Stop reason: HOSPADM

## 2024-09-16 RX ORDER — METOPROLOL TARTRATE 100 MG
100 TABLET ORAL ONCE
Status: COMPLETED | OUTPATIENT
Start: 2024-09-16 | End: 2024-09-16

## 2024-09-16 RX ORDER — IVABRADINE 5 MG/1
15 TABLET, FILM COATED ORAL ONCE
Status: DISCONTINUED | OUTPATIENT
Start: 2024-09-16 | End: 2024-09-17 | Stop reason: HOSPADM

## 2024-09-16 RX ORDER — METOPROLOL TARTRATE 100 MG
200 TABLET ORAL ONCE
Status: COMPLETED | OUTPATIENT
Start: 2024-09-16 | End: 2024-09-16

## 2024-09-16 RX ORDER — IOPAMIDOL 755 MG/ML
100 INJECTION, SOLUTION INTRAVASCULAR
Status: COMPLETED | OUTPATIENT
Start: 2024-09-16 | End: 2024-09-16

## 2024-09-16 RX ADMIN — METOPROLOL TARTRATE 200 MG: 100 TABLET, FILM COATED ORAL at 11:19

## 2024-09-16 RX ADMIN — NITROGLYCERIN 0.8 MG: 0.4 TABLET SUBLINGUAL at 12:16

## 2024-09-16 RX ADMIN — IOPAMIDOL 70 ML: 755 INJECTION, SOLUTION INTRAVENOUS at 12:28

## 2024-09-16 RX ADMIN — METOPROLOL TARTRATE 5 MG: 5 INJECTION INTRAVENOUS at 12:20

## 2024-09-18 ENCOUNTER — OFFICE VISIT (OUTPATIENT)
Dept: ENDOCRINOLOGY | Facility: CLINIC | Age: 74
End: 2024-09-18
Payer: MEDICARE

## 2024-09-18 VITALS
HEIGHT: 58 IN | OXYGEN SATURATION: 98 % | SYSTOLIC BLOOD PRESSURE: 122 MMHG | HEART RATE: 65 BPM | TEMPERATURE: 97.1 F | WEIGHT: 137 LBS | BODY MASS INDEX: 28.76 KG/M2 | DIASTOLIC BLOOD PRESSURE: 78 MMHG

## 2024-09-18 DIAGNOSIS — E06.3 HASHIMOTO'S THYROIDITIS: ICD-10-CM

## 2024-09-18 DIAGNOSIS — E03.9 HYPOTHYROIDISM (ACQUIRED): ICD-10-CM

## 2024-09-18 DIAGNOSIS — E04.1 SOLITARY THYROID NODULE: Primary | ICD-10-CM

## 2024-09-18 PROCEDURE — 99214 OFFICE O/P EST MOD 30 MIN: CPT | Performed by: INTERNAL MEDICINE

## 2024-09-18 PROCEDURE — 3074F SYST BP LT 130 MM HG: CPT | Performed by: INTERNAL MEDICINE

## 2024-09-18 PROCEDURE — 3078F DIAST BP <80 MM HG: CPT | Performed by: INTERNAL MEDICINE

## 2024-09-19 ENCOUNTER — HOSPITAL ENCOUNTER (OUTPATIENT)
Dept: CT IMAGING | Facility: HOSPITAL | Age: 74
Discharge: HOME | End: 2024-09-19
Admitting: INTERNAL MEDICINE
Payer: MEDICARE

## 2024-09-19 ENCOUNTER — TELEPHONE (OUTPATIENT)
Dept: CARDIOLOGY | Facility: CLINIC | Age: 74
End: 2024-09-19
Payer: MEDICARE

## 2024-09-19 DIAGNOSIS — R93.1 ABNORMAL FINDINGS ON DIAGNOSTIC IMAGING OF HEART AND CORONARY CIRCULATION: ICD-10-CM

## 2024-09-19 DIAGNOSIS — R93.1 ABNORMAL FINDINGS ON DIAGNOSTIC IMAGING OF HEART AND CORONARY CIRCULATION: Primary | ICD-10-CM

## 2024-09-19 PROCEDURE — 75580 N-INVAS EST C FFR SW ALY CTA: CPT

## 2024-09-20 ENCOUNTER — OFFICE VISIT (OUTPATIENT)
Dept: GASTROENTEROLOGY | Facility: CLINIC | Age: 74
End: 2024-09-20
Payer: MEDICARE

## 2024-09-20 VITALS
HEIGHT: 58 IN | BODY MASS INDEX: 29.39 KG/M2 | DIASTOLIC BLOOD PRESSURE: 72 MMHG | HEART RATE: 90 BPM | SYSTOLIC BLOOD PRESSURE: 122 MMHG | TEMPERATURE: 97.3 F | WEIGHT: 140 LBS | OXYGEN SATURATION: 99 %

## 2024-09-20 DIAGNOSIS — I20.0 PROGRESSIVE ANGINA: Primary | ICD-10-CM

## 2024-09-20 DIAGNOSIS — M62.89 PELVIC FLOOR DYSFUNCTION IN FEMALE: ICD-10-CM

## 2024-09-20 DIAGNOSIS — K58.2 IRRITABLE BOWEL SYNDROME WITH BOTH CONSTIPATION AND DIARRHEA: Primary | ICD-10-CM

## 2024-09-20 PROCEDURE — 3078F DIAST BP <80 MM HG: CPT | Performed by: NURSE PRACTITIONER

## 2024-09-20 PROCEDURE — 1160F RVW MEDS BY RX/DR IN RCRD: CPT | Performed by: NURSE PRACTITIONER

## 2024-09-20 PROCEDURE — 1159F MED LIST DOCD IN RCRD: CPT | Performed by: NURSE PRACTITIONER

## 2024-09-20 PROCEDURE — 3074F SYST BP LT 130 MM HG: CPT | Performed by: NURSE PRACTITIONER

## 2024-09-20 PROCEDURE — 99214 OFFICE O/P EST MOD 30 MIN: CPT | Performed by: NURSE PRACTITIONER

## 2024-09-20 RX ORDER — DICYCLOMINE HYDROCHLORIDE 10 MG/1
10 CAPSULE ORAL 2 TIMES DAILY PRN
Qty: 60 CAPSULE | Refills: 11 | Status: SHIPPED | OUTPATIENT
Start: 2024-09-20

## 2024-09-20 RX ORDER — MECLIZINE HYDROCHLORIDE 25 MG/1
TABLET ORAL
COMMUNITY

## 2024-09-23 PROBLEM — I20.0 PROGRESSIVE ANGINA: Status: ACTIVE | Noted: 2024-09-20

## 2024-09-24 ENCOUNTER — HOSPITAL ENCOUNTER (OUTPATIENT)
Facility: HOSPITAL | Age: 74
Setting detail: HOSPITAL OUTPATIENT SURGERY
Discharge: HOME | End: 2024-09-24
Attending: INTERNAL MEDICINE | Admitting: INTERNAL MEDICINE
Payer: MEDICARE

## 2024-09-24 VITALS
OXYGEN SATURATION: 96 % | SYSTOLIC BLOOD PRESSURE: 133 MMHG | BODY MASS INDEX: 28.84 KG/M2 | HEIGHT: 58 IN | RESPIRATION RATE: 16 BRPM | TEMPERATURE: 98.4 F | WEIGHT: 137.4 LBS | HEART RATE: 70 BPM | DIASTOLIC BLOOD PRESSURE: 76 MMHG

## 2024-09-24 DIAGNOSIS — I20.0 PROGRESSIVE ANGINA: ICD-10-CM

## 2024-09-24 DIAGNOSIS — I25.118 CORONARY ARTERY DISEASE OF NATIVE ARTERY OF NATIVE HEART WITH STABLE ANGINA PECTORIS: Primary | ICD-10-CM

## 2024-09-24 LAB
ANION GAP SERPL CALCULATED.3IONS-SCNC: 14 MMOL/L (ref 5–15)
BUN SERPL-MCNC: 26 MG/DL (ref 8–23)
BUN/CREAT SERPL: 21 (ref 7–25)
CALCIUM SPEC-SCNC: 10.1 MG/DL (ref 8.6–10.5)
CHLORIDE SERPL-SCNC: 104 MMOL/L (ref 98–107)
CHOLEST SERPL-MCNC: 141 MG/DL (ref 0–200)
CO2 SERPL-SCNC: 25 MMOL/L (ref 22–29)
CREAT SERPL-MCNC: 1.24 MG/DL (ref 0.57–1)
DEPRECATED RDW RBC AUTO: 42.8 FL (ref 37–54)
EGFRCR SERPLBLD CKD-EPI 2021: 45.2 ML/MIN/1.73
ERYTHROCYTE [DISTWIDTH] IN BLOOD BY AUTOMATED COUNT: 13.9 % (ref 12.3–15.4)
GLUCOSE SERPL-MCNC: 100 MG/DL (ref 65–99)
HBA1C MFR BLD: 5.7 % (ref 4.8–5.6)
HCT VFR BLD AUTO: 42.5 % (ref 34–46.6)
HDLC SERPL-MCNC: 58 MG/DL (ref 40–60)
HGB BLD-MCNC: 14.4 G/DL (ref 12–15.9)
LDLC SERPL CALC-MCNC: 68 MG/DL (ref 0–100)
LDLC/HDLC SERPL: 1.17 {RATIO}
MCH RBC QN AUTO: 28.7 PG (ref 26.6–33)
MCHC RBC AUTO-ENTMCNC: 33.9 G/DL (ref 31.5–35.7)
MCV RBC AUTO: 84.8 FL (ref 79–97)
PLATELET # BLD AUTO: 169 10*3/MM3 (ref 140–450)
PMV BLD AUTO: 10.7 FL (ref 6–12)
POTASSIUM SERPL-SCNC: 3.7 MMOL/L (ref 3.5–5.2)
RBC # BLD AUTO: 5.01 10*6/MM3 (ref 3.77–5.28)
SODIUM SERPL-SCNC: 143 MMOL/L (ref 136–145)
TRIGL SERPL-MCNC: 76 MG/DL (ref 0–150)
VLDLC SERPL-MCNC: 15 MG/DL (ref 5–40)
WBC NRBC COR # BLD AUTO: 7.01 10*3/MM3 (ref 3.4–10.8)

## 2024-09-24 PROCEDURE — 93005 ELECTROCARDIOGRAM TRACING: CPT | Performed by: INTERNAL MEDICINE

## 2024-09-24 PROCEDURE — 92928 PRQ TCAT PLMT NTRAC ST 1 LES: CPT | Performed by: INTERNAL MEDICINE

## 2024-09-24 PROCEDURE — 80061 LIPID PANEL: CPT | Performed by: INTERNAL MEDICINE

## 2024-09-24 PROCEDURE — 25510000001 IOPAMIDOL PER 1 ML: Performed by: INTERNAL MEDICINE

## 2024-09-24 PROCEDURE — C1894 INTRO/SHEATH, NON-LASER: HCPCS | Performed by: INTERNAL MEDICINE

## 2024-09-24 PROCEDURE — 85027 COMPLETE CBC AUTOMATED: CPT | Performed by: INTERNAL MEDICINE

## 2024-09-24 PROCEDURE — C9600 PERC DRUG-EL COR STENT SING: HCPCS | Performed by: INTERNAL MEDICINE

## 2024-09-24 PROCEDURE — C1769 GUIDE WIRE: HCPCS | Performed by: INTERNAL MEDICINE

## 2024-09-24 PROCEDURE — 93458 L HRT ARTERY/VENTRICLE ANGIO: CPT | Performed by: INTERNAL MEDICINE

## 2024-09-24 PROCEDURE — C1725 CATH, TRANSLUMIN NON-LASER: HCPCS | Performed by: INTERNAL MEDICINE

## 2024-09-24 PROCEDURE — C1874 STENT, COATED/COV W/DEL SYS: HCPCS | Performed by: INTERNAL MEDICINE

## 2024-09-24 PROCEDURE — 93010 ELECTROCARDIOGRAM REPORT: CPT | Performed by: INTERNAL MEDICINE

## 2024-09-24 PROCEDURE — C1887 CATHETER, GUIDING: HCPCS | Performed by: INTERNAL MEDICINE

## 2024-09-24 PROCEDURE — 25010000002 ONDANSETRON PER 1 MG: Performed by: INTERNAL MEDICINE

## 2024-09-24 PROCEDURE — 25010000002 HEPARIN (PORCINE) PER 1000 UNITS: Performed by: INTERNAL MEDICINE

## 2024-09-24 PROCEDURE — 83036 HEMOGLOBIN GLYCOSYLATED A1C: CPT | Performed by: INTERNAL MEDICINE

## 2024-09-24 PROCEDURE — 25010000002 MIDAZOLAM PER 1 MG: Performed by: INTERNAL MEDICINE

## 2024-09-24 PROCEDURE — 25010000002 FENTANYL CITRATE (PF) 50 MCG/ML SOLUTION: Performed by: INTERNAL MEDICINE

## 2024-09-24 PROCEDURE — 80048 BASIC METABOLIC PNL TOTAL CA: CPT | Performed by: INTERNAL MEDICINE

## 2024-09-24 PROCEDURE — 25010000002 NICARDIPINE 2.5 MG/ML SOLUTION: Performed by: INTERNAL MEDICINE

## 2024-09-24 PROCEDURE — 36415 COLL VENOUS BLD VENIPUNCTURE: CPT

## 2024-09-24 DEVICE — XIENCE SKYPOINT™ EVEROLIMUS ELUTING CORONARY STENT SYSTEM 2.50 MM X 18 MM / RAPID-EXCHANGE
Type: IMPLANTABLE DEVICE | Site: CORONARY | Status: FUNCTIONAL
Brand: XIENCE SKYPOINT™

## 2024-09-24 RX ORDER — ASPIRIN 81 MG/1
81 TABLET ORAL DAILY
Qty: 90 TABLET | Refills: 3 | Status: SHIPPED | OUTPATIENT
Start: 2024-09-24 | End: 2024-09-24

## 2024-09-24 RX ORDER — MIDAZOLAM HYDROCHLORIDE 1 MG/ML
INJECTION INTRAMUSCULAR; INTRAVENOUS
Status: DISCONTINUED | OUTPATIENT
Start: 2024-09-24 | End: 2024-09-24 | Stop reason: HOSPADM

## 2024-09-24 RX ORDER — CLOPIDOGREL BISULFATE 75 MG/1
TABLET ORAL
Status: DISCONTINUED | OUTPATIENT
Start: 2024-09-24 | End: 2024-09-24 | Stop reason: HOSPADM

## 2024-09-24 RX ORDER — SODIUM CHLORIDE 9 MG/ML
1 INJECTION, SOLUTION INTRAVENOUS CONTINUOUS
Status: ACTIVE | OUTPATIENT
Start: 2024-09-24 | End: 2024-09-24

## 2024-09-24 RX ORDER — ASPIRIN 81 MG/1
81 TABLET ORAL DAILY
Qty: 90 TABLET | Refills: 3 | Status: SHIPPED | OUTPATIENT
Start: 2024-09-24

## 2024-09-24 RX ORDER — HEPARIN SODIUM 1000 [USP'U]/ML
INJECTION, SOLUTION INTRAVENOUS; SUBCUTANEOUS
Status: DISCONTINUED | OUTPATIENT
Start: 2024-09-24 | End: 2024-09-24 | Stop reason: HOSPADM

## 2024-09-24 RX ORDER — FENTANYL CITRATE 50 UG/ML
INJECTION, SOLUTION INTRAMUSCULAR; INTRAVENOUS
Status: DISCONTINUED | OUTPATIENT
Start: 2024-09-24 | End: 2024-09-24 | Stop reason: HOSPADM

## 2024-09-24 RX ORDER — LIDOCAINE HYDROCHLORIDE 10 MG/ML
INJECTION, SOLUTION EPIDURAL; INFILTRATION; INTRACAUDAL; PERINEURAL
Status: DISCONTINUED | OUTPATIENT
Start: 2024-09-24 | End: 2024-09-24 | Stop reason: HOSPADM

## 2024-09-24 RX ORDER — CLOPIDOGREL BISULFATE 75 MG/1
75 TABLET ORAL DAILY
Qty: 90 TABLET | Refills: 1 | Status: SHIPPED | OUTPATIENT
Start: 2024-09-24 | End: 2024-09-24

## 2024-09-24 RX ORDER — NITROGLYCERIN 0.4 MG/1
0.4 TABLET SUBLINGUAL
Status: DISCONTINUED | OUTPATIENT
Start: 2024-09-24 | End: 2024-09-24 | Stop reason: HOSPADM

## 2024-09-24 RX ORDER — IOPAMIDOL 755 MG/ML
INJECTION, SOLUTION INTRAVASCULAR
Status: DISCONTINUED | OUTPATIENT
Start: 2024-09-24 | End: 2024-09-24 | Stop reason: HOSPADM

## 2024-09-24 RX ORDER — CLOPIDOGREL BISULFATE 75 MG/1
75 TABLET ORAL DAILY
Qty: 90 TABLET | Refills: 1 | Status: SHIPPED | OUTPATIENT
Start: 2024-09-24

## 2024-09-24 RX ORDER — ONDANSETRON 2 MG/ML
INJECTION INTRAMUSCULAR; INTRAVENOUS
Status: DISCONTINUED | OUTPATIENT
Start: 2024-09-24 | End: 2024-09-24 | Stop reason: HOSPADM

## 2024-09-24 RX ORDER — NICARDIPINE HCL-0.9% SOD CHLOR 1 MG/10 ML
SYRINGE (ML) INTRAVENOUS
Status: DISCONTINUED | OUTPATIENT
Start: 2024-09-24 | End: 2024-09-24 | Stop reason: HOSPADM

## 2024-09-25 ENCOUNTER — CALL CENTER PROGRAMS (OUTPATIENT)
Dept: CALL CENTER | Facility: HOSPITAL | Age: 74
End: 2024-09-25
Payer: MEDICARE

## 2024-09-25 LAB
QT INTERVAL: 412 MS
QTC INTERVAL: 451 MS

## 2024-10-01 ENCOUNTER — OFFICE VISIT (OUTPATIENT)
Dept: INTERNAL MEDICINE | Facility: CLINIC | Age: 74
End: 2024-10-01
Payer: MEDICARE

## 2024-10-01 VITALS
HEART RATE: 68 BPM | DIASTOLIC BLOOD PRESSURE: 70 MMHG | BODY MASS INDEX: 28.22 KG/M2 | TEMPERATURE: 97.8 F | SYSTOLIC BLOOD PRESSURE: 116 MMHG | RESPIRATION RATE: 16 BRPM | WEIGHT: 135 LBS

## 2024-10-01 DIAGNOSIS — Z23 IMMUNIZATION DUE: ICD-10-CM

## 2024-10-01 DIAGNOSIS — E78.5 HYPERLIPIDEMIA, UNSPECIFIED HYPERLIPIDEMIA TYPE: ICD-10-CM

## 2024-10-01 DIAGNOSIS — N64.4 MASTALGIA: ICD-10-CM

## 2024-10-01 DIAGNOSIS — E03.9 HYPOTHYROIDISM, UNSPECIFIED TYPE: ICD-10-CM

## 2024-10-01 DIAGNOSIS — I10 ESSENTIAL HYPERTENSION: Primary | ICD-10-CM

## 2024-10-01 DIAGNOSIS — I25.10 CORONARY ARTERY DISEASE INVOLVING NATIVE CORONARY ARTERY OF NATIVE HEART WITHOUT ANGINA PECTORIS: ICD-10-CM

## 2024-10-01 LAB
ALBUMIN SERPL-MCNC: 4.4 G/DL (ref 3.5–5.2)
ALBUMIN/GLOB SERPL: 1.5 G/DL
ALP SERPL-CCNC: 67 U/L (ref 39–117)
ALT SERPL W P-5'-P-CCNC: 17 U/L (ref 1–33)
ANION GAP SERPL CALCULATED.3IONS-SCNC: 13.2 MMOL/L (ref 5–15)
AST SERPL-CCNC: 18 U/L (ref 1–32)
BILIRUB BLD-MCNC: NEGATIVE MG/DL
BILIRUB SERPL-MCNC: 1 MG/DL (ref 0–1.2)
BUN SERPL-MCNC: 26 MG/DL (ref 8–23)
BUN/CREAT SERPL: 21.5 (ref 7–25)
CALCIUM SPEC-SCNC: 10.2 MG/DL (ref 8.6–10.5)
CHLORIDE SERPL-SCNC: 102 MMOL/L (ref 98–107)
CHOLEST SERPL-MCNC: 130 MG/DL (ref 0–200)
CLARITY, POC: CLEAR
CO2 SERPL-SCNC: 24.8 MMOL/L (ref 22–29)
COLOR UR: YELLOW
CREAT SERPL-MCNC: 1.21 MG/DL (ref 0.57–1)
EGFRCR SERPLBLD CKD-EPI 2021: 46.5 ML/MIN/1.73
EXPIRATION DATE: NORMAL
GLOBULIN UR ELPH-MCNC: 3 GM/DL
GLUCOSE SERPL-MCNC: 80 MG/DL (ref 65–99)
GLUCOSE UR STRIP-MCNC: NEGATIVE MG/DL
HDLC SERPL-MCNC: 45 MG/DL (ref 40–60)
KETONES UR QL: NEGATIVE
LDLC SERPL CALC-MCNC: 67 MG/DL (ref 0–100)
LDLC/HDLC SERPL: 1.47 {RATIO}
LEUKOCYTE EST, POC: NEGATIVE
Lab: NORMAL
NITRITE UR-MCNC: NEGATIVE MG/ML
PH UR: 6.5 [PH] (ref 5–8)
POTASSIUM SERPL-SCNC: 3.5 MMOL/L (ref 3.5–5.2)
PROT SERPL-MCNC: 7.4 G/DL (ref 6–8.5)
PROT UR STRIP-MCNC: NEGATIVE MG/DL
RBC # UR STRIP: NEGATIVE /UL
SODIUM SERPL-SCNC: 140 MMOL/L (ref 136–145)
SP GR UR: 1 (ref 1–1.03)
T4 FREE SERPL-MCNC: 1.69 NG/DL (ref 0.92–1.68)
TRIGL SERPL-MCNC: 95 MG/DL (ref 0–150)
TSH SERPL DL<=0.05 MIU/L-ACNC: 2.25 UIU/ML (ref 0.27–4.2)
UROBILINOGEN UR QL: NORMAL
VLDLC SERPL-MCNC: 18 MG/DL (ref 5–40)

## 2024-10-01 PROCEDURE — 84443 ASSAY THYROID STIM HORMONE: CPT | Performed by: INTERNAL MEDICINE

## 2024-10-01 PROCEDURE — 1126F AMNT PAIN NOTED NONE PRSNT: CPT | Performed by: INTERNAL MEDICINE

## 2024-10-01 PROCEDURE — G0008 ADMIN INFLUENZA VIRUS VAC: HCPCS | Performed by: INTERNAL MEDICINE

## 2024-10-01 PROCEDURE — 85025 COMPLETE CBC W/AUTO DIFF WBC: CPT | Performed by: INTERNAL MEDICINE

## 2024-10-01 PROCEDURE — 81003 URINALYSIS AUTO W/O SCOPE: CPT | Performed by: INTERNAL MEDICINE

## 2024-10-01 PROCEDURE — 84439 ASSAY OF FREE THYROXINE: CPT | Performed by: INTERNAL MEDICINE

## 2024-10-01 PROCEDURE — 90662 IIV NO PRSV INCREASED AG IM: CPT | Performed by: INTERNAL MEDICINE

## 2024-10-01 PROCEDURE — 99214 OFFICE O/P EST MOD 30 MIN: CPT | Performed by: INTERNAL MEDICINE

## 2024-10-01 PROCEDURE — 3078F DIAST BP <80 MM HG: CPT | Performed by: INTERNAL MEDICINE

## 2024-10-01 PROCEDURE — 80053 COMPREHEN METABOLIC PANEL: CPT | Performed by: INTERNAL MEDICINE

## 2024-10-01 PROCEDURE — 80061 LIPID PANEL: CPT | Performed by: INTERNAL MEDICINE

## 2024-10-01 PROCEDURE — 36415 COLL VENOUS BLD VENIPUNCTURE: CPT | Performed by: INTERNAL MEDICINE

## 2024-10-01 PROCEDURE — 3074F SYST BP LT 130 MM HG: CPT | Performed by: INTERNAL MEDICINE

## 2024-10-01 NOTE — PROGRESS NOTES
Chief Complaint   Patient presents with    Follow-up     6 month follow up chronic medical problems       History of Present Illness      The patient presents for an acute visit for pain in the left breast. This has been present for a two week duration. The area of concern in the left breast is located in the subareolar area. The patient denies nipple discharge. The patient reports excessive caffeine intake. There is a history of tobacco abuse. There is no family history of breast cancer.    The patient presents for a follow-up related to hypertension. The patient reports that she has had no headaches, chest pain, dyspnea, edema, syncope, blurred vision or palpitations. She states that she is taking her medication as prescribed. She is not having medication side effects.    The patient presents for a follow-up related to hypothyroidism. She reports a good energy level. She reports no hair loss, heat intolerance, cold intolerance, diarrhea, constipation or sweats. She is taking her medication as prescribed.    The patient presents for a follow-up related to hyperlipidemia. She is following a low fat diet. She reports that she is exercising. She is taking rosuvastatin. The patient is taking her medication as prescribed. She reports no medication side effects, including muscle cramps, abdominal pain, headaches or weakness. She denies orthopnea, paroxysmal nocturnal dyspnea or dyspnea on exertion.    The patient presents for a follow-up related to coronary artery disease. She denies diaphoresis. She takes an aspirin daily.    Medications      Current Outpatient Medications:     albuterol sulfate  (90 Base) MCG/ACT inhaler, Inhale 2 puffs Every 4 (Four) Hours As Needed for Wheezing., Disp: 1 g, Rfl: 5    aspirin 81 MG EC tablet, Take 1 tablet by mouth Daily., Disp: 90 tablet, Rfl: 3    celecoxib (CeleBREX) 100 MG capsule, Take 1 capsule by mouth 2 (Two) Times a Day As Needed for Mild Pain., Disp: 60 capsule, Rfl:  2    clopidogrel (PLAVIX) 75 MG tablet, Take 1 tablet by mouth Daily., Disp: 90 tablet, Rfl: 1    cycloSPORINE (RESTASIS) 0.05 % ophthalmic emulsion, Administer 1 drop to both eyes 2 (Two) Times a Day., Disp: , Rfl:     dicyclomine (BENTYL) 10 MG capsule, Take 1 capsule by mouth 2 (Two) Times a Day As Needed for Abdominal Cramping., Disp: 60 capsule, Rfl: 11    fexofenadine (ALLEGRA) 180 MG tablet, Take 1 tablet by mouth Daily As Needed (allergies)., Disp: , Rfl:     Fluticasone-Salmeterol (ADVAIR/WIXELA) 100-50 MCG/ACT DISKUS, INHALE 1 PUFF 2 (TWO) TIMES A DAY. (Patient taking differently: Inhale 1 puff 2 (Two) Times a Day As Needed.), Disp: 60 each, Rfl: 5    losartan-hydrochlorothiazide (HYZAAR) 100-25 MG per tablet, TAKE 1 TABLET BY MOUTH EVERY DAY., Disp: 90 tablet, Rfl: 1    meclizine (ANTIVERT) 25 MG tablet, Take 1 tablet by mouth 3 (Three) Times a Day As Needed for Dizziness., Disp: , Rfl:     metoprolol succinate XL (TOPROL-XL) 100 MG 24 hr tablet, TAKE 1 TABLET BY MOUTH DAILY., Disp: 90 tablet, Rfl: 1    rosuvastatin (CRESTOR) 5 MG tablet, TAKE 1 TABLET BY MOUTH DAILY., Disp: 90 tablet, Rfl: 3    Synthroid 75 MCG tablet, TAKE 1 TABLET BY MOUTH DAILY., Disp: 90 tablet, Rfl: 3    TRIAMCINOLONE ACETONIDE IN, Inhale. triamcinolone acetonide 55 mcg nasal spray aerosol spray 1 by nasal route  every day, Disp: , Rfl:     Yuvafem 10 MCG tablet vaginal tablet, APPLY 1 TABLET IN THE VAGINA 2 TIMES WEEKLY., Disp: , Rfl:      Allergies    Allergies   Allergen Reactions    Tenormin [Atenolol] Cough and Angioedema    Macrobid [Nitrofurantoin Monohyd Macro] Swelling and Other (See Comments)     Swelling around eyes, fatigue    Minoxidil Swelling     Feet swelling.    Plaquenil [Hydroxychloroquine Sulfate] Other (See Comments)     Vision changes over time     Shingrix [Zoster Vac Recomb Adjuvanted] Rash    Omeprazole Diarrhea    Ace Inhibitors Cough    Sudafed [Pseudoephedrine Hcl] Other (See Comments)     INCREASED  PRESSURE IN EYES        Problem List    Patient Active Problem List   Diagnosis    Primary hypertension    Peripheral vascular disease    Diverticulosis    Colon polyps    Constipation    Hashimoto's thyroiditis    Mixed connective tissue disease    Lupus    Uterine fibroid    Atrial tachycardia    Solitary thyroid nodule    History of asthma    GERD    Gallbladder calculus without cholecystitis and no obstruction    HUA (dyspnea on exertion)    Chronic pain of right knee    Shortness of breath    Multiple joint pain    Osteopenia of multiple sites    Photosensitivity of skin    Gastroesophageal reflux disease    Primary osteoarthritis of both first carpometacarpal joints    Hypothyroidism (acquired)    Progressive angina    Coronary artery disease of native artery of native heart with stable angina pectoris       Medications, Allergies, Problems List and Past History were reviewed and updated.    Physical Examination    /70 (BP Location: Left arm, Patient Position: Sitting, Cuff Size: Adult)   Pulse 68   Temp 97.8 °F (36.6 °C) (Infrared)   Resp 16   Wt 61.2 kg (135 lb)   LMP  (LMP Unknown)   BMI 28.22 kg/m²       HEENT: Head- Normocephalic Atraumatic. Facies- Within normal limits. Pinnas- Normal texture and shape bilaterally. Canals- Normal bilaterally. TMs- Normal bilaterally. Nares- Patent bilaterally. Nasal Septum- is normal. There is no tenderness to palpation over the frontal or maxillary sinuses. Lids- Normal bilaterally. Conjunctiva- Clear bilaterally. Sclera- Anicteric bilaterally. Oropharynx- Moist with no lesions. Tonsils- No enlargement, erythema or exudate.    Neck: Thyroid- non enlarged, symmetric and has no nodules. No bruits are detected. ROM- Normal Range of Motion with no rigidity.    Lungs: Auscultation- Clear to auscultation bilaterally. There are no retractions, clubbing or cyanosis. The Expiratory to Inspiratory ratio is equal.    Lymph Nodes: Cervical- no enlarged lymph nodes  noted. Clavicular- Deferred. Axillary- no enlarged axillary lymph nodes noted. Inguinal- Deferred.    Cardiovascular: There are no carotid bruits. Heart- Normal Rate with Regular rhythm and no murmurs. There are no gallops. There are no rubs. In the lower extremities there is no edema. The upper extremities do not have edema.      Abdomen: Soft, benign, non-tender with no masses, hernias, organomegaly or scars.    Breast: Normal contours bilaterally with no masses, discharge, skin changes or lumps. There are no scars noted.    Impression and Assessment    Essential Hypertension.    Hypothyroidism.    Hyperlipidemia.    Coronary Artery Disease.    Mastalgia.    Plan    Essential Hypertension Plan: The patient was instructed to continue the current medications.    Hyperlipidemia Plan: The patient was instructed to exercise daily, eat a low fat diet and continue her medications.    Coronary Artery Disease Plan: The patient was instructed to continue the current medications.    Hypothyroidism Plan: The patient was instructed to continue the current medications.    Mastalgia Plan: She was instructed to decrease her consumption of caffeine.    Diagnoses and all orders for this visit:    1. Essential hypertension (Primary)  -     CBC & Differential; Future  -     Comprehensive Metabolic Panel; Future  -     POC Urinalysis Dipstick, Automated; Future  -     CBC & Differential  -     Comprehensive Metabolic Panel  -     POC Urinalysis Dipstick, Automated    2. Hypothyroidism, unspecified type  -     TSH; Future  -     T4, Free; Future  -     TSH  -     T4, Free    3. Hyperlipidemia, unspecified hyperlipidemia type  -     Comprehensive Metabolic Panel; Future  -     Lipid Panel; Future  -     Comprehensive Metabolic Panel  -     Lipid Panel    4. Coronary artery disease involving native coronary artery of native heart without angina pectoris    5. Immunization due  -     Fluzone High-Dose 65+yrs    6.  Mastalgia            Return to Office    The patient was instructed to return for follow-up in 3 months. The patient was instructed to return sooner if the condition changes, worsens, or does not resolve.

## 2024-10-02 LAB
BASOPHILS # BLD AUTO: 0.06 10*3/MM3 (ref 0–0.2)
BASOPHILS NFR BLD AUTO: 0.8 % (ref 0–1.5)
DEPRECATED RDW RBC AUTO: 41.7 FL (ref 37–54)
EOSINOPHIL # BLD AUTO: 0.2 10*3/MM3 (ref 0–0.4)
EOSINOPHIL NFR BLD AUTO: 2.7 % (ref 0.3–6.2)
ERYTHROCYTE [DISTWIDTH] IN BLOOD BY AUTOMATED COUNT: 13.5 % (ref 12.3–15.4)
HCT VFR BLD AUTO: 41.1 % (ref 34–46.6)
HGB BLD-MCNC: 13.9 G/DL (ref 12–15.9)
IMM GRANULOCYTES # BLD AUTO: 0.03 10*3/MM3 (ref 0–0.05)
IMM GRANULOCYTES NFR BLD AUTO: 0.4 % (ref 0–0.5)
LYMPHOCYTES # BLD AUTO: 2.08 10*3/MM3 (ref 0.7–3.1)
LYMPHOCYTES NFR BLD AUTO: 28.4 % (ref 19.6–45.3)
MCH RBC QN AUTO: 28.6 PG (ref 26.6–33)
MCHC RBC AUTO-ENTMCNC: 33.8 G/DL (ref 31.5–35.7)
MCV RBC AUTO: 84.6 FL (ref 79–97)
MONOCYTES # BLD AUTO: 0.78 10*3/MM3 (ref 0.1–0.9)
MONOCYTES NFR BLD AUTO: 10.6 % (ref 5–12)
NEUTROPHILS NFR BLD AUTO: 4.18 10*3/MM3 (ref 1.7–7)
NEUTROPHILS NFR BLD AUTO: 57.1 % (ref 42.7–76)
NRBC BLD AUTO-RTO: 0 /100 WBC (ref 0–0.2)
PLATELET # BLD AUTO: 187 10*3/MM3 (ref 140–450)
PMV BLD AUTO: 11.1 FL (ref 6–12)
RBC # BLD AUTO: 4.86 10*6/MM3 (ref 3.77–5.28)
WBC NRBC COR # BLD AUTO: 7.33 10*3/MM3 (ref 3.4–10.8)

## 2024-10-04 ENCOUNTER — TELEPHONE (OUTPATIENT)
Dept: CARDIAC REHAB | Facility: HOSPITAL | Age: 74
End: 2024-10-04
Payer: MEDICARE

## 2024-10-04 ENCOUNTER — TRANSCRIBE ORDERS (OUTPATIENT)
Dept: CARDIAC REHAB | Facility: HOSPITAL | Age: 74
End: 2024-10-04
Payer: MEDICARE

## 2024-10-04 DIAGNOSIS — Z95.5 STENTED CORONARY ARTERY: Primary | ICD-10-CM

## 2024-10-04 NOTE — TELEPHONE ENCOUNTER
Patient called to schedule Phase II orientationPt. Referred for Phase II Cardiac Rehab. Staff discussed benefits of exercise and program protocol. Teach back verified. Patient scheduled for orientation at [facility] on Monday, October 14th 2024 at 1:00pm.

## 2024-10-10 ENCOUNTER — TRANSCRIBE ORDERS (OUTPATIENT)
Dept: ADMINISTRATIVE | Facility: HOSPITAL | Age: 74
End: 2024-10-10
Payer: MEDICARE

## 2024-10-10 DIAGNOSIS — N64.4 MASTODYNIA: Primary | ICD-10-CM

## 2024-10-12 DIAGNOSIS — I10 ESSENTIAL HYPERTENSION: ICD-10-CM

## 2024-10-14 ENCOUNTER — TREATMENT (OUTPATIENT)
Dept: CARDIAC REHAB | Facility: HOSPITAL | Age: 74
End: 2024-10-14
Payer: MEDICARE

## 2024-10-14 DIAGNOSIS — Z95.5 STENTED CORONARY ARTERY: ICD-10-CM

## 2024-10-14 PROCEDURE — 93798 PHYS/QHP OP CAR RHAB W/ECG: CPT

## 2024-10-14 RX ORDER — LOSARTAN POTASSIUM AND HYDROCHLOROTHIAZIDE 25; 100 MG/1; MG/1
TABLET ORAL
Qty: 90 TABLET | Refills: 1 | Status: SHIPPED | OUTPATIENT
Start: 2024-10-14

## 2024-10-14 NOTE — PROGRESS NOTES
Attended Phase II Cardiac Rehab orientation. Medication and health history reconciled. Heart and lungs assessed by staff RN. See [facility] for details.

## 2024-10-17 ENCOUNTER — TREATMENT (OUTPATIENT)
Dept: CARDIAC REHAB | Facility: HOSPITAL | Age: 74
End: 2024-10-17
Payer: MEDICARE

## 2024-10-17 DIAGNOSIS — Z95.5 STENTED CORONARY ARTERY: Primary | ICD-10-CM

## 2024-10-17 PROCEDURE — 93798 PHYS/QHP OP CAR RHAB W/ECG: CPT

## 2024-10-17 NOTE — PROGRESS NOTES
Attended Phase II Intro to Exercise and Cardiac Rehab session. No medication or health history changes reported. See [facility] for details.

## 2024-10-18 ENCOUNTER — TREATMENT (OUTPATIENT)
Dept: CARDIAC REHAB | Facility: HOSPITAL | Age: 74
End: 2024-10-18
Payer: MEDICARE

## 2024-10-18 DIAGNOSIS — Z95.5 STENTED CORONARY ARTERY: Primary | ICD-10-CM

## 2024-10-18 PROCEDURE — 93798 PHYS/QHP OP CAR RHAB W/ECG: CPT

## 2024-10-21 ENCOUNTER — TREATMENT (OUTPATIENT)
Dept: CARDIAC REHAB | Facility: HOSPITAL | Age: 74
End: 2024-10-21
Payer: MEDICARE

## 2024-10-21 DIAGNOSIS — Z95.5 STENTED CORONARY ARTERY: Primary | ICD-10-CM

## 2024-10-21 PROCEDURE — 93798 PHYS/QHP OP CAR RHAB W/ECG: CPT

## 2024-10-23 ENCOUNTER — TELEPHONE (OUTPATIENT)
Dept: CARDIAC REHAB | Facility: HOSPITAL | Age: 74
End: 2024-10-23
Payer: MEDICARE

## 2024-10-23 NOTE — TELEPHONE ENCOUNTER
Patient called to cancel her Phase II Cardiac Rehab exercise session stating that she wasn't feeling well today. Patient plans to return on Friday, October 25th.

## 2024-10-28 ENCOUNTER — TREATMENT (OUTPATIENT)
Dept: CARDIAC REHAB | Facility: HOSPITAL | Age: 74
End: 2024-10-28
Payer: MEDICARE

## 2024-10-28 DIAGNOSIS — Z95.5 STENTED CORONARY ARTERY: Primary | ICD-10-CM

## 2024-10-28 PROCEDURE — 93798 PHYS/QHP OP CAR RHAB W/ECG: CPT

## 2024-10-30 ENCOUNTER — TREATMENT (OUTPATIENT)
Dept: CARDIAC REHAB | Facility: HOSPITAL | Age: 74
End: 2024-10-30
Payer: MEDICARE

## 2024-10-30 DIAGNOSIS — Z95.5 STENTED CORONARY ARTERY: Primary | ICD-10-CM

## 2024-10-30 PROCEDURE — 93797 PHYS/QHP OP CAR RHAB WO ECG: CPT

## 2024-10-30 PROCEDURE — 93798 PHYS/QHP OP CAR RHAB W/ECG: CPT

## 2024-10-30 NOTE — PROGRESS NOTES
"  NUTRITION ASSESSMENT & EDUCATION  CARDIAC/PULMONARY REHAB      Appointment Date and Time: 10/30/24, 07:58 EDT  Patient Name: Kavitha Barnhart   Age: 74 y.o.     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 [email or web address]                          Job Activity Level: N/A  Routine Exercise: moderate, ballroom dancing, ballet, gym classes, stairs                                    Weight Assessment:   Height:   Ht Readings from Last 1 Encounters:   09/24/24 147.3 cm (58\")     Weight:   Wt Readings from Last 1 Encounters:   10/01/24 61.2 kg (135 lb)         BMI:    BMI Readings from Last 1 Encounters:   10/01/24 28.22 kg/m²       -------------------------------------------------------------------------------------------------  IBW: Ideal body weight: 47.1 kg (103 lb 14.5 oz)  Adjusted ideal body weight: 52.8 kg (116 lb 5.5 oz)  -------------------------------------------------------------------------------------------------  Weight Assessment: Overweight, acceptable for age  Weight Change last six months: decrease of 5 lbs since stent       Usual Weight: 134 lb       Desired Weight: 120 lb    Cardiac Risk Factors:         Atherosclerotic heart disease       Hypertension       Hypothyroidism      PVD, atrial tachycardia, diverticulosis, constipation, GERD, Lupus, osteopenia    Pertinent Lab Values:     Total Cholesterol   Date Value Ref Range Status   10/01/2024 130 0 - 200 mg/dL Final     HDL Cholesterol   Date Value Ref Range Status   10/01/2024 45 40 - 60 mg/dL Final     LDL Cholesterol    Date Value Ref Range Status   10/01/2024 67 0 - 100 mg/dL Final     LDL/HDL Ratio   Date Value Ref Range Status   10/01/2024 1.47  Final     Triglycerides   Date Value Ref Range Status   10/01/2024 95 0 - 150 mg/dL Final     Hemoglobin A1C   Date Value Ref Range Status   09/24/2024 5.70 (H) 4.80 - 5.60 % Final     TSH   Date Value Ref " Range Status   10/01/2024 2.250 0.270 - 4.200 uIU/mL Final     Glucose   Date Value Ref Range Status   10/01/2024 80 65 - 99 mg/dL Final     Labs reviewed with Pt today    Pertinent Medications:   Nutritional Supplements: reviewed  Pertinent Nutrition-Related Medications: Reviewed - no changes recommended at this time.  Self Identified Problems or Concerns:   I don't cook, I use frozen meals and eat out. I'm alone and it's not feasible to cook for one.   I have several diet restrictions r/t IBS, lupus, gallbladder removal, diverticulitis    Nutrition Influences:   Appetite: improving           Taste/smell changes:  No  Factors limiting PO intake: none    Food records reviewed?: Yes, completed review of 'Rate Your Plate' score and tallies.  Extensive review and discussion of home diet today.    Kavitha lives with: alone  Kavitha receives lifestyle support from others at home?: no  Spouse/significant other present for diet instruction today?: N/A    Diet Assessment:   Diet Survey Score: 56 of possible 72 =  78 % compliant with AHA guidelines    Meal intake pattern:  2 meals/day  Dining out:  2-3 times/week @ [employer], [employer], [employer]               Fast food:  occasionally [employer]    24 hour recall: completed and available in chart  Who does the grocery shopping:  self  Who does the cooking at home:  self                     Home diet review:  Current diet has a moderate intake of Saturated Fats, still with room for improvement, Excessive Sodium intake, not in compliance with recommended sodium levels, High in eating out- restaurant and/or fast food- potential for excess portioning, sodium, saturated fats, Frequent use of Commercially Prepared foods- potential for excess sodium, saturated fats, trans fats, Has recently made positive changes toward AHA nutrition guidelines, and Expressed motivation to make heart healthy changes in diet today    Motivation level toward diet  compliance:  moderate  Assessment / Recommendations:   Prior diet education before to coming to Cardiac Rehab?: Yes IBS/diverticulitis  Instructed provided on:  American Heart Association 2021 Nutrition Guidelines, Saturated Fat, Picking Healthy Proteins, Get the Scoop on Sodium/Salt <2300 mg/d, Added Sugars Guidance, Go Nuts for Heart Health, [place] 3 fats in Fish & Seafood, Dining Out Doesn't Mean Ditch Your Diet, and Foods to Avoid on the Cardiac Diet  Written materials provided to patient: Yes    Goals/Plan:   Patient defined goals:   1) will try meals from [employer], [employer], [employer] to decrease amount of frozen processed meal intakes  2) will try fish in foil w/seasonings from [employer] as an easy to cook meal  3) will purchase ana and/or flax seeds as doesn't tolerate nuts well    Plan:  Encouraged to complete goals and continue setting new nutrition/exercise goals             Encouraged to follow up with dietitian during cardiac rehab sessions; may request additional RD counseling.    Cielo Mireles RD, 07:58 EDT - 10/30/2024

## 2024-11-01 ENCOUNTER — TREATMENT (OUTPATIENT)
Dept: CARDIAC REHAB | Facility: HOSPITAL | Age: 74
End: 2024-11-01
Payer: MEDICARE

## 2024-11-01 DIAGNOSIS — Z95.5 STENTED CORONARY ARTERY: Primary | ICD-10-CM

## 2024-11-01 PROCEDURE — 93798 PHYS/QHP OP CAR RHAB W/ECG: CPT

## 2024-11-01 NOTE — TELEPHONE ENCOUNTER
ASTER SAYS THAT [facility] NEEDS AN OK FROM YOU STATING THAT IT IS OKAY FOR ME TO GO EIGHT DAYS WITHOUT TAKING CELEBREX.  MAIN LINE [phone].  NURSE [phone].    I NEED A BIOPSY.   
Left voice mail message for Haley or Ayesha to call office for message from Dr. Redding in regards to Pt ELINOR Thapa 09/03/01948. Office number given.      Hub okay to relay message     Yes.  That is OK  Farzad Redding MD  16:45 EDT  07/21/21     
Left voice mail message for Haley or Ayesha to call office for message from Dr. Redding in regards to Pt ELINOR Thapa 09/03/01948. Office number given.     Hub okay to relay message    Yes.  That is OK  Farzad Redding MD  16:45 EDT  07/21/21     
Note in Epic from [facility] needs clarification. Epic basket message sent to Farzad Redding MD asking the pt's clearance form be faxed back to us, after which the pt will be scheduled for recommended bx.   
Yes.  That is OK  Farzad Redding MD  16:45 EDT  07/21/21    
hide

## 2024-11-04 ENCOUNTER — TREATMENT (OUTPATIENT)
Dept: CARDIAC REHAB | Facility: HOSPITAL | Age: 74
End: 2024-11-04
Payer: MEDICARE

## 2024-11-04 DIAGNOSIS — Z95.5 STENTED CORONARY ARTERY: Primary | ICD-10-CM

## 2024-11-04 PROCEDURE — 93798 PHYS/QHP OP CAR RHAB W/ECG: CPT

## 2024-11-04 NOTE — PROGRESS NOTES
OFFICE VISIT  NOTE  [facility] CARDIOLOGY      Name: Kavitha Barnhart    Date: 2024  MRN:  [id number]  :  1950      REFERRING/PRIMARY PROVIDER:  Farzad Redding MD     Chief Complaint   Patient presents with    Atrial tachycardia     4-6 Wk HFU     HPI: Kavitha Barnhart is a 74 y.o. female who presents today for follow up of CAD with recent PCI, and SVT/PAT. Negative stress test 3/2022, last Holter 2022 rare PACs short SVT/PAT 11 beats. History of lupus, Raynaud's, Sjogren's, Hashimoto's and diverticulitis. She has been following Dr. Gilliland with pulmonology. She underwent various testings including a PFT that was reportedly normal. She had a cardiopulmonary stress test that did show a reduced O2 pulse suggestive of a cardiac limitation and he recommended a stress echocardiogram which was performed in  and was normal. No diastolic dysfunction was noted and LVEF was normal. Coronary CTA was pursued 2024 due to persistent symptoms, and this showed a significant LAD stenosis. Cath 24 with mid LAD MARIE, nonobstructive disease otherwise. She is doing well, participating in cardiac rehab. Has occasional pinching sensations in precordial area but these are very brief. She denies any symptoms while exercising at cardiac rehab. She is compliant with her medicines, however has questions regarding her Lupus and future risk of CAD.     ROS:Pertinent positives as listed in the HPI.  All other systems reviewed and negative.    Past Medical History:   Diagnosis Date    Abnormal ECG but low risk assessment    Allergic rhinitis Many years ago    Anemia Prior to menopause    Arrhythmia     Arthritis     Arthritis of back     Arthritis of neck     Asthma     Asthma, intrinsic around     Atrial fibrillation     Received 4 to 5 notifications on gumi re: I may have Atrial fib    Cervical disc disease     Coronary artery disease of native artery of native heart with stable angina  pectoris 2024    CTS (carpal tunnel syndrome)     Diastolic dysfunction 2023    Disease of thyroid gland     Diverticulitis of intestine with perforation     E-coli UTI     treated with antibioiutcs and urine rechecked and clean per pt report    GERD (gastroesophageal reflux disease)     Hashimoto's disease     Headache     Heart murmur     fast heart beat; sometimes irregular    Hip arthrosis     History of transfusion     several times, with surgeries post op and after c section, never a reaction    Hyperlipidemia sometimes marginally high    Hypertension     Hyperthyroidism     Thyroid nodule, hashimoto's thyroiditis    Hypothyroidism     Irritable bowel syndrome     Kidney stone     Knee sprain     Knee swelling     Low back pain     sometimes    Lumbosacral disc disease     Lupus     Neuromuscular disorder     Nerve damage lower back    Osteopenia     Palpitations     POSSIBLE ATRIAL TACHYCARDIA    Periarthritis of shoulder     PONV (postoperative nausea and vomiting)     Raynaud's disease     Seizure disorder     Sinusitis very long time    Sjogren's disease     SVT (supraventricular tachycardia)     Tennis elbow     Thyroid nodule     Tremor     sometimes hands shake    Visual impairment     Vitamin D deficiency        Past Surgical History:   Procedure Laterality Date    ADENOIDECTOMY      Removed as a child    APPENDECTOMY  2016    BONE GRAFT  2021    BREAST BIOPSY Left 10/15/2021    excisional    BREAST SURGERY  10/2021    CARDIAC CATHETERIZATION N/A 2024    Procedure: Left Heart Cath;  Surgeon: Roque Saenz MD;  Location:  [facility];  Service: Cardiovascular;  Laterality: N/A;    CARDIAC CATHETERIZATION N/A 2024    Procedure: Stent MARIE coronary;  Surgeon: Roque Saenz MD;  Location:  [facility];  Service: Cardiovascular;  Laterality: N/A;     SECTION  1970    CHOLECYSTECTOMY N/A 2022    Procedure:  CHOLECYSTECTOMY LAPAROSCOPIC;  Surgeon: Angie López MD;  Location:  [facility] OR;  Service: General;  Laterality: N/A;    COLON RESECTION  2016    sigmoid and partial rectum     COLON SURGERY      COLONOSCOPY      EXPLORATORY LAPAROTOMY      fibroid    ILEOSTOMY CLOSURE N/A 2016    Procedure: ILEOSTOMY  EXCISION AND TAKEDOWN;  Surgeon: Brooks Jacob MD;  Location:  [facility] OR;  Service:     KNEE ARTHROSCOPY      right knee scope    KNEE SURGERY      MYOMECTOMY  1970    PULMONARY STRESS TEST  2023    SMALL INTESTINE SURGERY      TONSILECTOMY, ADENOIDECTOMY, BILATERAL MYRINGOTOMY AND TUBES      TONSILLECTOMY      TOOTH EXTRACTION      2021    TOOTH EXTRACTION      UPPER GASTROINTESTINAL ENDOSCOPY         Social History     Socioeconomic History    Marital status:    Tobacco Use    Smoking status: Never     Passive exposure: Past    Smokeless tobacco: Never   Vaping Use    Vaping status: Never Used   Substance and Sexual Activity    Alcohol use: Yes     Alcohol/week: 2.0 - 3.0 standard drinks of alcohol     Types: 2 - 3 Glasses of wine per week     Comment: social    Drug use: No    Sexual activity: Not Currently     Partners: Male     Birth control/protection: Post-menopausal, None       Family History   Problem Relation Age of Onset    Lung cancer Mother     Hyperlipidemia Mother     Thyroid disease Mother     Pancreatic cancer Mother     Cancer Mother     Diabetes Mother     Hypertension Mother     Pancreatitis Mother     Heart disease Father     Hypertension Father     Heart failure Father     Heart attack Father     Cancer Brother     Heart disease Brother         Recently  of heart attack    Hyperlipidemia Brother     Hypertension Brother     Kidney disease Brother         Kidney Failure, Polycystic Kidney Disease    Liver disease Brother     Hyperlipidemia Brother     Hypertension Brother     Kidney disease Brother         Polycystic Kidney Disease    Crohn's disease Brother      Cancer Brother         My brother, Radu, in August 2024 underwent  small intestine surgerys because of his Crohn's disease and the pathology report stated that there was a cancerous tumor found, which was approximately 2 cm in size.    Thyroid disease Son     Breast cancer Neg Hx     Ovarian cancer Neg Hx     Colon cancer Neg Hx     Colon polyps Neg Hx     Esophageal cancer Neg Hx         Allergies   Allergen Reactions    Tenormin [Atenolol] Cough and Angioedema    Macrobid [Nitrofurantoin Monohyd Macro] Swelling and Other (See Comments)     Swelling around eyes, fatigue    Minoxidil Swelling     Feet swelling.    Plaquenil [Hydroxychloroquine Sulfate] Other (See Comments)     Vision changes over time     Shingrix [Zoster Vac Recomb Adjuvanted] Rash    Omeprazole Diarrhea    Ace Inhibitors Cough    Sudafed [Pseudoephedrine Hcl] Other (See Comments)     INCREASED PRESSURE IN EYES        Current Outpatient Medications   Medication Instructions    albuterol sulfate  (90 Base) MCG/ACT inhaler 2 puffs, Inhalation, Every 4 Hours PRN    Aspirin Low Dose 81 mg, Oral, Daily    celecoxib (CELEBREX) 100 mg, Oral, 2 Times Daily PRN    clopidogrel (PLAVIX) 75 mg, Oral, Daily    cycloSPORINE (RESTASIS) 0.05 % ophthalmic emulsion 1 drop, 2 Times Daily    dicyclomine (BENTYL) 10 mg, Oral, 2 Times Daily PRN    fexofenadine (ALLEGRA) 180 mg, Daily PRN    Fluticasone-Salmeterol (ADVAIR/WIXELA) 100-50 MCG/ACT DISKUS 1 puff, Inhalation, 2 Times Daily    losartan-hydrochlorothiazide (HYZAAR) 100-25 MG per tablet TAKE 1 TABLET BY MOUTH EVERY DAY.    meclizine (ANTIVERT) 25 MG tablet Take 1 tablet by mouth 3 (Three) Times a Day As Needed for Dizziness.    metoprolol succinate XL (TOPROL-XL) 100 mg, Oral, Daily    rosuvastatin (CRESTOR) 5 mg, Oral, Daily    Synthroid 75 mcg, Oral, Daily    TRIAMCINOLONE ACETONIDE IN Inhale. triamcinolone acetonide 55 mcg nasal spray aerosol  spray 1 by nasal route  every day    Yuvafem 10 MCG tablet  "vaginal tablet APPLY 1 TABLET IN THE VAGINA 2 TIMES WEEKLY.       Vitals:    11/07/24 0936   BP: 112/62   BP Location: Left arm   Patient Position: Sitting   Cuff Size: Adult   Pulse: 67   SpO2: 96%   Weight: 59.7 kg (131 lb 9.6 oz)   Height: 148.6 cm (58.5\")     Body mass index is 27.04 kg/m².    PHYSICAL EXAM:    General Appearance:   well developed  well nourished  Neck:  thyroid not enlarged  supple  Respiratory:  no respiratory distress  normal breath sounds  no rales  Cardiovascular:  no jugular venous distention  regular rhythm  apical impulse normal  S1 normal, S2 normal  no S3, no S4   no murmur  no rub, no thrill  lower extremity edema: none    Skin:   warm, dry    RESULTS:   Procedures    Results for orders placed during the hospital encounter of 11/01/23    Adult Stress Echo W/ Cont or Stress Agent if Necessary Per Protocol    Interpretation Summary    Normal stress echo with no significant echocardiographic evidence for myocardial ischemia.    Patient experienced dyspnea at peak exertion (O2 saturation % on RA), symptoms resolved within rest in recovery.    Expected exercise duration 5:10. Actual exercise duration 6:44. ISAIAH (-20).    Left ventricular systolic function is normal. Calculated left ventricular EF = 58.4%    Left ventricular wall thickness is consistent with borderline concentric hypertrophy.    Left ventricular diastolic function was normal.    There is mild calcification of the aortic valve.        Labs:  Lab Results   Component Value Date    CHOL 130 10/01/2024    TRIG 95 10/01/2024    HDL 45 10/01/2024    LDL 67 10/01/2024    AST 18 10/01/2024    ALT 17 10/01/2024     Lab Results   Component Value Date    HGBA1C 5.70 (H) 09/24/2024     Creatinine   Date Value Ref Range Status   10/01/2024 1.21 (H) 0.57 - 1.00 mg/dL Final   09/24/2024 1.24 (H) 0.57 - 1.00 mg/dL Final   10/30/2023 1.16 (H) 0.57 - 1.00 mg/dL Final     eGFR Non  Amer   Date Value Ref Range Status   10/19/2021 59 " (L) >60 mL/min/1.73 Final   10/13/2021 48 (L) >60 mL/min/1.73 Final   04/26/2021 49 (L) >60 mL/min/1.73 Final         ASSESSMENT:  Problem List Items Addressed This Visit          Cardiac and Vasculature    Primary hypertension    Atrial tachycardia    Overview     POSSIBLE ATRIAL TACHYCARDIA         HUA (dyspnea on exertion)    Overview     Stress echocardiogram (11/2023): No ischemia. Normal         Coronary artery disease of native artery of native heart with stable angina pectoris - Primary    Overview     9/24/2024: LHC at [facility], mid LAD 80% stenosis status post PCI with a 2.5 x 18 mm Xience MARIE postdilated with a 3.0 NC at high pressure.  Normal circumflex and left main, mid RCA 20%, proximal LAD 20%.  LVEDP 18 mmHg.            PLAN:  1.  Coronary artery disease  Previous false negative stress tests  Cardiac cath 09/2024 with mid LAD 80% stenosis, s/p MARIE, mild nonobstructive disease otherwise  Continue DAPT and statin   Active and asymptomatic without angina. Previous anginal equivalent was HUA    2.  SVT/PAT  Controlled on Metoprolol therapy     2.  Hypertension:  Goal blood pressure less than 130/80  BP well controlled, continue current regimen      3.  Hyperlipidemia:  Goal LDL less than 70  Well-controlled on most recent labs, LDL 67, continue statin.       Advance Care Planning   ACP discussion was held with the patient during this visit. Patient has an advance directive in EMR which is still valid.           Follow-up   Return in about 6 months (around 5/7/2025) for with RDS .      Electronically signed by Herlinda Daly PA-C, 11/07/24, 10:56 AM EST.

## 2024-11-06 ENCOUNTER — TREATMENT (OUTPATIENT)
Dept: CARDIAC REHAB | Facility: HOSPITAL | Age: 74
End: 2024-11-06
Payer: MEDICARE

## 2024-11-06 DIAGNOSIS — Z95.5 STENTED CORONARY ARTERY: Primary | ICD-10-CM

## 2024-11-06 PROCEDURE — 93798 PHYS/QHP OP CAR RHAB W/ECG: CPT

## 2024-11-07 ENCOUNTER — OFFICE VISIT (OUTPATIENT)
Dept: CARDIOLOGY | Facility: CLINIC | Age: 74
End: 2024-11-07
Payer: MEDICARE

## 2024-11-07 VITALS
SYSTOLIC BLOOD PRESSURE: 112 MMHG | HEART RATE: 67 BPM | BODY MASS INDEX: 26.53 KG/M2 | HEIGHT: 59 IN | OXYGEN SATURATION: 96 % | WEIGHT: 131.6 LBS | DIASTOLIC BLOOD PRESSURE: 62 MMHG

## 2024-11-07 DIAGNOSIS — I47.19 ATRIAL TACHYCARDIA: ICD-10-CM

## 2024-11-07 DIAGNOSIS — I10 PRIMARY HYPERTENSION: ICD-10-CM

## 2024-11-07 DIAGNOSIS — R06.09 DOE (DYSPNEA ON EXERTION): ICD-10-CM

## 2024-11-07 DIAGNOSIS — I25.118 CORONARY ARTERY DISEASE OF NATIVE ARTERY OF NATIVE HEART WITH STABLE ANGINA PECTORIS: Primary | ICD-10-CM

## 2024-11-07 PROCEDURE — 3074F SYST BP LT 130 MM HG: CPT | Performed by: PHYSICIAN ASSISTANT

## 2024-11-07 PROCEDURE — 99213 OFFICE O/P EST LOW 20 MIN: CPT | Performed by: PHYSICIAN ASSISTANT

## 2024-11-07 PROCEDURE — 3078F DIAST BP <80 MM HG: CPT | Performed by: PHYSICIAN ASSISTANT

## 2024-11-08 ENCOUNTER — TREATMENT (OUTPATIENT)
Dept: CARDIAC REHAB | Facility: HOSPITAL | Age: 74
End: 2024-11-08
Payer: MEDICARE

## 2024-11-08 DIAGNOSIS — Z95.5 STENTED CORONARY ARTERY: Primary | ICD-10-CM

## 2024-11-08 PROCEDURE — 93798 PHYS/QHP OP CAR RHAB W/ECG: CPT

## 2024-11-11 ENCOUNTER — TREATMENT (OUTPATIENT)
Dept: CARDIAC REHAB | Facility: HOSPITAL | Age: 74
End: 2024-11-11
Payer: MEDICARE

## 2024-11-11 DIAGNOSIS — Z95.5 STENTED CORONARY ARTERY: Primary | ICD-10-CM

## 2024-11-11 PROCEDURE — 93798 PHYS/QHP OP CAR RHAB W/ECG: CPT

## 2024-11-13 ENCOUNTER — TREATMENT (OUTPATIENT)
Dept: CARDIAC REHAB | Facility: HOSPITAL | Age: 74
End: 2024-11-13
Payer: MEDICARE

## 2024-11-13 DIAGNOSIS — Z95.5 STENTED CORONARY ARTERY: Primary | ICD-10-CM

## 2024-11-13 PROCEDURE — 93798 PHYS/QHP OP CAR RHAB W/ECG: CPT

## 2024-11-15 ENCOUNTER — TREATMENT (OUTPATIENT)
Dept: CARDIAC REHAB | Facility: HOSPITAL | Age: 74
End: 2024-11-15
Payer: MEDICARE

## 2024-11-15 DIAGNOSIS — Z95.5 STENTED CORONARY ARTERY: Primary | ICD-10-CM

## 2024-11-15 PROCEDURE — 93798 PHYS/QHP OP CAR RHAB W/ECG: CPT

## 2024-11-18 ENCOUNTER — TREATMENT (OUTPATIENT)
Dept: CARDIAC REHAB | Facility: HOSPITAL | Age: 74
End: 2024-11-18
Payer: MEDICARE

## 2024-11-18 DIAGNOSIS — Z95.5 STENTED CORONARY ARTERY: Primary | ICD-10-CM

## 2024-11-18 PROCEDURE — 93798 PHYS/QHP OP CAR RHAB W/ECG: CPT

## 2024-11-22 ENCOUNTER — TREATMENT (OUTPATIENT)
Dept: CARDIAC REHAB | Facility: HOSPITAL | Age: 74
End: 2024-11-22
Payer: MEDICARE

## 2024-11-22 DIAGNOSIS — Z95.5 STENTED CORONARY ARTERY: Primary | ICD-10-CM

## 2024-11-22 PROCEDURE — 93798 PHYS/QHP OP CAR RHAB W/ECG: CPT

## 2024-11-25 ENCOUNTER — TREATMENT (OUTPATIENT)
Dept: CARDIAC REHAB | Facility: HOSPITAL | Age: 74
End: 2024-11-25
Payer: MEDICARE

## 2024-11-25 DIAGNOSIS — Z95.5 STENTED CORONARY ARTERY: Primary | ICD-10-CM

## 2024-11-25 PROCEDURE — 93798 PHYS/QHP OP CAR RHAB W/ECG: CPT

## 2024-11-27 ENCOUNTER — TELEPHONE (OUTPATIENT)
Dept: CARDIAC REHAB | Facility: HOSPITAL | Age: 74
End: 2024-11-27
Payer: MEDICARE

## 2024-11-29 ENCOUNTER — APPOINTMENT (OUTPATIENT)
Dept: CARDIAC REHAB | Facility: HOSPITAL | Age: 74
End: 2024-11-29
Payer: MEDICARE

## 2024-12-02 ENCOUNTER — TREATMENT (OUTPATIENT)
Dept: CARDIAC REHAB | Facility: HOSPITAL | Age: 74
End: 2024-12-02
Payer: MEDICARE

## 2024-12-02 DIAGNOSIS — Z95.5 STENTED CORONARY ARTERY: Primary | ICD-10-CM

## 2024-12-02 PROCEDURE — 93798 PHYS/QHP OP CAR RHAB W/ECG: CPT

## 2024-12-03 ENCOUNTER — TELEPHONE (OUTPATIENT)
Dept: CARDIOLOGY | Facility: CLINIC | Age: 74
End: 2024-12-03
Payer: MEDICARE

## 2024-12-03 NOTE — TELEPHONE ENCOUNTER
Wants to know from you if its safe to have an ultrasound of her Lt breast with her recent stent? Please advise.

## 2024-12-04 ENCOUNTER — TREATMENT (OUTPATIENT)
Dept: CARDIAC REHAB | Facility: HOSPITAL | Age: 74
End: 2024-12-04
Payer: MEDICARE

## 2024-12-04 DIAGNOSIS — Z95.5 STENTED CORONARY ARTERY: Primary | ICD-10-CM

## 2024-12-04 PROCEDURE — 93798 PHYS/QHP OP CAR RHAB W/ECG: CPT

## 2024-12-06 ENCOUNTER — TREATMENT (OUTPATIENT)
Dept: CARDIAC REHAB | Facility: HOSPITAL | Age: 74
End: 2024-12-06
Payer: MEDICARE

## 2024-12-06 DIAGNOSIS — Z95.5 STENTED CORONARY ARTERY: Primary | ICD-10-CM

## 2024-12-06 PROCEDURE — 93798 PHYS/QHP OP CAR RHAB W/ECG: CPT

## 2024-12-06 NOTE — PROGRESS NOTES
Attended Phase II Cardiac Rehab. No medication or health history changes reported. See [facility] for details.   Consent: The patient's consent was obtained including but not limited to risks of crusting, scabbing, blistering, scarring, darker or lighter pigmentary change, recurrence, incomplete removal and infection. Show Topical Anesthesia Variable?: Yes Detail Level: Detailed Medical Necessity Information: It is in your best interest to select a reason for this procedure from the list below. All of these items fulfill various CMS LCD requirements except the new and changing color options. Post-Care Instructions: I reviewed with the patient in detail post-care instructions. Patient is to wear sunprotection, and avoid picking at any of the treated lesions. Pt may apply Vaseline to crusted or scabbing areas. Duration Of Freeze Thaw-Cycle (Seconds): 5-10 Spray Paint Text: The liquid nitrogen was applied to the skin utilizing a spray paint frosting technique. Number Of Freeze-Thaw Cycles: 1 freeze-thaw cycle Render Note In Bullet Format When Appropriate: No Medical Necessity Clause: This procedure was medically necessary because the lesions that were treated were: caroline

## 2024-12-09 ENCOUNTER — TREATMENT (OUTPATIENT)
Dept: CARDIAC REHAB | Facility: HOSPITAL | Age: 74
End: 2024-12-09
Payer: MEDICARE

## 2024-12-09 DIAGNOSIS — Z95.5 STENTED CORONARY ARTERY: Primary | ICD-10-CM

## 2024-12-09 PROCEDURE — 93798 PHYS/QHP OP CAR RHAB W/ECG: CPT

## 2024-12-11 ENCOUNTER — TELEPHONE (OUTPATIENT)
Dept: CARDIAC REHAB | Facility: HOSPITAL | Age: 74
End: 2024-12-11
Payer: MEDICARE

## 2024-12-11 NOTE — TELEPHONE ENCOUNTER
Patient called to cancel todays cardiac rehab session due to picking dog up from groomer. Patient plans to return on Friday, 12/13/24.

## 2024-12-13 ENCOUNTER — TREATMENT (OUTPATIENT)
Dept: CARDIAC REHAB | Facility: HOSPITAL | Age: 74
End: 2024-12-13
Payer: MEDICARE

## 2024-12-13 DIAGNOSIS — Z95.5 STENTED CORONARY ARTERY: Primary | ICD-10-CM

## 2024-12-13 PROCEDURE — 93798 PHYS/QHP OP CAR RHAB W/ECG: CPT

## 2024-12-16 ENCOUNTER — TREATMENT (OUTPATIENT)
Dept: CARDIAC REHAB | Facility: HOSPITAL | Age: 74
End: 2024-12-16
Payer: MEDICARE

## 2024-12-16 DIAGNOSIS — Z95.5 STENTED CORONARY ARTERY: Primary | ICD-10-CM

## 2024-12-16 PROCEDURE — 93798 PHYS/QHP OP CAR RHAB W/ECG: CPT

## 2024-12-17 DIAGNOSIS — J45.20 MILD INTERMITTENT ASTHMA WITHOUT COMPLICATION: ICD-10-CM

## 2024-12-17 RX ORDER — ALBUTEROL SULFATE 90 UG/1
2 INHALANT RESPIRATORY (INHALATION) EVERY 4 HOURS PRN
Qty: 6.7 G | Refills: 4 | Status: SHIPPED | OUTPATIENT
Start: 2024-12-17

## 2024-12-20 ENCOUNTER — TELEPHONE (OUTPATIENT)
Dept: CARDIAC REHAB | Facility: HOSPITAL | Age: 74
End: 2024-12-20
Payer: MEDICARE

## 2024-12-20 NOTE — TELEPHONE ENCOUNTER
Patient called to cancel Phase II Cardiac Rehab session due to weather. Patient plans to return to Cardiac Rehab on 12/23/24.

## 2024-12-22 PROCEDURE — 87086 URINE CULTURE/COLONY COUNT: CPT

## 2024-12-22 PROCEDURE — 87088 URINE BACTERIA CULTURE: CPT

## 2024-12-22 PROCEDURE — 87186 SC STD MICRODIL/AGAR DIL: CPT

## 2024-12-23 ENCOUNTER — TREATMENT (OUTPATIENT)
Dept: CARDIAC REHAB | Facility: HOSPITAL | Age: 74
End: 2024-12-23
Payer: MEDICARE

## 2024-12-23 DIAGNOSIS — Z95.5 STENTED CORONARY ARTERY: Primary | ICD-10-CM

## 2024-12-23 PROCEDURE — 93798 PHYS/QHP OP CAR RHAB W/ECG: CPT

## 2024-12-27 ENCOUNTER — TREATMENT (OUTPATIENT)
Dept: CARDIAC REHAB | Facility: HOSPITAL | Age: 74
End: 2024-12-27
Payer: MEDICARE

## 2024-12-27 DIAGNOSIS — Z95.5 STENTED CORONARY ARTERY: Primary | ICD-10-CM

## 2024-12-27 PROCEDURE — 93798 PHYS/QHP OP CAR RHAB W/ECG: CPT

## 2024-12-30 ENCOUNTER — TELEPHONE (OUTPATIENT)
Dept: CARDIAC REHAB | Facility: HOSPITAL | Age: 74
End: 2024-12-30
Payer: MEDICARE

## 2024-12-30 ENCOUNTER — APPOINTMENT (OUTPATIENT)
Dept: CARDIAC REHAB | Facility: HOSPITAL | Age: 74
End: 2024-12-30
Payer: MEDICARE

## 2024-12-30 NOTE — TELEPHONE ENCOUNTER
Patient called to cancel Phase II Cardiac Rehab session due to not feeling well today. Patient plans to return to Cardiac Rehab on 1/3/25.

## 2025-01-03 ENCOUNTER — TREATMENT (OUTPATIENT)
Dept: CARDIAC REHAB | Facility: HOSPITAL | Age: 75
End: 2025-01-03
Payer: MEDICARE

## 2025-01-03 DIAGNOSIS — Z95.5 STENTED CORONARY ARTERY: Primary | ICD-10-CM

## 2025-01-03 PROCEDURE — 93798 PHYS/QHP OP CAR RHAB W/ECG: CPT

## 2025-01-13 ENCOUNTER — TELEPHONE (OUTPATIENT)
Dept: CARDIAC REHAB | Facility: HOSPITAL | Age: 75
End: 2025-01-13
Payer: MEDICARE

## 2025-01-13 NOTE — TELEPHONE ENCOUNTER
Patient called to cancel todays cardiac rehab session due to lack of time and they have also ran out of a BP medication and does not want to exercise without taking it. Patient plans to return on Wednesday, 01/15/2025.

## 2025-01-15 ENCOUNTER — TREATMENT (OUTPATIENT)
Dept: CARDIAC REHAB | Facility: HOSPITAL | Age: 75
End: 2025-01-15
Payer: MEDICARE

## 2025-01-15 DIAGNOSIS — Z95.5 STENTED CORONARY ARTERY: Primary | ICD-10-CM

## 2025-01-15 PROCEDURE — 93798 PHYS/QHP OP CAR RHAB W/ECG: CPT

## 2025-01-17 ENCOUNTER — TREATMENT (OUTPATIENT)
Dept: CARDIAC REHAB | Facility: HOSPITAL | Age: 75
End: 2025-01-17
Payer: MEDICARE

## 2025-01-17 DIAGNOSIS — Z95.5 STENTED CORONARY ARTERY: Primary | ICD-10-CM

## 2025-01-17 PROCEDURE — 93798 PHYS/QHP OP CAR RHAB W/ECG: CPT

## 2025-01-20 ENCOUNTER — DOCUMENTATION (OUTPATIENT)
Dept: CARDIAC REHAB | Facility: HOSPITAL | Age: 75
End: 2025-01-20
Payer: MEDICARE

## 2025-01-22 ENCOUNTER — TREATMENT (OUTPATIENT)
Dept: CARDIAC REHAB | Facility: HOSPITAL | Age: 75
End: 2025-01-22
Payer: MEDICARE

## 2025-01-22 DIAGNOSIS — Z95.5 STENTED CORONARY ARTERY: Primary | ICD-10-CM

## 2025-01-22 PROCEDURE — 93798 PHYS/QHP OP CAR RHAB W/ECG: CPT

## 2025-01-24 ENCOUNTER — TREATMENT (OUTPATIENT)
Dept: CARDIAC REHAB | Facility: HOSPITAL | Age: 75
End: 2025-01-24
Payer: MEDICARE

## 2025-01-24 DIAGNOSIS — Z95.5 STENTED CORONARY ARTERY: Primary | ICD-10-CM

## 2025-01-24 PROCEDURE — 93798 PHYS/QHP OP CAR RHAB W/ECG: CPT

## 2025-01-27 ENCOUNTER — DOCUMENTATION (OUTPATIENT)
Dept: CARDIAC REHAB | Facility: HOSPITAL | Age: 75
End: 2025-01-27
Payer: MEDICARE

## 2025-01-27 ENCOUNTER — OFFICE VISIT (OUTPATIENT)
Dept: INTERNAL MEDICINE | Facility: CLINIC | Age: 75
End: 2025-01-27
Payer: MEDICARE

## 2025-01-27 ENCOUNTER — TELEPHONE (OUTPATIENT)
Dept: CARDIAC REHAB | Facility: HOSPITAL | Age: 75
End: 2025-01-27
Payer: MEDICARE

## 2025-01-27 VITALS
TEMPERATURE: 97.7 F | RESPIRATION RATE: 16 BRPM | SYSTOLIC BLOOD PRESSURE: 116 MMHG | BODY MASS INDEX: 25.65 KG/M2 | DIASTOLIC BLOOD PRESSURE: 64 MMHG | HEART RATE: 72 BPM | WEIGHT: 127 LBS

## 2025-01-27 DIAGNOSIS — E78.5 HYPERLIPIDEMIA, UNSPECIFIED HYPERLIPIDEMIA TYPE: ICD-10-CM

## 2025-01-27 DIAGNOSIS — I10 ESSENTIAL HYPERTENSION: Primary | ICD-10-CM

## 2025-01-27 DIAGNOSIS — I25.10 CORONARY ARTERY DISEASE INVOLVING NATIVE CORONARY ARTERY OF NATIVE HEART WITHOUT ANGINA PECTORIS: ICD-10-CM

## 2025-01-27 DIAGNOSIS — K21.9 GASTROESOPHAGEAL REFLUX DISEASE WITHOUT ESOPHAGITIS: ICD-10-CM

## 2025-01-27 DIAGNOSIS — E03.9 HYPOTHYROIDISM, UNSPECIFIED TYPE: ICD-10-CM

## 2025-01-27 LAB
BASOPHILS # BLD AUTO: 0.05 10*3/MM3 (ref 0–0.2)
BASOPHILS NFR BLD AUTO: 0.9 % (ref 0–1.5)
BILIRUB BLD-MCNC: NEGATIVE MG/DL
CLARITY, POC: CLEAR
COLOR UR: YELLOW
DEPRECATED RDW RBC AUTO: 40.1 FL (ref 37–54)
EOSINOPHIL # BLD AUTO: 0.23 10*3/MM3 (ref 0–0.4)
EOSINOPHIL NFR BLD AUTO: 4 % (ref 0.3–6.2)
ERYTHROCYTE [DISTWIDTH] IN BLOOD BY AUTOMATED COUNT: 13.2 % (ref 12.3–15.4)
EXPIRATION DATE: NORMAL
GLUCOSE UR STRIP-MCNC: NEGATIVE MG/DL
HCT VFR BLD AUTO: 41.1 % (ref 34–46.6)
HGB BLD-MCNC: 13.9 G/DL (ref 12–15.9)
IMM GRANULOCYTES # BLD AUTO: 0.01 10*3/MM3 (ref 0–0.05)
IMM GRANULOCYTES NFR BLD AUTO: 0.2 % (ref 0–0.5)
KETONES UR QL: NEGATIVE
LEUKOCYTE EST, POC: NEGATIVE
LYMPHOCYTES # BLD AUTO: 1.62 10*3/MM3 (ref 0.7–3.1)
LYMPHOCYTES NFR BLD AUTO: 28.3 % (ref 19.6–45.3)
Lab: NORMAL
MCH RBC QN AUTO: 28.5 PG (ref 26.6–33)
MCHC RBC AUTO-ENTMCNC: 33.8 G/DL (ref 31.5–35.7)
MCV RBC AUTO: 84.2 FL (ref 79–97)
MONOCYTES # BLD AUTO: 0.67 10*3/MM3 (ref 0.1–0.9)
MONOCYTES NFR BLD AUTO: 11.7 % (ref 5–12)
NEUTROPHILS NFR BLD AUTO: 3.15 10*3/MM3 (ref 1.7–7)
NEUTROPHILS NFR BLD AUTO: 54.9 % (ref 42.7–76)
NITRITE UR-MCNC: NEGATIVE MG/ML
NRBC BLD AUTO-RTO: 0 /100 WBC (ref 0–0.2)
PH UR: 6 [PH] (ref 5–8)
PLATELET # BLD AUTO: 186 10*3/MM3 (ref 140–450)
PMV BLD AUTO: 11.1 FL (ref 6–12)
PROT UR STRIP-MCNC: NEGATIVE MG/DL
RBC # BLD AUTO: 4.88 10*6/MM3 (ref 3.77–5.28)
RBC # UR STRIP: NEGATIVE /UL
SP GR UR: 1.01 (ref 1–1.03)
UROBILINOGEN UR QL: NORMAL
WBC NRBC COR # BLD AUTO: 5.73 10*3/MM3 (ref 3.4–10.8)

## 2025-01-27 PROCEDURE — 84443 ASSAY THYROID STIM HORMONE: CPT | Performed by: INTERNAL MEDICINE

## 2025-01-27 PROCEDURE — 3074F SYST BP LT 130 MM HG: CPT | Performed by: INTERNAL MEDICINE

## 2025-01-27 PROCEDURE — 80061 LIPID PANEL: CPT | Performed by: INTERNAL MEDICINE

## 2025-01-27 PROCEDURE — 3078F DIAST BP <80 MM HG: CPT | Performed by: INTERNAL MEDICINE

## 2025-01-27 PROCEDURE — 85025 COMPLETE CBC W/AUTO DIFF WBC: CPT | Performed by: INTERNAL MEDICINE

## 2025-01-27 PROCEDURE — 84439 ASSAY OF FREE THYROXINE: CPT | Performed by: INTERNAL MEDICINE

## 2025-01-27 PROCEDURE — 99214 OFFICE O/P EST MOD 30 MIN: CPT | Performed by: INTERNAL MEDICINE

## 2025-01-27 PROCEDURE — 81003 URINALYSIS AUTO W/O SCOPE: CPT | Performed by: INTERNAL MEDICINE

## 2025-01-27 PROCEDURE — 80053 COMPREHEN METABOLIC PANEL: CPT | Performed by: INTERNAL MEDICINE

## 2025-01-27 PROCEDURE — 1126F AMNT PAIN NOTED NONE PRSNT: CPT | Performed by: INTERNAL MEDICINE

## 2025-01-27 NOTE — PROGRESS NOTES
Chief Complaint   Patient presents with    Follow-up     3 month follow up chronic medical problems       History of Present Illness      The patient presents for a follow-up related to hypertension. The patient reports that she has had no headaches, chest pain, dyspnea, edema, syncope, blurred vision or palpitations. She states that she is taking her medication as prescribed. She is not having medication side effects.    The patient presents for a follow-up related to coronary artery disease. She denies diaphoresis. She takes an aspirin daily.    The patient presents for a follow-up related to hyperlipidemia. She is following a low fat diet. She reports that she is exercising. She is taking rosuvastatin. The patient is taking her medication as prescribed. She reports no medication side effects, including muscle cramps, abdominal pain, headaches or weakness. She denies orthopnea, paroxysmal nocturnal dyspnea or dyspnea on exertion.    The patient presents for a follow-up related to hypothyroidism. She reports a good energy level. She reports no hair loss, heat intolerance, cold intolerance, diarrhea, constipation or sweats. She is taking her medication as prescribed.    The patient presents for a follow-up related to GERD. The patient is not on medication for her gastroesophageal reflux. She reports no abdominal pain, belching, dysphagia, early satiety, heartburn, hoarseness, nausea, odynophagia, rectal bleeding, vomiting or weight loss. The GERD has no known aggravating factors.    Medications      Current Outpatient Medications:     albuterol sulfate  (90 Base) MCG/ACT inhaler, TAKE 2 PUFFS BY MOUTH EVERY 4 HOURS AS NEEDED FOR WHEEZE., Disp: 6.7 g, Rfl: 4    aspirin 81 MG EC tablet, Take 1 tablet by mouth Daily., Disp: 90 tablet, Rfl: 3    celecoxib (CeleBREX) 100 MG capsule, Take 1 capsule by mouth 2 (Two) Times a Day As Needed for Mild Pain., Disp: 60 capsule, Rfl: 2    clopidogrel (PLAVIX) 75 MG tablet,  Take 1 tablet by mouth Daily., Disp: 90 tablet, Rfl: 1    cycloSPORINE (RESTASIS) 0.05 % ophthalmic emulsion, Administer 1 drop to both eyes 2 (Two) Times a Day., Disp: , Rfl:     dicyclomine (BENTYL) 10 MG capsule, Take 1 capsule by mouth 2 (Two) Times a Day As Needed for Abdominal Cramping., Disp: 60 capsule, Rfl: 11    fexofenadine (ALLEGRA) 180 MG tablet, Take 1 tablet by mouth Daily As Needed (allergies)., Disp: , Rfl:     Fluticasone-Salmeterol (ADVAIR/WIXELA) 100-50 MCG/ACT DISKUS, INHALE 1 PUFF 2 (TWO) TIMES A DAY. (Patient taking differently: Inhale 1 puff 2 (Two) Times a Day As Needed.), Disp: 60 each, Rfl: 5    losartan-hydrochlorothiazide (HYZAAR) 100-25 MG per tablet, TAKE 1 TABLET BY MOUTH EVERY DAY., Disp: 90 tablet, Rfl: 1    meclizine (ANTIVERT) 25 MG tablet, Take 1 tablet by mouth 3 (Three) Times a Day As Needed for Dizziness., Disp: , Rfl:     metoprolol succinate XL (TOPROL-XL) 100 MG 24 hr tablet, TAKE 1 TABLET BY MOUTH DAILY., Disp: 90 tablet, Rfl: 1    rosuvastatin (CRESTOR) 5 MG tablet, TAKE 1 TABLET BY MOUTH DAILY., Disp: 90 tablet, Rfl: 3    Synthroid 75 MCG tablet, TAKE 1 TABLET BY MOUTH DAILY., Disp: 90 tablet, Rfl: 3    TRIAMCINOLONE ACETONIDE IN, Inhale. triamcinolone acetonide 55 mcg nasal spray aerosol spray 1 by nasal route  every day, Disp: , Rfl:     Yuvafem 10 MCG tablet vaginal tablet, APPLY 1 TABLET IN THE VAGINA 2 TIMES WEEKLY., Disp: , Rfl:      Allergies    Allergies   Allergen Reactions    Tenormin [Atenolol] Cough and Angioedema    Macrobid [Nitrofurantoin Monohyd Macro] Swelling and Other (See Comments)     Swelling around eyes, fatigue    Minoxidil Swelling     Feet swelling.    Plaquenil [Hydroxychloroquine Sulfate] Other (See Comments)     Vision changes over time     Shingrix [Zoster Vac Recomb Adjuvanted] Rash    Omeprazole Diarrhea    Ace Inhibitors Cough    Sudafed [Pseudoephedrine Hcl] Other (See Comments)     INCREASED PRESSURE IN EYES        Problem  List    Patient Active Problem List   Diagnosis    Primary hypertension    Peripheral vascular disease    Diverticulosis    Colon polyps    Constipation    Hashimoto's thyroiditis    Mixed connective tissue disease    Lupus    Uterine fibroid    Atrial tachycardia    Solitary thyroid nodule    History of asthma    GERD    Gallbladder calculus without cholecystitis and no obstruction    HUA (dyspnea on exertion)    Chronic pain of right knee    Shortness of breath    Multiple joint pain    Osteopenia of multiple sites    Photosensitivity of skin    Gastroesophageal reflux disease    Primary osteoarthritis of both first carpometacarpal joints    Hypothyroidism (acquired)    Progressive angina    Coronary artery disease of native artery of native heart with stable angina pectoris       Medications, Allergies, Problems List and Past History were reviewed and updated.    Physical Examination    /64 (BP Location: Left arm, Patient Position: Sitting, Cuff Size: Adult)   Pulse 72   Temp 97.7 °F (36.5 °C) (Infrared)   Resp 16   Wt 57.6 kg (127 lb)   LMP  (LMP Unknown)   BMI 25.65 kg/m²       HEENT: Head- Normocephalic Atraumatic. Facies- Within normal limits. Pinnas- Normal texture and shape bilaterally. Canals- Normal bilaterally. TMs- Normal bilaterally. Nares- Patent bilaterally. Nasal Septum- is normal. There is no tenderness to palpation over the frontal or maxillary sinuses. Lids- Normal bilaterally. Conjunctiva- Clear bilaterally. Sclera- Anicteric bilaterally. Oropharynx- Moist with no lesions. Tonsils- No enlargement, erythema or exudate.    Neck: Thyroid- non enlarged, symmetric and has no nodules. No bruits are detected. ROM- Normal Range of Motion with no rigidity.    Lungs: Auscultation- Clear to auscultation bilaterally. There are no retractions, clubbing or cyanosis. The Expiratory to Inspiratory ratio is equal.    Cardiovascular: There are no carotid bruits. Heart- Normal Rate with Regular rhythm  and no murmurs. There are no gallops. There are no rubs. In the lower extremities there is no edema. The upper extremities do not have edema.      Abdomen: Soft, benign, non-tender with no masses, hernias, organomegaly or scars.    Impression and Assessment    Essential Hypertension.    Coronary Artery Disease.    Hyperlipidemia.    Hypothyroidism.    Gastroesophageal Reflux Disease.    Plan    Gastroesophageal Reflux Disease Plan: The condition is stable. No change is needed in the current plan.    Essential Hypertension Plan: The patient was instructed to continue the current medications.    Coronary Artery Disease Plan: The patient was instructed to continue the current medications.    Hyperlipidemia Plan: The patient was instructed to exercise daily, eat a low fat diet and continue her medications.    Hypothyroidism Plan: The patient was instructed to continue the current medications.      Diagnoses and all orders for this visit:    1. Essential hypertension (Primary)  -     CBC & Differential; Future  -     Comprehensive Metabolic Panel; Future  -     POC Urinalysis Dipstick, Automated; Future    2. Hypothyroidism, unspecified type  -     TSH; Future  -     T4, Free; Future    3. Hyperlipidemia, unspecified hyperlipidemia type  -     Comprehensive Metabolic Panel; Future  -     Lipid Panel; Future    4. Coronary artery disease involving native coronary artery of native heart without angina pectoris    5. Gastroesophageal reflux disease without esophagitis      BMI is >= 25 and <30. (Overweight) The following options were offered after discussion;: weight loss educational material (shared in after visit summary), exercise counseling/recommendations, nutrition counseling/recommendations, and Information on healthy weight added to patient's after visit summary.      Return to Office    The patient was instructed to return for follow-up in 3 months. The patient was instructed to return sooner if the condition  changes, worsens, or does not resolve.

## 2025-01-27 NOTE — PROGRESS NOTES
Patient called to cancel todays cardiac rehab session due to a doctors appointment. Patient plans to return on 1/29/25.

## 2025-01-28 LAB
ALBUMIN SERPL-MCNC: 4.1 G/DL (ref 3.5–5.2)
ALBUMIN/GLOB SERPL: 1.5 G/DL
ALP SERPL-CCNC: 67 U/L (ref 39–117)
ALT SERPL W P-5'-P-CCNC: 14 U/L (ref 1–33)
ANION GAP SERPL CALCULATED.3IONS-SCNC: 12 MMOL/L (ref 5–15)
AST SERPL-CCNC: 18 U/L (ref 1–32)
BILIRUB SERPL-MCNC: 0.6 MG/DL (ref 0–1.2)
BUN SERPL-MCNC: 26 MG/DL (ref 8–23)
BUN/CREAT SERPL: 25 (ref 7–25)
CALCIUM SPEC-SCNC: 9.8 MG/DL (ref 8.6–10.5)
CHLORIDE SERPL-SCNC: 109 MMOL/L (ref 98–107)
CHOLEST SERPL-MCNC: 117 MG/DL (ref 0–200)
CO2 SERPL-SCNC: 21 MMOL/L (ref 22–29)
CREAT SERPL-MCNC: 1.04 MG/DL (ref 0.57–1)
EGFRCR SERPLBLD CKD-EPI 2021: 55.8 ML/MIN/1.73
GLOBULIN UR ELPH-MCNC: 2.7 GM/DL
GLUCOSE SERPL-MCNC: 76 MG/DL (ref 65–99)
HDLC SERPL-MCNC: 42 MG/DL (ref 40–60)
LDLC SERPL CALC-MCNC: 56 MG/DL (ref 0–100)
LDLC/HDLC SERPL: 1.3 {RATIO}
POTASSIUM SERPL-SCNC: 3.8 MMOL/L (ref 3.5–5.2)
PROT SERPL-MCNC: 6.8 G/DL (ref 6–8.5)
SODIUM SERPL-SCNC: 142 MMOL/L (ref 136–145)
T4 FREE SERPL-MCNC: 1.47 NG/DL (ref 0.92–1.68)
TRIGL SERPL-MCNC: 101 MG/DL (ref 0–150)
TSH SERPL DL<=0.05 MIU/L-ACNC: 2.12 UIU/ML (ref 0.27–4.2)
VLDLC SERPL-MCNC: 19 MG/DL (ref 5–40)

## 2025-01-29 ENCOUNTER — TREATMENT (OUTPATIENT)
Dept: CARDIAC REHAB | Facility: HOSPITAL | Age: 75
End: 2025-01-29
Payer: MEDICARE

## 2025-01-29 DIAGNOSIS — Z95.5 STENTED CORONARY ARTERY: Primary | ICD-10-CM

## 2025-01-29 PROCEDURE — 93798 PHYS/QHP OP CAR RHAB W/ECG: CPT

## 2025-01-31 ENCOUNTER — TREATMENT (OUTPATIENT)
Dept: CARDIAC REHAB | Facility: HOSPITAL | Age: 75
End: 2025-01-31
Payer: MEDICARE

## 2025-01-31 DIAGNOSIS — Z95.5 STENTED CORONARY ARTERY: Primary | ICD-10-CM

## 2025-01-31 PROCEDURE — 93798 PHYS/QHP OP CAR RHAB W/ECG: CPT

## 2025-02-03 ENCOUNTER — TREATMENT (OUTPATIENT)
Dept: CARDIAC REHAB | Facility: HOSPITAL | Age: 75
End: 2025-02-03
Payer: MEDICARE

## 2025-02-03 DIAGNOSIS — Z95.5 STENTED CORONARY ARTERY: Primary | ICD-10-CM

## 2025-02-03 PROCEDURE — 93798 PHYS/QHP OP CAR RHAB W/ECG: CPT

## 2025-02-05 ENCOUNTER — TREATMENT (OUTPATIENT)
Dept: CARDIAC REHAB | Facility: HOSPITAL | Age: 75
End: 2025-02-05
Payer: MEDICARE

## 2025-02-05 DIAGNOSIS — Z95.5 STENTED CORONARY ARTERY: Primary | ICD-10-CM

## 2025-02-05 PROCEDURE — 93798 PHYS/QHP OP CAR RHAB W/ECG: CPT

## 2025-02-07 ENCOUNTER — TREATMENT (OUTPATIENT)
Dept: CARDIAC REHAB | Facility: HOSPITAL | Age: 75
End: 2025-02-07
Payer: MEDICARE

## 2025-02-07 DIAGNOSIS — Z95.5 STENTED CORONARY ARTERY: Primary | ICD-10-CM

## 2025-02-07 PROCEDURE — 93798 PHYS/QHP OP CAR RHAB W/ECG: CPT

## 2025-02-10 ENCOUNTER — TREATMENT (OUTPATIENT)
Dept: CARDIAC REHAB | Facility: HOSPITAL | Age: 75
End: 2025-02-10
Payer: MEDICARE

## 2025-02-10 DIAGNOSIS — Z95.5 STENTED CORONARY ARTERY: Primary | ICD-10-CM

## 2025-02-10 PROCEDURE — 93798 PHYS/QHP OP CAR RHAB W/ECG: CPT

## 2025-02-26 DIAGNOSIS — E78.5 HYPERLIPIDEMIA, UNSPECIFIED HYPERLIPIDEMIA TYPE: ICD-10-CM

## 2025-02-26 RX ORDER — ROSUVASTATIN CALCIUM 5 MG/1
5 TABLET, COATED ORAL DAILY
Qty: 90 TABLET | Refills: 4 | Status: SHIPPED | OUTPATIENT
Start: 2025-02-26

## 2025-03-07 NOTE — TELEPHONE ENCOUNTER
"   Specialty Pharmacy Patient Management Program  Refill Outreach     Radha \"Mona\" was contacted today regarding refills of their medication(s).    Refill Questions      Flowsheet Row Most Recent Value   Changes to allergies? No   Changes to medications? No   New conditions or infections since last clinic visit No   Unplanned office visit, urgent care, ED, or hospital admission in the last 4 weeks  No   How does patient/caregiver feel medication is working? Good   Financial problems or insurance changes  No   Since the previous refill, were any specialty medication doses or scheduled injections missed or delayed?  No   Does this patient require a clinical escalation to a pharmacist? No            Delivery Questions      Flowsheet Row Most Recent Value   Delivery method UPS   Delivery address verified with patient/caregiver? Yes   Delivery address Home   Other address preferred n/a   Number of medications in delivery 1   Medication(s) being filled and delivered Rimegepant Sulfate (Nurtec)   Doses left of specialty medications 3 tabs   Copay verified? Yes   Copay amount $0   Copay form of payment No copayment ($0)   Delivery Date Selection 03/11/25   Signature Required No                 Follow-up: 21 day(s)     Sonia Santos, Pharmacy Technician  3/7/2025  09:59 EST    " Spoke with patient, explained we have a urine result from 5/30/24 but do not see where any blood tests were done.  States they did draw her blood that that Dr Redding discussed wanting to check her liver and kidney test while she was here. Reviewed Dr Redding's note and explained he did put in his note where he was ordering future labs but that something must have happened as we do not have record or results.      Discussed with Dr Redding, states for patient to come in and have labs done.  See next telephone encounter.     Spoke with patient, explained Dr Redding said to just come in and we will obtain the labs.  See next telephone encounter.  Patient having dizziness and scheduled to see Dr Redding tomorrow at 12:15pm  and will do labs at that time. Verbalized understanding and appreciation.

## 2025-03-17 RX ORDER — CLOPIDOGREL BISULFATE 75 MG/1
75 TABLET ORAL DAILY
Qty: 90 TABLET | Refills: 0 | Status: SHIPPED | OUTPATIENT
Start: 2025-03-17

## 2025-03-17 NOTE — TELEPHONE ENCOUNTER
Lab Results   Component Value Date    WBC 5.73 01/27/2025    HGB 13.9 01/27/2025    HCT 41.1 01/27/2025    MCV 84.2 01/27/2025     01/27/2025

## 2025-03-21 ENCOUNTER — TELEPHONE (OUTPATIENT)
Dept: INTERNAL MEDICINE | Facility: CLINIC | Age: 75
End: 2025-03-21
Payer: MEDICARE

## 2025-03-21 ENCOUNTER — OFFICE VISIT (OUTPATIENT)
Dept: INTERNAL MEDICINE | Facility: CLINIC | Age: 75
End: 2025-03-21
Payer: MEDICARE

## 2025-03-21 ENCOUNTER — HOSPITAL ENCOUNTER (OUTPATIENT)
Facility: HOSPITAL | Age: 75
Discharge: HOME | End: 2025-03-21
Payer: MEDICARE

## 2025-03-21 VITALS
WEIGHT: 128.2 LBS | SYSTOLIC BLOOD PRESSURE: 120 MMHG | HEART RATE: 72 BPM | BODY MASS INDEX: 25.84 KG/M2 | TEMPERATURE: 96.9 F | HEIGHT: 59 IN | DIASTOLIC BLOOD PRESSURE: 78 MMHG | RESPIRATION RATE: 16 BRPM

## 2025-03-21 DIAGNOSIS — R31.9 HEMATURIA, UNSPECIFIED TYPE: ICD-10-CM

## 2025-03-21 DIAGNOSIS — R82.998 LEUKOCYTES IN URINE: ICD-10-CM

## 2025-03-21 DIAGNOSIS — N30.01 ACUTE CYSTITIS WITH HEMATURIA: ICD-10-CM

## 2025-03-21 DIAGNOSIS — N30.01 ACUTE CYSTITIS WITH HEMATURIA: Primary | ICD-10-CM

## 2025-03-21 DIAGNOSIS — E03.9 HYPOTHYROIDISM (ACQUIRED): ICD-10-CM

## 2025-03-21 LAB
ALBUMIN SERPL-MCNC: 4 G/DL (ref 3.5–5.2)
ALBUMIN/GLOB SERPL: 1.4 G/DL
ALP SERPL-CCNC: 80 U/L (ref 39–117)
ALT SERPL W P-5'-P-CCNC: 20 U/L (ref 1–33)
ANION GAP SERPL CALCULATED.3IONS-SCNC: 9.5 MMOL/L (ref 5–15)
AST SERPL-CCNC: 22 U/L (ref 1–32)
BASOPHILS # BLD AUTO: 0.07 10*3/MM3 (ref 0–0.2)
BASOPHILS NFR BLD AUTO: 0.7 % (ref 0–1.5)
BILIRUB BLD-MCNC: ABNORMAL MG/DL
BILIRUB SERPL-MCNC: 0.6 MG/DL (ref 0–1.2)
BUN SERPL-MCNC: 28 MG/DL (ref 8–23)
BUN/CREAT SERPL: 23.3 (ref 7–25)
CALCIUM SPEC-SCNC: 9.8 MG/DL (ref 8.6–10.5)
CHLORIDE SERPL-SCNC: 103 MMOL/L (ref 98–107)
CLARITY, POC: ABNORMAL
CO2 SERPL-SCNC: 26.5 MMOL/L (ref 22–29)
COLOR UR: ABNORMAL
CREAT SERPL-MCNC: 1.2 MG/DL (ref 0.57–1)
DEPRECATED RDW RBC AUTO: 44.2 FL (ref 37–54)
EGFRCR SERPLBLD CKD-EPI 2021: 47 ML/MIN/1.73
EOSINOPHIL # BLD AUTO: 0.23 10*3/MM3 (ref 0–0.4)
EOSINOPHIL NFR BLD AUTO: 2.3 % (ref 0.3–6.2)
ERYTHROCYTE [DISTWIDTH] IN BLOOD BY AUTOMATED COUNT: 13.8 % (ref 12.3–15.4)
EXPIRATION DATE: ABNORMAL
GLOBULIN UR ELPH-MCNC: 2.9 GM/DL
GLUCOSE SERPL-MCNC: 77 MG/DL (ref 65–99)
GLUCOSE UR STRIP-MCNC: NEGATIVE MG/DL
HCT VFR BLD AUTO: 44.4 % (ref 34–46.6)
HGB BLD-MCNC: 14.3 G/DL (ref 12–15.9)
IMM GRANULOCYTES # BLD AUTO: 0.03 10*3/MM3 (ref 0–0.05)
IMM GRANULOCYTES NFR BLD AUTO: 0.3 % (ref 0–0.5)
KETONES UR QL: NEGATIVE
LEUKOCYTE EST, POC: ABNORMAL
LYMPHOCYTES # BLD AUTO: 2.3 10*3/MM3 (ref 0.7–3.1)
LYMPHOCYTES NFR BLD AUTO: 23.4 % (ref 19.6–45.3)
Lab: ABNORMAL
MCH RBC QN AUTO: 28 PG (ref 26.6–33)
MCHC RBC AUTO-ENTMCNC: 32.2 G/DL (ref 31.5–35.7)
MCV RBC AUTO: 86.9 FL (ref 79–97)
MONOCYTES # BLD AUTO: 0.92 10*3/MM3 (ref 0.1–0.9)
MONOCYTES NFR BLD AUTO: 9.4 % (ref 5–12)
NEUTROPHILS NFR BLD AUTO: 6.28 10*3/MM3 (ref 1.7–7)
NEUTROPHILS NFR BLD AUTO: 63.9 % (ref 42.7–76)
NITRITE UR-MCNC: POSITIVE MG/ML
NRBC BLD AUTO-RTO: 0 /100 WBC (ref 0–0.2)
PH UR: 6 [PH] (ref 5–8)
PLATELET # BLD AUTO: 186 10*3/MM3 (ref 140–450)
PMV BLD AUTO: 11.5 FL (ref 6–12)
POTASSIUM SERPL-SCNC: 3.9 MMOL/L (ref 3.5–5.2)
PROT SERPL-MCNC: 6.9 G/DL (ref 6–8.5)
PROT UR STRIP-MCNC: ABNORMAL MG/DL
RBC # BLD AUTO: 5.11 10*6/MM3 (ref 3.77–5.28)
RBC # UR STRIP: ABNORMAL /UL
SODIUM SERPL-SCNC: 139 MMOL/L (ref 136–145)
SP GR UR: 1.01 (ref 1–1.03)
UROBILINOGEN UR QL: NORMAL
WBC NRBC COR # BLD AUTO: 9.83 10*3/MM3 (ref 3.4–10.8)

## 2025-03-21 PROCEDURE — 85025 COMPLETE CBC W/AUTO DIFF WBC: CPT | Performed by: NURSE PRACTITIONER

## 2025-03-21 PROCEDURE — 25510000001 IOPAMIDOL PER 1 ML: Performed by: NURSE PRACTITIONER

## 2025-03-21 PROCEDURE — 84443 ASSAY THYROID STIM HORMONE: CPT | Performed by: INTERNAL MEDICINE

## 2025-03-21 PROCEDURE — 81001 URINALYSIS AUTO W/SCOPE: CPT | Performed by: NURSE PRACTITIONER

## 2025-03-21 PROCEDURE — 74178 CT ABD&PLV WO CNTR FLWD CNTR: CPT

## 2025-03-21 PROCEDURE — 80053 COMPREHEN METABOLIC PANEL: CPT | Performed by: INTERNAL MEDICINE

## 2025-03-21 PROCEDURE — 87086 URINE CULTURE/COLONY COUNT: CPT | Performed by: NURSE PRACTITIONER

## 2025-03-21 RX ORDER — CEFDINIR 300 MG/1
300 CAPSULE ORAL 2 TIMES DAILY
Qty: 14 CAPSULE | Refills: 0 | Status: SHIPPED | OUTPATIENT
Start: 2025-03-21

## 2025-03-21 RX ORDER — IOPAMIDOL 755 MG/ML
150 INJECTION, SOLUTION INTRAVASCULAR
Status: COMPLETED | OUTPATIENT
Start: 2025-03-21 | End: 2025-03-21

## 2025-03-21 RX ADMIN — IOPAMIDOL 150 ML: 755 INJECTION, SOLUTION INTRAVENOUS at 17:52

## 2025-03-21 NOTE — PROGRESS NOTES
Patient Name: Kavitha Barnhart  : 1950   MRN: [id number]     Chief Complaint:    Chief Complaint   Patient presents with    Blood in Urine       History of Present Illness: Kavitha Barnhart is a 74 y.o. female.  History of Present Illness  The patient presents for evaluation of urinary tract infection (UTI).    Urinary Tract Infection (UTI)  - She began experiencing symptoms of UTI on Saturday, characterized by frequent and urgent urination, accompanied by dysuria.  - This morning, she noticed hematuria with bright red blood and clots.  - She reports no fever but has experienced chills.  - Her baseline body temperature is approximately 97 degrees.  - She reports no recent trauma or history of renal bleeding associated with UTIs.  - She has not taken AZO or Pyridium.  - She has been self-medicating with cranberry juice and pills, which she believes may have alleviated a previous UTI several weeks ago.  - She has a history of recurrent E. coli UTIs, with the most recent episode occurring a few months ago.  - She also had a UTI in 2024, which was successfully treated with an 8-day course of Keflex. However, the symptoms recurred after a few months.    Bladder Tenderness  - She reports no back pain but does report tenderness over her bladder.    Irritable Bowel Syndrome (IBS)  - She has a history of irritable bowel syndrome (IBS), which causes intermittent diarrhea, including an episode this morning.  - She reports no nausea, vomiting, dizziness, weakness, or chest pain.    Medication and Concerns  - She is currently on Celebrex for pain management and finds it effective.  - She is also on Plavix and expresses concern about the potential impact of this medication on her bleeding.    Supplemental information: She had a stent placed in her left main artery in 2024. She had a ruptured colon in 2016.    MEDICATIONS  Current: Celebrex, Tylenol, Plavix  Past: Keflex         Subjective     Review of  System: Review of Systems     Medications:     Current Outpatient Medications:     albuterol sulfate  (90 Base) MCG/ACT inhaler, TAKE 2 PUFFS BY MOUTH EVERY 4 HOURS AS NEEDED FOR WHEEZE., Disp: 6.7 g, Rfl: 4    aspirin 81 MG EC tablet, Take 1 tablet by mouth Daily., Disp: 90 tablet, Rfl: 3    celecoxib (CeleBREX) 100 MG capsule, Take 1 capsule by mouth 2 (Two) Times a Day As Needed for Mild Pain., Disp: 60 capsule, Rfl: 2    clopidogrel (PLAVIX) 75 MG tablet, Take 1 tablet by mouth Daily., Disp: 90 tablet, Rfl: 0    cycloSPORINE (RESTASIS) 0.05 % ophthalmic emulsion, Administer 1 drop to both eyes 2 (Two) Times a Day., Disp: , Rfl:     dicyclomine (BENTYL) 10 MG capsule, Take 1 capsule by mouth 2 (Two) Times a Day As Needed for Abdominal Cramping., Disp: 60 capsule, Rfl: 11    fexofenadine (ALLEGRA) 180 MG tablet, Take 1 tablet by mouth Daily As Needed (allergies)., Disp: , Rfl:     Fluticasone-Salmeterol (ADVAIR/WIXELA) 100-50 MCG/ACT DISKUS, INHALE 1 PUFF 2 (TWO) TIMES A DAY. (Patient taking differently: Inhale 1 puff 2 (Two) Times a Day As Needed.), Disp: 60 each, Rfl: 5    losartan-hydrochlorothiazide (HYZAAR) 100-25 MG per tablet, TAKE 1 TABLET BY MOUTH EVERY DAY., Disp: 90 tablet, Rfl: 1    metoprolol succinate XL (TOPROL-XL) 100 MG 24 hr tablet, TAKE 1 TABLET BY MOUTH DAILY., Disp: 90 tablet, Rfl: 1    rosuvastatin (CRESTOR) 5 MG tablet, TAKE 1 TABLET BY MOUTH DAILY., Disp: 90 tablet, Rfl: 4    Synthroid 75 MCG tablet, TAKE 1 TABLET BY MOUTH DAILY., Disp: 90 tablet, Rfl: 3    TRIAMCINOLONE ACETONIDE IN, Inhale. triamcinolone acetonide 55 mcg nasal spray aerosol spray 1 by nasal route  every day, Disp: , Rfl:     Yuvafem 10 MCG tablet vaginal tablet, APPLY 1 TABLET IN THE VAGINA 2 TIMES WEEKLY., Disp: , Rfl:     cefdinir (OMNICEF) 300 MG capsule, Take 1 capsule by mouth 2 (Two) Times a Day., Disp: 14 capsule, Rfl: 0    Allergies:   Allergies   Allergen Reactions    Tenormin [Atenolol] Cough and  "Angioedema    Macrobid [Nitrofurantoin Monohyd Macro] Swelling and Other (See Comments)     Swelling around eyes, fatigue    Minoxidil Swelling     Feet swelling.    Plaquenil [Hydroxychloroquine Sulfate] Other (See Comments)     Vision changes over time     Shingrix [Zoster Vac Recomb Adjuvanted] Rash    Omeprazole Diarrhea    Ace Inhibitors Cough    Sudafed [Pseudoephedrine Hcl] Other (See Comments)     INCREASED PRESSURE IN EYES        Objective     Physical Exam:   Vital Signs:   Vitals:    03/21/25 1310   BP: 120/78   BP Location: Right arm   Patient Position: Sitting   Cuff Size: Adult   Pulse: 72   Resp: 16   Temp: 96.9 °F (36.1 °C)   TempSrc: Infrared   Weight: 58.2 kg (128 lb 3.2 oz)   Height: 149.9 cm (59\")   PainSc: 5            Physical Exam  Cardiovascular:      Rate and Rhythm: Normal rate and regular rhythm.      Heart sounds: Normal heart sounds.   Pulmonary:      Breath sounds: Normal breath sounds.   Abdominal:      General: Bowel sounds are normal.      Palpations: Abdomen is soft.      Tenderness: There is no abdominal tenderness. There is no right CVA tenderness or left CVA tenderness.   Neurological:      General: No focal deficit present.   Psychiatric:         Mood and Affect: Mood normal.     Physical Exam         Results  Laboratory Studies  Urine dipstick test showed presence of blood.     Assessment / Plan      Assessment/Plan:   Diagnoses and all orders for this visit:    1. Acute cystitis with hematuria (Primary)  -     cefdinir (OMNICEF) 300 MG capsule; Take 1 capsule by mouth 2 (Two) Times a Day.  Dispense: 14 capsule; Refill: 0  -     Cancel: CT Abdomen Pelvis With Contrast; Future  -     Comprehensive metabolic panel; Future  -     CBC w AUTO Differential; Future  -     Comprehensive metabolic panel  -     CBC w AUTO Differential  -     CT Abdomen Pelvis With & Without Contrast; Future  -     Urine Culture - , Urine, Clean Catch; Future    2. Hematuria, unspecified type  -     POC " Urinalysis Dipstick, Automated  -     Cancel: CT Abdomen Pelvis With Contrast; Future  -     Urinalysis With Microscopic - Urine, Clean Catch; Future  -     Urinalysis With Microscopic - Urine, Clean Catch  -     CT Abdomen Pelvis With & Without Contrast; Future  -     Urine Culture - , Urine, Clean Catch; Future    3. Hypothyroidism (acquired)  -     TSH    4. Leukocytes in urine  -     Urine Culture - Urine, Urine, Clean Catch; Future  -     Urine Culture - Urine, Urine, Clean Catch       Assessment & Plan  1. Urinary Tract Infection (UTI)  - Symptoms: frequent and urgent urination, burning and pain with urination, bright red blood with clots in urine  - History of frequent E. coli UTIs  - Currently symptomatic regardless of urine dip results  - Prescribe cefdinir  - Advise high fluid intake  - Tylenol for pain management  - Send urine culture for further analysis  - Order CT abdomen and pelvis to  evaluate hematuria;  rule out other causes (kidney stones, severe infection)  - Conduct kidney function tests and white blood cell count today  - Seek immediate medical attention at ER if no improvement in 1-2 days    2. Hematuria  - Presence of bright red blood and clots in urine  - Patient on Plavix, which may contribute to increased bleeding  - Primary cause likely infection  - Order CT urogram to check kidneys and rule out other causes (thrombus, bleeding in kidney)  - Go to ER if symptoms worsen (increased pain, high fevers, significant bleeding)    PROCEDURE  A stent was placed in the patient's left main artery in September 2024.        Questions and concerns addressed at this appointment.  Patient to report to ER for emergencies.  Patient verbalized an understanding and agreement with plan of care.      Follow Up:   Will discuss follow-up pending results  Patient or patient representative verbalized consent for the use of Ambient Listening during the visit with  JACK Torres for chart documentation.  3/21/2025  13:59 EDT    JACK Torres  [facility]

## 2025-03-21 NOTE — TELEPHONE ENCOUNTER
Caller: Kavitha Barnhart    Relationship: Self    Best call back number: [phone]     What medication are you requesting: N/A    What are your current symptoms: BLOOD IN URINE, FREQUENT AND PAINFUL URINATION     How long have you been experiencing symptoms: A WEEK     Have you had these symptoms before:    [x] Yes  [] No    Have you been treated for these symptoms before:   [x] Yes  [] No    If a prescription is needed, what is your preferred pharmacy and phone number: [facility] [place], [place] - [facility] [address] .  [phone] [facility] [phone]      Additional notes: PATIENT HAS BEEN TRYING TO TREAT THIS ON HER OWN BUT IT IS NOT HELPING          [facility]   [address] [facility], [address] [place] [address]  P: [phone]  F: [phone]    IRVIN Muñoz is a 19 y.o. male who is seen over telehealth for GERD and Bipolar disorder. He is requesting a refill of omeprazole and would also like to discuss mental health medications. He is currently off all medications for mental health, was previously on trileptal, lithium, wellbutrin , and abilify. Reports some racing thoughts and inability to sleep during our discussion today. Denies SI or HI. He is currently looking to schedule an appointment with psychiatry.     Patient Active Problem List    Diagnosis    Impotence due to erectile dysfunction    Hyperactive pharyngeal gag reflex    Bipolar II disorder, severe, depressed, with anxious distress ([facility] [place] [address]    Borderline personality disorder in adolescent ([facility] [place] [address]    Cannabis abuse, episodic    Attention deficit hyperactivity disorder (ADHD), predominantly inattentive type    Irritable bowel syndrome with both constipation and diarrhea          Past Medical History:   Diagnosis Date    ADD (attention deficit disorder)     ADD (attention deficit disorder)     Anxiety     Bipolar 1 disorder ([facility] [place] [address]     Depression     Irritable bowel syndrome with both constipation and diarrhea 10/16/2018     Past Surgical History:   Procedure Laterality Date    HX OTHER SURGICAL      Egd     Social History     Socioeconomic History    Marital status: SINGLE     Spouse name: Not on file    Number of children: Not on file    Years of education: Not on file    Highest education level: Not on file   Occupational History    Not on file   Social Needs    Financial resource strain: Not on file    Food insecurity     Worry: Not on file     Inability: Not on file    Transportation needs     Medical: Not on file     Non-medical: Not on file   Tobacco Use    Smoking status: Current Some Day Smoker    Smokeless tobacco: Current User   Substance and Sexual Activity    Alcohol use: Yes    Drug use: Yes     Types: Marijuana    Sexual activity: Never   Lifestyle    Physical activity     Days per week: Not on file     Minutes per session: Not on file    Stress: Not on file   Relationships    Social connections     Talks on phone: Not on file     Gets together: Not on file     Attends Taoism service: Not on file     Active member of club or organization: Not on file     Attends meetings of clubs or organizations: Not on file     Relationship status: Not on file    Intimate partner violence     Fear of current or ex partner: Not on file     Emotionally abused: Not on file     Physically abused: Not on file     Forced sexual activity: Not on file   Other Topics Concern    Not on file   Social History Narrative    Not on file     Family History   Problem Relation Age of Onset    Other Mother         autoimmune disease - Sjogrens    Cancer Maternal Grandfather 61        stomach     No Known Allergies            The medications were reviewed and updated in the medical record. The past medical history, past surgical history, and family history were reviewed and updated in the medical record. REVIEW OF SYSTEMS   Review of Systems   Constitutional: Negative for malaise/fatigue. HENT: Negative for congestion. Eyes: Negative for blurred vision and pain. Respiratory: Negative for cough and shortness of breath. Cardiovascular: Negative for chest pain and palpitations. Gastrointestinal: Positive for heartburn. Negative for abdominal pain. Genitourinary: Negative for frequency and urgency. Musculoskeletal: Negative for joint pain and myalgias. Neurological: Negative for dizziness, tingling, sensory change, weakness and headaches. Psychiatric/Behavioral: Negative for depression, memory loss and substance abuse. PHYSICAL EXAM   NO VITALS WERE TAKEN FOR THIS VISIT    ASSESSMENT/ PLAN   Diagnoses and all orders for this visit:    1.  Bipolar II disorder, severe, depressed, with anxious distress (HCC)  -     QUEtiapine (SEROquel) 25 mg tablet; Take 1 Tab by mouth nightly.  -Discussed follow-up with psychiatry as he has previously required multiple medications, also encourage counseling. Will trial low dose Seroquel nightly. 2. Gastroesophageal reflux disease, unspecified whether esophagitis present  -     omeprazole (PRILOSEC) 40 mg capsule; Take 1 Cap by mouth daily. Indications: gastroesophageal reflux disease    I was in the office while conducting this encounter. Consent:  He and/or his healthcare decision maker is aware that this patient-initiated Telehealth encounter is a billable service, with coverage as determined by his insurance carrier. He is aware that he may receive a bill and has provided verbal consent to proceed: Yes    This virtual visit was conducted telephone encounter only. -  I affirm this is a Patient Initiated Episode with an Established Patient who has not had a related appointment within my department in the past 7 days or scheduled within the next 24 hours. Note: this encounter is not billable if this call serves to triage the patient into an appointment for the relevant concern. Total Time: minutes: 21-30 minutes. Disclaimer:  Advised patient to call back or return to office if symptoms worsen/change/persist.  Discussed expected course/resolution/complications of diagnosis in detail with patient. Medication risks/benefits/alternatives discussed with patient. Patient was given an after visit summary which includes diagnoses, current medications, & vitals. Discussed patient instructions and advised to read to all patient instructions regarding care. Patient expressed understanding with the diagnosis and plan. This note will not be viewable in [address]         Pablito Roy NP  1/14/2021        (This document has been electronically signed)

## 2025-03-21 NOTE — TELEPHONE ENCOUNTER
Spoke with Kavitha and scheduled her for 1pm today with Shayy Sullivan    Verbal understanding given

## 2025-03-22 ENCOUNTER — RESULTS FOLLOW-UP (OUTPATIENT)
Dept: ENDOCRINOLOGY | Facility: CLINIC | Age: 75
End: 2025-03-22
Payer: MEDICARE

## 2025-03-22 LAB
BACTERIA UR QL AUTO: ABNORMAL /HPF
BILIRUB UR QL STRIP: NEGATIVE
CLARITY UR: ABNORMAL
COLOR UR: ABNORMAL
GLUCOSE UR STRIP-MCNC: NEGATIVE MG/DL
HGB UR QL STRIP.AUTO: ABNORMAL
HYALINE CASTS UR QL AUTO: ABNORMAL /LPF
KETONES UR QL STRIP: NEGATIVE
LEUKOCYTE ESTERASE UR QL STRIP.AUTO: ABNORMAL
NITRITE UR QL STRIP: POSITIVE
PH UR STRIP.AUTO: 6 [PH] (ref 5–8)
PROT UR QL STRIP: ABNORMAL
RBC # UR STRIP: ABNORMAL /HPF
REF LAB TEST METHOD: ABNORMAL
SP GR UR STRIP: 1.02 (ref 1–1.03)
SQUAMOUS #/AREA URNS HPF: ABNORMAL /HPF
TSH SERPL DL<=0.05 MIU/L-ACNC: 1.56 UIU/ML (ref 0.27–4.2)
UROBILINOGEN UR QL STRIP: ABNORMAL
WBC # UR STRIP: ABNORMAL /HPF

## 2025-03-23 LAB — BACTERIA SPEC AEROBE CULT: NORMAL

## 2025-03-27 ENCOUNTER — OFFICE VISIT (OUTPATIENT)
Dept: UROLOGY | Facility: CLINIC | Age: 75
End: 2025-03-27
Payer: MEDICARE

## 2025-03-27 VITALS — DIASTOLIC BLOOD PRESSURE: 78 MMHG | HEART RATE: 74 BPM | SYSTOLIC BLOOD PRESSURE: 146 MMHG | OXYGEN SATURATION: 99 %

## 2025-03-27 DIAGNOSIS — R31.0 GROSS HEMATURIA: Primary | ICD-10-CM

## 2025-03-27 LAB
BILIRUB BLD-MCNC: NEGATIVE MG/DL
CLARITY, POC: CLEAR
COLOR UR: YELLOW
EXPIRATION DATE: ABNORMAL
GLUCOSE UR STRIP-MCNC: NEGATIVE MG/DL
KETONES UR QL: NEGATIVE
LEUKOCYTE EST, POC: NEGATIVE
Lab: ABNORMAL
NITRITE UR-MCNC: NEGATIVE MG/ML
PH UR: 6 [PH] (ref 5–8)
PROT UR STRIP-MCNC: NEGATIVE MG/DL
RBC # UR STRIP: ABNORMAL /UL
SP GR UR: 1.02 (ref 1–1.03)
UROBILINOGEN UR QL: NORMAL

## 2025-03-27 NOTE — PROGRESS NOTES
Gross Hematuria Female Office Visit      Patient Name: Kavitha Barnhart  : 1950   MRN: [id number]     Chief Complaint:  Gross Hematuria.   Chief Complaint   Patient presents with    Blood in Urine     Gross hematuria       Referring Provider: No ref. provider found    History of Present Illness: Ms. Kavitha Barnhart is a 74 y.o. female with past medical history including hypertension, peripheral vascular disease, coronary artery disease, Hashimoto's thyroiditis, hypothyroidism, diverticulosis, GERD, constipation, lupus, history of asthma and uterine fibroid. Patient presents to urology today for gross hematuria.     Patient reports gross hematuria last week that was cherry red color.  Patient reports she has had 3 UTIs since . She is a non-smoker and denies family history of renal cell or bladder carcinoma.  She reports her brother has polycystic kidney disease.    UTI related symptoms include dysuria, suprapubic pain/discomfort, urinary urgency and frequency.  She denies history of nephrolithiasis. She currently denies any UTI related symptoms. Patient denies vaginal dryness.    Daily fluid intake includes 1 cup of coffee in the morning, 8 ounce glass of cranberry juice, 16 ounces of tea. She reports that she is working on improving water intake.    Urinalysis today is negative for infection. Positive for trace blood. Patient reports she takes Plavix and aspirin daily.    CT AP obtained on 3/21/2025 revealed left renal cyst. No nephrolithiasis or masses identified.       Subjective      Review of System: Review of Systems   I have reviewed the ROS documented by my clinical staff,  I have updated appropriately and I agree. JACK Montes    Past Medical History:  Past Medical History:   Diagnosis Date    Abnormal ECG but low risk assessment    Allergic     See chart    Allergic rhinitis Many years ago    Anemia Prior to menopause    Arrhythmia     Arthritis     Arthritis of back     Arthritis of  neck     Asthma     Asthma, intrinsic around 2000    Atrial fibrillation     Received 4 to 5 notifications on [facility] re: I may have Atrial fib    Bursitis of hip     Cataract     Cervical disc disease     Cholelithiasis 2016    Gallbladder removed 2022    Chronic diarrhea     Colon polyp     Coronary artery disease of native artery of native heart with stable angina pectoris 09/24/2024    CTS (carpal tunnel syndrome)     Diastolic dysfunction 06/07/2023    Disease of thyroid gland     Diverticulitis of intestine with perforation     Diverticulosis     E-coli UTI 2019    treated with antibioiutcs and urine rechecked and clean per pt report    GERD (gastroesophageal reflux disease)     Hashimoto's disease     Headache     Heart murmur     fast heart beat; sometimes irregular    Hip arthrosis     History of transfusion     several times, with surgeries post op and after c section, never a reaction    Hyperlipidemia sometimes marginally high    Hypertension     Hyperthyroidism     Thyroid nodule, hashimoto's thyroiditis    Hypothyroidism     Inflammatory bowel disease 2016    Irritable bowel syndrome     Kidney stone     Knee sprain     Knee swelling     Lactose intolerance     Low back pain     sometimes    Lumbosacral disc disease     Lupus     Neuromuscular disorder     Nerve damage lower back    Osteopenia     Palpitations     POSSIBLE ATRIAL TACHYCARDIA    Periarthritis of shoulder     PONV (postoperative nausea and vomiting)     Raynaud's disease     Seizure disorder     Shortness of breath     Sinusitis very long time    Sjogren's disease     SVT (supraventricular tachycardia)     Tennis elbow     Thyroid nodule 2006    Tremor     sometimes hands shake    Visual impairment     Vitamin D deficiency 2000       Past Surgical History:  Past Surgical History:   Procedure Laterality Date    ADENOIDECTOMY      Removed as a child    APPENDECTOMY  03/2016    BONE GRAFT  05/2021    BREAST BIOPSY Left 10/15/2021     excisional    BREAST SURGERY  10/2021    CARDIAC CATHETERIZATION N/A 2024    Procedure: Left Heart Cath;  Surgeon: Roque Saenz MD;  Location:  [facility];  Service: Cardiovascular;  Laterality: N/A;    CARDIAC CATHETERIZATION N/A 2024    Procedure: Stent MARIE coronary;  Surgeon: Roque Saenz MD;  Location:  [facility];  Service: Cardiovascular;  Laterality: N/A;     SECTION      CHOLECYSTECTOMY N/A 2022    Procedure: CHOLECYSTECTOMY LAPAROSCOPIC;  Surgeon: Angie López MD;  Location:  [facility];  Service: General;  Laterality: N/A;    COLON RESECTION  2016    sigmoid and partial rectum     COLON SURGERY      COLONOSCOPY      EXPLORATORY LAPAROTOMY      fibroid    ILEOSTOMY CLOSURE N/A 2016    Procedure: ILEOSTOMY  EXCISION AND TAKEDOWN;  Surgeon: Brooks Jacob MD;  Location:  [facility];  Service:     KNEE ARTHROSCOPY      right knee scope    KNEE SURGERY      MYOMECTOMY  1970    PULMONARY STRESS TEST  2023    SMALL INTESTINE SURGERY      TONSILECTOMY, ADENOIDECTOMY, BILATERAL MYRINGOTOMY AND TUBES      TONSILLECTOMY      TOOTH EXTRACTION      2021    TOOTH EXTRACTION      UPPER GASTROINTESTINAL ENDOSCOPY         Medications:    Current Outpatient Medications:     albuterol sulfate  (90 Base) MCG/ACT inhaler, TAKE 2 PUFFS BY MOUTH EVERY 4 HOURS AS NEEDED FOR WHEEZE., Disp: 6.7 g, Rfl: 4    aspirin 81 MG EC tablet, Take 1 tablet by mouth Daily., Disp: 90 tablet, Rfl: 3    cefdinir (OMNICEF) 300 MG capsule, Take 1 capsule by mouth 2 (Two) Times a Day., Disp: 14 capsule, Rfl: 0    celecoxib (CeleBREX) 100 MG capsule, Take 1 capsule by mouth 2 (Two) Times a Day As Needed for Mild Pain., Disp: 60 capsule, Rfl: 2    clopidogrel (PLAVIX) 75 MG tablet, Take 1 tablet by mouth Daily., Disp: 90 tablet, Rfl: 0    cycloSPORINE (RESTASIS) 0.05 % ophthalmic emulsion, Administer 1 drop to both eyes 2 (Two) Times a Day., Disp: ,  Rfl:     dicyclomine (BENTYL) 10 MG capsule, Take 1 capsule by mouth 2 (Two) Times a Day As Needed for Abdominal Cramping., Disp: 60 capsule, Rfl: 11    fexofenadine (ALLEGRA) 180 MG tablet, Take 1 tablet by mouth Daily As Needed (allergies)., Disp: , Rfl:     Fluticasone-Salmeterol (ADVAIR/WIXELA) 100-50 MCG/ACT DISKUS, INHALE 1 PUFF 2 (TWO) TIMES A DAY. (Patient taking differently: Inhale 1 puff 2 (Two) Times a Day As Needed.), Disp: 60 each, Rfl: 5    losartan-hydrochlorothiazide (HYZAAR) 100-25 MG per tablet, TAKE 1 TABLET BY MOUTH EVERY DAY., Disp: 90 tablet, Rfl: 1    metoprolol succinate XL (TOPROL-XL) 100 MG 24 hr tablet, TAKE 1 TABLET BY MOUTH DAILY., Disp: 90 tablet, Rfl: 1    rosuvastatin (CRESTOR) 5 MG tablet, TAKE 1 TABLET BY MOUTH DAILY., Disp: 90 tablet, Rfl: 4    Synthroid 75 MCG tablet, TAKE 1 TABLET BY MOUTH DAILY., Disp: 90 tablet, Rfl: 3    TRIAMCINOLONE ACETONIDE IN, Inhale. triamcinolone acetonide 55 mcg nasal spray aerosol spray 1 by nasal route  every day, Disp: , Rfl:     Yuvafem 10 MCG tablet vaginal tablet, APPLY 1 TABLET IN THE VAGINA 2 TIMES WEEKLY., Disp: , Rfl:     Allergies:  Allergies   Allergen Reactions    Tenormin [Atenolol] Cough and Angioedema    Macrobid [Nitrofurantoin Monohyd Macro] Swelling and Other (See Comments)     Swelling around eyes, fatigue    Minoxidil Swelling     Feet swelling.    Plaquenil [Hydroxychloroquine Sulfate] Other (See Comments)     Vision changes over time     Shingrix [Zoster Vac Recomb Adjuvanted] Rash    Omeprazole Diarrhea    Ace Inhibitors Cough    Sudafed [Pseudoephedrine Hcl] Other (See Comments)     INCREASED PRESSURE IN EYES        Social History:  Social History     Socioeconomic History    Marital status:    Tobacco Use    Smoking status: Never     Passive exposure: Past    Smokeless tobacco: Never   Vaping Use    Vaping status: Never Used   Substance and Sexual Activity    Alcohol use: Not Currently     Comment: occasional    Drug  use: No    Sexual activity: Not Currently     Partners: Male     Birth control/protection: Post-menopausal, None       Family History:  Family History   Problem Relation Age of Onset    Lung cancer Mother     Hyperlipidemia Mother     Thyroid disease Mother     Pancreatic cancer Mother     Cancer Mother     Diabetes Mother     Hypertension Mother     Pancreatitis Mother     Heart disease Father     Hypertension Father     Heart failure Father     Heart attack Father     Hyperlipidemia Father     Cancer Brother     Heart disease Brother         Recently  of heart attack    Hyperlipidemia Brother     Hypertension Brother     Kidney disease Brother         Kidney Failure, Polycystic Kidney Disease    Liver disease Brother     Hyperlipidemia Brother     Hypertension Brother     Kidney disease Brother         Polycystic Kidney Disease    Crohn's disease Brother     Cancer Brother         My brother, Radu, in 2024 underwent  small intestine surgerys because of his Crohn's disease and the pathology report stated that there was a cancerous tumor found, which was approximately 2 cm in size.    Colon cancer Brother         Not colon cancer but a small cancer was found in his small intestine when removed because of long term Crohn's disease    Rheumatologic disease Brother     Thyroid disease Son     Thyroid disease Son     Heart attack Brother          2022 of heart attack    Heart failure Brother     Breast cancer Neg Hx     Ovarian cancer Neg Hx     Colon polyps Neg Hx     Esophageal cancer Neg Hx      Bladder & Bowel Symptom Questionnaire    How often do you usually urinate during the day ?   2 - About every 2-3 hours    2.   How many timed do you urinate at night?   0 - 0-1 time at night   3.   What is the reason that you usually urinate?   2 - Moderate urge (can delay 10-60 min)   4.   Once you get the urge to go, how long can you     comfortably delay?   2 - 10-30 min   5.   How often do you get  a sudden urge that makes you rush to the bathroom?   2 - A few times a month   6.   How often does a sudden urge to urinate result in you leaking urine or wetting pads?   2 - A few times a month   7.  In your opinion, how good is your bladder control?   2 - Good   8.  Do you have accidental bowel leakage?   no   9.  Do you have difficulty fully emptying your bladder?   no   10.  Do you experience accidental leakage when performing some physical activity such as coughing, sneezing, laughing or exercise?   yes   11. Have you tried medications to help improve your symptoms?   yes   12. Would you be interested in learning about a long-lasting option that may help you with your symptoms?   yes                                                                             Total Score   12     0-7 (Mild) 8-16 (Moderate) 17-28 (Severe)        Post void residual bladder scan:   0 cc     Objective     Physical Exam:   Vital Signs:   Vitals:    03/27/25 1107   BP: 146/78   Pulse: 74   SpO2: 99%     There is no height or weight on file to calculate BMI.     Physical Exam    Labs:   Brief Urine Lab Results  (Last result in the past 365 days)        Color   Clarity   Blood   Leuk Est   Nitrite   Protein   CREAT   Urine HCG        03/27/25 1116 Yellow   Clear   Trace   Negative   Negative   Negative                   Urine Culture          12/22/2024    16:01 3/21/2025    14:02   Urine Culture   Urine Culture >100,000 CFU/mL Escherichia coli  25,000 CFU/mL Mixed Charu Isolated         Lab Results   Component Value Date    GLUCOSE 77 03/21/2025    CALCIUM 9.8 03/21/2025     03/21/2025    K 3.9 03/21/2025    CO2 26.5 03/21/2025     03/21/2025    BUN 28 (H) 03/21/2025    CREATININE 1.20 (H) 03/21/2025    EGFRIFNONA 59 (L) 10/19/2021    BCR 23.3 03/21/2025    ANIONGAP 9.5 03/21/2025       Lab Results   Component Value Date    WBC 9.83 03/21/2025    HGB 14.3 03/21/2025    HCT 44.4 03/21/2025    MCV 86.9 03/21/2025      "03/21/2025       Images:   CT Abdomen Pelvis With & Without Contrast  Result Date: 3/21/2025  1.No renal or ureteric calculi. No masses. 2.Hepatic cysts. 3.Left renal cyst. 4.Diverticulosis without diverticulitis. 5.Uterine fibroids. Electronically Signed: Jossue Fuentes MD  3/21/2025 6:31 PM EDT  Workstation ID: [id number]      Measures:   Tobacco:   Kavitha Barnhart  reports that she has never smoked. She has been exposed to tobacco smoke. She has never used smokeless tobacco.            Urine Incontinence: Patient reports that she is not currently experiencing any symptoms of urinary incontinence.     Assessment / Plan      Assessment/Plan:   Ms. Barnhart is a 74 y.o. female who presented today for evaluation of gross hematuria.      The patient was counseled regarding the possible etiologies, relevant work-up and diagnostic approach for gross hematuria, as well as the relevant risk categories as assigned by the American U[employer] Association guidelines.  I discussed that the definition of microscopic hematuria includes a microscopic urinalysis (not dipstick UA) positive for greater than 3 RBCs per high-powered field under microscopy.  We also discussed that any history of gross hematuria (or visible hematuria) places a patient at higher risk for occult urologic malignancies and necessitates prompt workup.  We discussed the aforementioned risk categories as assigned by the AUA, which are depicted below.  Ultimately, work-up is mandatory for gross or visible hematuria, as indicated by the \"HIGH RISK\" category and recommendations as depicted by the AUA Guidelines.  Given the patient's age, 74; the patient is deemed HIGH risk, and for these reasons I recommend proceeding with a flexible diagnostic cystoscopy and CT urogram to assess the collecting system of the kidney and bladder.                Diagnoses and all orders for this visit:    1. Gross hematuria (Primary)  -     POC Urinalysis Dipstick, Automated           Follow " Up:   Return in about 3 months (around 6/27/2025) for Cystoscopy with Dr. Poon in 3 weeks, Next scheduled follow up.    I spent approximately 45 minutes providing clinical care for this patient; including review of patient's chart and provider documentation, face to face time spent with patient in examination room (obtaining history, performing physical exam, discussing diagnosis and management options), placing orders, and completing patient documentation.       JACK Montes  [facility] [place]

## 2025-04-01 NOTE — PROGRESS NOTES
Encounter Date:2019      Patient ID: Kavitha Barnhart is a 68 y.o. female.    PCP: Farzad Redding MD     Chief Complaint: Palpitations      PROBLEM LIST:  1. Palpitations/possible atrial tachycardia:  a. Patient was admitted for diverticulitis complicated by bowel perforation on 2016, and while in the hospital began having palpitations and shortness of breath that showed possible atrial tachycardia versus atrial fibrillation.   b. Patient reports a long-standing history of palpitations and tachycardia.   c. Echocardiogram at [facility], 01/10/2016, showed LVH with ejection fraction of 60% to 65%. No significant valve disease.   d. A 24-hour Holter, 2016, showed sinus rhythm with average heart rate of 85. Rare PVCs and PACs with occasional short runs of SVT/PAD. No symptoms were reported.   2. Hypertension.   3. Diverticulitis complicated by perforated sigmoid colon:  a. Currently awaiting surgery at the beginning of 2016. He was hospitalized at [facility], 2016 for approximately 2 weeks and treated with antibiotics.   4. Lupus and mixed connective tissue disease.   5. Raynaud’s disease.  6. Sjögren's disease.  7. Hashimoto disease.  8. Surgical history:  a. .  b. Knee surgery.    History of Present Illness  Patient presents today for annual follow-up with a history of palpitations and hypertension. Since last visit, she has purchased an Apple Watch which gave her a few atrial fibrillation warnings. She denies any palpitations or fluttering sensation during these alerts by her watch. She also reports occasional high heart rates, up to 200 bpm. She has otherwise been doing well from a cardiovascular standpoint. She does report some dizziness, which she believes is a side effect due to her Ditropan. She has been holding this medication. Denies chest pain, shortness of breath, leg swelling, and syncope. Remains busy and active with [employer]  and ballet, with no significant cardiac limitations.    Allergies   Allergen Reactions   • Macrobid [Nitrofurantoin Monohyd Macro] Swelling and Other (See Comments)     Swelling around eyes, fatigue   • Ace Inhibitors Cough   • Plaquenil [Hydroxychloroquine Sulfate] Other (See Comments)     EYE PROBLEMS   • Sudafed [Pseudoephedrine Hcl] Other (See Comments)     INCREASED PRESSURE IN EYES    • Tenormin [Atenolol] Cough and Angioedema         Current Outpatient Medications:   •  ADVAIR DISKUS 100-50 MCG/DOSE DISKUS, inhale 1 dose by mouth twice a day, Disp: 60 each, Rfl: 5  •  albuterol sulfate  (90 Base) MCG/ACT inhaler, Inhale 2 puffs Every 4 (Four) Hours As Needed for Wheezing., Disp: 1 inhaler, Rfl: 5  •  aspirin 81 MG EC tablet, Take 81 mg by mouth daily., Disp: , Rfl:   •  celecoxib (CeleBREX) 100 MG capsule, Take 1 capsule by mouth 2 (Two) Times a Day As Needed for Mild Pain ., Disp: 60 capsule, Rfl: 5  •  diclofenac (VOLTAREN) 1 % gel gel, Apply 4 g topically to the appropriate area as directed 4 (Four) Times a Day. (Patient taking differently: Apply 4 g topically to the appropriate area as directed 4 (Four) Times a Day As Needed.), Disp: 100 g, Rfl: 3  •  estradiol (VAGIFEM) 10 MCG tablet vaginal tablet, Insert 1 tablet into the vagina 2 (Two) Times a Week., Disp: , Rfl: 12  •  fexofenadine (ALLEGRA) 180 MG tablet, Take 180 mg by mouth Daily., Disp: , Rfl:   •  losartan-hydrochlorothiazide (HYZAAR) 100-12.5 MG per tablet, take 1 tablet by mouth once daily, Disp: 30 tablet, Rfl: 5  •  metoprolol succinate XL (TOPROL-XL) 200 MG 24 hr tablet, take 1 tablet by mouth once daily, Disp: 90 tablet, Rfl: 1  •  SYNTHROID 75 MCG tablet, Take 75 mcg by mouth Daily., Disp: , Rfl: 0  •  Triamcinolone Acetonide (NASACORT) 55 MCG/ACT nasal inhaler, 2 sprays into each nostril daily., Disp: , Rfl:     The following portions of the patient's history were reviewed and updated as appropriate: allergies, current  "medications, past family history, past medical history, past social history, past surgical history and problem list.    ROS  Review of Systems   Constitution: Negative for chills, fatigue, fever, generalized weakness, weight gain and weight loss.   Cardiovascular: Negative for chest pain, claudication, dyspnea on exertion, leg swelling, orthopnea, paroxysmal nocturnal dyspnea and syncope. Positive for dizziness and tachycardia.  Respiratory: Negative for cough, shortness of breath, snoring, and wheezing.  HENT: Negative for ear pain, nosebleeds, and tinnitus.  Gastrointestinal: Negative for abdominal pain, constipation, diarrhea, nausea and vomiting.   Genitourinary: No urinary symptoms   Neurological: Negative for headaches, loss of balance, numbness, and symptoms of stroke.  Psychiatric: Normal mental status.     All other systems reviewed and are negative.    Objective:     /86 (BP Location: Right arm, Patient Position: Sitting)   Pulse 65   Ht 147.3 cm (58\")   Wt 61.7 kg (136 lb)   LMP  (LMP Unknown)   SpO2 97%   BMI 28.42 kg/m²    Repeat blood pressure measurement by, Naveen Sarmiento MD, at 160/90      Physical Exam  Constitutional: Patient appears well-developed and well-nourished.   HENT: HEENT exam unremarkable.   Neck: Neck supple. No JVD present. No carotid bruits.   Cardiovascular: Normal rate, regular rhythm and normal heart sounds. No murmur heard.   2+ symmetric pulses.   Pulmonary/Chest: Breath sounds normal. Does not exhibit tenderness.   Abdominal: Abdomen benign.   Musculoskeletal: Does not exhibit edema.   Neurological: Neurological exam unremarkable.   Vitals reviewed.    Lab Review:   Lab Results   Component Value Date    GLUCOSE 91 03/12/2019    BUN 26 (H) 03/12/2019    CREATININE 1.18 03/12/2019    EGFRIFNONA 45 (L) 03/12/2019    BCR 22.0 03/12/2019    K 4.2 03/12/2019    CO2 24.0 03/12/2019    CALCIUM 9.5 03/12/2019    ALBUMIN 4.28 03/12/2019    AST 24 03/12/2019    ALT 28 03/12/2019 "     Lab Results   Component Value Date    CHOL 205 (H) 03/12/2019    TRIG 110 03/12/2019    HDL 59 03/12/2019     (H) 03/12/2019      Lab Results   Component Value Date    TSH 1.113 03/12/2019       Procedures       Assessment:      Diagnosis Plan   1. Essential hypertension  Start amlodipine 5 mg for better control of hypertension.   2. Palpitations and smart watch warnings of atrial fibrillation with episodes of tachycardia Cardiac monitor to assess for tachypalpitations and arrhythmias.     Plan:   Cardiac event monitor to assess for tachypalpitations and arrhythmias.  Start amlodipine 5 mg for HTN. Patient advised to start monitoring her blood pressure at home and keep a log.  Continue current medications.   FU in 1 MO, sooner as needed.  Thank you for allowing us to participate in the care of your patient.     Scribed for Naveen Sarmiento MD by Jerrica Schmitt. 5/24/2019  11:29 AM      I, Naveen Sarmiento MD, personally performed the services described in this documentation as scribed by the above named individual in my presence, and it is both accurate and complete.  5/24/2019  11:35 AM        Please note that portions of this note may have been completed with a voice recognition program. Efforts were made to edit the dictations, but occasionally words are mistranscribed.       show

## 2025-04-02 ENCOUNTER — TRANSCRIBE ORDERS (OUTPATIENT)
Dept: CARDIAC REHAB | Facility: HOSPITAL | Age: 75
End: 2025-04-02
Payer: MEDICARE

## 2025-04-02 ENCOUNTER — TREATMENT (OUTPATIENT)
Dept: CARDIAC REHAB | Facility: HOSPITAL | Age: 75
End: 2025-04-02

## 2025-04-02 DIAGNOSIS — Z00.00 PREVENTATIVE HEALTH CARE: Primary | ICD-10-CM

## 2025-04-03 ENCOUNTER — TELEPHONE (OUTPATIENT)
Age: 75
End: 2025-04-03
Payer: MEDICARE

## 2025-04-03 NOTE — TELEPHONE ENCOUNTER
TRIED CALLING PT TO OFFER SOONER APPT.    NO ANSWER, DID NOT LEAVE VM.    MOVING TO NEXT IN LINE.    -TAWNYA

## 2025-04-04 ENCOUNTER — OFFICE VISIT (OUTPATIENT)
Dept: CARDIAC REHAB | Facility: HOSPITAL | Age: 75
End: 2025-04-04

## 2025-04-04 DIAGNOSIS — Z00.00 PREVENTATIVE HEALTH CARE: ICD-10-CM

## 2025-04-09 DIAGNOSIS — M25.552 BILATERAL HIP PAIN: ICD-10-CM

## 2025-04-09 DIAGNOSIS — I10 ESSENTIAL HYPERTENSION: ICD-10-CM

## 2025-04-09 DIAGNOSIS — M25.551 BILATERAL HIP PAIN: ICD-10-CM

## 2025-04-09 RX ORDER — CELECOXIB 100 MG/1
100 CAPSULE ORAL 2 TIMES DAILY PRN
Qty: 60 CAPSULE | Refills: 3 | Status: SHIPPED | OUTPATIENT
Start: 2025-04-09

## 2025-04-09 RX ORDER — LOSARTAN POTASSIUM AND HYDROCHLOROTHIAZIDE 25; 100 MG/1; MG/1
1 TABLET ORAL DAILY
Qty: 90 TABLET | Refills: 2 | Status: SHIPPED | OUTPATIENT
Start: 2025-04-09

## 2025-04-09 RX ORDER — METOPROLOL SUCCINATE 100 MG/1
100 TABLET, EXTENDED RELEASE ORAL DAILY
Qty: 90 TABLET | Refills: 2 | Status: SHIPPED | OUTPATIENT
Start: 2025-04-09

## 2025-04-09 RX ORDER — LEVOTHYROXINE SODIUM 75 MCG
75 TABLET ORAL DAILY
Qty: 90 TABLET | Refills: 4 | OUTPATIENT
Start: 2025-04-09

## 2025-04-09 NOTE — TELEPHONE ENCOUNTER
Rx Refill Note  Requested Prescriptions     Pending Prescriptions Disp Refills    Synthroid 75 MCG tablet [Pharmacy Med Name: SYNTHROID 75 MCG TABLET 75 Tablet] 90 tablet 4     Sig: TAKE 1 TABLET BY MOUTH DAILY.      Last office visit with prescribing clinician: 9/18/2024     Next office visit with prescribing clinician: Visit date not found     Kenya Navarro MA  04/09/25, 13:22 EDT

## 2025-04-15 ENCOUNTER — TELEPHONE (OUTPATIENT)
Age: 75
End: 2025-04-15
Payer: MEDICARE

## 2025-04-15 RX ORDER — LEVOTHYROXINE SODIUM 75 MCG
75 TABLET ORAL DAILY
Qty: 90 TABLET | Refills: 3 | Status: SHIPPED | OUTPATIENT
Start: 2025-04-15

## 2025-04-15 NOTE — TELEPHONE ENCOUNTER
Rx Refill Note  Requested Prescriptions     Pending Prescriptions Disp Refills    Synthroid 75 MCG tablet 90 tablet 3     Sig: Take 1 tablet by mouth Daily.          Last office visit with prescribing clinician: 9/18/2024     Next office visit with prescribing clinician: 11/12/2025   }    Ned Cordero MA  04/15/25, 13:25 EDT

## 2025-04-15 NOTE — TELEPHONE ENCOUNTER
"Relay     \"Hello!    Your appointment with Dr. Poon on 04/17/25 has been canceled as Dr. Poon will be in surgery. Please call [phone] and we will get your appointment rescheduled at your convenience.HUB - ok to reschedule.  \"                 "

## 2025-04-17 ENCOUNTER — PROCEDURE VISIT (OUTPATIENT)
Dept: UROLOGY | Facility: CLINIC | Age: 75
End: 2025-04-17
Payer: MEDICARE

## 2025-04-17 DIAGNOSIS — R31.0 GROSS HEMATURIA: Primary | ICD-10-CM

## 2025-04-17 NOTE — PROGRESS NOTES
Preprocedure diagnosis  Gross hematuria  History of UTI    Postprocedure diagnosis  Gross hematuria  History of UTI    Procedure  Flexible Cystourethroscopy    Attending surgeon  Andrea Poon MD    Anesthesia  2% lidocaine jelly intraurethrally    Complications  None    Indications  74 y.o. female undergoing a flexible cystoscopy for the above mentioned indications.      CT urogram reviewed. No concerns.     Informed consent was obtained prior to the procedure start.       Findings  Cystoscopy revealed normal bladder mucosa with NO tumors, masses, stones or trabeculations noted.      Procedure  The patient was placed in supine position and prepped and draped in sterile fashion with lidocaine jelly instilled 5 minutes pre-procedure start.  A brief timeout including available nursing staff and awake patient was performed.  The 16 Fr digital flexible cystoscope was lubricated and gently placed into the urethral meatus. The proximal and distal portions of the urethra appeared well vascularized and normal in appearance. The bladder neck was visualized and appeared well vascularized without mucosal lesions or abnormal appearance. The bladder was then entered and  completely visualized including the trigone. There were bilateral orthotopic ureteral orifices which appeared patent and effluxed clear yellow urine. The posterior wall, lateral walls, anterior wall, and dome were visualized. The cystoscope was then retroflexed and the bladder neck was further visualized and appeared normal.  The scope was gently withdrawn and the procedure terminated.  The patient tolerated the procedure well.       Follow Up:   Patient is a 74-year-old postmenopausal female who had gross hematuria likely due to urinary tract infection exacerbated by Plavix use.  Cystoscopy today negative.  CT urogram negative.  She is using vaginal estrogen tablets.  This is appropriate.  She is also using cranberry supplementation.  She will continue  following up with nurse practitioner for UTI evaluation, if worsening urinary tract infection frequency recommend initiate prophylaxis, either methenamine hippurate pending her renal function or low-dose nightly antibiotic for sterilization.  I would avoid preventative medications at this time given her history of CKD unless necessary.  If worsening renal function over time recommend PCP send her to a nephrologist.    Andrea Poon MD

## 2025-04-28 ENCOUNTER — TELEPHONE (OUTPATIENT)
Dept: INTERNAL MEDICINE | Facility: CLINIC | Age: 75
End: 2025-04-28
Payer: MEDICARE

## 2025-04-28 RX ORDER — HYDROCORTISONE 25 MG/G
CREAM TOPICAL 2 TIMES DAILY
Qty: 28 G | Refills: 0 | Status: SHIPPED | OUTPATIENT
Start: 2025-04-28

## 2025-04-28 NOTE — TELEPHONE ENCOUNTER
Caller: Kavitha Barnhart    Relationship: Self    Best call back number: [phone]     What medication are you requesting: HEMORRHOID MEDICATION    What are your current symptoms: BLEEDING HEMORRHOID    How long have you been experiencing symptoms: SATURDAY    Have you had these symptoms before:    [x] Yes  [] No    Have you been treated for these symptoms before:   [] Yes  [x] No    If a prescription is needed, what is your preferred pharmacy and phone number:  [facility], [facility]. [place], [place] - [facility] [address].  [phone]  - [phone] FX     Additional notes:

## 2025-04-28 NOTE — TELEPHONE ENCOUNTER
I have sent in proctocream HC to use BID.  If not better in 5 days or if worsens at anytime needs to be seen.  Farzad Redding MD  16:11 EDT  04/28/25

## 2025-04-28 NOTE — TELEPHONE ENCOUNTER
Pt notified of message   I have sent in proctocream HC to use BID.  If not better in 5 days or if worsens at anytime needs to be seen.  Farzad Redding MD  16:11 EDT  04/28/25   Good understanding verbalized.

## 2025-05-01 ENCOUNTER — OFFICE VISIT (OUTPATIENT)
Dept: INTERNAL MEDICINE | Facility: CLINIC | Age: 75
End: 2025-05-01
Payer: MEDICARE

## 2025-05-01 VITALS
SYSTOLIC BLOOD PRESSURE: 118 MMHG | HEART RATE: 72 BPM | TEMPERATURE: 98.7 F | HEIGHT: 59 IN | WEIGHT: 128.8 LBS | BODY MASS INDEX: 25.96 KG/M2 | RESPIRATION RATE: 16 BRPM | DIASTOLIC BLOOD PRESSURE: 76 MMHG

## 2025-05-01 DIAGNOSIS — E78.5 HYPERLIPIDEMIA, UNSPECIFIED HYPERLIPIDEMIA TYPE: ICD-10-CM

## 2025-05-01 DIAGNOSIS — I10 ESSENTIAL HYPERTENSION: Primary | ICD-10-CM

## 2025-05-01 DIAGNOSIS — K64.9 HEMORRHOIDS, UNSPECIFIED HEMORRHOID TYPE: ICD-10-CM

## 2025-05-01 DIAGNOSIS — K62.5 RECTAL BLEEDING: ICD-10-CM

## 2025-05-01 DIAGNOSIS — K21.9 GASTROESOPHAGEAL REFLUX DISEASE WITHOUT ESOPHAGITIS: ICD-10-CM

## 2025-05-01 DIAGNOSIS — E03.9 HYPOTHYROIDISM, UNSPECIFIED TYPE: ICD-10-CM

## 2025-05-01 DIAGNOSIS — I25.10 CORONARY ARTERY DISEASE INVOLVING NATIVE CORONARY ARTERY OF NATIVE HEART WITHOUT ANGINA PECTORIS: ICD-10-CM

## 2025-05-01 LAB
ALBUMIN SERPL-MCNC: 4.5 G/DL (ref 3.5–5.2)
ALBUMIN/GLOB SERPL: 1.6 G/DL
ALP SERPL-CCNC: 83 U/L (ref 39–117)
ALT SERPL W P-5'-P-CCNC: 19 U/L (ref 1–33)
ANION GAP SERPL CALCULATED.3IONS-SCNC: 11 MMOL/L (ref 5–15)
AST SERPL-CCNC: 22 U/L (ref 1–32)
BASOPHILS # BLD AUTO: 0.06 10*3/MM3 (ref 0–0.2)
BASOPHILS NFR BLD AUTO: 1.1 % (ref 0–1.5)
BILIRUB SERPL-MCNC: 0.7 MG/DL (ref 0–1.2)
BUN SERPL-MCNC: 24 MG/DL (ref 8–23)
BUN/CREAT SERPL: 20.2 (ref 7–25)
CALCIUM SPEC-SCNC: 10 MG/DL (ref 8.6–10.5)
CHLORIDE SERPL-SCNC: 103 MMOL/L (ref 98–107)
CHOLEST SERPL-MCNC: 136 MG/DL (ref 0–200)
CO2 SERPL-SCNC: 28 MMOL/L (ref 22–29)
CREAT SERPL-MCNC: 1.19 MG/DL (ref 0.57–1)
DEPRECATED RDW RBC AUTO: 42.2 FL (ref 37–54)
EGFRCR SERPLBLD CKD-EPI 2021: 47.5 ML/MIN/1.73
EOSINOPHIL # BLD AUTO: 0.21 10*3/MM3 (ref 0–0.4)
EOSINOPHIL NFR BLD AUTO: 3.8 % (ref 0.3–6.2)
ERYTHROCYTE [DISTWIDTH] IN BLOOD BY AUTOMATED COUNT: 13.8 % (ref 12.3–15.4)
GLOBULIN UR ELPH-MCNC: 2.9 GM/DL
GLUCOSE SERPL-MCNC: 95 MG/DL (ref 65–99)
HCT VFR BLD AUTO: 42.7 % (ref 34–46.6)
HDLC SERPL-MCNC: 49 MG/DL (ref 40–60)
HGB BLD-MCNC: 14.7 G/DL (ref 12–15.9)
IMM GRANULOCYTES # BLD AUTO: 0.01 10*3/MM3 (ref 0–0.05)
IMM GRANULOCYTES NFR BLD AUTO: 0.2 % (ref 0–0.5)
LDLC SERPL CALC-MCNC: 59 MG/DL (ref 0–100)
LDLC/HDLC SERPL: 1.11 {RATIO}
LYMPHOCYTES # BLD AUTO: 1.91 10*3/MM3 (ref 0.7–3.1)
LYMPHOCYTES NFR BLD AUTO: 34.7 % (ref 19.6–45.3)
MCH RBC QN AUTO: 29.2 PG (ref 26.6–33)
MCHC RBC AUTO-ENTMCNC: 34.4 G/DL (ref 31.5–35.7)
MCV RBC AUTO: 84.9 FL (ref 79–97)
MONOCYTES # BLD AUTO: 0.51 10*3/MM3 (ref 0.1–0.9)
MONOCYTES NFR BLD AUTO: 9.3 % (ref 5–12)
NEUTROPHILS NFR BLD AUTO: 2.81 10*3/MM3 (ref 1.7–7)
NEUTROPHILS NFR BLD AUTO: 50.9 % (ref 42.7–76)
NRBC BLD AUTO-RTO: 0 /100 WBC (ref 0–0.2)
PLATELET # BLD AUTO: 161 10*3/MM3 (ref 140–450)
PMV BLD AUTO: 11.6 FL (ref 6–12)
POTASSIUM SERPL-SCNC: 4.1 MMOL/L (ref 3.5–5.2)
PROT SERPL-MCNC: 7.4 G/DL (ref 6–8.5)
RBC # BLD AUTO: 5.03 10*6/MM3 (ref 3.77–5.28)
SODIUM SERPL-SCNC: 142 MMOL/L (ref 136–145)
T4 FREE SERPL-MCNC: 1.5 NG/DL (ref 0.92–1.68)
TRIGL SERPL-MCNC: 164 MG/DL (ref 0–150)
TSH SERPL DL<=0.05 MIU/L-ACNC: 1.67 UIU/ML (ref 0.27–4.2)
VLDLC SERPL-MCNC: 28 MG/DL (ref 5–40)
WBC NRBC COR # BLD AUTO: 5.51 10*3/MM3 (ref 3.4–10.8)

## 2025-05-01 PROCEDURE — 80061 LIPID PANEL: CPT | Performed by: INTERNAL MEDICINE

## 2025-05-01 PROCEDURE — 85025 COMPLETE CBC W/AUTO DIFF WBC: CPT | Performed by: INTERNAL MEDICINE

## 2025-05-01 PROCEDURE — 80053 COMPREHEN METABOLIC PANEL: CPT | Performed by: INTERNAL MEDICINE

## 2025-05-01 PROCEDURE — 84443 ASSAY THYROID STIM HORMONE: CPT | Performed by: INTERNAL MEDICINE

## 2025-05-01 PROCEDURE — 84439 ASSAY OF FREE THYROXINE: CPT | Performed by: INTERNAL MEDICINE

## 2025-05-06 NOTE — ASSESSMENT & PLAN NOTE
Known asthma. Recently on steroids for exacerbation. Would avoid pilocarpine with history of asthma  Had large evaluation with pulmonary and cardiology that was apparently normal including echo.  Dyspnea is better since stent.

## 2025-05-06 NOTE — ASSESSMENT & PLAN NOTE
Degenerative in nature.  She takes Celebrex from her primary care.  I urged her minimize this.

## 2025-05-06 NOTE — ASSESSMENT & PLAN NOTE
Diagnosed 2000.   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    No immunosuppressive medications since that time.  Manifestations have primarily been oral ulcerations, photosensitivity, joint pain, and alopecia.    2020 labs revealed positive crithidia double-stranded DNA with all other subtypes being negative.  Rheumatoid factor negative and CCP antibody negative..    Historically, by symptoms, and by labs, this appears more lupus-like.    Minor degenerative pain and dry eyes.   Fatigue is stable.   Her joint pain seems degenerative in nature.   No joint swelling. Symptoms are low grade today.   Pain score is 3/10.  She does not want to start medication for lupus. She would prefer close f/u instead.  Labs from PCP reviewed and are stable.   I will plan to re-evaluate her in 4 months

## 2025-05-06 NOTE — ASSESSMENT & PLAN NOTE
This causes a fair amount of pain for her  We discussed we could inject with steroids PRN. She will let us know if she would like to try this  Exercises given  She takes Celebrex from PCP  She can use voltaren gel.

## 2025-05-06 NOTE — PROGRESS NOTES
Office Follow Up      Date: 05/07/2025   Patient Name: Kavitha Barnhart  MRN: [id number]  YOB: 1950    Referring Physician: No ref. provider found     Chief Complaint: Mixed connective tissue disease    History of Present Illness: Kavitha Barnhart is a 74 y.o. female who is here today for follow up on mixed connective tissue disease  She carries a diagnosis of mixed connective tissue disease but historically her symptoms have been more lupus-like.  She also has had a positive double-stranded DNA.   She previously took Plaquenil but this was stopped due to ocular toxicity.   She declines starting any medication for lupus historically.   In interim, had stent placed. Did well.   Continued dry eyes. She did not start Tyrvaya.. She follows with Dr. Aguirre at  opthalmology. She reports Restasis helps some. .  Knees are sore. Had injection with ortho. It helped. No swelling, warmth, erythema. Knees are still stiff.   She continues on Celebrex PRN. She does have mild renal insufficiency. Her PCP prescribes her this.  No rash, alopecia, oral/nasal ulcers, or Raynaud's. No fevers.   Had dyspnea with extreme exertion. Saw pulmonary and cardiology. Has  stent.   Systems do not include CNS/PNS, GI, pulmonary, renal and cardiovascular  Systems involved include, integumentary and musculoskeletal.  The problem is unchanged.  Risk factors include female gender.  Symptoms are aggravated by UV/light exposure.  Relieving factors include antimalarials. Relevant history includes positive LARRY and positive dsDNA antibodies. Relevant history does not include positive Cordero antibodies, positive phospholipid antibodies, proteinuria, renal insufficiency, low complements, SSA/SSB or RNP.  Associated symptoms include arthralgias and malaise.  Pertinent negatives include discoid rash, dizziness, endocrine dysfunction, fatigue, headache, leukopenia, malar rash, muscle weakness, myocarditis, nephritis, numbness or  paresthesia, pericarditis, photosensitivity, pleuritic pain, pneumonitis, proteinuria, Raynaud's phenomenon, retinitis, seizures, unexplained fever, unusual hair loss, vascular symptoms and vision changes.      Subjective   Review of Systems: Review of Systems   Constitutional:  Positive for activity change, chills and fatigue. Negative for fever and unexpected weight loss.   HENT:  Positive for mouth sores, postnasal drip, sinus pressure, sneezing and tinnitus. Negative for dental problem and swollen glands.         Dry mouth   Eyes:  Positive for pain and visual disturbance. Negative for photophobia and redness.        Dry eyes   Respiratory:  Positive for cough. Negative for apnea, chest tightness and shortness of breath.    Cardiovascular:  Positive for chest pain. Negative for leg swelling.   Gastrointestinal:  Positive for abdominal pain, anal bleeding, blood in stool, constipation, diarrhea, nausea and rectal pain. Negative for GERD.   Endocrine: Positive for cold intolerance.   Genitourinary:  Positive for breast pain, dysuria and frequency. Negative for hematuria.   Skin:  Negative for dry skin, rash, skin lesions and bruise.   Allergic/Immunologic: Positive for environmental allergies.   Neurological:  Negative for dizziness, seizures, syncope, weakness, headache and memory problem.   Hematological:  Negative for adenopathy. Does not bruise/bleed easily.   Psychiatric/Behavioral:  Negative for sleep disturbance, suicidal ideas, depressed mood and stress. The patient is not nervous/anxious.         Medications:   Current Outpatient Medications:     albuterol sulfate  (90 Base) MCG/ACT inhaler, TAKE 2 PUFFS BY MOUTH EVERY 4 HOURS AS NEEDED FOR WHEEZE., Disp: 6.7 g, Rfl: 4    aspirin 81 MG EC tablet, Take 1 tablet by mouth Daily., Disp: 90 tablet, Rfl: 3    celecoxib (CeleBREX) 100 MG capsule, TAKE 1 CAPSULE BY MOUTH 2 (TWO) TIMES A DAY AS NEEDED FOR MILD PAIN., Disp: 60 capsule, Rfl: 3    clopidogrel  (PLAVIX) 75 MG tablet, Take 1 tablet by mouth Daily., Disp: 90 tablet, Rfl: 0    cycloSPORINE (RESTASIS) 0.05 % ophthalmic emulsion, Administer 1 drop to both eyes 2 (Two) Times a Day., Disp: , Rfl:     dicyclomine (BENTYL) 10 MG capsule, Take 1 capsule by mouth 2 (Two) Times a Day As Needed for Abdominal Cramping., Disp: 60 capsule, Rfl: 11    fexofenadine (ALLEGRA) 180 MG tablet, Take 1 tablet by mouth Daily As Needed (allergies)., Disp: , Rfl:     Fluticasone-Salmeterol (ADVAIR/WIXELA) 100-50 MCG/ACT DISKUS, INHALE 1 PUFF 2 (TWO) TIMES A DAY. (Patient taking differently: Inhale 1 puff 2 (Two) Times a Day As Needed.), Disp: 60 each, Rfl: 5    Hydrocortisone, Perianal, (ANUSOL-HC) 2.5 % rectal cream, Insert  into the rectum 2 (Two) Times a Day., Disp: 28 g, Rfl: 0    losartan-hydrochlorothiazide (HYZAAR) 100-25 MG per tablet, TAKE 1 TABLET BY MOUTH EVERY DAY., Disp: 90 tablet, Rfl: 2    metoprolol succinate XL (TOPROL-XL) 100 MG 24 hr tablet, TAKE 1 TABLET BY MOUTH DAILY., Disp: 90 tablet, Rfl: 2    rosuvastatin (CRESTOR) 5 MG tablet, TAKE 1 TABLET BY MOUTH DAILY., Disp: 90 tablet, Rfl: 4    Synthroid 75 MCG tablet, Take 1 tablet by mouth Daily., Disp: 90 tablet, Rfl: 3    Yuvafem 10 MCG tablet vaginal tablet, APPLY 1 TABLET IN THE VAGINA 2 TIMES WEEKLY., Disp: , Rfl:     Allergies:   Allergies   Allergen Reactions    Tenormin [Atenolol] Cough and Angioedema    Macrobid [Nitrofurantoin Monohyd Macro] Swelling and Other (See Comments)     Swelling around eyes, fatigue    Minoxidil Swelling     Feet swelling.    Plaquenil [Hydroxychloroquine Sulfate] Other (See Comments)     Vision changes over time     Shingrix [Zoster Vac Recomb Adjuvanted] Rash    Omeprazole Diarrhea    Ace Inhibitors Cough    Sudafed [Pseudoephedrine Hcl] Other (See Comments)     INCREASED PRESSURE IN EYES        I have reviewed and updated the patient's chief complaint, history of present illness, review of systems, past medical history,  "surgical history, family history, social history, medications and allergy list as appropriate.     Objective    Vital Signs:   Vitals:    05/07/25 1624   BP: 142/90   BP Location: Left arm   Patient Position: Sitting   Cuff Size: Adult   Pulse: 89   Resp: 16   Temp: 97.3 °F (36.3 °C)   TempSrc: Temporal   Weight: 58.5 kg (128 lb 14.4 oz)   Height: 149.9 cm (59.02\")   PainSc: 4    PainLoc: Foot  Comment: bilateral     Body mass index is 26.02 kg/m².    Physical Exam:  GENERAL:  The patient is well-developed and well nourished.  Cooperative and oriented x3.  Affect is normal.  Hydration appears normal.  HEENT:  Normocephalic and atraumatic.  No notable alopecia. Hair is thinning. No facial rash.  Lids and conjunctiva are normal.  Pupils are equal and sclerae are clear.  I see no oral or nasal ulcers.  Lips, teeth, and gums are within normal limits.  Oropharynx is clear.   NECK:  Neck is supple without adenopathy, masses, or thyromegaly.  CARDIOVASCULAR:  Normal S1, S2.  No murmurs are heard.  LUNGS:  Clear to auscultation bilaterally  ABDOMEN:  Soft and nontender without HSM.   EXTREMITIES:  No edema.  No cyanosis or clubbing.  SKIN:  She has some telangiectasias on her chest and neck. No rashes.   NEUROLOGIC:  Gait is normal.   MUSCULOSKELETAL:  Complete joint exam was performed.  There was full range of motion of the shoulders, elbows, wrists, and hands without notable deformities, soft tissue swelling, synovitis, or atrophy except as noted. There is squaring of the 1st CMC joint bilaterally.  Hips have good flexion and internal and external rotation.  Knees have no palpable effusions and are nontender, nonerythematous.  There is full flexion and full extension of the knees without pain.  Ankles have no soft tissue swelling or synovitis or major deformities.  There is bony enlargement of the 1st MTP with the left being worse than the right.  BACK:  Straight without scoliosis.  Procedures   Assessment / Plan  "     Assessment & Plan  Mixed connective tissue disease  Diagnosed 2000.   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    No immunosuppressive medications since that time.  Manifestations have primarily been oral ulcerations, photosensitivity, joint pain, and alopecia.    2020 labs revealed positive crithidia double-stranded DNA with all other subtypes being negative.  Rheumatoid factor negative and CCP antibody negative..    Historically, by symptoms, and by labs, this appears more lupus-like.    Minor degenerative pain and dry eyes.   Fatigue is stable.   Her joint pain seems degenerative in nature.   No joint swelling. Symptoms are low grade today.   Pain score is 3/10.  She does not want to start medication for lupus. She would prefer close f/u instead.  Labs from PCP reviewed and are stable.   I will plan to re-evaluate her in 4 months       HUA (dyspnea on exertion)  Known asthma. Recently on steroids for exacerbation. Would avoid pilocarpine with history of asthma  Had large evaluation with pulmonary and cardiology that was apparently normal including echo.  Dyspnea is better since stent.        Multiple joint pain  Degenerative in nature.  She takes Celebrex from her primary care.  I urged her minimize this.        Primary osteoarthritis of both first carpometacarpal joints  This causes a fair amount of pain for her  We discussed we could inject with steroids PRN. She will let us know if she would like to try this  Exercises given  She takes Celebrex from PCP  She can use voltaren gel.             Follow Up:   Return in about 4 months (around 9/7/2025).        Willi Luke MD  [facility]

## 2025-05-07 ENCOUNTER — OFFICE VISIT (OUTPATIENT)
Age: 75
End: 2025-05-07
Payer: MEDICARE

## 2025-05-07 VITALS
WEIGHT: 128.9 LBS | HEIGHT: 59 IN | TEMPERATURE: 97.3 F | SYSTOLIC BLOOD PRESSURE: 142 MMHG | RESPIRATION RATE: 16 BRPM | HEART RATE: 89 BPM | DIASTOLIC BLOOD PRESSURE: 90 MMHG | BODY MASS INDEX: 25.99 KG/M2

## 2025-05-07 DIAGNOSIS — R06.09 DOE (DYSPNEA ON EXERTION): ICD-10-CM

## 2025-05-07 DIAGNOSIS — M18.0 PRIMARY OSTEOARTHRITIS OF BOTH FIRST CARPOMETACARPAL JOINTS: ICD-10-CM

## 2025-05-07 DIAGNOSIS — M35.1 MIXED CONNECTIVE TISSUE DISEASE: Primary | ICD-10-CM

## 2025-05-07 DIAGNOSIS — M25.50 MULTIPLE JOINT PAIN: ICD-10-CM

## 2025-05-19 ENCOUNTER — TREATMENT (OUTPATIENT)
Dept: CARDIAC REHAB | Facility: HOSPITAL | Age: 75
End: 2025-05-19

## 2025-05-19 DIAGNOSIS — Z00.00 PREVENTATIVE HEALTH CARE: Primary | ICD-10-CM

## 2025-05-26 NOTE — PROGRESS NOTES
Chief Complaint   Patient presents with    Hypertension     Follow up     Abdominal Cramping     Cramping and pt called last week for blood in stool - took medication and it has cleared up        History of Present Illness    The patient presents for a follow-up related to hypertension. The patient reports that she has had no headaches, chest pain, dyspnea, edema, syncope, blurred vision or palpitations. She states that she is taking her medication as prescribed. She is not having medication side effects.    The patient presents for a follow-up related to hyperlipidemia. She is following a low fat diet. She reports that she is exercising. She is taking rosuvastatin. The patient is taking her medication as prescribed. She reports no medication side effects, including muscle cramps, abdominal pain, headaches or weakness. She denies orthopnea, paroxysmal nocturnal dyspnea or dyspnea on exertion.    The patient presents for a follow-up related to hypothyroidism. She reports a good energy level. She reports no hair loss, heat intolerance, cold intolerance, diarrhea, constipation or sweats. She is taking her medication as prescribed.    The patient presents for a follow-up related to GERD. The patient is not on medication for her gastroesophageal reflux. She reports rectal bleeding but she denies abdominal pain, belching, dysphagia, early satiety, heartburn, hoarseness, nausea, odynophagia or vomiting. The GERD has no known aggravating factors.    The patient presents for a follow-up related to coronary artery disease. She denies diaphoresis. She takes an aspirin daily.    The patient has a one week history of bright red blood per rectum. The patient denies a change in bowel habits. The stool caliber is normal. The patient denies diarrhea. There has not been constipation. The patient has not had a fever. There is no associated rectal pain. The patient denies emesis. The patient denies abdominal pain. There has not been  any unexplained weight loss. The patient denies nose bleeds. There has been lightheadedness.       There is no family history of GI malignancy.    Medications      Current Outpatient Medications:     albuterol sulfate  (90 Base) MCG/ACT inhaler, TAKE 2 PUFFS BY MOUTH EVERY 4 HOURS AS NEEDED FOR WHEEZE., Disp: 6.7 g, Rfl: 4    aspirin 81 MG EC tablet, Take 1 tablet by mouth Daily., Disp: 90 tablet, Rfl: 3    celecoxib (CeleBREX) 100 MG capsule, TAKE 1 CAPSULE BY MOUTH 2 (TWO) TIMES A DAY AS NEEDED FOR MILD PAIN., Disp: 60 capsule, Rfl: 3    clopidogrel (PLAVIX) 75 MG tablet, Take 1 tablet by mouth Daily., Disp: 90 tablet, Rfl: 0    cycloSPORINE (RESTASIS) 0.05 % ophthalmic emulsion, Administer 1 drop to both eyes 2 (Two) Times a Day., Disp: , Rfl:     dicyclomine (BENTYL) 10 MG capsule, Take 1 capsule by mouth 2 (Two) Times a Day As Needed for Abdominal Cramping., Disp: 60 capsule, Rfl: 11    fexofenadine (ALLEGRA) 180 MG tablet, Take 1 tablet by mouth Daily As Needed (allergies)., Disp: , Rfl:     Fluticasone-Salmeterol (ADVAIR/WIXELA) 100-50 MCG/ACT DISKUS, INHALE 1 PUFF 2 (TWO) TIMES A DAY. (Patient taking differently: Inhale 1 puff 2 (Two) Times a Day As Needed.), Disp: 60 each, Rfl: 5    Hydrocortisone, Perianal, (ANUSOL-HC) 2.5 % rectal cream, Insert  into the rectum 2 (Two) Times a Day., Disp: 28 g, Rfl: 0    losartan-hydrochlorothiazide (HYZAAR) 100-25 MG per tablet, TAKE 1 TABLET BY MOUTH EVERY DAY., Disp: 90 tablet, Rfl: 2    metoprolol succinate XL (TOPROL-XL) 100 MG 24 hr tablet, TAKE 1 TABLET BY MOUTH DAILY., Disp: 90 tablet, Rfl: 2    rosuvastatin (CRESTOR) 5 MG tablet, TAKE 1 TABLET BY MOUTH DAILY., Disp: 90 tablet, Rfl: 4    Synthroid 75 MCG tablet, Take 1 tablet by mouth Daily., Disp: 90 tablet, Rfl: 3    Yuvafem 10 MCG tablet vaginal tablet, APPLY 1 TABLET IN THE VAGINA 2 TIMES WEEKLY., Disp: , Rfl:      Allergies    Allergies   Allergen Reactions    Tenormin [Atenolol] Cough and Angioedema  "   Macrobid [Nitrofurantoin Monohyd Macro] Swelling and Other (See Comments)     Swelling around eyes, fatigue    Minoxidil Swelling     Feet swelling.    Plaquenil [Hydroxychloroquine Sulfate] Other (See Comments)     Vision changes over time     Shingrix [Zoster Vac Recomb Adjuvanted] Rash    Omeprazole Diarrhea    Ace Inhibitors Cough    Sudafed [Pseudoephedrine Hcl] Other (See Comments)     INCREASED PRESSURE IN EYES        Problem List    Patient Active Problem List   Diagnosis    Primary hypertension    Peripheral vascular disease    Diverticulosis    Colon polyps    Constipation    Hashimoto's thyroiditis    Mixed connective tissue disease    Lupus    Uterine fibroid    Atrial tachycardia    Solitary thyroid nodule    History of asthma    GERD    Gallbladder calculus without cholecystitis and no obstruction    HUA (dyspnea on exertion)    Chronic pain of right knee    Shortness of breath    Multiple joint pain    Osteopenia of multiple sites    Photosensitivity of skin    Gastroesophageal reflux disease    Primary osteoarthritis of both first carpometacarpal joints    Hypothyroidism (acquired)    Progressive angina    Coronary artery disease of native artery of native heart with stable angina pectoris       Medications, Allergies, Problems List and Past History were reviewed and updated.    Physical Examination    /76 (BP Location: Left arm, Patient Position: Sitting, Cuff Size: Adult)   Pulse 72   Temp 98.7 °F (37.1 °C) (Infrared)   Resp 16   Ht 149.9 cm (59\")   Wt 58.4 kg (128 lb 12.8 oz)   LMP  (LMP Unknown)   BMI 26.01 kg/m²       HEENT: Head- Normocephalic Atraumatic. Facies- Within normal limits. Pinnas- Normal texture and shape bilaterally. Canals- Normal bilaterally. TMs- Normal bilaterally. Nares- Patent bilaterally. Nasal Septum- is normal. There is no tenderness to palpation over the frontal or maxillary sinuses. Lids- Normal bilaterally. Conjunctiva- Clear bilaterally. Sclera- " Anicteric bilaterally. Oropharynx- Moist with no lesions. Tonsils- No enlargement, erythema or exudate.    Neck: Thyroid- non enlarged, symmetric and has no nodules. No bruits are detected. ROM- Normal Range of Motion with no rigidity.    Lungs: Auscultation- Clear to auscultation bilaterally. There are no retractions, clubbing or cyanosis. The Expiratory to Inspiratory ratio is equal.    Cardiovascular: There are no carotid bruits. Heart- Normal Rate with Regular rhythm and no murmurs. There are no gallops. There are no rubs. In the lower extremities there is no edema. The upper extremities do not have edema.    Abdomen: Soft, benign, non-tender with no masses, hernias, organomegaly or scars.    Rectal: reveals normal external sphincter tone with non-thrombosed external hemorrhoids.    Impression and Assessment    Essential Hypertension.    Hyperlipidemia.    Hypothyroidism.    Gastroesophageal Reflux Disease.    Coronary Artery Disease.    Rectal Bleeding.    Hemorrhoids.    Plan    Gastroesophageal Reflux Disease Plan: The patient was instructed to continue the current medications.    Rectal Bleeding Plan: Medication will be added as noted below.    Hemorrhoids Plan: Medication will be added as noted below.    Essential Hypertension Plan: The patient was instructed to continue the current medications.    Hyperlipidemia Plan: The patient was instructed to exercise daily, eat a low fat diet and continue her medications.    Coronary Artery Disease Plan: The patient was instructed to continue the current medications.    Hypothyroidism Plan: The patient was instructed to continue the current medications. Further plans will be made following results of testing.    Diagnoses and all orders for this visit:    1. Essential hypertension (Primary)  -     CBC & Differential; Future  -     Comprehensive Metabolic Panel; Future  -     CBC & Differential  -     Comprehensive Metabolic Panel    2. Hyperlipidemia, unspecified  hyperlipidemia type  -     Comprehensive Metabolic Panel; Future  -     Lipid Panel; Future  -     Comprehensive Metabolic Panel  -     Lipid Panel    3. Hypothyroidism, unspecified type  -     TSH; Future  -     T4, Free; Future  -     TSH  -     T4, Free    4. Gastroesophageal reflux disease without esophagitis    5. Coronary artery disease involving native coronary artery of native heart without angina pectoris    6. Rectal bleeding  -     CBC & Differential; Future  -     CBC & Differential    7. Hemorrhoids, unspecified hemorrhoid type  -     CBC & Differential; Future  -     CBC & Differential            Return to Office    The patient was instructed to return for follow-up in 6 months. The patient was instructed to return sooner if the condition changes, worsens, or does not resolve.

## 2025-05-28 ENCOUNTER — TREATMENT (OUTPATIENT)
Dept: CARDIAC REHAB | Facility: HOSPITAL | Age: 75
End: 2025-05-28

## 2025-05-28 DIAGNOSIS — Z00.00 PREVENTATIVE HEALTH CARE: Primary | ICD-10-CM

## 2025-05-30 ENCOUNTER — TREATMENT (OUTPATIENT)
Dept: CARDIAC REHAB | Facility: HOSPITAL | Age: 75
End: 2025-05-30

## 2025-05-30 DIAGNOSIS — Z00.00 PREVENTATIVE HEALTH CARE: Primary | ICD-10-CM

## 2025-06-02 ENCOUNTER — TREATMENT (OUTPATIENT)
Dept: CARDIAC REHAB | Facility: HOSPITAL | Age: 75
End: 2025-06-02

## 2025-06-02 DIAGNOSIS — Z00.00 PREVENTATIVE HEALTH CARE: Primary | ICD-10-CM

## 2025-06-04 ENCOUNTER — TREATMENT (OUTPATIENT)
Dept: CARDIAC REHAB | Facility: HOSPITAL | Age: 75
End: 2025-06-04

## 2025-06-04 DIAGNOSIS — Z00.00 PREVENTATIVE HEALTH CARE: Primary | ICD-10-CM

## 2025-06-09 ENCOUNTER — TREATMENT (OUTPATIENT)
Dept: CARDIAC REHAB | Facility: HOSPITAL | Age: 75
End: 2025-06-09

## 2025-06-09 DIAGNOSIS — Z00.00 PREVENTATIVE HEALTH CARE: Primary | ICD-10-CM

## 2025-06-10 ENCOUNTER — TRANSCRIBE ORDERS (OUTPATIENT)
Dept: ADMINISTRATIVE | Facility: HOSPITAL | Age: 75
End: 2025-06-10
Payer: MEDICARE

## 2025-06-10 DIAGNOSIS — Z12.31 ENCOUNTER FOR SCREENING MAMMOGRAM FOR MALIGNANT NEOPLASM OF BREAST: Primary | ICD-10-CM

## 2025-06-16 ENCOUNTER — TREATMENT (OUTPATIENT)
Dept: CARDIAC REHAB | Facility: HOSPITAL | Age: 75
End: 2025-06-16

## 2025-06-16 DIAGNOSIS — Z00.00 PREVENTATIVE HEALTH CARE: Primary | ICD-10-CM

## 2025-06-18 ENCOUNTER — TREATMENT (OUTPATIENT)
Dept: CARDIAC REHAB | Facility: HOSPITAL | Age: 75
End: 2025-06-18

## 2025-06-18 DIAGNOSIS — Z00.00 PREVENTATIVE HEALTH CARE: Primary | ICD-10-CM

## 2025-06-23 ENCOUNTER — TREATMENT (OUTPATIENT)
Dept: CARDIAC REHAB | Facility: HOSPITAL | Age: 75
End: 2025-06-23

## 2025-06-23 DIAGNOSIS — Z00.00 PREVENTATIVE HEALTH CARE: Primary | ICD-10-CM

## 2025-06-25 ENCOUNTER — OFFICE VISIT (OUTPATIENT)
Dept: PULMONOLOGY | Facility: CLINIC | Age: 75
End: 2025-06-25
Payer: MEDICARE

## 2025-06-25 VITALS
WEIGHT: 130 LBS | SYSTOLIC BLOOD PRESSURE: 134 MMHG | HEART RATE: 73 BPM | DIASTOLIC BLOOD PRESSURE: 90 MMHG | BODY MASS INDEX: 26.21 KG/M2 | HEIGHT: 59 IN | OXYGEN SATURATION: 97 %

## 2025-06-25 DIAGNOSIS — R06.09 DOE (DYSPNEA ON EXERTION): Primary | ICD-10-CM

## 2025-06-25 DIAGNOSIS — M35.1 MIXED CONNECTIVE TISSUE DISEASE: ICD-10-CM

## 2025-06-25 NOTE — PROGRESS NOTES
"  PULMONARY  NOTE    Chief Complaint     Dyspnea on exertion, history of asthma, history of lupus/mixed connective tissue disease, coronary artery disease, non-smoker    History of Present Illness     74-year-old female returns today for follow-up  I last saw her June 2024    Non-smoker with a past history of \"cough variant asthma\"    Also history of lupus and mixed connective tissue disease  She continues to follow with Dr. Luke    She has had dyspnea on exertion in the past.  Workup is included chest imaging, PFTs, and a cardiopulmonary exercise test.  The only abnormality on her cardiopulmonary exercise test was a reduced O2 pulse suggesting a cardiac limitation  After I saw her last year she ultimately underwent a left heart catheterization in September and was found to have significant disease in the LAD and underwent stent placement  She has done well since that time and is participating in pulmonary rehabilitation    Patient Active Problem List   Diagnosis    Primary hypertension    Peripheral vascular disease    Diverticulosis    Colon polyps    Constipation    Hashimoto's thyroiditis    Mixed connective tissue disease    Lupus    Uterine fibroid    Atrial tachycardia    Solitary thyroid nodule    History of asthma    GERD    Gallbladder calculus without cholecystitis and no obstruction    HUA (dyspnea on exertion)    Chronic pain of right knee    Shortness of breath    Multiple joint pain    Osteopenia of multiple sites    Photosensitivity of skin    Gastroesophageal reflux disease    Primary osteoarthritis of both first carpometacarpal joints    Hypothyroidism (acquired)    Progressive angina    Coronary artery disease of native artery of native heart with stable angina pectoris      Allergies   Allergen Reactions    Tenormin [Atenolol] Cough and Angioedema    Macrobid [Nitrofurantoin Monohyd Macro] Swelling and Other (See Comments)     Swelling around eyes, fatigue    Minoxidil Swelling     Feet swelling. "    Plaquenil [Hydroxychloroquine Sulfate] Other (See Comments)     Vision changes over time     Shingrix [Zoster Vac Recomb Adjuvanted] Rash    Omeprazole Diarrhea    Ace Inhibitors Cough    Sudafed [Pseudoephedrine Hcl] Other (See Comments)     INCREASED PRESSURE IN EYES        Current Outpatient Medications:     albuterol sulfate  (90 Base) MCG/ACT inhaler, TAKE 2 PUFFS BY MOUTH EVERY 4 HOURS AS NEEDED FOR WHEEZE., Disp: 6.7 g, Rfl: 4    aspirin 81 MG EC tablet, Take 1 tablet by mouth Daily., Disp: 90 tablet, Rfl: 3    celecoxib (CeleBREX) 100 MG capsule, TAKE 1 CAPSULE BY MOUTH 2 (TWO) TIMES A DAY AS NEEDED FOR MILD PAIN., Disp: 60 capsule, Rfl: 3    clopidogrel (PLAVIX) 75 MG tablet, Take 1 tablet by mouth Daily., Disp: 90 tablet, Rfl: 0    cycloSPORINE (RESTASIS) 0.05 % ophthalmic emulsion, Administer 1 drop to both eyes 2 (Two) Times a Day., Disp: , Rfl:     dicyclomine (BENTYL) 10 MG capsule, Take 1 capsule by mouth 2 (Two) Times a Day As Needed for Abdominal Cramping., Disp: 60 capsule, Rfl: 11    fexofenadine (ALLEGRA) 180 MG tablet, Take 1 tablet by mouth Daily As Needed (allergies)., Disp: , Rfl:     Fluticasone-Salmeterol (ADVAIR/WIXELA) 100-50 MCG/ACT DISKUS, INHALE 1 PUFF 2 (TWO) TIMES A DAY. (Patient taking differently: Inhale 1 puff 2 (Two) Times a Day As Needed.), Disp: 60 each, Rfl: 5    losartan-hydrochlorothiazide (HYZAAR) 100-25 MG per tablet, TAKE 1 TABLET BY MOUTH EVERY DAY., Disp: 90 tablet, Rfl: 2    metoprolol succinate XL (TOPROL-XL) 100 MG 24 hr tablet, TAKE 1 TABLET BY MOUTH DAILY., Disp: 90 tablet, Rfl: 2    rosuvastatin (CRESTOR) 5 MG tablet, TAKE 1 TABLET BY MOUTH DAILY., Disp: 90 tablet, Rfl: 4    Synthroid 75 MCG tablet, Take 1 tablet by mouth Daily., Disp: 90 tablet, Rfl: 3    Yuvafem 10 MCG tablet vaginal tablet, APPLY 1 TABLET IN THE VAGINA 2 TIMES WEEKLY., Disp: , Rfl:     Hydrocortisone, Perianal, (ANUSOL-HC) 2.5 % rectal cream, Insert  into the rectum 2 (Two) Times a  Day., Disp: 28 g, Rfl: 0  MEDICATION LIST AND ALLERGIES REVIEWED.    Family History   Problem Relation Age of Onset    Lung cancer Mother     Hyperlipidemia Mother     Thyroid disease Mother     Pancreatic cancer Mother     Cancer Mother     Diabetes Mother     Hypertension Mother     Pancreatitis Mother     Heart disease Father     Hypertension Father     Heart failure Father     Heart attack Father     Hyperlipidemia Father     Cancer Brother     Heart disease Brother         Recently  of heart attack    Hyperlipidemia Brother     Hypertension Brother     Kidney disease Brother         Kidney Failure, Polycystic Kidney Disease    Liver disease Brother     Hyperlipidemia Brother     Hypertension Brother     Kidney disease Brother         Polycystic Kidney Disease    Crohn's disease Brother     Cancer Brother         My brother, Radu, in 2024 underwent  small intestine surgerys because of his Crohn's disease and the pathology report stated that there was a cancerous tumor found, which was approximately 2 cm in size.    Colon cancer Brother         Not colon cancer but a small cancer was found in his small intestine when removed because of long term Crohn's disease    Rheumatologic disease Brother     Thyroid disease Son     Thyroid disease Son     Heart attack Brother          2022 of heart attack    Heart failure Brother     Breast cancer Neg Hx     Ovarian cancer Neg Hx     Colon polyps Neg Hx     Esophageal cancer Neg Hx      Social History     Tobacco Use    Smoking status: Never     Passive exposure: Past    Smokeless tobacco: Never   Vaping Use    Vaping status: Never Used   Substance Use Topics    Alcohol use: Yes     Alcohol/week: 2.0 - 3.0 standard drinks of alcohol     Types: 2 - 3 Glasses of wine per week     Comment: Staying away from wine at this time    Drug use: No     Social History     Social History Narrative    Non-smoker     FAMILY AND SOCIAL HISTORY REVIEWED.    Review of  "Systems  IF PRESENT REFER TO SCANNED ROS SHEET FROM SAME DATE  OTHERWISE ROS OBTAINED AND NON-CONTRIBUTORY OVER HPI.    /90   Pulse 73   Ht 149.9 cm (59\")   Wt 59 kg (130 lb)   LMP  (LMP Unknown)   SpO2 97%   BMI 26.26 kg/m²   Physical Exam  Vitals and nursing note reviewed.   Constitutional:       General: She is not in acute distress.     Appearance: She is well-developed. She is not diaphoretic.   HENT:      Head: Normocephalic and atraumatic.   Neck:      Thyroid: No thyromegaly.   Cardiovascular:      Rate and Rhythm: Normal rate and regular rhythm.      Heart sounds: Normal heart sounds. No murmur heard.  Pulmonary:      Effort: Pulmonary effort is normal.      Breath sounds: Normal breath sounds. No stridor.   Lymphadenopathy:      Cervical: No cervical adenopathy.      Upper Body:      Right upper body: No supraclavicular or epitrochlear adenopathy.      Left upper body: No supraclavicular or epitrochlear adenopathy.   Skin:     General: Skin is warm and dry.   Neurological:      Mental Status: She is alert.   Psychiatric:         Behavior: Behavior normal.         Results     PFTs reveal no airway obstruction, no restriction, and a normal diffusion capacity    Immunization History   Administered Date(s) Administered    Arexvy (RSV, Adults 60+ yrs) 09/20/2024    COVID-19 (PFIZER) 12YRS+ (COMIRNATY) 11/25/2023, 10/14/2024    COVID-19 (PFIZER) BIVALENT 12+YRS 10/19/2022    COVID-19 (PFIZER) Purple Cap Monovalent 01/27/2021, 02/17/2021, 09/18/2021    Covid-19 (Pfizer) Gray Cap Monovalent 05/20/2022    FluMist 2-49yrs 10/01/2016    Fluad Quad 65+ 10/01/2020, 10/09/2021    Fluzone High-Dose 65+YRS 01/01/2014, 10/10/2016, 10/20/2017, 11/06/2018, 10/31/2019, 10/11/2020, 11/22/2021, 10/01/2024    Fluzone High-Dose 65+yrs 10/19/2022, 10/30/2023    Hepatitis A 11/06/2018, 09/03/2019    Pneumococcal Conjugate 13-Valent (PCV13) 02/28/2018    Pneumococcal Polysaccharide (PPSV23) 03/12/2019    Shingrix " 03/28/2023    Tdap 02/19/2018     Problem List       ICD-10-CM ICD-9-CM   1. HUA (dyspnea on exertion)  R06.09 786.09   2. Mixed connective tissue disease  M35.1 710.8       Discussion     We reviewed her test results  PFTs are normal  I reassured her that she has no evidence of autoimmune related interstitial lung disease based on PFTs and exam    Shortness of breath is apparently improved since she underwent her stent    I will plan to see her back in a year with repeat PFTs or earlier if there are any problems in the meantime    Level of service justified based on 32 minutes spent in patient care on this date of service including, but not limited to: preparing to see the patient, obtaining and/or reviewing history, performing medically appropriate examination, ordering tests/medicine/procedures, independently interpreting results, documenting clinical information in EHR, and counseling/education of patient/family/caregiver (excluding time spent on other separate services such as performing procedures or test interpretation, if applicable). (Level 4 30-39 minutes; Level 5 40-54 minutes)    Hayden Gilliland MD  Note electronically signed    CC: Farzad Redding MD

## 2025-06-27 ENCOUNTER — TREATMENT (OUTPATIENT)
Dept: CARDIAC REHAB | Facility: HOSPITAL | Age: 75
End: 2025-06-27

## 2025-06-27 DIAGNOSIS — Z00.00 PREVENTATIVE HEALTH CARE: Primary | ICD-10-CM

## 2025-06-30 ENCOUNTER — APPOINTMENT (OUTPATIENT)
Dept: CARDIAC REHAB | Facility: HOSPITAL | Age: 75
End: 2025-06-30
Payer: MEDICARE

## 2025-06-30 ENCOUNTER — OFFICE VISIT (OUTPATIENT)
Dept: UROLOGY | Facility: CLINIC | Age: 75
End: 2025-06-30
Payer: MEDICARE

## 2025-06-30 VITALS — SYSTOLIC BLOOD PRESSURE: 143 MMHG | DIASTOLIC BLOOD PRESSURE: 82 MMHG | OXYGEN SATURATION: 97 % | HEART RATE: 65 BPM

## 2025-06-30 DIAGNOSIS — R31.29 MICROSCOPIC HEMATURIA: ICD-10-CM

## 2025-06-30 DIAGNOSIS — N39.0 RECURRENT UTI: ICD-10-CM

## 2025-06-30 DIAGNOSIS — R31.0 GROSS HEMATURIA: Primary | ICD-10-CM

## 2025-06-30 LAB
BILIRUB BLD-MCNC: NEGATIVE MG/DL
BILIRUB UR QL STRIP: NEGATIVE
CLARITY UR: CLEAR
CLARITY, POC: CLEAR
COLOR UR: ABNORMAL
COLOR UR: YELLOW
EXPIRATION DATE: ABNORMAL
GLUCOSE UR STRIP-MCNC: NEGATIVE MG/DL
GLUCOSE UR STRIP-MCNC: NEGATIVE MG/DL
HGB UR QL STRIP.AUTO: NEGATIVE
KETONES UR QL STRIP: NEGATIVE
KETONES UR QL: NEGATIVE
LEUKOCYTE EST, POC: NEGATIVE
LEUKOCYTE ESTERASE UR QL STRIP.AUTO: NEGATIVE
Lab: ABNORMAL
NITRITE UR QL STRIP: NEGATIVE
NITRITE UR-MCNC: NEGATIVE MG/ML
PH UR STRIP.AUTO: 7 [PH] (ref 5–8)
PH UR: 6.5 [PH] (ref 5–8)
PROT UR QL STRIP: NEGATIVE
PROT UR STRIP-MCNC: NEGATIVE MG/DL
RBC # UR STRIP: ABNORMAL /UL
SP GR UR STRIP: 1.02 (ref 1–1.03)
SP GR UR: 1.01 (ref 1–1.03)
UROBILINOGEN UR QL STRIP: NORMAL
UROBILINOGEN UR QL: NORMAL

## 2025-06-30 PROCEDURE — 81001 URINALYSIS AUTO W/SCOPE: CPT

## 2025-06-30 RX ORDER — ESTRADIOL 0.1 MG/G
2 CREAM VAGINAL DAILY
Qty: 42.5 G | Refills: 6 | Status: SHIPPED | OUTPATIENT
Start: 2025-06-30

## 2025-06-30 NOTE — PROGRESS NOTES
Gross Hematuria Female Office Visit      Patient Name: Kavitha Barnhart  : 1950   MRN: [id number]     Chief Complaint:  Gross Hematuria.   Chief Complaint   Patient presents with    Blood in Urine     Gross     Urinary Tract Infection       Referring Provider: No ref. provider found    History of Present Illness: Ms. Barnhart is a 74 y.o. female with past medical history including hypertension, peripheral vascular disease, coronary artery disease, Hashimoto's thyroiditis, hypothyroidism, diverticulosis, GERD, constipation, lupus, history of asthma and uterine fibroid. Patient presents for follow-up of gross hematuria and recurrent UTI.    Patient underwent negative gross hematuria work up with Dr. Poon in 2025.since gross hematuria workup patient denies gross hematuria.      Patient presents today with history of recurrent UTI.  UTI related symptoms include dysuria, urinary frequency and suprapubic pain.  Patient denies UTI related symptoms at this time.  Last known UTI 3/22/2025 , + E.coli. Treated with short course antibiotic with symptom improvement.     Reports mild stress urinary incontinence that is not bothersome to her. She wears panty liners daily.     Urinalysis today is negative for infection, positive for +1 blood.    Patient would like to discontinue vaginal estrogen tablet.    Subjective      Review of System: Review of Systems   Genitourinary:         Mild MARIA L   All other systems reviewed and are negative.     I have reviewed the ROS documented by my clinical staff,  I have updated appropriately and I agree. JACK Montes    Past Medical History:  Past Medical History:   Diagnosis Date    Abnormal ECG but low risk assessment    Allergic     See chart    Allergic rhinitis Many years ago    Anemia Prior to menopause    Arrhythmia     Arthritis     Arthritis of back     Arthritis of neck     Asthma     Asthma, intrinsic around     Atrial fibrillation     [facility] Atrial fib    Bursitis of hip     Cataract     Cervical disc disease     Cholelithiasis 2016    Gallbladder removed 2022    Chronic diarrhea     Colon polyp     Coronary artery disease of native artery of native heart with stable angina pectoris 09/24/2024    CTS (carpal tunnel syndrome)     Diastolic dysfunction 06/07/2023    Disease of thyroid gland     Diverticulitis of intestine with perforation     Diverticulosis     E-coli UTI 2019    treated with antibioiutcs and urine rechecked and clean per pt report    Fibroid     GERD (gastroesophageal reflux disease)     Hashimoto's disease     Headache     Heart murmur     fast heart beat; sometimes irregular    Hip arthrosis     History of transfusion     several times, with surgeries post op and after c section, never a reaction    Hyperlipidemia sometimes marginally high    Hypertension     Hyperthyroidism     Thyroid nodule, hashimoto's thyroiditis    Hypothyroidism     Inflammatory bowel disease 2016    Irritable bowel syndrome     Kidney stone     Knee sprain     Knee swelling     Lactose intolerance     Low back pain     sometimes    Lumbosacral disc disease     Lupus     Neuromuscular disorder     Nerve damage lower back    Osteopenia     Palpitations     POSSIBLE ATRIAL TACHYCARDIA    Periarthritis of shoulder     PONV (postoperative nausea and vomiting)     Raynaud's disease     Seizure disorder     Shortness of breath     Sinusitis very long time    Sjogren's disease     SVT (supraventricular tachycardia)     Tennis elbow     Thyroid nodule 2006    Tremor     sometimes hands shake    Visual impairment     Vitamin D deficiency 2000       Past Surgical History:  Past Surgical History:   Procedure Laterality Date    ADENOIDECTOMY      Removed as a child    APPENDECTOMY  03/2016    BONE GRAFT  05/2021    BREAST BIOPSY Left 10/15/2021    excisional    BREAST SURGERY  10/2021    CARDIAC CATHETERIZATION N/A 09/24/2024     Procedure: Left Heart Cath;  Surgeon: Roque Saenz MD;  Location:  [facility];  Service: Cardiovascular;  Laterality: N/A;    CARDIAC CATHETERIZATION N/A 2024    Procedure: Stent MARIE coronary;  Surgeon: Roque Saenz MD;  Location:  [facility];  Service: Cardiovascular;  Laterality: N/A;    CARDIAC CATHETERIZATION       SECTION  1970    CHOLECYSTECTOMY N/A 2022    Procedure: CHOLECYSTECTOMY LAPAROSCOPIC;  Surgeon: Angie López MD;  Location:  [facility];  Service: General;  Laterality: N/A;    COLON RESECTION  2016    sigmoid and partial rectum     COLON SURGERY      COLONOSCOPY      EXPLORATORY LAPAROTOMY      fibroid    ILEOSTOMY CLOSURE N/A 2016    Procedure: ILEOSTOMY  EXCISION AND TAKEDOWN;  Surgeon: Brooks Jacob MD;  Location:  [facility];  Service:     KNEE ARTHROSCOPY      right knee scope    KNEE SURGERY      MYOMECTOMY      PULMONARY STRESS TEST  2023    SMALL INTESTINE SURGERY      TONSILECTOMY, ADENOIDECTOMY, BILATERAL MYRINGOTOMY AND TUBES      TONSILLECTOMY      TOOTH EXTRACTION      2021    TOOTH EXTRACTION      UPPER GASTROINTESTINAL ENDOSCOPY         Medications:    Current Outpatient Medications:     albuterol sulfate  (90 Base) MCG/ACT inhaler, TAKE 2 PUFFS BY MOUTH EVERY 4 HOURS AS NEEDED FOR WHEEZE., Disp: 6.7 g, Rfl: 4    aspirin 81 MG EC tablet, Take 1 tablet by mouth Daily., Disp: 90 tablet, Rfl: 3    celecoxib (CeleBREX) 100 MG capsule, TAKE 1 CAPSULE BY MOUTH 2 (TWO) TIMES A DAY AS NEEDED FOR MILD PAIN., Disp: 60 capsule, Rfl: 3    clopidogrel (PLAVIX) 75 MG tablet, Take 1 tablet by mouth Daily., Disp: 90 tablet, Rfl: 0    cycloSPORINE (RESTASIS) 0.05 % ophthalmic emulsion, Administer 1 drop to both eyes 2 (Two) Times a Day., Disp: , Rfl:     dicyclomine (BENTYL) 10 MG capsule, Take 1 capsule by mouth 2 (Two) Times a Day As Needed for Abdominal Cramping., Disp: 60 capsule, Rfl: 11     fexofenadine (ALLEGRA) 180 MG tablet, Take 1 tablet by mouth Daily As Needed (allergies)., Disp: , Rfl:     Fluticasone-Salmeterol (ADVAIR/WIXELA) 100-50 MCG/ACT DISKUS, INHALE 1 PUFF 2 (TWO) TIMES A DAY. (Patient taking differently: Inhale 1 puff 2 (Two) Times a Day As Needed.), Disp: 60 each, Rfl: 5    losartan-hydrochlorothiazide (HYZAAR) 100-25 MG per tablet, TAKE 1 TABLET BY MOUTH EVERY DAY., Disp: 90 tablet, Rfl: 2    metoprolol succinate XL (TOPROL-XL) 100 MG 24 hr tablet, TAKE 1 TABLET BY MOUTH DAILY., Disp: 90 tablet, Rfl: 2    rosuvastatin (CRESTOR) 5 MG tablet, TAKE 1 TABLET BY MOUTH DAILY., Disp: 90 tablet, Rfl: 4    Synthroid 75 MCG tablet, Take 1 tablet by mouth Daily., Disp: 90 tablet, Rfl: 3    estradiol (ESTRACE) 0.1 MG/GM vaginal cream, Insert 2 g into the vagina Daily. Insert 2 g of vaginal estrogen cream nightly for 2 weeks, then insert 1 g of vaginal estrogen cream 2-3 times per week for UTI prophylaxis., Disp: 42.5 g, Rfl: 6    Allergies:  Allergies   Allergen Reactions    Tenormin [Atenolol] Cough and Angioedema    Macrobid [Nitrofurantoin Monohyd Macro] Swelling and Other (See Comments)     Swelling around eyes, fatigue    Minoxidil Swelling     Feet swelling.    Plaquenil [Hydroxychloroquine Sulfate] Other (See Comments)     Vision changes over time     Shingrix [Zoster Vac Recomb Adjuvanted] Rash    Omeprazole Diarrhea    Ace Inhibitors Cough    Sudafed [Pseudoephedrine Hcl] Other (See Comments)     INCREASED PRESSURE IN EYES        Social History:  Social History     Socioeconomic History    Marital status:    Tobacco Use    Smoking status: Never     Passive exposure: Past    Smokeless tobacco: Never   Vaping Use    Vaping status: Never Used   Substance and Sexual Activity    Alcohol use: Yes     Alcohol/week: 2.0 - 3.0 standard drinks of alcohol     Types: 2 - 3 Glasses of wine per week     Comment: Staying away from wine at this time    Drug use: Never    Sexual activity: Yes      Partners: Male     Birth control/protection: Post-menopausal, None       Family History:  Family History   Problem Relation Age of Onset    Lung cancer Mother     Hyperlipidemia Mother     Thyroid disease Mother     Pancreatic cancer Mother     Cancer Mother     Diabetes Mother     Hypertension Mother     Pancreatitis Mother     Heart disease Father     Hypertension Father     Heart failure Father     Heart attack Father     Hyperlipidemia Father     Cancer Brother     Heart disease Brother         Recently  of heart attack    Hyperlipidemia Brother     Hypertension Brother     Kidney disease Brother         Kidney Failure, Polycystic Kidney Disease    Liver disease Brother     Hyperlipidemia Brother     Hypertension Brother     Kidney disease Brother         Polycystic Kidney Disease    Crohn's disease Brother     Cancer Brother         My brother, Radu, in 2024 underwent  small intestine surgerys because of his Crohn's disease and the pathology report stated that there was a cancerous tumor found, which was approximately 2 cm in size.    Colon cancer Brother         Not colon cancer but a small cancer was found in his small intestine when removed because of long term Crohn's disease    Rheumatologic disease Brother     Thyroid disease Son     Thyroid disease Son     Heart attack Brother          2022 of heart attack    Heart failure Brother     Breast cancer Neg Hx     Ovarian cancer Neg Hx     Colon polyps Neg Hx     Esophageal cancer Neg Hx      Bladder & Bowel Symptom Questionnaire    How often do you usually urinate during the day ?   2 - About every 2-3 hours    2.   How many timed do you urinate at night?   0 - 0-1 time at night   3.   What is the reason that you usually urinate?   2 - Moderate urge (can delay 10-60 min)   4.   Once you get the urge to go, how long can you     comfortably delay?   2 - 10-30 min   5.   How often do you get a sudden urge that makes you rush to the  bathroom?   2 - A few times a month   6.   How often does a sudden urge to urinate result in you leaking urine or wetting pads?   2 - A few times a month   7.  In your opinion, how good is your bladder control?   2 - Good   8.  Do you have accidental bowel leakage?   yes   9.  Do you have difficulty fully emptying your bladder?   no   10.  Do you experience accidental leakage when performing some physical activity such as coughing, sneezing, laughing or exercise?   yes   11. Have you tried medications to help improve your symptoms?   yes   12. Would you be interested in learning about a long-lasting option that may help you with your symptoms?   yes                                                                             Total Score   12     0-7 (Mild) 8-16 (Moderate) 17-28 (Severe)        Post void residual bladder scan:   7 cc     Objective     Physical Exam:   Vital Signs:   Vitals:    06/30/25 1544   BP: 143/82   Pulse: 65   SpO2: 97%     There is no height or weight on file to calculate BMI.     Physical Exam  Vitals and nursing note reviewed.   Constitutional:       Appearance: Normal appearance.   Pulmonary:      Effort: Pulmonary effort is normal.   Neurological:      Mental Status: She is alert and oriented to person, place, and time.   Psychiatric:         Mood and Affect: Mood normal.         Behavior: Behavior normal.         Labs:   Brief Urine Lab Results  (Last result in the past 365 days)        Color   Clarity   Blood   Leuk Est   Nitrite   Protein   CREAT   Urine HCG        06/30/25 1547 Straw   Clear   Trace   Negative   Negative   Negative                   Urine Culture          12/22/2024    16:01 3/21/2025    14:02   Urine Culture   Urine Culture >100,000 CFU/mL Escherichia coli  25,000 CFU/mL Mixed Charu Isolated         Lab Results   Component Value Date    GLUCOSE 95 05/01/2025    CALCIUM 10.0 05/01/2025     05/01/2025    K 4.1 05/01/2025    CO2 28.0 05/01/2025     05/01/2025     BUN 24 (H) 05/01/2025    CREATININE 1.19 (H) 05/01/2025    EGFRIFNONA 59 (L) 10/19/2021    BCR 20.2 05/01/2025    ANIONGAP 11.0 05/01/2025       Lab Results   Component Value Date    WBC 5.51 05/01/2025    HGB 14.7 05/01/2025    HCT 42.7 05/01/2025    MCV 84.9 05/01/2025     05/01/2025       Images:   CT Abdomen Pelvis With & Without Contrast  Result Date: 3/21/2025  1.No renal or ureteric calculi. No masses. 2.Hepatic cysts. 3.Left renal cyst. 4.Diverticulosis without diverticulitis. 5.Uterine fibroids. Electronically Signed: Jossue Fuentes MD  3/21/2025 6:31 PM EDT  Workstation ID: [id number]      Measures:   Tobacco:   Kavitha Barnhart  reports that she has never smoked. She has been exposed to tobacco smoke. She has never used smokeless tobacco.          Urine Incontinence: Patient reports that she is currently experiencing  symptoms of urinary incontinence.     Assessment / Plan      Assessment/Plan:   Ms. Barnhart is a 74 y.o. female who presented today for evaluation of gross hematuria and history of recurrent UTI.  Underwent negative gross hematuria workup with Dr. Poon in April 2025.  Patient denies gross hematuria since workup.  Patient has history of recurrent UTI with last known positive urine culture on 3/22/2025, + E.coli.  Analysis today is negative for infection, positive for RBCs.  We will send urinalysis for UA micro to further evaluate the presence of red blood cells.  I will notify patient once results are available.  Patient would like to discontinue vaginal estrogen tablet and start vaginal estrogen cream for UTI prophylaxis.  Patient is overall happy with current status. We will push her next follow-up out to 6 months.  Patient is understanding and agreeable plan of care.  She will alert questions or concerns in the interim.          Diagnoses and all orders for this visit:    1. Gross hematuria (Primary)  -     POC Urinalysis Dipstick, Automated    2. Recurrent UTI  -     estradiol  (ESTRACE) 0.1 MG/GM vaginal cream; Insert 2 g into the vagina Daily. Insert 2 g of vaginal estrogen cream nightly for 2 weeks, then insert 1 g of vaginal estrogen cream 2-3 times per week for UTI prophylaxis.  Dispense: 42.5 g; Refill: 6    3. Microscopic hematuria  -     Urinalysis With Microscopic - Urine, Clean Catch; Future           Follow Up:   Return in about 6 months (around 12/30/2025) for Next scheduled follow up.    I spent approximately 30 minutes providing clinical care for this patient; including review of patient's chart and provider documentation, face to face time spent with patient in examination room (obtaining history, performing physical exam, discussing diagnosis and management options), placing orders, and completing patient documentation.       JACK Montes  [facility] [place]

## 2025-07-01 LAB
BACTERIA UR QL AUTO: ABNORMAL /HPF
HYALINE CASTS UR QL AUTO: ABNORMAL /LPF
RBC # UR STRIP: ABNORMAL /HPF
REF LAB TEST METHOD: ABNORMAL
SQUAMOUS #/AREA URNS HPF: ABNORMAL /HPF
WBC # UR STRIP: ABNORMAL /HPF

## 2025-07-07 ENCOUNTER — TREATMENT (OUTPATIENT)
Dept: CARDIAC REHAB | Facility: HOSPITAL | Age: 75
End: 2025-07-07

## 2025-07-07 DIAGNOSIS — Z00.00 PREVENTATIVE HEALTH CARE: Primary | ICD-10-CM

## 2025-07-09 ENCOUNTER — TREATMENT (OUTPATIENT)
Dept: CARDIAC REHAB | Facility: HOSPITAL | Age: 75
End: 2025-07-09

## 2025-07-09 DIAGNOSIS — Z00.00 PREVENTATIVE HEALTH CARE: Primary | ICD-10-CM

## 2025-07-11 ENCOUNTER — TREATMENT (OUTPATIENT)
Dept: CARDIAC REHAB | Facility: HOSPITAL | Age: 75
End: 2025-07-11

## 2025-07-11 DIAGNOSIS — Z00.00 PREVENTATIVE HEALTH CARE: Primary | ICD-10-CM

## 2025-07-14 ENCOUNTER — TREATMENT (OUTPATIENT)
Dept: CARDIAC REHAB | Facility: HOSPITAL | Age: 75
End: 2025-07-14

## 2025-07-14 DIAGNOSIS — Z00.00 PREVENTATIVE HEALTH CARE: Primary | ICD-10-CM

## 2025-07-23 ENCOUNTER — RESULTS FOLLOW-UP (OUTPATIENT)
Facility: HOSPITAL | Age: 75
End: 2025-07-23
Payer: MEDICARE

## 2025-07-23 LAB
NCCN CRITERIA FLAG: ABNORMAL
TYRER CUZICK SCORE: 2.1

## 2025-07-26 DIAGNOSIS — I10 ESSENTIAL HYPERTENSION: ICD-10-CM

## 2025-07-27 RX ORDER — LOSARTAN POTASSIUM AND HYDROCHLOROTHIAZIDE 25; 100 MG/1; MG/1
1 TABLET ORAL DAILY
Qty: 90 TABLET | Refills: 1 | Status: SHIPPED | OUTPATIENT
Start: 2025-07-27

## 2025-07-28 ENCOUNTER — DOCUMENTATION (OUTPATIENT)
Dept: GENETICS | Facility: HOSPITAL | Age: 75
End: 2025-07-28
Payer: MEDICARE

## 2025-07-28 ENCOUNTER — HOSPITAL ENCOUNTER (OUTPATIENT)
Dept: MAMMOGRAPHY | Facility: HOSPITAL | Age: 75
Discharge: HOME | End: 2025-07-28
Admitting: NURSE PRACTITIONER
Payer: MEDICARE

## 2025-07-28 DIAGNOSIS — Z12.31 ENCOUNTER FOR SCREENING MAMMOGRAM FOR MALIGNANT NEOPLASM OF BREAST: ICD-10-CM

## 2025-07-28 PROCEDURE — 77063 BREAST TOMOSYNTHESIS BI: CPT

## 2025-07-28 PROCEDURE — 77067 SCR MAMMO BI INCL CAD: CPT

## 2025-07-28 NOTE — PROGRESS NOTES
This patient recently completed the CARE risk assessment for a mammogram appointment. Based on the patient's responses, NCCN criteria for genetic testing was met. At the time of the assessment, the patient was provided with both written and video educational materials regarding genetic testing.    Navigator follow-up: The patient had [facility] w/ [facility] [employer] genetic testing in 2024.  Additional testing is not indicated at this time.

## 2025-07-30 ENCOUNTER — APPOINTMENT (OUTPATIENT)
Dept: CARDIAC REHAB | Facility: HOSPITAL | Age: 75
End: 2025-07-30

## 2025-07-30 PROCEDURE — 77063 BREAST TOMOSYNTHESIS BI: CPT | Performed by: RADIOLOGY

## 2025-07-30 PROCEDURE — 77067 SCR MAMMO BI INCL CAD: CPT | Performed by: RADIOLOGY

## 2025-08-04 ENCOUNTER — TREATMENT (OUTPATIENT)
Dept: CARDIAC REHAB | Facility: HOSPITAL | Age: 75
End: 2025-08-04

## 2025-08-04 DIAGNOSIS — Z00.00 PREVENTATIVE HEALTH CARE: Primary | ICD-10-CM

## 2025-08-13 ENCOUNTER — TREATMENT (OUTPATIENT)
Dept: CARDIAC REHAB | Facility: HOSPITAL | Age: 75
End: 2025-08-13

## 2025-08-13 DIAGNOSIS — Z00.00 PREVENTATIVE HEALTH CARE: Primary | ICD-10-CM

## 2025-08-26 ENCOUNTER — OFFICE VISIT (OUTPATIENT)
Age: 75
End: 2025-08-26
Payer: MEDICARE

## 2025-08-26 ENCOUNTER — HOSPITAL ENCOUNTER (OUTPATIENT)
Dept: GENERAL RADIOLOGY | Facility: HOSPITAL | Age: 75
Discharge: HOME | End: 2025-08-26
Payer: MEDICARE

## 2025-08-26 ENCOUNTER — LAB (OUTPATIENT)
Dept: LAB | Facility: HOSPITAL | Age: 75
End: 2025-08-26
Payer: MEDICARE

## 2025-08-26 VITALS
DIASTOLIC BLOOD PRESSURE: 98 MMHG | TEMPERATURE: 97 F | HEART RATE: 73 BPM | HEIGHT: 59 IN | WEIGHT: 127.3 LBS | SYSTOLIC BLOOD PRESSURE: 146 MMHG | BODY MASS INDEX: 25.66 KG/M2

## 2025-08-26 DIAGNOSIS — M18.0 PRIMARY OSTEOARTHRITIS OF BOTH FIRST CARPOMETACARPAL JOINTS: ICD-10-CM

## 2025-08-26 DIAGNOSIS — M35.1 MIXED CONNECTIVE TISSUE DISEASE: Primary | ICD-10-CM

## 2025-08-26 DIAGNOSIS — R06.09 DOE (DYSPNEA ON EXERTION): ICD-10-CM

## 2025-08-26 DIAGNOSIS — M35.1 MIXED CONNECTIVE TISSUE DISEASE: ICD-10-CM

## 2025-08-26 DIAGNOSIS — M25.50 MULTIPLE JOINT PAIN: ICD-10-CM

## 2025-08-26 DIAGNOSIS — M79.672 LEFT FOOT PAIN: ICD-10-CM

## 2025-08-26 LAB
ALBUMIN SERPL-MCNC: 4.4 G/DL (ref 3.5–5.2)
ALBUMIN/GLOB SERPL: 1.8 G/DL
ALP SERPL-CCNC: 78 U/L (ref 39–117)
ALT SERPL W P-5'-P-CCNC: 16 U/L (ref 1–33)
ANION GAP SERPL CALCULATED.3IONS-SCNC: 14.2 MMOL/L (ref 5–15)
AST SERPL-CCNC: 20 U/L (ref 1–32)
BACTERIA UR QL AUTO: NORMAL /HPF
BASOPHILS # BLD AUTO: 0.05 10*3/MM3 (ref 0–0.2)
BASOPHILS NFR BLD AUTO: 0.8 % (ref 0–1.5)
BILIRUB SERPL-MCNC: 1.1 MG/DL (ref 0–1.2)
BILIRUB UR QL STRIP: NEGATIVE
BUN SERPL-MCNC: 23 MG/DL (ref 8–23)
BUN/CREAT SERPL: 19.8 (ref 7–25)
C3 SERPL-MCNC: 135 MG/DL (ref 82–167)
C4 SERPL-MCNC: 27 MG/DL (ref 14–44)
CALCIUM SPEC-SCNC: 10 MG/DL (ref 8.6–10.5)
CHLORIDE SERPL-SCNC: 104 MMOL/L (ref 98–107)
CLARITY UR: CLEAR
CO2 SERPL-SCNC: 23.8 MMOL/L (ref 22–29)
COLOR UR: YELLOW
CREAT SERPL-MCNC: 1.16 MG/DL (ref 0.57–1)
CRP SERPL-MCNC: <0.3 MG/DL (ref 0–0.5)
DEPRECATED RDW RBC AUTO: 41.3 FL (ref 37–54)
EGFRCR SERPLBLD CKD-EPI 2021: 49 ML/MIN/1.73
EOSINOPHIL # BLD AUTO: 0.22 10*3/MM3 (ref 0–0.4)
EOSINOPHIL NFR BLD AUTO: 3.5 % (ref 0.3–6.2)
ERYTHROCYTE [DISTWIDTH] IN BLOOD BY AUTOMATED COUNT: 13.2 % (ref 12.3–15.4)
GLOBULIN UR ELPH-MCNC: 2.5 GM/DL
GLUCOSE SERPL-MCNC: 100 MG/DL (ref 65–99)
GLUCOSE UR STRIP-MCNC: NEGATIVE MG/DL
HCT VFR BLD AUTO: 42.5 % (ref 34–46.6)
HGB BLD-MCNC: 13.9 G/DL (ref 12–15.9)
HGB UR QL STRIP.AUTO: NEGATIVE
HYALINE CASTS UR QL AUTO: NORMAL /LPF
IMM GRANULOCYTES # BLD AUTO: 0.02 10*3/MM3 (ref 0–0.05)
IMM GRANULOCYTES NFR BLD AUTO: 0.3 % (ref 0–0.5)
KETONES UR QL STRIP: NEGATIVE
LEUKOCYTE ESTERASE UR QL STRIP.AUTO: NEGATIVE
LYMPHOCYTES # BLD AUTO: 1.85 10*3/MM3 (ref 0.7–3.1)
LYMPHOCYTES NFR BLD AUTO: 29.6 % (ref 19.6–45.3)
MCH RBC QN AUTO: 28 PG (ref 26.6–33)
MCHC RBC AUTO-ENTMCNC: 32.7 G/DL (ref 31.5–35.7)
MCV RBC AUTO: 85.5 FL (ref 79–97)
MONOCYTES # BLD AUTO: 0.8 10*3/MM3 (ref 0.1–0.9)
MONOCYTES NFR BLD AUTO: 12.8 % (ref 5–12)
NEUTROPHILS NFR BLD AUTO: 3.31 10*3/MM3 (ref 1.7–7)
NEUTROPHILS NFR BLD AUTO: 53 % (ref 42.7–76)
NITRITE UR QL STRIP: NEGATIVE
NRBC BLD AUTO-RTO: 0 /100 WBC (ref 0–0.2)
PH UR STRIP.AUTO: 7.5 [PH] (ref 5–8)
PLATELET # BLD AUTO: 160 10*3/MM3 (ref 140–450)
PMV BLD AUTO: 11.2 FL (ref 6–12)
POTASSIUM SERPL-SCNC: 4.2 MMOL/L (ref 3.5–5.2)
PROT SERPL-MCNC: 6.9 G/DL (ref 6–8.5)
PROT UR QL STRIP: NEGATIVE
RBC # BLD AUTO: 4.97 10*6/MM3 (ref 3.77–5.28)
RBC # UR STRIP: NORMAL /HPF
REF LAB TEST METHOD: NORMAL
SODIUM SERPL-SCNC: 142 MMOL/L (ref 136–145)
SP GR UR STRIP: 1.01 (ref 1–1.03)
SQUAMOUS #/AREA URNS HPF: NORMAL /HPF
UROBILINOGEN UR QL STRIP: NORMAL
WBC # UR STRIP: NORMAL /HPF
WBC NRBC COR # BLD AUTO: 6.25 10*3/MM3 (ref 3.4–10.8)

## 2025-08-26 PROCEDURE — 73630 X-RAY EXAM OF FOOT: CPT

## 2025-08-26 PROCEDURE — 85652 RBC SED RATE AUTOMATED: CPT

## 2025-08-26 PROCEDURE — 81001 URINALYSIS AUTO W/SCOPE: CPT

## 2025-08-26 PROCEDURE — 86160 COMPLEMENT ANTIGEN: CPT

## 2025-08-26 PROCEDURE — 80053 COMPREHEN METABOLIC PANEL: CPT

## 2025-08-26 PROCEDURE — 36415 COLL VENOUS BLD VENIPUNCTURE: CPT

## 2025-08-26 PROCEDURE — 85025 COMPLETE CBC W/AUTO DIFF WBC: CPT

## 2025-08-26 PROCEDURE — 86140 C-REACTIVE PROTEIN: CPT

## 2025-08-26 PROCEDURE — 86255 FLUORESCENT ANTIBODY SCREEN: CPT

## 2025-08-26 RX ORDER — PREDNISONE 5 MG/1
TABLET ORAL
Qty: 30 TABLET | Refills: 0 | Status: SHIPPED | OUTPATIENT
Start: 2025-08-26 | End: 2025-09-07

## 2025-08-27 LAB — ERYTHROCYTE [SEDIMENTATION RATE] IN BLOOD: 14 MM/HR (ref 0–30)

## (undated) DEVICE — LAPAROSCOPIC SMOKE FILTRATION SYSTEM: Brand: PALL LAPAROSHIELD® PLUS LAPAROSCOPIC SMOKE FILTRATION SYSTEM

## (undated) DEVICE — APPL CHLORAPREP W/TINT 26ML BLU

## (undated) DEVICE — ENDOPOUCH RETRIEVER SPECIMEN RETRIEVAL BAGS: Brand: ENDOPOUCH RETRIEVER

## (undated) DEVICE — ANTIBACTERIAL UNDYED BRAIDED (POLYGLACTIN 910), SYNTHETIC ABSORBABLE SURGICAL SUTURE: Brand: COATED VICRYL

## (undated) DEVICE — [HIGH FLOW INSUFFLATOR,  DO NOT USE IF PACKAGE IS DAMAGED,  KEEP DRY,  KEEP AWAY FROM SUNLIGHT,  PROTECT FROM HEAT AND RADIOACTIVE SOURCES.]: Brand: PNEUMOSURE

## (undated) DEVICE — ST EXT IV SMRTSTE 2VLV FIX M LL 6ML 41

## (undated) DEVICE — ST INF PRI SMRTSTE 20DRP 2VLV 24ML 117

## (undated) DEVICE — MODEL BT2000 P/N 700287-012KIT CONTENTS: MANIFOLD WITH SALINE AND CONTRAST PORTS, SALINE TUBING WITH SPIKE AND HAND SYRINGE, TRANSDUCER: Brand: BT2000 AUTOMATED MANIFOLD KIT

## (undated) DEVICE — NC TREK NEO™ CORONARY DILATATION CATHETER 2.50 X 12 MM / RAPID-EXCHANGE: Brand: NC TREK NEO™

## (undated) DEVICE — GLIDESHEATH SLENDER STAINLESS STEEL KIT: Brand: GLIDESHEATH SLENDER

## (undated) DEVICE — CATH DIAG EXPO .045 FL3  5F 100CM

## (undated) DEVICE — DEV INFL MONARCH 25W

## (undated) DEVICE — CATH DIAG EXPO M/ PK 5F FL4/FR4 PIG

## (undated) DEVICE — PK LAP LASR CHOLE 10

## (undated) DEVICE — ENDOPATH XCEL BLADELESS TROCARS WITH STABILITY SLEEVES: Brand: ENDOPATH XCEL

## (undated) DEVICE — TR BAND RADIAL ARTERY COMPRESSION DEVICE: Brand: TR BAND

## (undated) DEVICE — Device: Brand: ASAHI SION BLUE

## (undated) DEVICE — GW PERIPH GUIDERIGHT STD/EXCHNG/J/TIP SS 0.035IN 5X260CM

## (undated) DEVICE — ENDOPATH XCEL UNIVERSAL TROCAR STABLILITY SLEEVES: Brand: ENDOPATH XCEL

## (undated) DEVICE — ENDOPATH XCEL BLUNT TIP TROCARS WITH SMOOTH SLEEVES: Brand: ENDOPATH XCEL

## (undated) DEVICE — NC TREK NEO™ CORONARY DILATATION CATHETER 3.00 MM X 12 MM / RAPID-EXCHANGE: Brand: NC TREK NEO™

## (undated) DEVICE — GLV SURG SENSICARE PI ORTHO SZ7.5 LF STRL

## (undated) DEVICE — MODEL AT P65, P/N 701554-001KIT CONTENTS: HAND CONTROLLER, 3-WAY HIGH-PRESSURE STOPCOCK WITH ROTATING END AND PREMIUM HIGH-PRESSURE TUBING: Brand: ANGIOTOUCH® KIT

## (undated) DEVICE — GUIDE CATHETER: Brand: MACH1™

## (undated) DEVICE — ENDOCUT SCISSOR TIP, DISPOSABLE: Brand: RENEW

## (undated) DEVICE — SUT MNCRYL PLS ANTIB UD 4/0 PS2 18IN

## (undated) DEVICE — ADULT, W/LG. BACK PAD, RADIOTRANSPARENT ELEMENT AND LEAD WIRE COMPATIBLE W/: Brand: DEFIBRILLATION ELECTRODES

## (undated) DEVICE — PK CATH CARD 10